# Patient Record
Sex: MALE | Race: WHITE | Employment: OTHER | URBAN - METROPOLITAN AREA
[De-identification: names, ages, dates, MRNs, and addresses within clinical notes are randomized per-mention and may not be internally consistent; named-entity substitution may affect disease eponyms.]

---

## 2017-01-09 NOTE — PROGRESS NOTES
Application for renewal of [address] financial assistance faxed to [email or web address] [facility], [phone] (Ph: [phone]) along with tax returns and insurance cards.

## 2017-01-24 NOTE — PROGRESS NOTES
Received notification from [employer] that patient was not approved for continuing assistance for Gleevec due to income limit and that he has drug coverage.   SW called [employer] and they advise that he may appeal with letter of explanation of need and let

## 2017-01-30 NOTE — TELEPHONE ENCOUNTER
Pt states he is having lower left abdomen pain. He is having problems going to the bathroom. Dr. Guille Jacobo informed. MRI ordered, premeds sent to pharmacy and patient should follow up a couple of days after MRI. Pt informed and verbalized understanding.

## 2017-02-03 PROBLEM — R19.5 DECREASED STOOL CALIBER: Status: ACTIVE | Noted: 2017-02-03

## 2017-02-03 NOTE — PROGRESS NOTES
[facility] Progress Note      Patient Name: Andre Torres   YOB: 1950  Medical Record Number: [id number]  Attending Physician: Faith Roy M.D.      Date of Visit: 2/3/2017      Chief Complaint  Gastrointestinal stromal tumor - alcohol use. Current Medications     furosemide 20 MG Oral Tab Take 1 tablet (20 mg total) by mouth 2 (two) times daily as needed (edema).  Disp: 180 tablet Rfl: 1   Levothyroxine Sodium (SYNTHROID) 125 MCG Oral Tab Take 1 tablet (125 mcg total) by nicolas our discussions today.     Laboratory     Recent Results (from the past 48 hour(s))  -COMP METABOLIC PANEL (14)   Collection Time: 02/03/17 11:49 AM   Result Value Ref Range   Glucose 135 (H) 70-99 mg/dL   BUN 17 8-20 mg/dL   Creatinine 1.17 0.70-1.30 mg/dL adenoma is again noted. AORTA/VASCULAR:  Normal.  No aneurysm or dissection.   RETROPERITONEUM:  Normal.  No mass or adenopathy.   BOWEL/MESENTERY:  Normal.  No visible mass, obstruction, or bowel wall thickening.   ABDOMINAL WALL:  Normal.  No mass or thuy symptoms persist or progress, recommend changing regimens to sunitinib. 2.   Bibiana-oribtal edema: Well controlled on furosemide.      Planned Follow Up   Patient will return for follow up in 2 months with imaging studies; he will call with an update in 1

## 2017-02-03 NOTE — PROGRESS NOTES
Pt was here at [facility] ([facility]) today for f/u OV with Dr. Sandi Astorga. Pt has been working with other Pricilla Martinez, to apply for continued financial assistance for [employer] through [employer].  Recent initial application was denied.   Pt & Nishi Arias are

## 2017-02-13 NOTE — PROGRESS NOTES
Patient called to advise that he was approved for assistance for Gleevec through the [employer] PA[employer], but they need a signed and dated RX. SW called and was advised that one date was missing from Enrollment application.   DELL added oral ALONZO signed the form and

## 2017-04-10 NOTE — PROGRESS NOTES
Patient is here today for follow up with Tj Alfaro for GIST. Patient denies pain. Patient is on Gleevec 800mg daily. Stated orbital edema and watery eyes. Medication list, medical history and toxicities were reviewed and updated.     Education Record    L

## 2017-04-23 PROBLEM — R35.0 URINARY FREQUENCY: Status: ACTIVE | Noted: 2017-04-23

## 2017-04-23 PROBLEM — N40.1 BPH (BENIGN PROSTATIC HYPERTROPHY) WITH URINARY RETENTION: Status: ACTIVE | Noted: 2017-04-23

## 2017-04-23 PROBLEM — R33.8 BPH (BENIGN PROSTATIC HYPERTROPHY) WITH URINARY RETENTION: Status: ACTIVE | Noted: 2017-04-23

## 2017-04-23 NOTE — PROGRESS NOTES
[facility] Progress Note      Patient Name: Darin Last   YOB: 1950  Medical Record Number: [id number]  Attending Physician: Eagle Ko M.D.      Date of Visit: 4/10/2017      Chief Complaint  Gastrointestinal stromal tumor - Rfl: 1   Imatinib Mesylate (GLEEVEC) 400 MG Oral Tab Take 2 tablets (800 mg total) by mouth daily. Disp: 60 tablet Rfl: 6   furosemide 20 MG Oral Tab Take 1 tablet (20 mg total) by mouth 2 (two) times daily as needed (edema).  Disp: 180 tablet Rfl: 1   Levo Collection Time: 04/10/17  1:01 PM   Result Value Ref Range   Glucose 100 (H) 70-99 mg/dL   BUN 14 8-20 mg/dL   Creatinine 1.17 0.70-1.30 mg/dL   GFR 65 >=60   Calcium, Total 9.2 8.3-10.3 mg/dL   Alkaline Phosphatase 65  U/L   AST 28 15-41 U/L   Al mass measured up to 9.8 x 9.1 cm on 3/15/16 and up  to 11 x 9.8 cm on 2/2/17. A component of these differences could relate to differences in technique. The extent of mass effect on the bladder does not appear significantly changed. CONCLUSION:   1. Persist

## 2017-04-28 NOTE — TELEPHONE ENCOUNTER
Per Dr. Maricarmen Medina. He should come back to see me with MRI in July. Orders are in Epic      Patient notified of follow up and MRI for July. When asked patient stated Tamsulosin has not improved his urination.  Instructed may stop taking Tamsulosin if it is no

## 2017-05-11 PROBLEM — R35.0 URINARY FREQUENCY: Status: RESOLVED | Noted: 2017-04-23 | Resolved: 2017-05-11

## 2017-05-11 PROBLEM — R19.5 DECREASED STOOL CALIBER: Status: RESOLVED | Noted: 2017-02-03 | Resolved: 2017-05-11

## 2017-05-11 NOTE — PROGRESS NOTES
HPI:    Patient ID: Kelsie Shaikh is a 77year old male. HPI  HPI:   Kelsie Shaikh is a 77year old male who presents for recheck of hyperlipidemia. Patient reports taking medications as instructed, no medication side effects noted.  Denies any generalized Past Medical History   Diagnosis Date   • Carcinoma of gastrointestinal tract [facility])    • Thyroid disease    • Other and unspecified hyperlipidemia    • Cancer ([facility] [address] 02-12     GIST   • Hypothyroidism           Past Surgical History    BACK SURGERY  19 plan.  The patient is asked to return in 10 m for physical. No further c-scope per pt due to 10.3 cm GIST tumor in rectum treated with gleevac.     Review of Systems         Current Outpatient Prescriptions:  tamsulosin HCl 0.4 MG Oral Cap Take 1 capsule (0.

## 2017-05-11 NOTE — PATIENT INSTRUCTIONS
High Cholesterol  High cholesterol is called hypercholesterolemia. Cholesterol and dietary fat are not the same thing. It’s important to understand how the fat in your diet affects your cholesterol levels.   Your body needs cholesterol to build new cells You can have high blood cholesterol if you eat a diet high in saturated fat and don’t get much exercise. In some cases, your family history plays a role. Your health care provider can diagnose high cholesterol with blood tests.  Treatment consists of a diet Follow up with your health care provider, or as advised. It takes at least 3 months for dietary changes to show a result in your blood cholesterol. Have repeat blood testing as advised by your provider.   If an X-ray or EKG (cardiogram) was done, a speciali

## 2017-06-19 NOTE — ED NOTES
MD at bedside to assess patient at this time. Patient's wife on her way to hospital to drive patient home following pain medication.

## 2017-06-19 NOTE — ED NOTES
Patient is resting comfortably. Patient provided with urinal to void at this time, continues to be able to pass urine. Wife remains at bedside. Patient still has all paperwork and prescriptions from yesterday with him. Wife will drive patient home.

## 2017-06-19 NOTE — ED NOTES
Patient able to void 100 ml following walking around in room, ready for discharge at this time. Wife to drive patient home.

## 2017-06-19 NOTE — ED PROVIDER NOTES
Patient Seen in: [facility] Emergency Department    History   Patient presents with:  Abdomen/Flank Pain (GI/)    Stated Complaint: abd pain    HPI    Patient's a 49-year-old with history of GI ST, with chronic pain, worse over the last 20 hours, levofloxacin 500 MG Oral Tab,  Take 1 tablet (500 mg total) by mouth daily. metRONIDAZOLE 500 MG Oral Tab,  Take 1 tablet (500 mg total) by mouth 3 (three) times daily. tamsulosin HCl 0.4 MG Oral Cap,  Take 1 capsule (0.4 mg total) by mouth daily.    FU anicteric, conjunctiva pink and moist.    Mucus membranes pink and moist,   Neck: Supple with normal range of motion. Chest: clear breath sounds     Heart: Regular rate and rhythm     Abdomen: Soft, nondistended,  No tenderness.   Rectal exam with no te patient had an IV established labs and urine, CT scan were performed. He was given IV fluids and Dilaudid for pain. On repeated exams he is much more comfortable. I spoke with him at length about the results.   I did call and speak with the surgeon on workup as needed    Elida Cockayne, MD  [address] [facility] [phone]    In 5 days  For office consultation      Medications Prescribed:  Discharge Medication List as of 6/19/2017  3:11 AM    START taking these medications

## 2017-06-19 NOTE — ED NOTES
Pt will go home via cab per ERMD. No ride home. 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

## 2017-06-19 NOTE — ED PROVIDER NOTES
Patient Seen in: [facility] Emergency Department    History   Patient presents with:  Anal Problem (GI)    Stated Complaint: rectal pain    HPI    51-year-old male with a known stromal tumor that abuts his bladder presents to the emerge department w (three) times daily. tamsulosin HCl 0.4 MG Oral Cap,  Take 1 capsule (0.4 mg total) by mouth daily.    FUROSEMIDE 20 MG Oral Tab,  TAKE 1 TABLET TWICE A DAY  AS NEEDED (EDEMA)   Imatinib Mesylate (GLEEVEC) 400 MG Oral Tab,  Take 2 tablets (800 mg total) b stridor. Abdominal: Soft. Bowel sounds are normal.   Musculoskeletal: Normal range of motion. Lymphadenopathy:     He has no cervical adenopathy. Neurological: He is alert and oriented to person, place, and time. Skin: Skin is warm and dry.    Psych

## 2017-06-19 NOTE — ED INITIAL ASSESSMENT (HPI)
Pt ambulatory to er with c.c. Abdominal pain with rectal pressure pt has hx of \"gist\"  Has had abd pain over past several weeks much  Worse today.

## 2017-06-19 NOTE — ED NOTES
Patient resting on cart with wife at bedside. Pain controlled at this time. Remains updated with plan of care. Waiting for MD to discuss plan with patient following phone consult with oncology.

## 2017-06-21 NOTE — TELEPHONE ENCOUNTER
Per Deb BOTELLO. Patient to drink one bottle of mag citrate. If no results within two hours patient to drink an additional bottle of mag citrate. Patient to call with results. Stated understanding.

## 2017-06-21 NOTE — TELEPHONE ENCOUNTER
Per Saira Phelps - patient to do an enema and call with results. Instructed pain can be from tumor pressure.

## 2017-06-22 NOTE — TELEPHONE ENCOUNTER
Telephone call to patient. Stated had bowel movement last night beginning at 7pm ... He is feeling better now. We will see patient this afternoon for appointment.

## 2017-06-22 NOTE — PROGRESS NOTES
Patient is here today for follow up with Mahesh Miner for GIST. Patient is on Gleevec 800mg daily. Patient stated pain rectal area is rated a 4 on a scale of 0-10. Increased pain with constipation and when having Bowel movement.  Denies side effects from [facility]

## 2017-06-26 NOTE — ED INITIAL ASSESSMENT (HPI)
Arrives with severe pain to the rectal area. States has a Gist tumor, for which being treated. Was here on Wednesday, given a few pills of pain meds but is now out. Had a bowel movement PTA which increased pain.

## 2017-06-26 NOTE — ED PROVIDER NOTES
Patient Seen in: [facility] Emergency Department    History   Patient presents with:  Abdomen/Flank Pain (GI/)    Stated Complaint: ABDOMINAL PAIN    HPI    14-year-old male presents with rectal pain.   Patient has a history of a large pelvic gist You cannot drive within 8 hours of taking this medicine because of the drowsiness. This medicine has tylenol in it, so you cannot take tylenol in addition to it.   levofloxacin 500 MG Oral Tab,  Take 1 tablet (500 mg total) by mouth daily.    metRONIDAZO Oropharynx clear, mucous membranes moist   Neck: supple, no rigidity   Lungs: good air exchange and clear   Heart: regular rate rhythm and no murmur   Abdomen: Soft and nontender. No abdominal masses.   No peritoneal signs   Extremities: no edema, normal p

## 2017-06-27 NOTE — TELEPHONE ENCOUNTER
Per Efrain Clark ok for refill Hydrocodone acetaminophen 5-325 #90. Patient notified - to  in [address] [place] [address].

## 2017-06-30 NOTE — PROGRESS NOTES
Patient is here today for follow up with Adrianna Daniels for GIST - Patient stated rectal pain improved - patient stated pain is a 2 on a scale of 0-10. Patient is on Sunitinib therapy. Patient denies nausea.  Medication list, medical history and toxicities wer

## 2017-06-30 NOTE — PROGRESS NOTES
DELL obtained patient's signatures on [employer] form for assistance with Sutent. MD completed Prescriber Section. Faxed to [employer], [phone], with patient's tax return and letter explaining his annual income.   Anette Jeronimo will advise Pam Tejeda that it has

## 2017-07-05 NOTE — PROGRESS NOTES
[facility] Progress Note      Patient Name: Siva Grey   YOB: 1950  Medical Record Number: [id number]  Attending Physician: Kali Delgado M.D.      Date of Visit: 6/22/2017      Chief Complaint  Gastrointestinal stromal tumor - hyperplasia; hypothyroidism; hypercholesterolemia. Past Surgical History (historical data, reviewed)  Inguinal hernia repair x 4; lumbar discectomy. Family History (historical data, reviewed)  Mother with breast cancer.      Social History (historic HPI.  Genitourinary As per HPI.     Vital Signs   /67 (BP Location: Left arm, Patient Position: Sitting)   Pulse 69   Temp 97.8 °F (36.6 °C) (Tympanic)   Resp 18   Ht 1.854 m (6' 0.99\")   Wt 107.8 kg (237 lb 9.6 oz)   SpO2 98%   BMI 31.35 kg/m²     P Collection Time: 06/18/17 11:51 PM   Result Value Ref Range   Hold Lt Green Auto Resulted    -COMP METABOLIC PANEL (14)   Collection Time: 06/18/17 11:51 PM   Result Value Ref Range   Glucose 139 (H) 70 - 99 mg/dL   BUN 14 8 - 20 mg/dL   Creatinine 1.13 Negative   pH Urine 7.0 4.5 - 8.0   Protein Urine Negative Negative mg/dl   Urobilinogen Urine <2.0 0.2 - 2.0 mg/dL   Nitrite Urine Negative Negative   Leukocyte Esterase Urine Negative Negative   WBC Urine 1-4 <5 /HPF   RBC URINE 0-2 0 - 2 /HPF   Bacteria

## 2017-07-05 NOTE — PROGRESS NOTES
[facility] Progress Note      Patient Name: Felix Brunner   YOB: 1950  Medical Record Number: [id number]  Attending Physician: Rj Bliss M.D.      Date of Visit: 6/30/2017      Chief Complaint  Gastrointestinal stromal tumor - signs/symptoms. Past Medical History (historical data, reviewed)  Benign prostatic hyperplasia; hypothyroidism; hypercholesterolemia. Past Surgical History (historical data, reviewed)  Inguinal hernia repair x 4; lumbar discectomy.      Family Histor Constitutional Well developed and well nourished; in no apparent distress; appears close to chronological age. Head  Normocephalic and atraumatic. Eyes  Conjunctiva clear; sclera anicteric; mild allyson-orbital edema.    ENMT  External nose normal; externa Monocyte Absolute 0.49 0.10 - 0.60 x10(3) uL   Eosinophil Absolute 0.18 0.00 - 0.30 x10(3) uL   Basophil Absolute 0.02 0.00 - 0.10 x10(3) uL   Immature Granulocyte Absolute 0.02 0.00 - 1.00 x10(3) uL   Neutrophil % 55.7 %   Lymphocyte % 30.2 %   Monocyte

## 2017-07-14 NOTE — PROGRESS NOTES
Patient advises that his application for financial assistance for Sutent was denied.   After speaking to the [employer] rep, the patient wrote a letter of appeal and created a spreadsheet of annual income and expenses which SW faxed to [employer] at [phone]

## 2017-07-14 NOTE — PROGRESS NOTES
Patient is here today for follow up with Dr. Alvaro Ang for GIST. Patient denies pain. Is on Sunitinib 50mg daily. Uses miralax daily for constipation. Orbital edema is resolved. Medication list, medical history and toxicities were reviewed and updated.

## 2017-07-16 NOTE — PROGRESS NOTES
[facility] Progress Note      Patient Name: Adela Lizarraga   YOB: 1950  Medical Record Number: [id number]  Attending Physician: Vasile Little M.D.      Date of Visit: 7/14/2017      Chief Complaint  Gastrointestinal stromal tumor - prostatic hyperplasia; hypothyroidism; hypercholesterolemia. Past Surgical History (historical data, reviewed)  Inguinal hernia repair x 4; lumbar discectomy. Family History (historical data, reviewed)  Mother with breast cancer.      Social History no respiratory distress; clear to auscultation bilaterally. Cardiovascular Regular rate and rhythm; normal S1S2  Abdomen Soft, nondistended; nontender. Extremities No lower extremity edema. Integumentary Skin is warm and dry.   Neurologic Alert and darshana % 4.7 %   Basophil % 0.3 %   Immature Granulocyte % 0.3 %   -FREE T4 (FREE THYROXINE)   Collection Time: 07/14/17  1:49 PM   Result Value Ref Range   Free T4 1.4 0.9 - 1.8 ng/dL       Impression and Plan   1.    Gastrointestinal stromal tumor: Patient is to

## 2017-07-17 NOTE — PROGRESS NOTES
DELL faxed another copy of a Medical Emergency Certificate to Genny Koroma, [phone], that was originally sent on 6/30, at patient's wife Judith's request.

## 2017-07-17 NOTE — PROGRESS NOTES
DELL called [address] who advises that patient is currently in the donut hole and his cost for Sutent is $3079. Once he hits the out-of-pocket maximum, his monthly cost will be in the area of $750/mo. MD advised.

## 2017-07-28 NOTE — TELEPHONE ENCOUNTER
Per  patient patient to restart Sutent at 37.5mg - follow up with Dr. Tian Billy and labs in 2 weeks. Patient transferred to [facility] to scheduled.

## 2017-08-06 NOTE — PROGRESS NOTES
[facility] Progress Note      Patient Name: Jason Olivo   YOB: 1950  Medical Record Number: [id number]  Attending Physician: Nel Medina M.D.      Date of Visit: 8/14/2017      Chief Complaint  Gastrointestinal stromal tumor - fatigue, mouth sores, diarrhea, abdominal cramps, melena, bright red blood per rectum, epistaxis. Performance Status   Karnofsky 100% - Normal, no complaints.     Past Medical History (historical data, reviewed)  Benign prostatic hyperplasia; hypothyroi Temp (!) 96.9 °F (36.1 °C) (Tympanic)   Resp 18   Ht 1.854 m (6' 0.99\")   Wt 103.2 kg (227 lb 8 oz)   SpO2 97%   BMI 30.02 kg/m²     Physical Examination   Constitutional  Well developed and well nourished; in no apparent distress; appears close to chrono - 33.2 pg   MCHC 34.4 31.0 - 37.0 g/dL   RDW 14.3 11.5 - 16.0 %   RDW-SD 50.3 (H) 35.1 - 46.3 fL   Neutrophil Absolute Prelim 1.74 1.30 - 6.70 x10 (3) uL   Neutrophil Absolute 1.74 1.30 - 6.70 x10(3) uL   Lymphocyte Absolute 1.55 0.90 - 4.00 x10(3) uL   Mo

## 2017-08-14 NOTE — PROGRESS NOTES
Patient is here today for follow up with Efrain Clark for GIST. Patient stated pain/aching in bilateral legs with walking or getting up from sitting position. On Sutent therapy 37.5mg daily. Denies nausea. Appetite is slightly decreased.  Medication list, med

## 2017-08-14 NOTE — TELEPHONE ENCOUNTER
----- Message from Poly Interiano MD sent at 8/14/2017  2:16 PM CDT -----  Decrease levothyroxine to 100 mcg daily.  Recheck tsh in 6 weeks    Santiago Micheline  ----- Message -----  From: Gus August MD  Sent: 8/14/2017   2:04 PM  To: Poly Interiano MD    [facility]

## 2017-08-14 NOTE — TELEPHONE ENCOUNTER
D/w pt results and recommendations. He expressed understanding. Rx sent ot retail and lab order placed.

## 2017-09-07 NOTE — PROGRESS NOTES
Patient is here today for follow up with Emily Mesa for GIST/carcinoma gastrointestinal treact. Patient denies pain. Is currently on Sutent 37.5mg daily. Denies adverse side effects.  Medication list, medical history and toxicities were reviewed and updated

## 2017-09-08 NOTE — PROGRESS NOTES
[facility] Progress Note      Patient Name: Janeth Hsu   YOB: 1950  Medical Record Number: [id number]  Attending Physician: Shyann Kemp M.D.      Date of Visit: 9/7/2017      Chief Complaint  Gastrointestinal stromal tumor - reviewed)  Benign prostatic hyperplasia; hypothyroidism; hypercholesterolemia. Past Surgical History (historical data, reviewed)  Inguinal hernia repair x 4; lumbar discectomy. Family History (historical data, reviewed)  Mother with breast cancer. effort; no respiratory distress; clear to auscultation bilaterally. Cardiovascular  Regular rate and rhythm; normal S1S2  Abdomen  Soft, nondistended; nontender. Extremities  No lower extremity edema. Integumentary  Skin is warm and dry.   Neurologic 51.2 %   Lymphocyte % 36.7 %   Monocyte % 7.5 %   Eosinophil % 4.0 %   Basophil % 0.3 %   Immature Granulocyte % 0.3 %     Radiology  08/31/2017:  CT abdomen/pelvis w contrast - LUNG BASE:  Slight subsegmental atelectasis. LIVER:  Homogeneous enhancement. BI discussed with Dr. Almas Peters. Impression and Plan   1. Gastrointestinal stromal tumor: Patient is tolerating sunitinib well. Patient's pain has resolved with therapy. He continues to complain of urinary frequency. Continue sunitinib.  I explained to alize

## 2017-09-08 NOTE — TELEPHONE ENCOUNTER
Received a message from Jasmina at Dr. Nasreen Cain office:  Zoie aBker number given to discuss colostomy care.  No answer at pt's number, left brief message for patient to call [facility] for pre op appointment for education prior to surgery if ne

## 2017-09-11 NOTE — TELEPHONE ENCOUNTER
Contacted patient per MD request to discuss ostomy care. Pt states he has decided to have ostomy surgery in the next month. I instructed pt on [employer] [place] web site for online education and also gave pt information about the ostomy support group [place]

## 2017-09-20 NOTE — PROGRESS NOTES
Pt given pre-op instrutions for surgery. Surgery to be coordinated with Dr. Dakotah Ramires and Dr. Yulia Stewart. PT to get medical clearance with PCP. Pt to see wound center for stoma markings and teachings. Pt agreed and understood.  I informed patient to call with a
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
## 2017-09-22 NOTE — PATIENT INSTRUCTIONS
Facts About Dietary Fat     Olive oil is a good source of unsaturated fat. Eating less saturated and trans fat is one of the best things you can do for your heart. Start by finding out which fats are better to use.  Then always try to use as little \" © 3241-5695 [address], [address] [place]. All rights reserved. This information is not intended as a substitute for professional medical care. Always follow your healthcare professional's instructions.

## 2017-09-25 NOTE — H&P
[address] [facility], [facility]    History and Physical    Hoang Moscow Patient Status:  No patient class for patient encounter    10/23/1950 MRN [id number]   Location [facility] Attending No att. provide Rfl: 0   Levothyroxine Sodium 100 MCG Oral Tab Take 1 tablet (100 mcg total) by mouth before breakfast. Disp: 30 tablet Rfl: 1   PEG 3350 Oral Powd Pack Take 17 g by mouth daily.  Disp:  Rfl:    tamsulosin HCl 0.4 MG Oral Cap Take 1 capsule (0.4 mg total) b any unusual skin lesions  EYES:denies blurred vision or double vision  HEENT: denies nasal congestion, sinus pain or ST  LUNGS: denies shortness of breath with exertion  CARDIOVASCULAR: denies chest pain on exertion  GI: denies abdominal pain,denies heartb showed normal sinus rhythm without acute ST changes or TWI. His EKG is unchanged from his EKG done on 10/30/2015.         History     Past Medical History:   Diagnosis Date   • Back problem     back surgery 20 years ago   • [facility] 02-12    GIST   • Ca 09/07/2017   BILT 0.4 09/07/2017   TP 6.5 09/07/2017   AST 18 09/07/2017   ALT 29 09/07/2017   T4F 1.4 08/14/2017   TSH 0.841 09/07/2017    06/18/2017   PSA 0.717 06/30/2017    08/14/2017               Assessment/Plan:     * No active hospital

## 2017-10-02 NOTE — PROGRESS NOTES
[facility]  Report of Pre-op Ostomy Nurse Consultation    Ashley Cordero Patient Status:  Wound Series    10/23/1950 MRN [id number]   Location [address] [facility] Attending Aliyah Mehta MD     History of Present Illness:  Ashley Cordero LUQ, LLQ, RUQ and RLQ    These sites were in 1. Rectus muscle, 2. In patients flattest pouching plane and in patients visual field. Surgical marker was used and covered with Transparent dressing. Questions answered. Plan: Will follow post-op.  Surg

## 2017-10-03 NOTE — INTERVAL H&P NOTE
There has been no significant change since I saw patient as documented in [facility]. Surgery revisted. To proceed as planned. Kush Sung I.  Yesica Cartwright MD FACS

## 2017-10-03 NOTE — BRIEF OP NOTE
Pre-Operative Diagnosis: GIST (gastrointestinal stroma tumor), malignant, colon ([address] [C49. A4]     Post-Operative Diagnosis: GIST (gastrointestinal stroma tumor), malignant, colon ([address] [C49. A4]     Procedure Performed:     PELVIC EXENTERATION  TO INCLU

## 2017-10-03 NOTE — ANESTHESIA PREPROCEDURE EVALUATION
PRE-OP EVALUATION    Patient Name: Baltazar Molina    Pre-op Diagnosis: GIST (gastrointestinal stroma tumor), malignant, colon ([facility] Fabiola Rodas. A4]    Procedure(s):  PELVIC EXENTERATION (DR. JORDAN)   OPEN ILEAL CONDUIT OR SIGMOID CONDUIT DIVERSION (NICOLE) mouth daily. Disp:  Rfl:        Allergies: Radiology Contrast Iodinated Dyes; Iodine (Topical)      Anesthesia Evaluation    Patient summary reviewed.     Anesthetic Complications  (-) history of anesthetic complications         GI/Hepatic/Renal  Comment: G

## 2017-10-03 NOTE — ANESTHESIA POSTPROCEDURE EVALUATION
[address] [place] [address]  Patient Status:  Surgery Admit   Age/Gender 77year old male MRN [id number]   [facility] SURGERY Attending Mingo Kohler MD   [facility] Day # 0 PCP Thomas Lake MD       Anesthesia Post-op Note    Procedure(s):  PE
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
## 2017-10-04 NOTE — PROGRESS NOTES
[facility] Urology   Progress Note  Landy Ledbetter Patient Status:  Inpatient    10/23/1950 MRN [id number]   [facility] 7NE-A Attending Deondre Rinaldi MD   [facility] Day # 1 PCP Molly Cheadle, MD     Subjective:  Landy Music is a(n) 77 y Urology  [phone]  4:24 PM

## 2017-10-04 NOTE — PHYSICAL THERAPY NOTE
PHYSICAL THERAPY EVALUATION - INPATIENT     Room Number: 8919/2807-S  Evaluation Date: 10/4/2017  Type of Evaluation: Initial  Physician Order: PT Eval and Treat    Presenting Problem: s/p pelvic exenteration 10/3  Reason for Therapy: Mobility Dysfunct incisional  Management Techniques: Activity promotion; Body mechanics;Breathing techniques;Repositioning    COGNITION  · Overall Cognitive Status:  WFL - within functional limits  · Arousal/Alertness:  appropriate responses to stimuli  · Attention Span:  ap Standardized Score (AM-PAC Scale): 38.1   CMS Modifier (G-Code): CL    FUNCTIONAL ABILITY STATUS  Gait Assessment   Gait Assistance: Dependent assistance  Distance (ft): 5  Assistive Device: Rolling walker  Pattern: Shuffle  Stoop/Curb Assistance: Not te transfers, and gait and stairs. The patient is below baseline and would benefit from skilled inpatient PT to address the above deficits to assist patient in returning to prior to level of function.   DISCHARGE RECOMMENDATIONS  PT Discharge Recommendations:

## 2017-10-04 NOTE — PROGRESS NOTES
[facility]  Progress Note     Maddy Rodriguez Patient Status:  Inpatient    10/23/1950 MRN [id number]   [facility] 7NE-A Attending Guanako Rodriguez MD   Hosp Day # 1 PCP Diane Eduardo MD     Chief Complaint: Medical Mgmt     S: Patient n 100 mcg Oral Before breakfast   • atorvastatin  10 mg Oral Nightly       ASSESSMENT / PLAN:     1. GIST tumor  1. S/p pelvic tumor resection/abdominoperineal resection, cystectomy,prostatectomy   2. XR abdomen reviewed   2. Hypothyroid   1.  Levothyroxine

## 2017-10-04 NOTE — PLAN OF CARE
Awake and alert   PCA dilaudid, effective for pain, required one additional dose of PRN dilaudid  Midline incision with small amt of drainage  Urostomy with dark red drainage  Pelvic drain with dark red drainage  Colostomy bag with no output  BS hypoactive

## 2017-10-04 NOTE — PLAN OF CARE
Received from PACU @ 2030, family at bedside. Pt is a/o x 4, drowsy. C/o abdominal pain and \"spasms\", reinforced use of PCA. Additional IV dilaudid given IVP. ML incision with scant amt of shadow drainage.    Pelvic drain to bulb suction, dark bloody

## 2017-10-04 NOTE — CM/SW NOTE
Pt seen for d/c planning and to set pt up with [facility]. Pt is a 78 yo male admitted with GIST. Pt had surgery with Dr Zackary Cooks who removed pt's bladder, prostate, and rectum. Pt is  and lives alone in a first floor apt with one step to get in.   Pt has th

## 2017-10-04 NOTE — H&P
[facility]  Annemarie Kim Patient Status:  Inpatient    10/23/1950 MRN [id number]   [facility] 7NE-A Attending Jim Winn MD   Hosp Day # 0 PCP Lauren Turner MD     Reason for consult: med mgt    Requested by:  and 10 pm day before surgery Disp: 3 tablet Rfl: 0   Neomycin Sulfate 500 MG Oral Tab Take 2 tablets by mouth at 2 pm, 3 pm, and 10 pm day before surgery Disp: 6 tablet Rfl: 0   Levothyroxine Sodium 100 MCG Oral Tab Take 1 tablet (100 mcg total) by mouth b Imaging data reviewed in Epic. ASSESSMENT / PLAN:     1. GIST tumor POD#0 s/p PELVIC EXENTERATION  TO INCLUDE RESECTION OF PELVIC TUMOR WITH EN BLOC, ABDOMINOPERINEAL RESECTION, TOTAL CYSTECTOMY, PROSTATECTOMY. END COLOSTOMY, UROSTOMY.    2. Hypothyroi

## 2017-10-04 NOTE — PROGRESS NOTES
POD#1  Reports pain  Has belching    Blood pressure 115/76, pulse 75, temperature 98.8 °F (37.1 °C), temperature source Oral, resp. rate 18, height 1.829 m (6'), weight 108 kg (238 lb), SpO2 95 %. I/O last 3 completed shifts:   In: 8176 [P.O.:180; I.V.:6

## 2017-10-05 NOTE — CONSULTS
[facility]  Inpatient Ostomy Progress Note    José Miguel Liz Patient Status:  Inpatient    10/23/1950 MRN [id number]   [facility] 7NE-A Attending Rosa Escobar MD   Days in hospital 2 Primary Rylee Cabezas MD     History of Present Illn program registration no    Purchasing supplies  yes        Teaching tools used    Video yes    Learning packet   yes    Demonstration  yes    Return Demonstration  no            Outcome of Education:  Needs reinforcement, Observed demonstration and Shows u

## 2017-10-05 NOTE — PROGRESS NOTES
[facility]  Progress Note     Tatyana Head Patient Status:  Inpatient    10/23/1950 MRN [id number]   [facility] 7NE-A Attending Ness Hammond MD   Hosp Day # 2 PCP Manju Salazar MD     Chief Complaint: Medical Mgmt     S: Patient f Subcutaneous 2 times per day   • famoTIDine  20 mg Oral BID    Or   • famoTIDine  20 mg Intravenous BID   • cefTRIAXone  2 g Intravenous Q24H   • Levothyroxine Sodium  100 mcg Oral Before breakfast   • atorvastatin  10 mg Oral Nightly       ASSESSMENT / PL

## 2017-10-05 NOTE — OPERATIVE REPORT
[facility] Urology        Operative Note     Perri Lopez Patient Status:  Inpatient    10/23/1950 MRN [id number]   [address] [facility] [address] Attending Ledy Rapp MD   Hosp Day # 2 PCP Feliberto Nayak MD       SURGEON: Rick Khanna M.D. appeared to be pushed up and compressed by the tumor against pubic arch bone.   Initially, an attempt was made to spare the bladder and a plane was created between the bladder and tumor mass, however, we realized that the tumor was invading bladder at the b The pelvis was then thoroughly irrigated with saline and hemostasis within the pelvis was confirmed. Dr Brent Potter then completed perineal resection of remaining rectum/anus and closed the vault of pelvic floor muscles.  The pelvis was then thoroughly irrigat drain, simultaneous drop in patient's BP and hypotention. We observed for a few minutes, bleeding did not seem to stop. At this point we decided to re-open the patient and explore the pelvic area.   We identified a large clot and some minor active bleedin

## 2017-10-05 NOTE — PLAN OF CARE
Anxious to work with PT  Nursing staff assisted pt to the chair, up to chair now, resting comfortably  Midline incision RED, staples well approximated, no s/s of infection  Pain well controlled with PCA and tramadol  hypoactive BS, abdomen soft, non disten

## 2017-10-05 NOTE — PLAN OF CARE
Assumed care at 1900. Pt a/ox4. Vss, nsr per tele. RA. Pain controlled w pca dilaudid and scheduled toradol. Mid abd incision w/ island dressing and tegaderm in place, small old drainage noted. DANETTE drain intact draining serosanguinous drain.   Colostomy

## 2017-10-05 NOTE — PROGRESS NOTES
POD#2  Pain controlled, toradol added yesterday    Blood pressure 121/73, pulse 74, temperature 98.1 °F (36.7 °C), temperature source Oral, resp. rate 18, height 1.829 m (6'), weight 106.6 kg (235 lb 0.2 oz), SpO2 98 %. I/O last 3 completed shifts:   In:

## 2017-10-05 NOTE — PROGRESS NOTES
[facility]  Urology Progress Note    Tatyana Head Patient Status:  Inpatient    10/23/1950 MRN [id number]   [facility] 7NE-A Attending Ness Hammond MD   [facility] Day # 2 PCP Manju Salazar MD     Subjective:  Tatyana Head is a(n) 77 year removed ureteral stents 7 days post-op.       Marianna King P.A.-C  [facility] Urology  10/5/2017  2:01 PM

## 2017-10-05 NOTE — DIETARY NOTE
[address] ASSESSMENT    Pt is at moderate nutrition risk. Pt does not meet malnutrition criteria.     NUTRITION DIAGNOSIS/PROBLEM:    Increased nutrient needs related to increased need for protein for healing as evidenced by pt s/ BID    FOOD/NUTRITION RELATED HISTORY:  Appetite: Good  Intake: >75%  Intake Meeting Needs: Yes  Food Allergies: No  Cultural/Ethnic/Anabaptist Preferences Addresses: Yes    NUTRITION RELATED PHYSICAL FINDINGS:     1. Body Fat/Muscle Mass: BMI: 31.87     2.

## 2017-10-06 NOTE — PROGRESS NOTES
[facility]  Urology Progress Note    Marla Mckinney Patient Status:  Inpatient    10/23/1950 MRN [id number]   [facility] 7NE-A Attending Marion Pandey MD   [address] Day # 3 PCP Tommy Medrano MD     Subjective:  Marla Mckinney is a(n) 77 year

## 2017-10-06 NOTE — CM/SW NOTE
SW received call from the wound care RN. Pt is s/p resection of pelvic gastrointestinal stromal tumor with en bloc abdominoperineal resection, total cystectomy, prostatectomy, end colostomy, and urostomy and will need extensive wound care at d/c.  Pt lives

## 2017-10-06 NOTE — PROGRESS NOTES
POD#3  Had vomited twice  Does not feel as well    Blood pressure 152/76, pulse 66, temperature 97.9 °F (36.6 °C), temperature source Oral, resp. rate 18, height 1.829 m (6'), weight 106.6 kg (235 lb 0.2 oz), SpO2 96 %. I/O last 3 completed shifts:   In:

## 2017-10-06 NOTE — PLAN OF CARE
Patient A/Ox4, discouraged about today  Room air, no s/s of respiratory distress  Encouraged to use the IS  NG inserted this morning by Dr. Rosana Selby, large green/brown output noted.  Minimal relief with nausea noted, Dr. Rosana Selby paged  No output noted in L) colo

## 2017-10-06 NOTE — PLAN OF CARE
Assumed care of pt 1900. POD #3 Abdomen with incision with staples RED. Abdomen soft, rounded. hypoactive BS. Pt denies flatus. No air noted in colostomy bag. Bibiana-anal area with dressing, C/D/I. Lt colostomy, no output noted. Stoma pink.  Rt urostomy with

## 2017-10-06 NOTE — PHYSICAL THERAPY NOTE
PHYSICAL THERAPY TREATMENT NOTE - INPATIENT    Room Number: 9176/7607-G     Session: 1   Number of Visits to Meet Established Goals: 5    Presenting Problem: s/p pelvic exenteration 10/3    Problem List  Active Problems:    GIST (gastrointestinal stroma t Little   -   Moving from lying on back to sitting on the side of the bed?: A Lot   How much help from another person does the patient currently need. ..   -   Moving to and from a bed to a chair (including a wheelchair)?: A Little   -   Need to walk in hosp with gait/transfers resulting in downgrade of overall functional mobility. Due to above deficits, Pt will benefit from continued IP PT, so that patient may achieve highest functional independence/return to baseline.  Recommend Home upon [facility] d/c

## 2017-10-06 NOTE — PHYSICAL THERAPY NOTE
Attempted to see Pt today x 2 attempts. Per RN Daryle Maid - Pt is currently with increased NG output and nausea. Pt not appropriate for PT session. Recommend nursing staff attempt to mobilize later this PM.  Will re-attempt as time permits.

## 2017-10-06 NOTE — PROGRESS NOTES
[facility]  Progress Note     Dennis Meza Patient Status:  Inpatient    10/23/1950 MRN [id number]   [facility] 7NE-A Attending Elvin Wang MD   Hosp Day # 3 PCP Marybelle Mortimer, MD     Chief Complaint: Medical Mgmt     S: Patient s Imaging data reviewed in Epic.     Medications:   • ketorolac (TORADOL) injection  15 mg Intravenous Q6H   • Heparin Sodium (Porcine)  5,000 Units Subcutaneous 2 times per day   • famoTIDine  20 mg Oral BID    Or   • famoTIDine  20 mg Intravenous BID   • Le

## 2017-10-07 NOTE — PLAN OF CARE
Assumed care at 1900. No acute issues overnight. C/o nausea x 2, zofran given with good results. NG to LIS, green output. Hypo bs x 4. No gas/stool in colostomy. Urostomy with pink tinged urine. ML incision w/ staples, franklyn.    Left abdominal DANETTE, s

## 2017-10-07 NOTE — PLAN OF CARE
RN paged Dr. Guera Lynch regarding fever and updates  Orders for Albumin x 1 bolus, orders carried out  Will continue to monitor

## 2017-10-07 NOTE — PROGRESS NOTES
[facility]: Antimicrobial Weight Dose Adjustment for: Zosyn (piperacillin/tazobactam)    Krysta Kahn is a 77year old male who has been prescribed Zosyn (piperacillin/tazobactam).   CrCl is estimated creatinine clearance is 107.8 mL/min (based on SC

## 2017-10-07 NOTE — PROGRESS NOTES
[facility]  Progress Note     Katlin Liao Patient Status:  Inpatient    10/23/1950 MRN [id number]   [facility] 7NE-A Attending Kali Shrestha MD   Hosp Day # 4 PCP Thomas Lake MD     Chief Complaint: Medical Mgmt     S: Patient Sodium  100 mcg Oral Before breakfast   • atorvastatin  10 mg Oral Nightly       ASSESSMENT / PLAN:     1. Fever overnight  1. Neutropenia on labs  2. Pan Cx, CXr  3. Pip tazo- d/c Abx in 48 hours if all cx negative/no PNA on CXR  2. GIST tumor  1.  S/p pel

## 2017-10-07 NOTE — PLAN OF CARE
Patient A/Ox4, more optimistic today  Room air, no s/s of respiratory distress  Encouraged to use the IS  NG noted with green output  No output noted in L) colostomy   R) urostomy noted with blood tinged urine  Bowel hypoactive, remains NPO, nausea control

## 2017-10-07 NOTE — PROGRESS NOTES
[facility]  Progress Note    Vinay Kim Patient Status:  Inpatient    10/23/1950 MRN [id number]   [facility] 7NE-A Attending Fide Burgess MD   [facility] Day [facility] 4 PCP Brian Mckeon MD     Subjective:  Vinay Kim is a(n) 77year old male

## 2017-10-07 NOTE — PROGRESS NOTES
[facility]  Progress Note    Maddy Rodriguez Patient Status:  Inpatient    10/23/1950 MRN [id number]   [facility] 7NE-A Attending Lisha Morales MD   Hosp Day # 4 PCP Diane Eduardo MD     Subjective:  Patient with large amount of an empha significant leukopenia-1.8 this morning  Monitor platelet [id number] this morning  Low-grade temp 100 overnight-afebrile this morning continue NG tube-encourage ambulation-    Plan:   Continue DANETTE drain to suction-monitor labs- continue NG tube-encourage amb

## 2017-10-08 NOTE — PROGRESS NOTES
[facility]  Progress Note     Osvaldo Eubanks Patient Status:  Inpatient    10/23/1950 MRN [id number]   [facility] 7NE-A Attending Judith Warren MD   Hosp Day # 5 PCP Sophie Fox MD     Chief Complaint: Medical Mgmt     S: Patient w Intravenous BID   • Levothyroxine Sodium  100 mcg Oral Before breakfast   • atorvastatin  10 mg Oral Nightly       ASSESSMENT / PLAN:     1. Fever overnight  1. Neutropenia on labs  2. Pan Cx, CXr  3.  Pip tazo- d/c Abx in 48 hours if all cx negative/no PNA

## 2017-10-08 NOTE — PLAN OF CARE
Assumed care at 0730. Midline insicion c/d/i. R DANETTE drain to bulb suction, see chart for output. PCA minimally utilized. Pain well controlled. NG tube to LIS. Pt ambulated the unit, up to chair for hours at a time. Needs attended to.     Angelo Daniels

## 2017-10-08 NOTE — PHYSICAL THERAPY NOTE
PHYSICAL THERAPY TREATMENT NOTE - INPATIENT    Room Number: 7662/5134-N     Session: 2  Number of Visits to Meet Established Goals: 5    Presenting Problem: s/p pelvic exenteration 10/3    Problem List  Active Problems:    GIST (gastrointestinal stroma tu standing up from a chair with arms (e.g., wheelchair, bedside commode, etc.): A Little   -   Moving from lying on back to sitting on the side of the bed?: A Lot   How much help from another person does the patient currently need. ..   -   Moving to and from for gait training, active flexibility and BLE strengthening, due to [facility] admission for s/p pelvic exenteration 10/3.     Results of the AM-PAC \"6 clicks\" Inpatient Daily Mobility Short Form for the patient is 54.16% degree of basic mobility imp

## 2017-10-08 NOTE — PLAN OF CARE
Assumed care at [address]. AOx4, VSS on RA  No signs of cardiac or respiratory distress noted. Denies nausea. Pain management with PCA Dilaudid and Toradol. Midline incision, RED. No redness, drainage or swelling noted.   DANETTE in place to bulb suction, serosa

## 2017-10-08 NOTE — PROGRESS NOTES
[facility]  Progress Note    Fina Aldrich Patient Status:  Inpatient    10/23/1950 MRN [id number]   [facility] 7NE-A Attending lSy Saab MD   [address] Day # 5 PCP Gabriella Feliciano MD     Subjective:  Fina Aldrich is a(n) 77year old male

## 2017-10-08 NOTE — CM/SW NOTE
sw notified by therapy they continue to recommend home. sw spoke to RN who states pts nursing and wound care needs for discharge are still unknown. Per RN pt/family have not yet picked a [facility] facility.  SW to follow up once nursing needs are known for discha

## 2017-10-08 NOTE — PROGRESS NOTES
[facility]  Progress Note    Erin Trejo Patient Status:  Inpatient    10/23/1950 MRN [id number]   [facility] 7NE-A Attending Josie Gordon MD   Hosp Day # 5 PCP Roscoe Kapoor MD     Subjective:  Patient states overall he is feeling for TPN parenteral nutritional support  Last albumin 2.5 from 4 days ago    Claudio Guidry MD  10/8/2017  12:20 PM

## 2017-10-09 NOTE — PROGRESS NOTES
[facility]  Progress Note    Jossy Jay Patient Status:  Inpatient    10/23/1950 MRN [id number]   [facility] 7NE-A Attending Burgess Giulia MD   [address] Day # 6 PCP Ean Pantoja MD     Subjective:  Patient sitting up in chair, NGT in p with dietary - unsure if pt to require TPN at DC  Pt with PCA in place, using minimally due to fear of side effects, discussed with pt   Ambulation encouraged  GI/DVT prophylaxis   Duane Salinas PA-C  10/9/2017  11:15AM

## 2017-10-09 NOTE — DIETARY NOTE
1230 [facility] ASSESSMENT    Pt is at moderate nutrition risk. Pt does not meet malnutrition criteria.     NUTRITION DIAGNOSIS/PROBLEM:    Inadequate oral intake related to inability to consume sufficient po as evidenced by pt s/p re placed today. 10/5- pt POD #2 s/p resection of pelvic gastrointestinal stromal tumor with en bloc abdominoperineal resection, total cystectomy, prostatectomy, end colostomy, and urostomy.  Pt reports he had some cream of wheat this morning, only ate a lit

## 2017-10-09 NOTE — OCCUPATIONAL THERAPY NOTE
OCCUPATIONAL THERAPY QUICK EVALUATION - INPATIENT    Room Number: 6203/0901-S  Evaluation Date: 10/9/2017     Type of Evaluation: Quick Eval  Presenting Problem: GI stroma tumor, 10/3 surgery, see surgery report    Physician Order: IP Consult to Occupation Hand Dominance: Right  Drives: Yes  Patient Regularly Uses: Glasses    Prior Level of Function: independent with ADL, IADL. Daughter lives in [facility]. SUBJECTIVE   \"I can do things on my own just fine. \"        OBJECTIVE  Precautions: Drain(s); Co Independent with UE dressing. Patient End of Session: Up in chair;Needs met;Call light within reach;RN aware of session/findings; All patient questions and concerns addressed    ASSESSMENT     Patient is a 77year old male admitted on 10/3/2017 for michele to demonstrate safety with ADLS: safely and independently

## 2017-10-09 NOTE — CM/SW NOTE
PT/OT not recommending NEERAJ; however, pt does not feel comfortable going home alone and has limited support. He would like to go to [facility] or [facility]. Referrals made to both of these facilities via ecin. DON screen requested.   Waiting for

## 2017-10-09 NOTE — PLAN OF CARE
Assumed care at 0700  PICC placed  Bowel sounds present. No output from colostomy.  NPO with ice chips  Urine cultures positive for yeast, Zosyn dc'd, Diflucan started  DANETTE creatinine pending  Midline incision dressing c/d/i  TPN to be administered tonight

## 2017-10-09 NOTE — PHYSICAL THERAPY NOTE
PHYSICAL THERAPY TREATMENT NOTE - INPATIENT    Room Number: 1868/3906-P     Session: 3  Number of Visits to Meet Established Goals: 5    Presenting Problem: s/p pelvic exenteration 10/3    Problem List  Active Problems:    GIST (gastrointestinal stroma tu over in bed (including adjusting bedclothes, sheets and blankets)?: A Little   -   Sitting down on and standing up from a chair with arms (e.g., wheelchair, bedside commode, etc.): A Little   -   Moving from lying on back to sitting on the side of the bed? ambulation to maintain current level of mobility. The rehab aide will perform treatment activities prescribed by this physical therapist. The rehab aide will communicate with overseeing PT regarding any change in functional mobility. RN aware.      Via Fidelia Mcdowell

## 2017-10-09 NOTE — PLAN OF CARE
Assumed patient care at [address] [facility], Patient is alert & oriented X 4  patient c/o abdominal pain 5/10 with relief of dilaudid PCA, patient states he is hesitant to use pain medication because he does not want to slow down his bowels, discussed pain control and am

## 2017-10-09 NOTE — PROGRESS NOTES
[facility]  Progress Note     Ashley Cordero Patient Status:  Inpatient    10/23/1950 MRN [id number]   [facility] 7NE-A Attending Keshia Holloway MD   Hosp Day # 6 PCP Mila Bee MD     Chief Complaint: Medical Mgmt     S: Patient u • Levothyroxine Sodium  100 mcg Oral Before breakfast   • atorvastatin  10 mg Oral Nightly       ASSESSMENT / PLAN:     1. Fever - resolved  1. Urine Cx with candida albicans- given neutropenia/urological intervention  will treat with fluconazole  2.  Pip

## 2017-10-10 NOTE — WOUND PROGRESS NOTE
[facility]  Report of Inpatient Ostomy Consultation    Fina Aldrich Patient Status:  Inpatient    10/23/1950 MRN [id number]   [facility] 7NE-A Attending Sly Saab MD     History of Present Illness:  Fina Aldrich is a a(n) 77 year sigmoid conduit (Urostomy)  Stoma color: red  Stoma condition: normal  Stoma diameter:   Ostomy output: urine  Stoma height: adequate  Peristomal skin: intact  Pouching system:two piece Wafer 07806 and pouch 23463 and adapter 2625  Able to care for stoma:

## 2017-10-10 NOTE — PROGRESS NOTES
POD#7  Feels well  + flatus    Blood pressure 133/77, pulse 64, temperature 98.4 °F (36.9 °C), temperature source Oral, resp. rate 18, height 1.829 m (6'), weight 97.5 kg (215 lb), SpO2 98 %. I/O last 3 completed shifts: In: 3408 [P.O.:400;  I.V.:2648;

## 2017-10-10 NOTE — PLAN OF CARE
Patient alert and oriented times four. Meds given per MAR. Patient has NG to LIS. Walked laps before bed. Starting to pass gas into colostomy bag. Vital signs stable. Denies any pain or discomfort. Resting comfortably in bed. Call light in reach.

## 2017-10-10 NOTE — PROGRESS NOTES
10/10/17 0044   Clinical Encounter Type   Visited With Patient and family together  (daughter/friend were present by the bedside)   Continue Visiting (Encouraged to call  anytime for continuity of care/support.)   Patient's Supportive Strategies

## 2017-10-10 NOTE — PROGRESS NOTES
Patient presented semi-supine in bed; VSS and agreeable to participate. Transferred supine>sit and sit>stand w/supervision assist.  Ambulated 300' w/SBA sans AD.   Upon completion, patient left in bathroom under PCT care to assist w/ADL's.  RN [facility] aware

## 2017-10-10 NOTE — PROGRESS NOTES
[facility]  Progress Note     Priti To Patient Status:  Inpatient    10/23/1950 MRN [id number]   [facility] 7NE-A Attending Dontrell Mena MD   Hosp Day # 7 PCP Shelby Hernández MD     Chief Complaint: Medical Mgmt     S: Patient f Sodium (Porcine)  5,000 Units Subcutaneous [facility]   • famoTIDine  20 mg Oral BID    Or   • famoTIDine  20 mg Intravenous BID   • Levothyroxine Sodium  100 mcg Oral Before breakfast       ASSESSMENT / PLAN:     1. Fever - resolved  1.  Urine Cx with candida

## 2017-10-10 NOTE — PROGRESS NOTES
[facility]    Progress Note    Loly Jiang Patient Status:  Inpatient    10/23/1950 MRN [id number]   [facility] 7NE-A Attending Vianey Oreilly MD   [facility] Day # 7 PCP Bernice Hernandez MD       Subjective:   Loly Jiang is a(n) 77year old

## 2017-10-10 NOTE — PLAN OF CARE
Patient A/Ox4, cooperative with care and staff  Room air, no s/s of respiratory distress  Encouraged to use the IS  NG clamped at 08:00, no residual noted when hooked back to LIS.   RN to remove NG  Gas and stool noted in L) colostomy   R) urostomy noted wi

## 2017-10-10 NOTE — DIETARY NOTE
Nutrition Short Note-TPN    TPN infusing without difficulty. TPN tonight to provide 825 dextrose calories, 500 lipid calories, 90 grams protein in 2400 ml fluid to meet ~75% pt needs. Electrolytes adjusted based on am labs. All discussed with CLINTON PAREKH to con

## 2017-10-10 NOTE — PROGRESS NOTES
10/10/17 9691   Clinical Encounter Type   Visited With Patient and family together  (daughter/friend were present by the bedside)   Continue Visiting (Encouraged to call  anytime for continuity of care/support.)   Patient's Supportive Strategies

## 2017-10-11 NOTE — WOUND PROGRESS NOTE
[facility]  Inpatient Ostomy Progress Note    Domingo Zuniga Patient Status:  Inpatient    10/23/1950 MRN [id number]   [facility] 7NE-A Attending Mercedes Sicard, MD   Days in hospital 8 Primary Arya Dodson MD     History of Present Illn Purchasing supplies  yes        Teaching tools used    Video yes    Learning packet   yes    Demonstration  yes    Return Demonstration  yes            Outcome of Education:  Needs reinforcement, Observed demonstration and Shows understanding        Tota

## 2017-10-11 NOTE — PROGRESS NOTES
[facility]  Progress Note     Loly Dates Patient Status:  Inpatient    10/23/1950 MRN [id number]   [facility] 7NE-A Attending Keyona Noland MD   Hosp Day # 8 PCP Bernice Hernandez MD     Chief Complaint: GIST    S: Patient doing well Heparin Sodium (Porcine)  5,000 Units Subcutaneous [facility]   • famoTIDine  20 mg Oral BID    Or   • famoTIDine  20 mg Intravenous BID   • Levothyroxine Sodium  100 mcg Oral Before breakfast       ASSESSMENT / PLAN:     1. Neutropenia fever, resolved  2.  Ca

## 2017-10-11 NOTE — PROGRESS NOTES
10/11/17 1825   Clinical Encounter Type   Visited With Patient   Continue Visiting No   Patient Spiritual Encounters   Spiritual Interventions  visited pt. of Dr. Shirley Reis, providing active listening as pt. shared of surgery one week ago, and exp

## 2017-10-11 NOTE — PROGRESS NOTES
POD#8  Feels well  + BM    Blood pressure 128/78, pulse 52, temperature 98.9 °F (37.2 °C), temperature source Oral, resp. rate 18, height 1.829 m (6'), weight 97.5 kg (215 lb), SpO2 100 %. I/O last 3 completed shifts:   In: 2755 [P.O.:880; I.V.:165; IV P

## 2017-10-11 NOTE — CONSULTS
INFECTIOUS DISEASE CONSULT NOTE    Delilah Ryan Patient Status:  Inpatient    10/23/1950 MRN [id number]   [facility] 7NE-A Attending Rio Recinos MD   [facility] Day # 8 PCP India Gallagher, Contrast *    Hives, Respiratory failure, Other                            (See Comments)    Comment:Originally with \"lower GI enema\"  Iodine (Topical)            Medications:    Current Facility-Administered Medications:   •  HYDROcodone-acetaminophen ( auscultation bilaterally. No wheezes. Decreased at bases  Cardiovascular: S1, S2.  Regular rate and rhythm. Abdomen: Soft, mild tenderness with deep palpation, nondistended. Incision well approx, no signs of infection. Colostomy and urostomy in place. <5 /HPF 5-10 (A)   RBC Latest Ref Range: 0 - 2 /HPF >10 (A)   BACTERIA Latest Ref Range: None Seen  None Seen   SQUAM EPI CELLS UR Latest Ref Range: Small /LPF None Seen   RENAL TUBULAR EPITHELIAL CELLS Latest Ref Range: Small /LPF None Seen   TRANSITIONAL

## 2017-10-11 NOTE — PROGRESS NOTES
[facility]    Progress Note    Domingo Zuniga Patient Status:  Inpatient    10/23/1950 MRN [id number]   [facility] 7NE-A Attending Deb Amador MD   [facility] Day # 8 PCP Arya Dodson MD       Subjective:   Domingo Zuniga is a(n) 77year old Group  10/11/2017

## 2017-10-11 NOTE — PLAN OF CARE
Assumed care @0700. Pt AOx4. VSS on RA. Pain treated with Norco.   PCA pump d/c. TPN d/c. Diet advanced to soft/low fiber. Tolerating well. Stents pulled by PA from urostomy. Changed by wound care. Colostomy with small OP. Changed by wound care.

## 2017-10-11 NOTE — PLAN OF CARE
Assumed care at [address]. Alert and oriented x4. Telemetry monitor reading SR. Rt. PICC with TPN infusing assessed and maintained. Lt. DANETTE drain with serosanguineous output. Urostomy draining clear, yellow urine.  Colostomy drain very small amount of greenish/br

## 2017-10-12 NOTE — CM/SW NOTE
Pt is ready for d/c today. Pt will go to [facility]. Called Ely at [facility] to set up d/c time. Gave report number to RN.   Pt dtr will drive pt to [facility] today at 3:00pm.

## 2017-10-12 NOTE — PROGRESS NOTES
[facility]    Progress Note    Landy Ledbetter Patient Status:  Inpatient    10/23/1950 MRN [id number]   [facility] 7NE-A Attending Mik Neil MD   [facility] Day # 9 PCP Molly Cheadle, MD       Subjective:   Landy Ledbetter is a(n) 77year old

## 2017-10-12 NOTE — PROGRESS NOTES
[facility]  Progress Note     Sharlette Music Patient Status:  Inpatient    10/23/1950 MRN [id number]   [facility] 7NE-A Attending Deondre Rinaldi MD   [address] Day # 5 PCP Molly Cheadle, MD     Chief Complaint: GIST    S: Patient doing well Intravenous BID   • Levothyroxine Sodium  100 mcg Oral Before breakfast       ASSESSMENT / PLAN:     1. Neutropenia fever, resolved  2. Candida UTI in setting of recent surgery, creation of urostomy  1. Diflucan  2. ID following  3.  GIST tumor sp pelvic tu

## 2017-10-12 NOTE — PLAN OF CARE
NURSING DISCHARGE NOTE    Discharged Nursing home via Wheelchair. Accompanied by Support staff  Belongings Taken by patient/family. VSS. Report given to Maria Luisa at [address] [place] [address]. Discharge instructions given to patient.  Scripts and discharge paperwork for S

## 2017-10-12 NOTE — PROGRESS NOTES
POD#9  Feels well  + BM    Blood pressure 114/71, pulse 58, temperature 98.1 °F (36.7 °C), temperature source Oral, resp. rate 18, height 1.829 m (6'), weight 97.4 kg (214 lb 11.7 oz), SpO2 98 %. I/O last 3 completed shifts:   In: 3083 [P.O.:640; I.V.:12

## 2017-10-12 NOTE — PLAN OF CARE
Assumed care at [address]. Alert and oriented x4. Telemetry monitor reading SR. Lt DANETTE drain with serosanguineous output. Urostomy with clear yellow drainage. Colostomy with green soft drainage. Pain managed with Norco. Ambulated once in halls from 7p-7a.  Assess

## 2017-10-12 NOTE — PROGRESS NOTES
[address] [place] [address]  TEL: [phone]  FAX: [phone]    José Miguel Liz Patient Status:  Inpatient    10/23/1950 MRN [id number]   [facility] 7NE-A Attending Edson Adams MD   [facility] Day # 9 PCP Rylee Cabezas MD View) (cpt=74000)    Result Date: 10/4/2017  PROCEDURE:  XR ABDOMEN (1 VIEW) (CPT=74000)  INDICATIONS:  s/p pelvix exenteration. COMPARISON:  [facility] , CT ABDOMEN+PELVIS(CONTRAST ONLY)(CPT=74177), 8/31/2017, 9:57. TECHNIQUE:  Supine AP view was obtained. increased distention of small bowel loops with relatively decompressed large bowel suggesting either ileus or small bowel obstruction. Findings are slightly worse in the interval. Follow up recommended. Details above.     Dictated by: Joseph Julio MD

## 2017-10-13 NOTE — PROGRESS NOTES
Jairo Peña  : 10/23/1950  Age 77year old  male patient is admitted to Facility: [address] [place] [address] [address] Residence for [address] [place] [address] date:  10/3/17  Discharge date to [facility]:  10/12/17  ELOS:  10-12 days  Anticipated discharge date: 10/23/17     P Packs/day: 1.00      Years: 45.00        Types: Cigars     Quit date: 9/22/2016  Smokeless tobacco: Never Used                      Alcohol use:  Yes              Comment: 1 a day      ALLERGIES:    Radiology Contrast *    Hives, Respiratory failure, O bleeding  ENDOCRINE: denies excessive thirst or urination; denies unexpected wt gain or wt loss  ALLERGY/IMM.: denies food or seasonal allergies      PHYSICAL EXAM:  GENERAL HEALTH: well developed, well nourished, in no apparent distress  LINES, TUBES, LUCIO HCT 30.4 (L) 10/11/2017   .0 10/11/2017   MCV 96.5 10/11/2017   MCH 32.4 10/11/2017   MCHC 33.6 10/11/2017   RDW 14.6 10/11/2017   NEPRELIM 2.49 10/11/2017   NEPERCENT 57.6 10/11/2017   LYPERCENT 28.9 10/11/2017   MOPERCENT 8.1 10/11/2017   EOPERC

## 2017-10-13 NOTE — CM/SW NOTE
10/13/17 0900   Discharge disposition   Discharged to: Skilled Nurs   Name of Facillity/Home Care/Hospice [facility]   Patient is Discharged to a [address] [place], [place] [address]st [place] Yes   Discharge transportation Private car

## 2017-10-14 NOTE — DISCHARGE SUMMARY
[facility]  Discharge Summary    Maddy Rodriguez Patient Status:  Inpatient    10/23/1950 MRN [id number]   [facility] 7NE-A Attending No att. providers found   Hosp Day # 9 PCP Diane Eduardo MD     Date of Admission: 10/3/2017    Date of mouth daily. , Normal, Disp-90 capsule, R-1    simvastatin 20 MG Oral Tab  Take 1 tablet (20 mg total) by mouth daily. , Normal, Disp-90 tablet, R-3    aspirin 81 MG Oral Tab  Take 81 mg by mouth daily. , Historical    Multiple Vitamin (MULTI-VITAMIN) Oral Ta

## 2017-10-17 NOTE — PROGRESS NOTES
Kelsie Shaikh  : 10/23/1950  Age 77year old  male patient is admitted to Facility: [address] [place] [address] for [facility] Admit date:  10/3/17  Discharge date to [facility]:  10/12/17  ELOS:  10-12 days  Anticipated discharge date: 10/23/17         Breast      Smoking status: Former Smoker                                                              Packs/day: 1.00      Years: 45.00        Types: Cigars     Quit date: 9/22/2016  Smokeless tobacco: Never Used                      Alcohol use: Michael Saucedo sensory or motor complaint  PSYCHE: no symptoms of depression or anxiety  HEMATOLOGY:denies excessive bleeding  ENDOCRINE: denies excessive thirst or urination; denies unexpected wt gain or wt loss  ALLERGY/IMM.: denies food or seasonal allergies        PH 03/05/2015    10/12/2017   K 4.3 10/12/2017    10/12/2017   CO2 23.0 10/12/2017            Lab Results  Component Value Date   WBC 4.3 10/11/2017   RBC 3.15 (L) 10/11/2017   HGB 10.2 (L) 10/11/2017   HCT 30.4 (L) 10/11/2017   .0 10/11/20

## 2017-10-17 NOTE — PROGRESS NOTES
Karla Morris, 10/23/1950, 77year old, male    Chief Complaint:  Patient presents with:  [address] date:  10/3/17  Discharge date to [facility]:  10/12/17  ELOS:  10-12 days  Anticipated discharge date: 10/23/17     HPI  Pt hx JVD, no TMG, no carotid bruits  BREAST: deferred exam  RESPIRATORY:clear to percussion and auscultation  CARDIOVASCULAR: S1, S2 normal, RRR; no S3, no S4;   ABDOMEN:  normal active BS+, soft, nontender, no guarding  LYMPHATIC:no lymphedema  MUSCULOSKELETAL

## 2017-10-18 NOTE — PATIENT INSTRUCTIONS
Rectal incision care: apply neosporin and cover with a dry gauze. Change once per day or more if soiled. Abdominal incision care:wet to dry dressing once per day.

## 2017-10-18 NOTE — PROGRESS NOTES
Post-op: S/p Pelvic exenteration to include en-bloc resection of pelvic gastrointestinal stromal tumor with abdominoperineal resection, total cystectomy, prostatectomy, end colostomy, and urostomy on 10/3/17.  Sutures and staples removed from rectum and abd
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
## 2017-10-18 NOTE — PROGRESS NOTES
Kelsie Shaikh, 10/23/1950, 77year old, male    Chief Complaint:  Patient presents with:  Wound  Pt [address] [facility] Admit date:  10/3/17  Discharge date to [facility]:  10/12/17  ELOS:  10-12 days  Anticipated discharge date: 10/23/17     HPI  Pt hx of Norma Burrows intact; no drainage/cellulitis or s/s infection  EYES: conjunctiva normal; no drainage from eyes, glasses+  HENT: normocephalic; normal nose, no nasal drainage  NECK: supple; FROM; no JVD, no TMG, no carotid bruits  BREAST: deferred exam  RESPIRATORY:clear

## 2017-10-19 NOTE — PROGRESS NOTES
Pt here for MD f/u for GIST. S/p Pelvic exenteration to include en-bloc resection of pelvic gastrointestinal stromal tumor with abdominoperineal resection, total cystectomy, prostatectomy, end colostomy, and urostomy on 10/3/17 with Dr. Guera Lynch.  Here to disc

## 2017-10-20 NOTE — PROGRESS NOTES
[facility] Progress Note      Patient Name: Ruchi Torres   YOB: 1950  Medical Record Number: [id number]  Attending Physician: Bibi Jade M.D.      Date of Visit: 10/19/2017      Chief Complaint  Gastrointestinal stromal tumor colostomy, and urostomy.  Pathology showed a 10.2 cm GIST with 80% necrosis; necrotic tumor was present in prostatic tissue and wall of rectum; 10/10 lymph nodes were negative for metastasis; tumor showed 16 mitoses per 50 hpf; surgical margins were negativ m (6' 0.99\")   Wt 96.2 kg (212 lb)   SpO2 99%   BMI 27.98 kg/m²     Physical Examination   Constitutional Well developed and well nourished; in no apparent distress; appears close to chronological age. Head Normocephalic and atraumatic.   Eyes Conjunctiva 2.   Depression: Recommend pharmacologic intervention. Patient and daughter are in agreement. In consultation with Dr. Alessandra Ashby, the patient's primary care physician, he will start Lexapro 10 mg daily and follow up with Dr. Alessandra Ashby in 2 weeks for titration.

## 2017-10-20 NOTE — TELEPHONE ENCOUNTER
Claudia Hawley MD  [facility]             Please call patient. Tell him that I spoke with Dr. Marie Busby who wants him to start lexapro 10 mg daily. Need it called into his subacute rehab [facility].  Dr. Marie Busby would like him to make an appoint

## 2017-10-23 NOTE — PROGRESS NOTES
Patient called DELL this morning advising that someone at [facility]. Pat[facility] said that he may be discharged this Friday. He is concerned that they are trying to teach him to care for his wound, and he doesn't want to do that because it's a big wound.   [facility] advised mandi

## 2017-10-23 NOTE — PROGRESS NOTES
Marla Mckinney, 10/23/1950, 77year old, male    Chief Complaint:  Patient presents with:  [address] Admit date:  10/3/17  Discharge date to [facility]:  10/12/17  ELOS:  10-12 days  Anticipated discharge date: 10/23/17, extended to 10/27 du Original plan had been to have [place] RN, close follow-up with wound clinic and surgeon however will see what patient/family decided is best for them going forward. Patient seen by heme onc, Dr. Stefano Salinas who prescribed Lexapro 10mg po daily.       Afebrile Deconditioning/Weakness  1. PT/OT/ST to evaluate and treat  2. Anticipated/goal discharge date: 10/27/17  3. ASA 81mg po daily  4.  PCP Follow-up with Dr. Genia Burnette 11/13    Wound; surgical incision to abdomen with staples; dehiscence at either end with packing

## 2017-10-24 NOTE — PROGRESS NOTES
Karla Morris, 10/23/1950, 79year old, male    Chief Complaint:  Patient presents with:  [address] date:  10/3/17  Discharge date to [facility]:  10/12/17  ELOS:  10-12 days  Anticipated discharge date: 10/23/17, extended to 10/ largely WNL/unremarkable, stable  No SOB/KIM/cough  No CP/dizziness/palpitations  No nausea/vomiting  Urostomy urine+, Colostomy stool+; both ostomy bags adherent/ no leak at this time  Wound: no s/s infection    Objective:  VITALS:  /65   Pulse 68 52.0 L Final  MCV 98.7 um3 80.0 - 94.0 H Final  MCH 32.9 uug 27.0 - 33.0 Final  MCHC 33.3 gm/dl 32.0 - 36.0 Final  RDW-CV 14.8 % 11.5 - 15.2 Final  PLATELET COUNT 586 THO/mm3 150 - 400 H Final  MPV 9.4 um3 9.5 - 13.1 L Final  NEUTROPHILS, ABS 1.76 THO/mm3 I.D. X 7 days total; END DATE: 10/16/17      Follow-ups after NEERAJ discharge:  11/13/17, PCP, AYAN Blancas  10/23/2017  2:56 PM

## 2017-10-24 NOTE — PROGRESS NOTES
[facility]  Report of Outpatient Ostomy Consultation    Baltazar Molina Patient Status:  Wound Series    10/23/1950 MRN [id number]   Location [address] [facility] Attending Jaky Gudino MD     History of Present Illness:  Baltazar Molina is color: red  Stoma condition: edematous  Stoma diameter: 1 1/4 inch  Ostomy output: serosanguinous drainage  Stoma height: adequate  Peristomal skin: intact  Pouching system:one piece Engelhard 7609 with Adapt barrier ring 0912       Able to care for stoma:

## 2017-10-25 PROBLEM — Z71.89 CARE PLAN DISCUSSED WITH PATIENT: Status: ACTIVE | Noted: 2017-10-25

## 2017-10-25 NOTE — PATIENT INSTRUCTIONS
Wet to dry dressing to abdominal wounds everyday. Return to clinic on Monday for wound dressing change.
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
## 2017-10-26 NOTE — PROGRESS NOTES
Karla Morris, 10/23/1950, 79year old, male is being discharged from Facility: [place] [address]    Date of Admission: 10/12/17    Date of Discharge: 10/27/17                                 Admitting Diagnoses: Pt hx of Domitila Cantrell bruits  RESPIRATORY:clear to percussion and auscultation  CARDIOVASCULAR: S1, S2 normal, RRR; no S3, no S4;   ABDOMEN:  BS+, soft, nontender, no guarding/ Urine+, Stool+  MUSCULOSKELETAL: no acute synovitis upper or lower extremity  EXTREMITIES/VASCULAR:no MIL/mm3 4.70 - 6.10 L Final  HEMOGLOBIN 10.2 gm/dl 14.0 - 18.0 L Final  HEMATOCRIT 30.6 % 42.0 - 52.0 L Final  MCV 98.7 um3 80.0 - 94.0 H Final  MCH 32.9 uug 27.0 - 33.0 Final  MCHC 33.3 gm/dl 32.0 - 36.0 Final  RDW-CV 14.8 % 11.5 - 15.2 Final  PLATELET CO 100mcg po daily before breakfast     Dyslipidemia  Simvastatin 20mg po daily     Vitamins/supplements as r/t deficiencies   1. MVI; 1 po daily     Candida UTI---RESOLVED  Diflucan 100mg po daily per I.D.  X 7 days total; END DATE: 10/16/17      Medication R

## 2017-10-30 NOTE — PROGRESS NOTES
Patient here for abdominal wound packing. Old dressing removed wounds cleaned with saline. Dr. Humaira Medina inspected wound- anticipating wound vac provided by wound clinic tomorrow. New wet to dry dressing placed.  Patient will need follow up imaging in January p

## 2017-10-30 NOTE — PROGRESS NOTES
Initial Post Discharge Follow Up   Discharge Date: 10/27/17 from [address] [place] [address]  Contact Date: 10/30/2017    Consent Verification:  Assessment Completed With: Patient  HIPAA Verified?   Yes    Discharge Dx:  Patient was admitted to [address] [place] [address] 1 needed for Pain. Disp:  Rfl:    Levothyroxine Sodium 100 MCG Oral Tab Take 1 tablet (100 mcg total) by mouth before breakfast. Disp: 30 tablet Rfl: 1   PEG 3350 Oral Powd Pack Take 17 g by mouth daily.  Disp:  Rfl:    simvastatin 20 MG Oral Tab Take 1 table [facility]    Nov 13, 2017 11:00 AM CST Medicare Annual Well Visit with MD Isaac Brooks 26, Jay Vital ([address] [facility])        [facility] Wound Ca

## 2017-10-30 NOTE — TELEPHONE ENCOUNTER
PCP request for TCM HFU after stay at [address] [place] [address]. Patient has an appointment on 11/13/17 for an annual wellness visit.  NCM attempted to schedule a TCM HFU for 11/3/17 and then had to cancel because patient remembered he has an appointment for a dres

## 2017-10-31 NOTE — PROGRESS NOTES
Chief Complaint  This information was obtained from the patient  Patient is here for an initial consult. He presents with an abdominal wound from surgery on 10/3/2017.      Allergies  Iodine    HPI  This information was obtained from the patient    10-31- developed severe pain. He states that he had noticed that for the one month prior, he was having increasing issues with constipation. 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   On 06/20/2017 patient began therapy with suniti colon ([facility] [id number]. A4]     Procedure Performed:      PELVIC EXENTERATION  TO INCLUDE RESECTION OF PELVIC TUMOR WITH EN BLOC, ABDOMINOPERINEAL RESECTION, TOTAL CYSTECTOMY, PROSTATECTOMY. END COLOSTOMY, UROSTOMY.          Surgeon(s) and Role:  Panel 1:    * Sal (GI)  Neurological  Allergic/Immunologic    General Notes:  negative except HPI    Additional Information  Medication reconciliation completed at today's visit. : Yes  History of chemotherapy?: No  History of radiation?: No    Medications  simvastatin 20 m Surgical Wound and has received a status of Not Healed. Initial wound encounter measurements are 4cm length x 2cm width x 1.4cm depth, with an area of 8 sq cm and a volume of 11.2 cubic cm. No tunneling has been noted. No sinus tract has been noted.  No und slot.    Misc/Additional Orders  Home health care nurse for wound care. Supplement with a daily multivitamin. Increase dietary protein  Abdominal binder  S/S of Infection    Care summary  Reviewed and evaluated labs.   Discussed the Plan of Care at [facility]

## 2017-11-07 PROBLEM — Z71.89 CARE PLAN DISCUSSED WITH PATIENT: Status: RESOLVED | Noted: 2017-10-25 | Resolved: 2017-11-07

## 2017-11-07 NOTE — PATIENT INSTRUCTIONS
Facts About Dietary Fat     Olive oil is a good source of unsaturated fat. Eating less saturated and trans fat is one of the best things you can do for your heart. Start by finding out which fats are better to use.  Then always try to use as little \" Date Last Reviewed: 6/1/2017  © 7624-3368 [employer] [address]. [address] [facility] [place], [address] [place] [employer]. All rights reserved. This information is not intended as a substitute for professional medical care.  Always follow your healthcare professional'

## 2017-11-07 NOTE — PROGRESS NOTES
HPI:    Patient ID: Linda Johnson is a 79year old male.     HPI   Here for follow up from discharge from SNF  Patient was admitted to [address] [place] [address] 10/12/17 for deconditioning/weakness following hospital stay  [facility] admit: 10/3/17, [facility] discharge 10/ reviewed and are negative. Current Outpatient Prescriptions:  escitalopram 10 MG Oral Tab Take 1 tablet (10 mg total) by mouth daily.  Disp: 90 tablet Rfl: 0   ALPRAZolam (XANAX) 0.5 MG Oral Tab Take 1 tablet (0.5 mg total) by mouth 2 (two) times xanax prn for anxiety  - cont statin and levothyroxine.  - labs placed for up coming physical    Orders Placed This Encounter      COMP METABOLIC PANEL      HGB R9S      CBC W/DIFF      TSH W REFLEX TO FREE T4      LIPID PANEL      URINALYSIS, ROUTINE    M

## 2017-11-08 NOTE — PROGRESS NOTES
Chief Complaint  This information was obtained from the patient  Patient is here for a f/u to wound on abdomen x 2. Pt denies any problems with wound vac.     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 [facility] - reviewed initial pathology slides and confirmed GIST tumor, spindle cell type. On 06/19/2017 he presented to the emergency room with rectal pain.  He reports that he was cons • Alcohol and Other Disorders Associated Father    • Cancer Mother        Breast       Date of Service: 10/3/2017 6:39 PM  Yecenia Mcbride MD   Surgical Oncology     Pre-Operative Diagnosis: GIST (gastrointestinal stroma tumor), malignant, colon ([facility] [C49. Wound #1 Proximal Abdomen is a chronic Full Thickness Surgical Wound and has received a status of Not Healed. Subsequent wound encounter measurements are 3.2cm length x 2cm width x 1.2cm depth, with an area of 6.4 sq cm and a volume of 7.68 cubic cm.  No tu (Encounter Diagnosis) S31.105A - Unspecified open wound of abdominal wall, periumbilic region without penetration into peritoneal cavity, initial encounter  (Encounter Diagnosis) T81.32XA - Disruption of internal operation (surgical) wound, not elsewhere c Wound #2 (Surgical Wound) is located on the distal abdomen.  A skin/subcutaneous tissue level excisional/surgical debridement with a total area debrided of 3.78 sq cm was performed by Kwesi Burgos MD. Subcutaneous was removed along with devitalized Initial Anesthetic Order: Apply lidocaine to wound bed on all future wound center visits during preparation for physician exam if wound bed contains fibrin or eschar.   4% Topical Lidocaine    Wound Cleansing & Dressings  May shower and/or cleanse wound wit

## 2017-11-13 PROBLEM — R33.8 BENIGN PROSTATIC HYPERPLASIA WITH URINARY RETENTION: Status: ACTIVE | Noted: 2017-04-23

## 2017-11-13 PROBLEM — N40.1 BENIGN PROSTATIC HYPERPLASIA WITH URINARY RETENTION: Status: ACTIVE | Noted: 2017-04-23

## 2017-11-13 NOTE — PATIENT INSTRUCTIONS
Facts About Dietary Fat     Olive oil is a good source of unsaturated fat. Eating less saturated and trans fat is one of the best things you can do for your heart. Start by finding out which fats are better to use.  Then always try to use as little \" Date Last Reviewed: 6/1/2017  © 3927-9446 [employer] [address]. [address] [facility] [place], [address] [place]. All rights reserved. This information is not intended as a substitute for professional medical care.  Always follow your healthcare professional' E[facility] - [facility], and non-screening if indicated for medical reasons    Electrocardiogram date09/22/2017 Routine EKG is not a screening covered service except at the Welcome to Medicare Visit    Abdominal aortic aneurysm scre Pneumococcal 23 (Pneumovax)  Covered Once after 65   Orders placed or performed in visit on 10/27/15  -PNEUMOCOCCAL IMMUNIZATION    Please get once after your 65th birthday    Hepatitis B for Moderate/High Risk No orders found for this or any previous vis

## 2017-11-13 NOTE — PROGRESS NOTES
HPI:   Hoang Yip is a 79year old male who presents for a Medicare Subsequent Annual Wellness visit (Pt already had Initial Annual Wellness). HPI:  Feels better. Less anxiety  Wound vac upper has been removed.  Denies fever    PAST MEDICAL, SOCIAL, FA omeprazole 20 MG Oral Capsule Delayed Release Take 20 mg by mouth every morning before breakfast.   aspirin 81 MG Oral Tab Take 81 mg by mouth daily. Multiple Vitamin (MULTI-VITAMIN) Oral Tab Take 1 tablet by mouth daily.       MEDICAL INFORMATION:   He 72\".    Weight as of this encounter: 210 lb.     Medicare Hearing Assessment  (Required for AWV/SWV)    Hearing Screening    Screening Method:  Whisper Test  Whisper Test Result:  Pass           Visual Acuity  Right Eye Visual Acuity: Corrected Right Eye C Pneumococcal (Prevnar 13) 03/02/2015   • Pneumovax 23 10/27/2015   • TDAP 03/02/2012   • Zoster (Shingles) 03/02/2013       ASSESSMENT AND OTHER RELEVANT CHRONIC CONDITIONS:   Delilah Ryan is a 79year old male who presents for a Medicare Assessment.      P activity?: Light    How would you describe your current health state?: Good    How do you maintain positive mental well-being?: Social Interaction; Visiting Friends; Visiting Family    If you are a male age 38-65 or a female age 47-67, do you take aspirin?: patient  PREVENTATIVE SERVICES  INDICATIONS AND SCHEDULE Internal Lab or Procedure External Lab or Procedure   Diabetes Screening      HbgA1C   Annually HgbA1C (%)   Date Value   11/08/2017 5.6       No flowsheet data found.     Fasting Blood Sugar (FSB)Tawanna (ACE/ARB, digoxin diuretics, anticonvulsants.)    Potassium  Annually Potassium (mmol/L)   Date Value   11/08/2017 4.1     POTASSIUM (mmol/L)   Date Value   03/05/2015 4.1    No flowsheet data found.     Creatinine  Annually CREATININE (mg/dL)   Date Value

## 2017-11-15 NOTE — PROGRESS NOTES
Chief Complaint  This information was obtained from the patient  Patient is here for a f/u to wound on abdomen x 2. Pt denies any problems with wound vac. Pt stated the distal wound he has been having a hard time keeping the dressing on.     Allergies  Io He has transitioned his care to [facility]. 11/21/2014 - MRI - Pelvic GIST tumor. No metastatic disease. [facility] - reviewed initial pathology slides and confirmed GIST tumor, spindle cell type.   On 06/19/2017 he presented to the [address] Reviewed:    Family History  Problem Relation Age of Onset  • Alcohol and Other Disorders Associated Father    • Cancer Mother        Breast       Date of Service: 10/3/2017 6:39 PM  Catherine Poe MD   Surgical Oncology     Pre-Operative Diagnosis: GIST Thickness Surgical Wound and has received a status of Not Healed. Subsequent wound encounter measurements are 2.5cm length x 1.5cm width x 3cm depth, with an area of 3.75 sq cm and a volume of 11.25 cubic cm. No tunneling has been noted.  No sinus tract has internal operation (surgical) wound, not elsewhere classified, subsequent encounter  (Encounter Diagnosis) Z92.21 - Personal history of antineoplastic chemotherapy  (Encounter Diagnosis) C26.9 - Malignant neoplasm of ill-defined sites within the digestive or eschar. 4% Topical Lidocaine    Wound Cleansing & Dressings  May shower and/or cleanse wound with mild soap and water. - before dressing changes  Collagen  Change dressing 3x/week    NPWT Orders  NPWT, black foam to at 125 mmHG continuous.  RN to change

## 2017-11-22 NOTE — PROGRESS NOTES
Chief Complaint  This information was obtained from the patient  Patient is here for a f/u to wound on abdomen x 2. Pt stated he is having more pain and has been shivering since Monday. He also states he has been SOB.      Allergies  Iodine    HPI  This i mass in abdomen. He also developed bladder dysfunction requiring a chronic vo catheter at that time. He was referred to [facility] [place].   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 : Yes        Objective    Constitutional  Height/Length: 73 in (185.42 cm), Weight: 206 lbs (93.64 kgs), BMI: 27.2, Temperature: 99.4 °F (37.44 °C), Pulse: 72 bpm, Respiratory Rate: 16 breaths/min, Blood Pressure: 127/80 mmHg. periumbilic region without penetration into peritoneal cavity, initial encounter  (Encounter Diagnosis) T81.32XD - Disruption of internal operation (surgical) wound, not elsewhere classified, subsequent encounter  (Encounter Diagnosis) Z92.21 - Personal hi Orders  Home health care nurse for wound care. Increase dietary protein  Abdominal binder  S/S of Infection    Care summary  Discussed the Plan of Care at bedside with patient.  The patient verbally acknowledges understanding of all instructions and all q

## 2017-11-29 NOTE — PROGRESS NOTES
Progress Note Details  Patient Name: Aristeo Lebron  Date: 11/29/2017   Patient Number: [id number] Physician / Extender: Yessica Freeman   Patient YOB: 1950 [facility]: [facility]    Chief Complaint  This information was obtained PAST MEDICAL HISTORY RELATED TO GIST DIAGNOSIS AND RECENT SURGERY FOR THE SAME -   2008 -developed LLQ pain. CT scan of abd/pelis \"negative\". Follow up colonoscopy reveals \"indentation\" in colon and pt was referred to Urology.  He was diagnosed with BPH • Hernia surgery Left 2006  • Hernia surgery Left 1975  • Other surgical history 12/3/2015  fracture radiius and ulna Right arm -   • Other surgical history 10/03/2017  Pelvic exenteration to include en-bloc resection of pelvic gastrointestinal stromal thomas Eyes: Vision Changes  Ear/Nose/Mouth/Throat: Dental Problems  Respiratory  Cardiovascular (Central/Peripheral): Dyspnea on Exertion  Gastrointestinal (GI): Nausea / Vomiting / Diarrhea (N/V/D), Difficulty Swallowing, Stomach/abdominal pain  Musculoskeletal Assessment    Active Problems    ICD-10  (Encounter Diagnosis) S31.105D - Unspecified open wound of abdominal wall, periumbilic region without penetration into peritoneal cavity, subsequent encounter  (Encounter Diagnosis) T81.32XD - Disruption of internal Discussed the Plan of Care at bedside with patient. The patient verbally acknowledges understanding of all instructions and all questions were answered. Reviewed hospital records to include: - MD NOTES / LABS  Wound no change. No s/s of infection.

## 2017-12-01 NOTE — PROGRESS NOTES
Contacted pt to intro CCM. Pt stated would like to think about it. Advised pt I would send info in the mail and f/up in a few weeks. Verbalized understanding.

## 2017-12-06 NOTE — PROGRESS NOTES
Chief Complaint  This information was obtained from the patient  Patient is here for a f/u to wound on abdomen .      Allergies  Iodine    HPI  This information was obtained from the patient    12-6-17: Completed Bactrim - V small opening persists with a SURGERY FOR THE SAME -   2008 -developed LLQ pain. CT scan of abd/pelis \"negative\". Follow up colonoscopy reveals \"indentation\" in colon and pt was referred to Urology. He was diagnosed with BPH and was treated for approx. 3 years.   LLQ pain continu Hernia surgery Left 1975  • Other surgical history   12/3/2015    fracture radiius and ulna  Right arm -   • Other surgical history   10/03/2017    Pelvic exenteration to include en-bloc resection of pelvic gastrointestinal stromal tumor with abdominoperin (Right urostomy)  Integumentary (Hair/Skin/Nails): Other (Abdominal wound)    Patient denies complaints or symptoms related to:   Constitutional Symptoms (General Health):  Fatigue, Fever, Loss of Appetite, Night Sweats, Chills, Other  Eyes: Vision Changes Friable, Rash, Dry/Scaly, Moist, Maceration, Ecchymosis, Erythema. The temperature of the periwound skin is Warm. Periwound skin does not exhibit signs or symptoms of infection.         Assessment    Active Problems    ICD-10  (Encounter Diagnosis) S31.105D of all instructions and all questions were answered.    Reviewed hospital records to include: - MD NOTES / John Haley

## 2017-12-11 PROBLEM — R50.9 FEVER: Status: ACTIVE | Noted: 2017-12-11

## 2017-12-11 PROBLEM — R50.9 FEVER, UNSPECIFIED FEVER CAUSE: Status: ACTIVE | Noted: 2017-12-11

## 2017-12-11 PROBLEM — N39.0 URINARY TRACT INFECTION WITHOUT HEMATURIA, SITE UNSPECIFIED: Status: ACTIVE | Noted: 2017-12-11

## 2017-12-11 NOTE — ED PROVIDER NOTES
Patient Seen in: [facility] Emergency Department    History   Patient presents with:  Fever (infectious)    Stated Complaint: fever, pt sts wound from abd sx maybe infected.  \"it is getting worse\"     HPI    This is a 70-year-old male who has histo Systems    Positive for stated complaint: fever, pt sts wound from abd sx maybe infected. \"it is getting worse\"   Other systems are as noted in HPI. Constitutional and vital signs reviewed.       All other systems reviewed and negative except as noted ab for the following:     Clarity Urine Cloudy (*)     Ketones Urine Trace (*)     pH Urine 9.0 (*)     Protein Urine 30  (*)     Nitrite Urine Positive (*)     Leukocyte Esterase Urine Moderate (*)     WBC Urine 21-50 (*)     Bacteria Urine 1+ (*)     Amorph 8/31/2017, 9:57. INDICATIONS:  fever, pt sts wound from abd sx maybe infected. it is getting worse  TECHNIQUE:  Unenhanced multislice CT scanning was performed from the dome of the diaphragm to the pubic symphysis. Dose reduction techniques were used.  Do changes cystectomy, partial colon resection with right lower quadrant ileal loop diversion and left lower quadrant colostomy.     Trace soft tissue stranding and free fluid adjacent to surgical clips in the posterior pelvis and presacral space likely postop 11/7/2017          ICD-10-CM Noted POA    Fever R50.9 12/11/2017 Unknown

## 2017-12-12 NOTE — PLAN OF CARE
Assumed care @ 0700. A&Ox4, VSS on RA  NSR/SB on tele.  HR as low at 43 bpm. Pt asymptomatic  Wound RN saw pt. abd dressing and urostomy changed  Colostomy with brown stool output    GASTROINTESTINAL - ADULT    • Minimal or absence of nausea and vomiting Pr

## 2017-12-12 NOTE — PLAN OF CARE
Received patient via cart from the ED with c/o fever. Alert and oriented x4. Telemetry monitor reading SB. Colostomy and urostomy present. Started IVF. SCD's patient refusing. Oriented to floor and hospital policies.  See admission assessment for complete c

## 2017-12-12 NOTE — CONSULTS
[facility]  Report of Inpatient Wound Care Consultation    Jossydebbie Jay Patient Status:  Observation    10/23/1950 MRN [id number]   [facility] 7NE-A Attending Velia Thompson MD   Hosp Day # 0 PCP Ean Pantoja MD     Reason for Consultat HGB  11.7*  10.6*   HCT  34.3*  32.0*   PLT  223.0  169.0   CREATSERUM  1.00  0.85   BUN  12  15   GLU  105*  123*   CA  8.9  8.2*   ALB  3.2*   --    TP  7.2   --        Microbiology:   Wound culture pending   Urologist obtained urine sample, catheteriz

## 2017-12-12 NOTE — CONSULTS
Please refer to my clinic progress note from today.   Will also consult urology (Dr Adrianna Chanel was involved in pelvic exenteration) and wound care

## 2017-12-12 NOTE — PROGRESS NOTES
[facility]  Progress Note     Sylvia Loera Patient Status:  Observation    10/23/1950 MRN [id number]   [facility] 7NE-A Attending Marty Higginbotham MD   Hosp Day # 0 PCP Sarah Martinez MD     Chief Complaint: abd pain fever    S: Patient Pantoprazole Sodium  20 mg Oral QAM AC   • atorvastatin  10 mg Oral Nightly       ASSESSMENT / PLAN:     1. Fever  due to pyelonephritis -on Zosyn  2.  Recent GIST surgery -abdominal CT without any acute issues, appreciate Dr. Almas Peters, and Urology recs, await

## 2017-12-12 NOTE — HOME CARE LIAISON
MET WITH PTNT TO DISCUSS HOME HEALTH SERVICES AND COVERAGE CRITERIA. PTNT AGREEABLE TO Quinton Hernandez. PTNT GIVEN RESIDENTIAL BROCHURE. RESIDENTIAL WITH PROVIDE SN/PT ON DISCHARGE.         Thank you for this referral,   Rachel Micheline

## 2017-12-12 NOTE — ED NOTES
Report given to [facility], Talisha 55 [phone] RN x [phone] at [address] [place] [address]. Transport paged. Pt updated on plan of care.
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
## 2017-12-12 NOTE — CM/SW NOTE
Pt seen for d/c planning. Pt is a 79 y o male admitted for unspecified cause of fever. Pt is known from his last admission in October when he had surgery with Dr. Evie Cole. Pt lives alone in an apartment. He has three step sons and a dtr.   Pt went to [employer]

## 2017-12-12 NOTE — CONSULTS
[facility]  Report of Consultation    José Miguel Agusto Patient Status:  Observation    10/23/1950 MRN [id number]   [facility] 7NE-A Attending Sarah Bae MD   Hosp Day # 0 PCP Rylee Cabezas MD     Impression and Plan:  Left pyelonephrit aspirin 81 MG Oral Tab Take 81 mg by mouth daily. Disp:  Rfl:    Multiple Vitamin (MULTI-VITAMIN) Oral Tab Take 1 tablet by mouth daily.  Disp:  Rfl:          Current Facility-Administered Medications:  ondansetron HCl (ZOFRAN) injection 4 mg 4 mg [address] Surgical History:  1998: BACK SURGERY  2006: COLONOSCOPY  2006: HERNIA SURGERY Left  1975:  HERNIA SURGERY Left  12/3/2015: OTHER SURGICAL HISTORY      Comment: fracture radiius and ulna  Right arm -   10/03/2017: OTHER SURGICAL HISTORY      Comment: Pelvic with a little bit of purulent material coming from it. It is not malodorous and there is no surrounding erythema. It is not tender. Genitourinary: Penis; no lesions. Testicles;no masses. No flank tenderness. Prostate has been removed SHANNAN not performed.

## 2017-12-13 NOTE — PLAN OF CARE
Assumed care at 1900. No issues overnight. Denies pain. RA, no sob. NSR/SB on tele. ML packing/dressing intact. Colostomy and urostomy intact. IVF and IV abx as ordered. Vitals stable, afebrile.      GASTROINTESTINAL - ADULT    • Maintains or

## 2017-12-13 NOTE — PROGRESS NOTES
[facility]  Progress Note     Leone Bence Patient Status:  Inpatient    10/23/1950 MRN [id number]   [facility] 7NE-A Attending Priyanka Monreal MD   Hosp Day # 1 PCP Danii Resenedz MD     Chief Complaint: abd pain    S: Patient feels breakfast   • Pantoprazole Sodium  20 mg Oral QAM AC   • atorvastatin  10 mg Oral Nightly       ASSESSMENT / PLAN:     1. Proteus/ GNR Pyelonephritis  1. Cont. IV ABX  2. Await BCX  3.  following  2. Wound Dehiscence  1. Cont. Wound care  2.  No infection

## 2017-12-13 NOTE — PROGRESS NOTES
[facility]  Urology Progress Note    Linda Johnson Patient Status:  Inpatient    10/23/1950 MRN [id number]   [facility] 7NE-A Attending Roswell Sicard, MD   [facility] Day # 1 PCP Nereida Castro MD     Subjective:  Linda Johnson is a(n) 79 year

## 2017-12-13 NOTE — PLAN OF CARE
Assumed care at 0730. A&OX4. Denies pain or discomfort. Pt feels well and requesting discharge today. Awaiting urine culture sensitivities for home antibiotics. Up ambulating in room and roberts. Pt is self care with urostomy and  Colostomy.   Continuing

## 2017-12-14 PROBLEM — N39.0 URINARY TRACT INFECTION WITHOUT HEMATURIA: Status: ACTIVE | Noted: 2017-12-11

## 2017-12-14 NOTE — PROGRESS NOTES
[facility]  Urology Progress Note    Leone Bence Patient Status:  Inpatient    10/23/1950 MRN [id number]   [facility] 7NE-A Attending Priyanka Monreal MD   [address] Day # 2 PCP Danii Resendez MD     Subjective:  Leone Bence is a(n) 79 year

## 2017-12-14 NOTE — CM/SW NOTE
Pt is ready for d/c today. He will resume HH with [facility]. Laith Rahman from [facility] aware.

## 2017-12-14 NOTE — PLAN OF CARE
AOx4, RA, SR/SB as low as 36  Reports no pain, nausea, or SOB  Dressing changed according to order - old dressing with scant SS drainage  Patient empties and cares for colostomy and urostomy per request  Up ad cecy - ambulates frequently  VSS, patient resti

## 2017-12-14 NOTE — PROGRESS NOTES
[facility]  Progress Note     Leone Bence Patient Status:  Inpatient    10/23/1950 MRN [id number]   [facility] 7NE-A Attending Priyanka Monreal MD   Hosp Day # 2 PCP Danii Resendez MD     Chief Complaint: abd pain    S: Patient is do Levothyroxine Sodium  100 mcg Oral Before breakfast   • Pantoprazole Sodium  20 mg Oral QAM AC   • atorvastatin  10 mg Oral Nightly       ASSESSMENT / PLAN:     1. Proteus/ GNR Pyelonephritis  1. Cont. IV ABX  2.  following  3. [address] NTD  2.  Wound Dehiscen

## 2017-12-14 NOTE — PLAN OF CARE
Discharge instructions, prescriptions, medications & follow-up appointments reviewed with pt. Verbalizes understanding. LAURA BRITTON DC'gurvinder. Pt refused to get Holter Monitor prior to discharge for Asymptomatic Bradycardia.   Agrees to see Cardiologist as an outpt

## 2017-12-14 NOTE — PLAN OF CARE
Holter Monitor for Home ordered. Pt refused. Agreeable to make outpatient appointment with Cardiologist for Asymptomatic Bradycardia.

## 2017-12-15 NOTE — DISCHARGE SUMMARY
[facility]  DISCHARGE SUMMARY     Dipti Simms Patient Status:  Inpatient    10/23/1950 MRN [id number]   [facility] 7NE-A Attending No att. providers found   Hosp Day # 2 PCP Tito Haywood MD     Date of Admission: 2017  Date activity. The patient was eventually stable for discharge on oral antibiotics. He will follow-up with his primary doctor. I discussed in detail with the patient in regards to his bradycardia.   He did not wish to see cardiology or have any further cardia [id number] [facility] [address]  [phone]    In 1 week  low heart rate    Kee Cespedes MD  [address] [facility] [address] [phone]    Schedule an appointment as soon as possible for a visit      Gold August

## 2017-12-15 NOTE — CM/SW NOTE
12/15/17 1000   Discharge disposition   Discharged to: Home-Health   Name of Facillity/Home Care[facility]Hospice [facility]   Home services after discharge Skilled home care   Discharge transportation Private car

## 2017-12-18 NOTE — PROGRESS NOTES
Initial Post Discharge Follow Up   Discharge Date: 12/14/17  Contact Date: 12/18/2017    Consent Verification:  Assessment Completed With: Patient  HIPAA Verified? Yes    Discharge Dx:     Fever, unspecified, pyelonephritis, wound dehiscence    General: you? yes  • If you were prescribed a new medication:   o Was the new medication’s purpose explained? yes  o Was the new medication’s side effects discussed? yes  o Do you have any questions about your new medication?  No  • Did you  your discharge me Reviewed/scheduled/rescheduled PCP TCM/HFU appointment with pt:  NCM confirmed TCM HFU appointment on 12/20/17      Have you made all of your follow up appointments? no, Patient reports he still has to schedule an appointment with urology and cardiology.

## 2017-12-20 PROBLEM — R50.9 FEVER: Status: RESOLVED | Noted: 2017-12-11 | Resolved: 2017-12-20

## 2017-12-20 PROBLEM — R00.1 BRADYCARDIA: Status: ACTIVE | Noted: 2017-12-20

## 2017-12-20 PROBLEM — R50.9 FEVER, UNSPECIFIED FEVER CAUSE: Status: RESOLVED | Noted: 2017-12-11 | Resolved: 2017-12-20

## 2017-12-20 PROBLEM — N39.0 URINARY TRACT INFECTION WITHOUT HEMATURIA: Status: RESOLVED | Noted: 2017-12-11 | Resolved: 2017-12-20

## 2017-12-20 NOTE — PROGRESS NOTES
HPI:    Kelsie Shaikh is a 79year old male here today for hospital follow up.    He was discharged from Inpatient hospital, [facility] to Home   Admission Date: 12/11/17   Discharge Date: 12/14/17  Hospital Discharge Diagnosis: No Value exists for th bradycardia. He did not wish to see cardiology or have any further cardiac workup while inpatient. He was given a outpatient follow-up with cardiology.      -patient followed up with wound care today, has wound vac in place.   -denies fever, chills, urina father; Cancer in his mother. He  reports that he quit smoking about 14 months ago. His smoking use included Cigars. He has a 45.00 pack-year smoking history. He has never used smokeless tobacco. He reports that he drinks alcohol.  He reports that he does has a normal mood and affect. ASSESSMENT/ PLAN:   Diagnoses and all orders for this visit:    Pyelonephritis    Hypothyroidism sequence  -     Levothyroxine Sodium 100 MCG Oral Tab;  Take 1 tablet (100 mcg total) by mouth before breakfast.    Bradycardi

## 2017-12-20 NOTE — PROGRESS NOTES
Chief Complaint  This information was obtained from the patient  Patient is here for a f/u to wound on abdomen    Allergies  Iodine    HPI  This information was obtained from the patient    12-20-17:   Patient was recently hospitalized  for pyelonephriti Accompanied by family. Wound vac equested by Dr. Evie Cole.     Patient was admitted to [address] [place] [address] 10/12/17 for deconditioning/weakness following hospital stay  THE [facility] admit: 10/3/17, THE [facility] discharge 10/12/17 DX: GIST (gastrointestinal stroma tumor), • Cancer [facility]) 02-12    GIST  • Carcinoma of gastrointestinal tract ([facility] [address]    • Hypothyroidism    • Other and unspecified hyperlipidemia    • Personal history of antineoplastic chemotherapy      currently on chemo  • Thyroid disease    • Visual impairment Estimated Blood Loss: Blood Output: 2L (10/3/2017  6:26 PM)       Giselle Oliver MD  10/3/2017  6:39 PM    General Notes:  1st application of KASHMIR dressing this visit.  Trung    Complaints and Symptoms  This information was obtained from the patient  Pa Wound #1 Proximal Abdomen is a chronic Full Thickness Surgical Wound and has received a status of Not Healed. Subsequent wound encounter measurements are 0.4cm length x 0.1cm width x 2cm depth, with an area of 0.04 sq cm and a volume of 0.08 cubic cm.  Hype Wound #1 (Surgical Wound) is located on the proximal abdomen.  A skin/subcutaneous tissue level excisional/surgical debridement with a total area debrided of 0.04 sq cm was performed by Mireya Emmanuel MD. Subcutaneous was removed along with devitaliz

## 2017-12-20 NOTE — PATIENT INSTRUCTIONS
Finish antibiotics as prescribed  Schedule with Dr. Riley Logan, electrophysiologist, for bradycardia  Continue with wound care

## 2017-12-27 NOTE — PROGRESS NOTES
Chief Complaint  This information was obtained from the patient  Patient is here for a f/u to wound on abdomen. Holger Covarrubias reports improvement.      Allergies  Iodine    HPI  This information was obtained from the patient    12-27-17:  WOUND CLOSED    12-20-17: pain / redness periwound / discharge / fever. Accompanied by family. Wound vac equested by Dr. Evie Cole.     Patient was admitted to [address] [place] [address] 10/12/17 for deconditioning/weakness following hospital stay  THE [facility] admit: 10/3/17, THE [facility] discharge 10/ problem      back surgery 20 years ago  • Cancer [facility]) 02-12    GIST  • Carcinoma of gastrointestinal tract ([facility] [address]    • Hypothyroidism    • Other and unspecified hyperlipidemia    • Personal history of antineoplastic chemotherapy      currently on chemo  • least 10 cm tumor     Specimen: Yes     Estimated Blood Loss: Blood Output: 2L (10/3/2017  6:26 PM)       Angus Chandra MD  10/3/2017  6:39 PM    Complaints and Symptoms  This information was obtained from the patient  Patient complains of:   [facility] The periwound skin did not exhibit: Excoriation, Crepitus, Fluctuance, Friable, Rash, Dry/Scaly, Maceration, Ecchymosis, Erythema. The temperature of the periwound skin is Warm. Periwound skin does not exhibit signs or symptoms of infection.    General Note

## 2018-02-06 NOTE — PROGRESS NOTES
Called pt to f/up with CCM info that was sent back in Dec. Pt stated he's pretty sure he received the info but at this time is going to hold off on enrolling in CCM program. Pt did state it is something he would possibly interested in for the future.  Bassam Leavitt

## 2018-03-29 NOTE — TELEPHONE ENCOUNTER
Patient called and stated he has had a colon ostomy and urostomy, and has great difficulty in the bathroom.  Currently, patient lives in a complex where his lease doesn't  until September, and can only leave early if MD writes a letter he needs to mov

## 2018-03-29 NOTE — TELEPHONE ENCOUNTER
The pt states has been struggling after procedure in October that has given the pt a colostomy and urostomy. The pt has had difficulty in the bathroom due to this. The pt has found an apartment that has a handicap apartment that would be available in June.

## 2018-05-15 PROBLEM — E03.4 HYPOTHYROIDISM DUE TO ACQUIRED ATROPHY OF THYROID: Status: ACTIVE | Noted: 2018-05-15

## 2018-05-15 NOTE — PATIENT INSTRUCTIONS
Facts About Dietary Fat     Olive oil is a good source of unsaturated fat. Eating less saturated and trans fat is one of the best things you can do for your heart. Start by finding out which fats are better to use.  Then always try to use as little \" Date Last Reviewed: 6/1/2017  © 7037-4239 [employer] [address]. [address] [facility] [address]. All rights reserved. This information is not intended as a substitute for professional medical care.  Always follow your healthcare professional'

## 2018-05-15 NOTE — PROGRESS NOTES
HPI:    Patient ID: Ashley Cordero is a 79year old male. HPI  HPI:   Ashley Cordero is a 79year old male who presents for recheck of hyperlipidemia and hypothyroidism. Patient reports taking medications as instructed, no medication side effects noted.  Farzana Dumont Past Medical History:   Diagnosis Date   • Back problem     back surgery 20 years ago   • Cancer ([facility] [address] 02-12    GIST   • [facility]    • Hypothyroidism    • Other and unspecified hyperlipidemia    • Personal history of BS's,no masses, HSM or tenderness  EXTREMITIES: no cyanosis, clubbing or edema    ASSESSMENT AND PLAN:   Vinay Kim is a 79year old male who presents for recheck of hyperlipidemia and hyperlipidemia.  Degree of control is well controlled, no significant simvastatin 20 MG Oral Tab 90 tablet 3      Sig: Take 1 tablet (20 mg total) by mouth daily.            Imaging & Referrals:  None       BI#[id number]

## 2018-05-22 NOTE — TELEPHONE ENCOUNTER
Rx was already sent to the mail order pharmacy on 5/15/2018. Rx resent for the pt. Did confirm Rx for the [facility] from 5/15/2018 was cancelled.

## 2018-05-22 NOTE — TELEPHONE ENCOUNTER
Refill sent to wrong pharmacy:  Levothyroxine Sodium 100 MCG Oral Tab 90 tablet 3     Please resend to:  [facility], [address] [facility], [address] [phone], [phone]

## 2018-07-20 NOTE — TELEPHONE ENCOUNTER
Pt called stating he was having some increased stool output over the past few days then decrease in colostomy function for several days. Discussed with  Pt ostomy  Blockage s/s and then more about hydration and f/u with MD if occurs again.  Today pt states

## 2018-07-22 NOTE — ED PROVIDER NOTES
Patient Seen in: [facility] Emergency Department    History   Patient presents with:  Cath Tube Problem (gastrointestinal, urinary, integumentary)    Stated Complaint: blocked colostomy bag    HPI    Patient states that he has a colostomy as result HPI.  Constitutional and vital signs reviewed. All other systems reviewed and negative except as noted above.     Physical Exam   ED Triage Vitals [07/22/18 5697]  BP: (!) 135/94  Pulse: 58  Resp: 16  Temp: 97.9 °F (36.6 °C)  Temp src: Temporal  SpO2: who recommended oral MiraLAX and tap water enema  ------------------------------------------------------------  Time: 07/22 2213  Comment: Not much yield from soap suds enema.       MDM     Patient presents with no symptoms other than noting diminished stoo

## 2018-07-23 NOTE — TELEPHONE ENCOUNTER
Patient calling to report a drastic change in his ostomy output. Describes some general abdominal pain and bloating.  States he was in ED yesterday and they recommend patient begin Miralax and also attempted to relieve symptoms with enema but reports that d

## 2018-07-24 NOTE — TELEPHONE ENCOUNTER
Patient's CT without any evidence for obstruction. Patient states he had two large BMs today. Denies fever, chills, bloating, abdominal pain, or N/V. Good appetite. Patient will follow-up with our office next Wed.  Aug 1st at 3:15 PM.
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
## 2018-09-21 NOTE — TELEPHONE ENCOUNTER
Patient calling with c/o area around colostomy protruding and feels like it's a hernia. Denies fever, nausea or vomiting. C/o a \"chronic\" abdominal pain that gets better at night while sleeping but returns in the am when he wakes and moves around.  Per Jenny Armendariz

## 2018-09-21 NOTE — PROGRESS NOTES
[facility]    Patient Name:  Marla Mckinney   YOB: 1950   Gender:  Male   Appt Date:  9/21/2018   Provider:  Mae Nguyen MD   Insurance:  MEDICARE PART A&B     PATIENT PROVIDERS    Primary Care Zay Cano MD   Add 2012 - Repeat CT obtained.  Per patient he was found to have \"large\" mass in abdomen.  He also developed bladder dysfunction requiring a chronic vo catheter at that time.  He was referred to [facility].  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 , Disp: 1 tablet, Rfl: 0  •  Levothyroxine Sodium 100 MCG Oral Tab, Take 1 tablet (100 mcg total) by mouth before breakfast., Disp: 90 tablet, Rfl: Social History    Tobacco Use      Smoking status: Former Smoker        Packs/day: 1.00        Years: 45.00        Pack years: 39        Types: Cigars        Quit date: 2016        Years since quittin.9      Smokeless tobacco: Never Used    [employer] There is concern for parastomal hernia based on physical exam and history. There is no evidence of strangulation or incarceration. Patient has history of incarcerated hernia. He is due for routine imaging for follow-up GIST in a few weeks.  Patient will charissa
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
## 2018-11-15 PROBLEM — R00.1 BRADYCARDIA: Status: RESOLVED | Noted: 2017-12-20 | Resolved: 2018-11-15

## 2018-11-15 PROBLEM — K46.9 PERISTOMAL HERNIA: Status: ACTIVE | Noted: 2018-11-15

## 2018-11-15 NOTE — PROGRESS NOTES
HPI:   Kelsie Shaikh is a 76year old male who presents for a Medicare Subsequent Annual Wellness visit (Pt already had Initial Annual Wellness). HPI:  Here for AWV  Reports increasing anxiety related to peristomal hernia.  No plans to correct at this kaity (Internal Medicine)  Kirsten Newman MD (Shad Rucker)  Unique Oreilly MD (Claudene Cherry)  Colton Nelson MD (Surgical Oncology)    Patient Active Problem List:     GIST (gastrointestinal stroma tumor), malignant, colon ([facility] [address]     Mixed hyperlipid chemotherapy, Thyroid disease, and Visual impairment.     He  has a past surgical history that includes back surgery (1998); hernia surgery (Left, 2006); hernia surgery (Left, 1975); colonoscopy (2006); other surgical history (12/3/2015); other surgical his Both Eyes Chart Acuity: 20/20   Able To Tolerate Visual Acuity: Yes      General Appearance:  Alert, cooperative, no distress, appears stated age   Head:  Normocephalic, without obvious abnormality, atraumatic   Eyes:  PERRL, conjunctiva/corneas clear, EOM visit:    Annual physical exam    Anemia of chronic disease    Benign prostatic hyperplasia with urinary retention    GIST (gastrointestinal stroma tumor), malignant, colon ([facility] [address]  -     CBC WITH DIFFERENTIAL WITH PLATELET;  Future    Hypothyroidism due to a Procedure External Lab or Procedure   Diabetes Screening      HbgA1C   Annually HgbA1C (%)   Date Value   11/08/2017 5.6       No flowsheet data found.     Fasting Blood Sugar (FSB)Annually Glucose (mg/dL)   Date Value   12/12/2017 123 (H)     GLUCOSE (mg/d covered by Medicare Part B) No vaccine history found This may be covered with your prescription benefits, but Medicare does not cover unless Medically needed    Zoster   Not covered by Medicare Part B No vaccine history found This may be covered with your 100 MCG Oral Tab 90 tablet 3     Sig: TAKE 1 TABLET BEFORE       BREAKFAST   • simvastatin 20 MG Oral Tab 90 tablet 3     Sig: Take 1 tablet (20 mg total) by mouth daily.        Imaging & Referrals:  ENT - INTERNAL       TP#[id number]

## 2018-11-15 NOTE — PATIENT INSTRUCTIONS
Keeley Hernández's SCREENING SCHEDULE   Tests on this list are recommended by your physician but may not be covered, or covered at this frequency, by your insurer. Please check with your insurance carrier before scheduling to verify coverage.     PREVENTATIVE following criteria:   • Men who are 73-68 years old and have smoked more than 100 cigarettes in their lifetime   • Anyone with a family history    Colorectal Cancer Screening Covered up to Age 76     Colonoscopy Screen   Covered every 10 years- more often institutions for the mentally retarded   Persons who live in the same house as a HepB virus carrier   Homosexual men   Illicit injectable drug abusers     Tetanus Toxoid- Only covered with a cut with metal- TD and TDaP Not covered by Medicare Part B) No or

## 2018-11-19 NOTE — TELEPHONE ENCOUNTER
----- Message from Diane Eduardo MD sent at 11/19/2018 11:52 AM CST -----  Increase levothyroxine to 125 mcg daily and recheck tsh in 6 weeks  Cbc is normal  Renal/lft are normal  fbs in dm2 range, please recheck glucose, fasting.   flp is at goal

## 2018-12-19 NOTE — PROGRESS NOTES
HPI:    Patient ID: Jenifer Rodriguez is a 76year old male. Patient presents with:  Fatigue: fatigue, chills     HPI  Patient has been feeling ill since Monday. He has had full body chills and extreme fatigue. He has a urostomy bag and colostomy bag.  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 No respiratory distress. Abdominal: Soft. Bowel sounds are normal. He exhibits no distension. There is no tenderness. There is no CVA tenderness.    Urostomy and colostomy site WNL   Neurological: He is alert and oriented to person,

## 2019-01-02 NOTE — TELEPHONE ENCOUNTER
Relayed results and recommendations to patient. He verbalized understanding and agreed with plan. DM education referral placed and information given to patient. He also requested refill on Levothyroxine. Ok per Dr. Howard Cunha to give refills.    Orders

## 2019-01-02 NOTE — TELEPHONE ENCOUNTER
----- Message from Wojciech Cunningham MD sent at 1/2/2019  9:22 AM CST -----  New onset dm2. At this time can cont diet control. Recheck a1c in 3 m. Check microalbuminuria in 3 m.  Refer to dm2 educator for diet education

## 2019-02-18 NOTE — LETTER
[facility] [address], [address] [place] [address]  Consent for Procedure/Sedation    Date: 10/03/2022    Time: 1145      1. I hereby authorize  DR Abigail Restrepo , my physician and his/her assistants (if applicable), which may include medical students, residents, and/or fellows, to perform the following surgical operation/ procedure and administer such anesthesia as may be determined necessary by my physician:  Operation/Procedure name (s)  Cardioversion/Automated Internal Cardiac Defibrillator check   on Graciela Gladys  2. I recognize that during the surgical operation/procedure, unforeseen conditions may necessitate additional or different procedures than those listed above. I, therefore, further authorize and request that the above-named surgeon, assistants, or designees perform such procedures as are, in their judgment, necessary and desirable. 3.   My surgeon/physician has discussed prior to my surgery the potential benefits, risks and side effects of this procedure; the likelihood of achieving goals; and potential problems that might occur during recuperation. They also discussed reasonable alternatives to the procedure, including risks, benefits, and side effects related to the alternatives and risks related to not receiving this procedure. I have had all my questions answered and I acknowledge that no guarantee has been made as to the result that may be obtained. 4.   Should the need arise during my operation or immediate post-operative period, I also consent to the administration of blood and/or blood products. Further, I understand that despite careful testing and screening of blood or blood products by collecting agencies, I may still be subject to ill effects as a result of receiving a blood transfusion and/or blood products.   The following are some, but not all, of the potential risks that can occur: fever and allergic reactions, hemolytic reactions, transmission of diseases such as Dr Wing Teran pt  Pt stable on current dose  Has neuro appt 4/2/19  Hepatitis, AIDS and Cytomegalovirus (CMV) and fluid overload. In the event that I wish to have an autologous transfusion of my own blood, or a directed donor transfusion. I will discuss this with my physician. 5.   I authorize the use of any specimen, organs, tissues, body parts or foreign objects that may be removed from my body during the operation/procedure for diagnosis, research or teaching purposes and their subsequent disposal by hospital authorities. I also authorize the release of specimen test results and/or written reports to my treating physician on the hospital medical staff or other referring or consulting physicians involved in my care, at the discretion of the Pathologist or my treating physician. 6.   I consent to the photographing or videotaping of the operations or procedures to be performed, including appropriate portions of my body for medical, scientific, or educational purposes, provided my identity is not revealed by the pictures or by descriptive texts accompanying them. If the procedure has been photographed/videotaped, the surgeon will obtain the original picture, image, videotape or CD. The hospital will not be responsible for storage, release or maintenance of the picture, image, tape or CD.    7.   I consent to the presence of a  or observers in the operating room as deemed necessary by my physician or their designees. 8.   I recognize that in the event my procedure results in extended X-Ray/fluoroscopy time, I may develop a skin reaction. 9. If I have a Do Not Attempt Resuscitation (DNAR) order in place, that status will be suspended while in the operating room, procedural suite, and during the recovery period unless otherwise explicitly stated by me (or a person authorized to consent on my behalf). The surgeon or my attending physician will determine when the applicable recovery period ends for purposes of reinstating the DNAR order.   10. Patients having a sterilization procedure: I understand that if the procedure is successful the results will be permanent and it will therefore be impossible for me to inseminate, conceive, or bear children. I also understand that the procedure is intended to result in sterility, although the result has not been guaranteed. 11. I acknowledge that my physician has explained sedation/analgesia administration to me including the risk and benefits I consent to the administration of sedation/analgesia as may be necessary or desirable in the judgment of my physician.     I CERTIFY THAT I HAVE READ AND FULLY UNDERSTAND THE ABOVE CONSENT TO OPERATION and/or OTHER PROCEDURE.      _________________________________________  __________________________________  Signature of Patient     Signature of Responsible Person         ___________________________________         Printed Name of Responsible Person           _________________________________                 Relationship to Patient  _________________________________________  ______________________________  Signature of Witness          Date  Time      Patient Name: Dorie Burger     : 10/23/1950                 Printed: October 3, 2022     Medical Record #: [id number]                     Page 1 of 1

## 2019-03-31 PROBLEM — Z43.6 ATTENTION TO UROSTOMY (HCC): Status: ACTIVE | Noted: 2019-03-31

## 2019-03-31 PROBLEM — Z93.3 COLOSTOMY STATUS (HCC): Status: ACTIVE | Noted: 2019-03-31

## 2019-03-31 NOTE — H&P
New Patient Visit Note       Active Problems      1. Peristomal hernia    2. Colostomy status ([facility] [address]    3. Attention to urostomy [facility])    4. Pelvic pain    5. Anemia of chronic disease    6. Hypothyroidism due to acquired atrophy of thyroid    7.  GIST (te (topical) and radiology contrast iodinated dyes. Past Medical / Surgical / Social / Family History    The past medical history, past surgical history, family history, social history, and medications have been reviewed by me today.     Past Medical Histor total) by mouth daily. aspirin 81 MG Oral Tab Take 81 mg by mouth daily. Multiple Vitamin (MULTI-VITAMIN) Oral Tab Take 1 tablet by mouth daily. No current facility-administered medications on file prior to visit.        Review of Systems  Review of urine in the left neck. On the left lower abdomen the colostomy is in place. The mucosa is healthy. There is a small bulge of the skin around the colostomy. This is soft and nontender. There is soft stool in the bag.    Musculoskeletal: Normal range of BASE:  Minimal atelectasis/scarring  LIVER:  Unremarkable. BILIARY:  Unremarkable. SPLEEN:  Unremarkable. PANCREAS:  Unremarkable.   ADRENALS:  Incompletely characterized right adrenal mass measuring 1.6 x 1.5 cm is unchanged in the previous exam.  This Linn Nowak MD on 10/26/2018 at 9:25       Approved by: Linn Nowak MD              Assessment   Peristomal hernia  (primary encounter diagnosis)  Colostomy status [facility])  Attention to urostomy [facility])  Pelvic pain  Anemia of chronic disease  Hypothyr the symptoms. Patient should notify the office if he does begin having increasing symptoms at the colostomy, we may revisit surgical intervention at that point. No orders of the defined types were placed in this encounter.         Viviana Gaviria

## 2019-03-31 NOTE — PATIENT INSTRUCTIONS
Assessment   Peristomal hernia  (primary encounter diagnosis)  Colostomy status ([facility] [address]  Attention to urostomy [facility])  Pelvic pain  Anemia of chronic disease  Hypothyroidism due to acquired atrophy of thyroid  GIST (gastrointestinal stroma tumor), malignant, symptoms at the colostomy, we may revisit surgical intervention at that point.

## 2019-04-09 NOTE — PROGRESS NOTES
A letter was mailed to the pt's home reminding the office will need a copy of a yearly eye exam. There are no eye exams on record.

## 2019-04-17 NOTE — TELEPHONE ENCOUNTER
Returned patient's call stating he has been experienced rectal pain. Denies fever or chills. Informed patient he is due for his CT scan and confirmed central scheduling number. Patient will schedule scan and then f/u appointment with Dr. Fermin Gonsalez.

## 2019-05-01 PROBLEM — R10.2 PERINEAL PAIN IN MALE: Status: ACTIVE | Noted: 2019-05-01
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
## 2019-05-16 PROBLEM — E11.9 DIET-CONTROLLED DIABETES MELLITUS (HCC): Status: ACTIVE | Noted: 2019-05-16

## 2019-05-16 NOTE — PROGRESS NOTES
HPI:    Patient ID: Hoang Yip is a 76year old male. HPI  Here for follow up  C/p severe perineum pan x 10 months  Constant. Burning. Severe. No alleviating factors. Tried lidoderm patch with oncology surgery. No relief. Hx of GIST tumor.    No fever sounds normal. No respiratory distress. He has no wheezes. He has no rales. Abdominal: Soft. Bowel sounds are normal. He exhibits no distension. There is no tenderness. There is no rebound. Musculoskeletal: Normal range of motion. He exhibits no edema.

## 2019-05-29 NOTE — TELEPHONE ENCOUNTER
Patient called and requested a call back because he was prescribed Gabapentin, and he thinks his dose should be doubled. Please advise.

## 2019-05-29 NOTE — TELEPHONE ENCOUNTER
Currently on Gabapentin 100mg bid    Patient states the nerve pain is getting better, urge to urinate is gone but buttock nerve pain is still not tolerable. States Dr. Miriam Alexander advised he would increase Gabapentin dose if symptoms persisted.      Patient aware

## 2019-05-30 NOTE — TELEPHONE ENCOUNTER
Dr. Howard Cunha recommends increasing Gabapentin 300 mg BID. Follow up in a couple of weeks with condition update. Patient informed of recommendations and agreed with plan.

## 2019-06-13 NOTE — TELEPHONE ENCOUNTER
The pt was given an Rx for gabapentin 300mg bid on 5/30/2019 for perineal pain and BPH. Last appointment on 5/16/2019:   C/p severe perineum pan x 10 months  Constant. Burning. Severe. No alleviating factors.  Tried lidoderm patch with oncology surgery

## 2019-06-13 NOTE — TELEPHONE ENCOUNTER
Pt reports gabapentin incresase is helping but he still experiences pain. Pain rating is only a 2/3 during the early day but after six pm builds up to 9/10 pain rating (1 to 10 scale).  meds will run out in approximately 8 days but the patient doesn't want

## 2019-06-28 NOTE — PROGRESS NOTES
HPI:    Patient ID: Priti To is a 76year old male. HPI  HPI:   Priti To is a 76year old male who presents for recheck of his diabetes, HYPERLIPIDEMIA AND HYPOTHYROIDISM. States last visit with opthalmology was 2 years ago.   Pt has been checkin Calc   Date Value Ref Range Status   05/08/2019 75.6 (H) <=30.0 ug/mg Final     Comment:       <30 ug/mg creatinine       Normal     ug/mg creatinine   Microalbuminuria   >300 ug/mg creatinine      Albuminuria             Current Outpatient Medicatio OR      Social History: Social History    Tobacco Use      Smoking status: Former Smoker        Packs/day: 1.00        Years: 45.00        Pack years: 39        Types: Cigars        Quit date: 2016        Years since quittin.7      Smokeless tobac been helping the pain). Hypothyroidism- stable, continue medication. Hyperlipidemia- stable, continue statin. Diet controlled diabetes- continue low carb, low sugar diet. The patient indicates understanding of these issues and agrees to the plan.

## 2019-07-07 PROBLEM — R10.2 PERINEAL PAIN: Status: ACTIVE | Noted: 2019-07-07

## 2019-07-07 PROBLEM — R73.9 HYPERGLYCEMIA: Status: ACTIVE | Noted: 2019-07-07

## 2019-07-08 NOTE — ED INITIAL ASSESSMENT (HPI)
Pt to ED from home with c/o rectal pain. Pt states this began 6-7 months ago when noted hernia around colostomy site. Pt has been seeing his surgeon, put on gabapentin with mild relief. He reports that yesterday pain became significantly worse.

## 2019-07-08 NOTE — PLAN OF CARE
A&Ox4. VSS. Afebrile. Tolerating regular diet. Denies any nausea. Colostomy noted with gas and liquid stool. Urostomy draining clear yellow urine, night time bag at bedside. Patient does self care to both appliances. Pain service consulted.  Norco scheduled call for assistance with activity based on assessment  - Modify environment to reduce risk of injury  - Provide assistive devices as appropriate  - Consider OT/PT consult to assist with strengthening/mobility  - Encourage toileting schedule  Outcome: Progr
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
## 2019-07-08 NOTE — PLAN OF CARE
Assumed care at 1500  EKG completed, showing SB and left anterior fascicular block. Clarified with pain service prior to starting lidocaine infusion; no contraindication  Pt denies any side effects.  VSS  Reports no improvement of pain yet, but burning sens

## 2019-07-08 NOTE — H&P
[facility]  History and Physical     Tatyana Head Patient Status:  Observation    10/23/1950 MRN [id number]   [facility] 3NW-A Attending Jimmy Ceballos MD   Hosp Day # 0 PCP Manju Salazar MD     Chief Complaint: Pain     History alcohol. He reports that he does not use drugs.     Family History:   Family History   Problem Relation Age of Onset   • Alcohol and Other Disorders Associated Father    • Cancer Mother         Breast        Allergies:   Iodine (Topical)        HIVES  Radio No edema or cyanosis. Integument: No rashes or lesions.    Psychiatric: Almost tearful, upset, talkative       Diagnostic Data:      Labs:  Recent Labs   Lab 07/07/19 2050   WBC 8.6   HGB 14.4   MCV 90.3   .0       Recent Labs   Lab 07/07/19 2050

## 2019-07-08 NOTE — CONSULTS
formerly [facility]  Report of Consultation    Fina Aldrich Patient Status:  Observation    10/23/1950 MRN [id number]   [facility] 3NW-A Attending Dariel Ford MD   Hosp Day # 0 PCP Gabriella Feliciano MD     Date of Admission:  2019 alcohol. He reports that he does not use drugs.     Allergies:    Iodine (Topical)        HIVES  Radiology Contrast *    HIVES, RESPIRATORY FAILURE, OTHER                            (SEE COMMENTS)    Comment:Originally with \"lower GI enema\"    Medications 07/07/2019    CO2 22.0 07/07/2019     07/07/2019    CA 9.4 07/07/2019    ALB 3.9 07/07/2019    ALKPHO 71 07/07/2019    BILT 0.4 07/07/2019    TP 7.3 07/07/2019    AST 16 07/07/2019    ALT 25 07/07/2019       Imaging:  X-Ray    Impression:  Patient A

## 2019-07-08 NOTE — BH PROGRESS NOTE
Went to see the pt for his gwen level of care assessment. Pt is tearful and admits to being in pain. He doesn't want the assessment done today but agrees for this liaison to come back tomorrow.

## 2019-07-08 NOTE — PLAN OF CARE
Ax4.  is being maintained, HR is in the 50s. Pt put on 2L of O2 NC d/t desating (86%) overnight. Pt is up with asst, reg diet, electrolyte protocol. PIV is saline locked. Pt has urostomy on the right side and colostomy is on the left side.  Pt is having

## 2019-07-08 NOTE — ED PROVIDER NOTES
Patient Seen in: [facility] Emergency Department    History   Patient presents with:  Anal Problem (GI)    Stated Complaint: ambulatory to er cc rectal pain/hx colostomy incr pain today    HPI    Patient presents with perineal pain.   The patient sta and urostomy   • PELVIC EXENTERATION N/A 10/3/2017    Performed by Yecenia Mcbride MD at [facility] MAIN OR           Social History    Tobacco Use      Smoking status: Former Smoker        Packs/day: 1.00        Years: 45.00        Pack years: 45        Types: Cig CULTURE REFLEX - Abnormal; Notable for the following components:    Blood Urine Small (*)     pH Urine 9.0 (*)     Leukocyte Esterase Urine Moderate (*)     Bacteria Urine Rare (*)     All other components within normal limits   CBC WITH DIFFERENTIAL WITH Noted POA    Hyperglycemia R73.9 7/7/2019 Yes    Perineal pain R10.2 7/7/2019 Unknown

## 2019-07-09 NOTE — PROGRESS NOTES
[facility]  Progress Note     José Miguel Liz Patient Status:  Observation    10/23/1950 MRN [id number]   [facility] 7NE-A Attending Sarah Bae MD   Hosp Day # 0 PCP Rylee Cabezas MD     Chief Complaint: pelvic pain    S: Patient den 6 times per day   • gabapentin  400 mg Oral TID   • Amitriptyline HCl  10 mg Oral Nightly   • ketorolac (TORADOL) injection  30 mg Intravenous Q6H       ASSESSMENT / PLAN:     1.  Perineal pain s/p abdominoperineal resection, pelvic exenteration for GIST wi

## 2019-07-09 NOTE — PLAN OF CARE
Assumed care @ 0700. A&Ox3, VSS   NSR on tele. 12.5 sec run of SVT this am while pt sleeping. Lawerance Kras APN updated. Cardizem started   Received pt on 8L Hi-flow NC. Weaned to 5-6L at rest. Requires 10L with minimal activity as pt desats to low 80s.  Lung sound

## 2019-07-09 NOTE — PLAN OF CARE
Assumed care @ 0700. A&Ox4, VSS on RA, NSR on tele. Pt denies pain. Denies need for medication. Plan to adjust medications outpt        NURSING DISCHARGE NOTE    Discharged Home via Wheelchair.   Accompanied by Support staff  Belongings Taken by patient/fam

## 2019-07-09 NOTE — BH LEVEL OF CARE ASSESSMENT
Level of Care Assessment Note    General Questions  Why are you here?: Patient states he came into the hospital due to rectal pain. Precipitating Events: Pt was in severe pain when he came into the hospital.  Order placed for possible depression.   The pt Firearm/Weapon: Yes  Current Location of Firearm/Weapon: pt states he has 3 guns  Firearm/Weapon Secured: states not locked up because he lives by himself.   Do you have a firearm owner ID card?: No    Self Injury  History of Self Injurious Behaviors: No  P No  Describe: The pt states he loves everything about his home.      Withdrawal Symptoms  Current Withdrawal Symptoms: No    Compulsive Behaviors  Are you/others concerned about any of the following behaviors over the past 30 days?: Denies time thought he was depresed. Pt also denies any anxiety or depression. Risk/Protective Factors  Risk Factors: No current treatment  Protective Factors:  The pt states for his grandkids    Motivational Stage of Change  Motivational Stage of Change: Pr

## 2019-07-09 NOTE — PLAN OF CARE
Assumed pt care @2071. Lidocaine infusion stopped @ 2030. Pt c/o continued rectal pain, states his pain type changed from burning to aching now. Refused Norco and Gabapentin, stating they don't help control pain at all. IV toradol given.  Pt ambul as appropriate  - Consider OT/PT consult to assist with strengthening/mobility  - Encourage toileting schedule  Outcome: Progressing     Problem: DISCHARGE PLANNING  Goal: Discharge to home or other facility with appropriate resources  Description  Ced Grace

## 2019-07-10 NOTE — PROGRESS NOTES
Initial Post Discharge Follow Up   Discharge Date: 7/9/19  Contact Date: 7/10/2019    Consent Verification:  Assessment Completed With: Patient  HIPAA Verified?   Yes    Discharge Dx:    Perineal pain    Was TCC ordered: no    General:   • How have you b Patient states when he gets up and moves around the pain worsens  o Is the pain manageable at home?  yes  • How well was your pain managed while in the hospital?   o On a scale of 1-5   1- Very Poor and 5- Very well   o Very well Patient reports that he had in the AM and 1200 mg in the evening. Referrals/orders at D/C:  Home Health ordered at D/C? No     DME ordered at D/C? No    Services ordered at D/C? Yes   What services:   Pain clinic   Have you scheduled these services?     yes, 7/17/19      Needs post symptoms, when to call the doctor and when to call 911. Patient verbalized understanding of these. NCM instructed patient to call PCP with any questions or needs, he states he will.     [x]  Discharge Summary, Discharge medications reviewed/discussed/and re

## 2019-07-11 NOTE — TELEPHONE ENCOUNTER
Pt was discharged but rx for amitriptyline was not given. Per Dr. Tahmina Moreno Amitriptyline 10mg at bedtime. Spoke with pt Dr. Marleen Soni office just called in a short term supply of amitriptyline but pt would like our office to manage and prescribe this.

## 2019-07-11 NOTE — TELEPHONE ENCOUNTER
From: Micheline Starks RN   Sent: 7/9/2019   1:23 PM   To: Radha Duffy MD, Lamon Babinski, MD, *   Subject: follow up for neuropathetic pain                 Hi Dr. Stephanie Chance,   Just wanted to follow up with you on Carmen Nguyen.  He was admitted with intractable ne

## 2019-07-11 NOTE — DISCHARGE SUMMARY
[facility]  DISCHARGE SUMMARY     Sylvia Loera Patient Status:  Observation    10/23/1950 MRN [id number]   [facility] 7NE-A Attending No att. providers found   Hosp Day # 0 PCP Sarah Martinez MD     Date of Admission: 2019  Date worsen    Laurie Fountain MD  [address] [facility] [address]  [phone]    Schedule an appointment as soon as possible for a visit in 1 week      Appointments for Next 30 Days 7/11/2019 - 8/10/2019      Date Arrival Time Visit Type Good

## 2019-07-11 NOTE — TELEPHONE ENCOUNTER
Called to check on patient s/p hospital discharge. He has overall been doing well. The pain only occurs at night as of right now. He has follow-up with Dr. Humble George and his PCP next week.  He reports he was only discharged home on Gabapentin and not given the E

## 2019-07-16 NOTE — PROGRESS NOTES
HPI:    Linda Johnson is a 76year old male here today for hospital follow up.    He was discharged from Inpatient hospital, [facility] to Home   Admission Date: 7/7/19   Discharge Date: 7/9/19  Hospital Discharge Diagnoses (since 6/16/2019) perineal in the afternoon, 2 capsules at bedtime. CRANBERRY OR Take by mouth. LEVOTHYROXINE SODIUM 125 MCG Oral Tab TAKE 1 TABLET (125 MCG TOTAL) BY MOUTH BEFORE BREAKFAST.   simvastatin 20 MG Oral Tab Take 1 tablet (20 mg total) by mouth daily.    aspirin 81 MG bleeding. Colostomy WNL     Genitourinary: Negative. Ileal conduit with urostomy WNL   Musculoskeletal: Negative. Negative for back pain. Neurological: Negative. Negative for dizziness, weakness, light-headedness, numbness and headaches. business days of discharge on date above   Medical Decision Making- Based on service period of discharge to 30 days:   · Number of Possible Diagnoses and/or Management Options: moderate  · Amount and/or Complexity of Data to Be Reviewed: moderate  · Risk o

## 2019-07-17 NOTE — PROGRESS NOTES
PHYSICAL EXAM:   Physical Exam   Constitutional:           Patient presents in office today with reported pain in middle of buttocks     Current pain level reported = 5/10      Location of Pain: middle of buttocks    Date Pain Began: 8/18          Work R

## 2019-07-18 NOTE — TELEPHONE ENCOUNTER
Spoke with patient and scheduled injection(s). Reviewed pre-op instructions. Patient verbalized understanding, and agreeable to holding ASA 81 and NSAIDs medications as indicated in instructions listed in below.   Encouraged pt to contact office if changes **To reduce the risk of infection, we ask that you bathe within 24 hours prior to your procedure. **      Day of Procedure:   Do not eat or drink anything (including water) 8 hours prior to your procedure.   ? If you take morning blood pressure medicat ? Plavix (Clopidogrel)  ? Epidural ____ - 10 days  ? Others 7 days   NSAIDs: 24 hours  Meloxicam -- Cervical procedures require 3 days  ?  Ibuprofen (Motrin, Advil, Vicoprofen), Naproxen (Naprosyn, Aleve), Piroxcam (Feldene), Meloxicam (Mobic)--(Cervical pr Spinal Cord Stimulation  What is a spinal cord stimulator?   A spinal cord stimulator is a specialized device that stimulates the spinal cord and spinal nerves by emitting tiny electrical impulses via a small electrical wire placed behind and just outside t You will be scheduled for the trial once prior authorization had been obtained.  The final step is the follow up appointment after the trial. At this appointment it will be determined how well your pain was relieved and if you are a candidate for permanent You will be monitored with an EKG, blood pressure cuff and an oxygen-monitoring device. The procedure is performed under sterile conditions.  In a spinal cord stimulator trial, temporary electrodes are placed and the patient uses an external device to gener If the procedure is successful, your pain may be gone or diminished. You may feel a constant sensation of stimulation, often described as warm or tingly. You may have soreness due to the needle placement for a day or two.  Most patients with successful stim Videos about spinal cord stimulation are available in the office for you to take home. More detailed information is available from the manufacturers of these devices. Such information is available at [employer] website at [email or web address].        February 2

## 2019-07-18 NOTE — TELEPHONE ENCOUNTER
Received psychological evaluation from Dr. Thierry Ugarte and abraham to proceed with SCS trial. Please call patient to schedule.

## 2019-07-19 NOTE — TELEPHONE ENCOUNTER
Per Dr. Faustino Fleming patient can have percocet 5/325mg q8h PRN #90 one time script for now until the SCS trial. Per Lisa Hanks this was offered multiple times during the office visit and patient refused.  If he would like to try that now he can come  the pr

## 2019-07-19 NOTE — TELEPHONE ENCOUNTER
The pt has also contacted Dr. Geroge Kehr office post ED visit due to uncontrolled pain and was given a lidocaine infusion that helped the pt for only a few days. The pain is located in the buttocks and described as intense.  The pt is taking advil, gabapentin 3

## 2019-07-19 NOTE — TELEPHONE ENCOUNTER
Pt just got home from ED, states they were unable to control his pain, pt asking for meds or advice from RN

## 2019-07-19 NOTE — TELEPHONE ENCOUNTER
Patient here to p/up RX, RN working on it.  Provided [facility] [phone]

## 2019-07-19 NOTE — TELEPHONE ENCOUNTER
Patient wants to know if he can increase the dosage of his amitriptyline 25mg and gabapentin 300mg because he says he is in a lot of pain. Please c/b to discuss.

## 2019-07-23 NOTE — TELEPHONE ENCOUNTER
We can add some other medications to help until his SCS:   Meds sent to pharmacy:  Medrol dose pack  Cyclobenzaprine 10mg every 8 hours    If the pain is so uncontrolled he can be admitted and receive IV lidocaine with Dr. Trae Chan.

## 2019-07-25 NOTE — PROGRESS NOTES
Name: Linda Johnson   : 10/23/1950   DOS: 2019     Chief complaint: Rectal pain    History of present illness:  Linda Johnson is a 76year old male complaining of  pain in the rectal area for 1 year.  Pain started after he had extensive intra-abdomina Disp: 90 tablet Rfl: 0   Amitriptyline HCl 25 MG Oral Tab Take 2 tablets (50 mg total) by mouth nightly. Disp: 90 tablet Rfl: 1   gabapentin 300 MG Oral Cap 2 capsules in the morning, 3 capsules in the afternoon, 3 capsules at bedtime as needed.  Disp: 30 c ischemia in extremities. Endocrine: Negative. Specifically denies thyroid disorder, diabetes mellitus, osteoporosis or any other endocrine problems. Respiratory:  Denies shortness of breath, wheezing, coughing.   Gastrointestinal:  Denies abdominal pain, 5    Deep Tendon Reflexes:            Biceps:     5   5   Triceps:    5   5   Brachioradialis:               5   5  Sensory Examination:    R   L   C5:     N   N   C6:     N   N   C7:     N   N   C8:     N   N   T1:     N   N    Cardiovascular system: Regu discussion with the patient about the management. The patient does not do well with the narcotic and narcotic also does not help him much. I recommended a spinal cord stimulator trial and implantation. I discussed the risk and benefits of the procedure.

## 2019-07-29 NOTE — TELEPHONE ENCOUNTER
Returned patient's call regarding re order of his ostomy supplies. I contacted [address] ph. [phone] to have them fax re order forms for all of his ostomy supplies.      Leonel Dawkins know we would re order his supplies as soon as the refill was received

## 2019-08-08 NOTE — TELEPHONE ENCOUNTER
Spoke to patient, confirmed procedure date of 8/13/19 with Dr Jory Smart and to be checked in at outpatient registration at 12:00 pm. Patient instructed to call pre-procedure line before procedure at [phone].  Patient instructed to call office if there are a

## 2019-08-13 NOTE — H&P
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 [address]    • Hypothyroidism    • O Physical done within the last 30 days. Any changes noted above.     Chema Kim, APRN

## 2019-08-15 NOTE — OPERATIVE REPORT
[facility]  Operative Report  2019     Karla Morris Patient Status:  Hospital Outpatient Surgery    10/23/1950 MRN [id number]   [facility] ENDOSCOPY Attending No att. providers found   Hosp Day # 0 PCP Yohana Galeano MD     Indicat and subcutaneous tissue was anesthetized with 1% lidocaine at the L3 level around the midline. A #11 blade was used to make a small skin incision. A 14-gauge Tuohy needle was inserted and directed to L3 intervertebral interlaminar space in the midline. be determined according to the patient's response during this week of trial.  The patient understood fully and was discharged in good condition. Complications: None. Follow up:  The patient will be followed in the pain clinic in 5 to 7 days for posto

## 2019-08-19 NOTE — PROGRESS NOTES
Patient presents in office today with reported pain in rectum.      Current pain level reported = 4/10    Last reported dose of Oxycodone 8 days ago

## 2019-08-20 NOTE — PROGRESS NOTES
Location of Pain: Buttocks, anal region.   Has colostomy / urostomy    Date Pain Began: Yesterday       Work Related:   No        Receiving Work Comp/Disability:   No    Numeric Rating Scale:  Pain at Present:  10

## 2019-08-20 NOTE — H&P
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 Abdomen:  Soft, non-tender to palpation. Non-distended, with positive bowel sounds. Rectal and genital:  Exams are deferred. Muscular:  Exam reveals normal bulk and tone.   Neurologic Ex Lyrica for nerve pain, he is took gabapentin the past without much relief        Time spent on counseling/coordination of care:  15 Minutes    Total time spent with patient:  130 Diana Mosher MD   [address]

## 2019-08-24 NOTE — PROGRESS NOTES
Name: Tatyana Head   : 10/23/1950   DOS: 2019     Pain Clinic Follow Up Visit:   Tatyana Head is a 76year old male who presents for recheck of his chronic rectal pain.   She is status post spinal cord stimulator trial.  The patient had 100% pain re in this encounter.       Medications filled today:  Requested Prescriptions      No prescriptions requested or ordered in this encounter       Radiology orders and consultations:None    The patient indicates understanding of these issues and agrees to the p

## 2019-09-09 NOTE — TELEPHONE ENCOUNTER
Last time medication was refilled ?  D/C'd 7/30  Quantity and # of refills   Last OV 7/16/19 here, Jose Mcfarland 8/19/19, Yuriy 8/20/19  Next OV 11/18/19 here

## 2019-09-26 NOTE — TELEPHONE ENCOUNTER
Call patient to inform we will contact him once we get approval to start doing these at the surgery center.   Dr. Michelle Wilson is still awaiting approval from [facility]

## 2019-10-21 NOTE — TELEPHONE ENCOUNTER
Spoke with pt informing him Dr. Rob Ball office will need to refill percocet. He will call that office for refill. Pt also states he is taking gabapentin 300mg 3 caps in the morning and afternoon and then 2 at bedtime and needs a refill.   Pt aware this will

## 2019-10-22 NOTE — TELEPHONE ENCOUNTER
Medication: oxyCODONE-acetaminophen 5-325 MG Oral Tab    Date of last refill: 7/19/19  [place] 0[place]/01    Last office visit: 8/19/19  Due back to clinic per last office note:  Not indicated  Date next office visit schedu

## 2019-10-22 NOTE — TELEPHONE ENCOUNTER
Percocet refill  Patient informed of 48 hour refill policy excluding weekends and holidays. Informed patient only patient can  the prescription effective April 1, 2017.   Further explained patient will not receive a call back once prescription is marcy

## 2019-10-22 NOTE — TELEPHONE ENCOUNTER
Percocet was given as a one time prescription, but since he has not gotten his SCS permanent placement due to Dr. Herbert Oconnor waiting on approval for surgical center [facility] we will give one last prescription which should hopefully last him until he can ge

## 2019-10-23 NOTE — TELEPHONE ENCOUNTER
Prescription ready for , [place] [address]    The following documentation was provided to patients at time of Rx pickup:    From the Office of Bryan Zuniga and Marylu Torres at [facility]:  [address] [facility] office is committed to providing the best care to o

## 2019-11-18 PROBLEM — R73.9 HYPERGLYCEMIA: Status: RESOLVED | Noted: 2019-07-07 | Resolved: 2019-11-18

## 2019-11-18 PROBLEM — R10.2 PERINEAL PAIN: Status: RESOLVED | Noted: 2019-07-07 | Resolved: 2019-11-18

## 2019-11-18 NOTE — PATIENT INSTRUCTIONS
Ayana Hernández's SCREENING SCHEDULE   Tests on this list are recommended by your physician but may not be covered, or covered at this frequency, by your insurer. Please check with your insurance carrier before scheduling to verify coverage.     PREVENTATIVE the following criteria:   • Men who are 73-68 years old and have smoked more than 100 cigarettes in their lifetime   • Anyone with a family history    Colorectal Cancer Screening Covered up to Age 76     Colonoscopy Screen   Covered every 10 years- more of renal disease   Hemophiliacs who received Factor VIII or IX concentrates   Clients of institutions for the mentally retarded   Persons who live in the same house as a HepB virus carrier   Homosexual men   Illicit injectable drug abusers     Tetanus Toxoid-

## 2019-11-18 NOTE — PROGRESS NOTES
HPI:   Jerzy Powell is a 71year old male who presents for a Medicare Subsequent Annual Wellness visit (Pt already had Initial Annual Wellness). HPI:  Here for AWV  Still with perineal pain. Controlled with percocet. Take 2 tabs a day.  Awaiting pain sti his medication list.   CAGE Alcohol screening   Loly Jiang was screened for Alcohol abuse and had a score of 0 so is at low risk.      Patient Care Team: Patient Care Team:  Rober Wahl MD as PCP - General (Internal Medicine)  Rober Wahl MD as PCP afternoon and 2 capsules at bedtime. CRANBERRY OR, Take by mouth. LEVOTHYROXINE SODIUM 125 MCG Oral Tab, TAKE 1 TABLET (125 MCG TOTAL) BY MOUTH BEFORE BREAKFAST.  simvastatin 20 MG Oral Tab, Take 1 tablet (20 mg total) by mouth daily.   aspirin 81 MG Oral kg)   SpO2 97%   BMI 30.87 kg/m²   Estimated body mass index is 30.87 kg/m² as calculated from the following:    Height as of this encounter: 73\". Weight as of this encounter: 234 lb (106.1 kg).     Medicare Hearing Assessment  (Required for AWV/SWV) normal.  Pulsation pedal pulse exam of both lower legs/feet is normal as well.               Vaccination History     Immunization History   Administered Date(s) Administered   • FLU VACC High Dose 72 YRS & Older PRSV Free (04893) 10/11/2016, 09/13/2017, 09/ ostomy clinic     Attention to urostomy [facility])- refer to ostomy clinic     Perineal pain in male- cont percocet per pain service. Await placement     Diet-controlled diabetes mellitus ([facility] [address]- controlled.  Cont current med therapy    The patient indicates unde found.    Glaucoma Screening      Ophthalmology Visit Annually: Diabetics, FHx Glaucoma, AA>50, > 65 Data entered on: 11/6/2018   Last Dilated Eye Exam 11/6/2018       Prostate Cancer Screening      PSA  Annually There are no preventive care remind found.       Diabetes      HgbA1C  Annually HgbA1C (%)   Date Value   05/08/2019 6.6 (H)       No flowsheet data found.     Creat/alb ratio  Annually Malb/Cre Calc (ug/mg)   Date Value   05/08/2019 75.6 (H)        LDL  Annually LDL Cholesterol (mg/dL)   Bennie PANEL      HGB A1C      Microalb/Creat Ratio, Random Urine      LIPID PANEL      Meds This Visit:  Requested Prescriptions      No prescriptions requested or ordered in this encounter       Imaging & Referrals:  Cheli Riggs [phone]       #[phone]

## 2019-11-26 NOTE — TELEPHONE ENCOUNTER
Discussed with Dr. Jaron Ramirez, who feels there is nothing we can do for him today in the office.  I can increase his percocet 5/325mg to 1 tab q6h PRN and give him a prescription #28 to get him through the weekend and have him make an office visit on Monday 12/2/

## 2019-11-26 NOTE — TELEPHONE ENCOUNTER
Patient calling with pain to: In severe pain today, states pain is in the rectal area. Completed SCS  trial 8/13/19. On wait list for SCS placement.      Patient states he was on Percocet for 2 months and it has stopped working since past 2 weeks     Nadege

## 2019-11-26 NOTE — TELEPHONE ENCOUNTER
Discussed with Gely BOTELLO and informed patient we can give him a temporary Rx for Percocet QID and he will need to be seen by Dr Rosana Carroll in office on Monday for further plan of care and pain management.      Patient stated he has enough Percocet to take QID and

## 2019-12-02 NOTE — PROGRESS NOTES
Name: Vinay Kim   : 10/23/1950   DOS: 2019     Pain Clinic Follow Up Visit:   Vinay Kim is a 71year old male who presents for recheck of his chronic rectal pain.  Patient is s/p SCS trial with good results and is currently awaiting date for S Size: adult)   Pulse 78   Ht 72\"   Wt 225 lb (102.1 kg)   SpO2 96%   BMI 30.52 kg/m²   General: Jairo Peña IS A 71year old male in no acute distress. Neurologic: Alert, oriented, articulate. Normal gait. Psychiatric: Normal mood.   Inspection: Ambul Pain.       Radiology orders and consultations:None    The patient indicates understanding of these issues and agrees to the plan. Return if symptoms worsen or fail to improve.     HUMPHREY Abrams, 12/2/2019, 11:02 AM

## 2019-12-02 NOTE — ED PROVIDER NOTES
Patient Seen in: [facility] Emergency Department      History   Patient presents with:  Pain (neurologic)    Stated Complaint: rectal pain    HPI    Patient is a 70-year-old male comes to ED for evaluation of rectal pain.   Patient has history of gas Past Surgical History:   Procedure Laterality Date   • BACK SURGERY  1998   • BLADDER CYSTECTOMY WITH ILEAL CONDUIT N/A 10/3/2017    Performed by Karina Gerardo MD at [facility] MAIN OR   • COLONOSCOPY  2006   • HERNIA SURGERY Left 2006   • HERNIA SURGER exudate. NECK: Supple, trachea midline, no lymphadenopathy. LUNG: Lungs clear to auscultation bilaterally, no wheezing, no rales, no rhonchi. CARDIOVASCULAR: Regular rate and rhythm. Normal S1S2. No S3S4 or murmur. ABDOMEN: Bowel sounds are present. the pelvis right greater than left. Also bilateral pelvic adenopathy right greater than left. 4.4 cm soft tissue mass with the left lower quadrant.   Finding suggests recurrent malignancy metastatic disease    Medications   HYDROmorphone HCl (DILAUDID) 1

## 2019-12-02 NOTE — PROGRESS NOTES
Patient presents in office today with reported pain in rectal area    Current pain level reported = 6/10    Received morphINE Sulfate IR 15 MG Oral Tab this morning in the ER however states it does not appear to be helping at this time.  Patient states \"I

## 2019-12-02 NOTE — ED INITIAL ASSESSMENT (HPI)
Phantom rectal pain. Pt was recently seen by PCP who increased percocet dosage pt states he has had no relief. Pt appears to be in mild distress with frequent groaning and deep breathing.

## 2019-12-04 NOTE — TELEPHONE ENCOUNTER
Spoke to pt who states the medication he was prescribed on 12/2/19 is not managing pain very well. Confirmed that pt was referring to Dilaudid. Pt asking if he can double the dosage, stating \"they have me on a very little dose, just 4 mg. \" Advised pt not

## 2019-12-04 NOTE — TELEPHONE ENCOUNTER
Pt wants to increase dose of hydromorphone after being on it for 3 days. It is not working that well, in a lot of pain. Can he double or take one and a half? Please call.

## 2019-12-05 PROBLEM — K62.89 RECTAL PAIN: Status: ACTIVE | Noted: 2019-12-05

## 2019-12-05 NOTE — TELEPHONE ENCOUNTER
Patient seen by Dr. Kaur Favorite today and is admitted inpatient, will wait and see how patient is doing when discharged.

## 2019-12-05 NOTE — PLAN OF CARE
NURSING ADMISSION NOTE      Patient admitted via Wheelchair  Oriented to room. Safety precautions initiated. Bed in low position. Call light in reach. Admission navigator completed. Page out to oncology service for admission orders.     Patient

## 2019-12-05 NOTE — PROGRESS NOTES
Patient is here today for Consult with Zander Saucedo for GIST. Patient stated pain in the Buttock is rated a 8 on a scale of 0-10. Medication list and medical history were reviewed and updated.      Education Record    Learner:  Patient and family members    D

## 2019-12-05 NOTE — PROGRESS NOTES
At 1110, pt up to restroom and reports pain back up to 8/10, mainly when standing or walking. Dilaudid 2 mg IVP given. Pt now reports pain 0/10 and is resting comfortably in reclined chair.

## 2019-12-05 NOTE — PROGRESS NOTES
At 1020, pt received additional dilaudid 1 mg IVP for unresolved pain. At approx 1040, pt states pain is gone while he is reclined in the chair; pt now resting comfortably. Will reassess pain level at 1100.

## 2019-12-05 NOTE — PROGRESS NOTES
Pt was seen by Dr. Francoise Martínez today for recurrent GIST. Plan is for pt to be admitted for pain management. While in clinic, orders were received for pt to receive IVP dilaudid PRN while waiting for IP bed. Pt agreeable with plan.  Will continue to monitor pain

## 2019-12-05 NOTE — H&P
[facility] History and Physical      Patient Name: Thelma Mcnamara   YOB: 1950  Medical Record Number: [id number]  Consulting Physician: Branda Kanner. Koko Sullivan M.D.    Referring Physician: Rose May MD    Date of Con abdominoperineal resection, total cystectomy, prostatectomy, end colostomy, and urostomy.  Pathology showed a 10.2 cm GIST with 80% necrosis; necrotic tumor was present in prostatic tissue and wall of rectum; 10/10 lymph nodes were negative for metastasis; Transdermal, Q72H        Allergies   Mr. Mitzy Patel is allergic to radiology contrast iodinated dyes. Review of Systems   Constitutional No fevers, chills, night sweats, excessive fatigue or weight loss. Allergic/Immunologic No reactions.   Eyes No signi rashes. Neurologic Alert and oriented x 3; motor and sensory grossly intact. Psychiatric Mood and affect appropriate; coherent speech; verbalizes understanding of our discussions today.     Laboratory   No results found for this or any previous visit (fro Cystectomy. PELVIC NODES:  Bilateral pelvic adenopathy, right greater than left. Several heterogeneous soft tissue nodules within the pelvis, right greater than left which measure up to 3 cm. PELVIC ORGANS:  Prostatectomy. No free fluid.   BONES:  N

## 2019-12-05 NOTE — IMAGING NOTE
Spoke w RN Lucio Guillaume re CT bx. To be done tomorrow. Pt is able to sign consent. Last dose of ASA on 12/4. Asked to hold. Instructed NPO after midnight and draw stat PT/INR in morning.  Requested accucheck prior to going to bx and also asked for PCA to be dc'd

## 2019-12-05 NOTE — PROGRESS NOTES
Pt will be admitted to  403. Transport requested and report called to CHOLO Fuentes at 1230. Dilaudid 2 mg IVP last given at 1120.  Offered additional dose prior to discharge from clinic, but pt refused at this time stating that while he is sitting he ha

## 2019-12-06 NOTE — PROGRESS NOTES
Saw patient this afternoon to evaluation pain control. S/p biopsy this AM, some tenderness at site. Has ambulated in hallway-states pain was controlled and improved compared to past several days. Discussed discharge, patient agreeable.  Daughter to drive pa

## 2019-12-06 NOTE — PLAN OF CARE
NURSING DISCHARGE NOTE    Discharged Home via Wheelchair. Accompanied by Family member  Belongings Taken by patient/family. AVS and education provided to patient and family.    Discharge plan of care including follow up needs and new medication info

## 2019-12-06 NOTE — PLAN OF CARE
PCA turned off 1550;   7.5mL given since last shift handoff. 5 doses attempted, 5 doses delivered. Plan to DC home with Fentanyl patch & PO Dilaudid.

## 2019-12-06 NOTE — PROCEDURES
[facility]  Procedure Note    Hoang Kailua Patient Status:  Inpatient    10/23/1950 MRN [id number]   [facility] 4NW-A Attending Stuart Carlos MD   [facility] Day # 1 PCP Boris Ribeiro MD     Procedure: CT guided abdominal biopsy

## 2019-12-06 NOTE — PLAN OF CARE
A&Ox4. VSS, afebrile. PCA Dilaudid 0/0.3/10/1.8- patient tolerating pain medication well. Fentanyl patch to left upper arm- new this admission. Patient has barely any pain when at rest. Patient able to sit up without any increase in pain.  Patient's p assistance with activity based on assessment  - Modify environment to reduce risk of injury  - Provide assistive devices as appropriate  - Consider OT/PT consult to assist with strengthening/mobility  - Encourage toileting schedule  Outcome: Progressing

## 2019-12-06 NOTE — IMAGING NOTE
Patient arrived from floor to CT for Bx of a left sided pelvic mass. The patient has a history of cancer. The Patient's affect is good and conversational.  He has pain in his abdominal region.  Doctor Soto consented patient for the procedure, and all q

## 2019-12-12 PROBLEM — Z51.5 PALLIATIVE CARE BY SPECIALIST: Status: ACTIVE | Noted: 2019-12-12

## 2019-12-12 PROBLEM — G89.3 NEOPLASM RELATED PAIN: Status: ACTIVE | Noted: 2019-12-12

## 2019-12-12 NOTE — PROGRESS NOTES
Palliative Care Consult Note     Patient Name: Karla Morris   YOB: 1950   Medical Record Number: [id number]   [facility]: [id number]   Date of visit: 12/12/2019     Reason for Consult:  Symptom management and goals of care    Chief Complaint/Reason Laterality Date   • BACK SURGERY  1998   • BLADDER CYSTECTOMY WITH ILEAL CONDUIT N/A 10/3/2017    Performed by Stefany Davalos MD at [facility] MAIN OR   • COLONOSCOPY  2006   • HERNIA SURGERY Left 2006   • HERNIA SURGERY Left 1975   • OTHER SURGICAL HISTORY  1 Patch 72 Hr Place 1 patch onto the skin every third day. 10 patch 0   • HYDROmorphone HCl (DILAUDID) 4 MG Oral Tab Take 1 tablet (4 mg total) by mouth every 4 (four) hours as needed for Pain.  120 tablet 0   • SIMVASTATIN 20 MG Oral Tab TAKE 1 TABLET DAILY

## 2019-12-13 NOTE — TELEPHONE ENCOUNTER
MD Bobbi Knight RN             Please call patient. Let him know that his biopsy, as expected, confirms GIST. Mutational testing on the tumor is still in process but I expect to have results next week.  So I would like him to be sc

## 2019-12-19 NOTE — PROGRESS NOTES
ORAL CHEMOTHERAPY EDUCATION RECORD  Learner:  Patient and Family Member  Barriers / Limitations:  None    Diagnosis:   Malignant GI Stromal Tumor    Medication Name:   Regorafenib  Dose:  Start with 2 tablets daily x4 days, then 3 tablets daily x4 days, the Treatment Team Information Sheet  Side Effect Management Information Sheet  Maria [facility] Hayde Support Services Sheet  [employer] Information sheet    Patient and caregiver were given ample opportunity to ask questions.   All questions and concerns Deja Kothari

## 2019-12-23 NOTE — TELEPHONE ENCOUNTER
Patient called stating his pain has much improved with starting stivarga. He is still using dilaudid in the morning as a just in case dose and lyrica BID. He would like to wean if possible.     Will have him stop dilaudid and only use if pain starts escal

## 2019-12-26 PROBLEM — R10.9 ABDOMINAL PAIN OF UNKNOWN ETIOLOGY: Status: ACTIVE | Noted: 2019-12-26

## 2019-12-26 NOTE — TELEPHONE ENCOUNTER
Ilan Allen called to say that he was on a new chemo drug as of last week and it has led to him being in immense pain. He asked that he please get a call back to go over this.

## 2019-12-26 NOTE — ANESTHESIA PREPROCEDURE EVALUATION
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   Te Cardiovascular  Comment: 1. Left ventricle: The cavity size was mildly increased. Wall thickness was     normal. Systolic function was normal. The estimated ejection fraction was     60%.  There was no diagnostic evidence for regio Smokeless tobacco: Never Used      Tobacco comment: \"occasional social cigar\"     Alcohol use: Yes      Comment: occasionally- 1 or 2 per week       Drug use: No     Available pre-op labs reviewed.   Lab Results   Component Value Date    WBC 7.7 12/26/201

## 2019-12-26 NOTE — PLAN OF CARE
NURSING ADMISSION NOTE      Patient admitted via Cart  Oriented to room. Safety precautions initiated. Bed in low position. Call light in reach.   ADMISSION COMPLETED IN ER

## 2019-12-26 NOTE — ANESTHESIA PROCEDURE NOTES
Airway  Date/Time: 12/26/2019 4:45 PM  Urgency: elective      General Information and Staff    Patient location during procedure: OR  Anesthesiologist: Joanna Licona MD  Performed: anesthesiologist     Indications and Patient Condition  Indications for a

## 2019-12-26 NOTE — ED PROVIDER NOTES
Patient Seen in: [facility] Emergency Department      History   Patient presents with:  Abdomen/Flank Pain    Stated Complaint: abdominal discomfort, hx of colostomy/urostomy, started new oral chemo    HPI    Adisdebbie Edwards is a pleasant 61-year-old gentleman Years: 45.00        Pack years: 39        Types: Cigars        Quit date: 9/22/2016        Years since quitting: 3.2      Smokeless tobacco: Never Used      Tobacco comment: \"occasional social cigar\"     Alcohol use: Yes      Comment: occasionally- 1 or other components within normal limits   LIPASE - Normal   PTT, ACTIVATED - Normal   PROTHROMBIN TIME (PT) - Normal   CBC WITH DIFFERENTIAL WITH PLATELET    Narrative:      The following orders were created for panel order CBC WITH DIFFERENTIAL WITH PLATELET

## 2019-12-26 NOTE — ED NOTES
OR called and they are sending transport to get the patient for OR. Pt instructed to remove all clothing and jewelry. Pt last ate at 0600.

## 2019-12-26 NOTE — CONSULTS
[facility]  Report of Consultation    Vinay Judy Patient Status:  Emergency    10/23/1950 MRN [id number]   Location [address] [facility] Attending Julita Wen MD   Hosp Day # 0 PCP Brian Mckeon MD     Reason for Consultation History:   Diagnosis Date   • Back problem     back surgery 20 years ago   • Cancer ([facility] 02-12    GIST   • Carcinoma of gastrointestinal tract ([facility]    • Hypothyroidism    • Other and unspecified hyperlipidemia    • Personal history of antineoplastic chem injection 4 mg, 4 mg, Intravenous, Once    Review of Systems:    Allergic/Immuno:  Review of patient's allergies complete, up to date. Cardiovascular:  Negative for cool extremity and irregular heartbeat/palpitations  Constitutional:  Negative for fever. quadrant. Well-healed midline incision. There is no evidence of any ascites or peritonitis. No other palpable hernias. Extremities:  No lower extremity edema noted. Without clubbing or cyanosis. Skin: Normal texture and turgor.       Laboratory the appearance of the left-sided ostomy, with redemonstration of parastomal hernia containing numerous loops of small bowel, but now containing stranding of the fat and small fluid and a dilated loop of small bowel 4.7   x 3.9 cm.      URINARY BLADDER:  Unr retention     Anemia of chronic disease     Hypothyroidism due to acquired atrophy of thyroid     Peristomal hernia     Colostomy status ([facility]     Attention to urostomy [facility])     Perineal pain in male     Diet-controlled diabetes mellitus ([facility]     Rectal I have edited the above to reflect my evaluation, opinion, and physical exam findings. Patient is seen at the request of the emergency room physician for incarcerated and possibly strangulated parastomal hernia.   The patient has history of a gist tumor explained include, but are not limited to, bleeding, infection, pain wound complications, recurrence, incorrect diagnosis, injury to adjacent organs and structures.  We also discussed the possibile need for further therapeutic, diagnostic, or surgical inter

## 2019-12-26 NOTE — TELEPHONE ENCOUNTER
Spoke with Norma BOTELLO. She will see patient this afternoon. In [place] office. Spoke with patient - Offered appointment this afternoon. Patient stated he doesn't think he can wait. Instructed patient to go to the ER.   Stated he really didn't wan

## 2019-12-26 NOTE — ED INITIAL ASSESSMENT (HPI)
Pt here stating severe pain around colostomy bag onset 4d ago. Pt stating he being treated with colon CA x 6y. Last Friday started a new chemo drug to shrink 5 tumors in his abdomen.  Pt stating initially the new chemo drug took away the rectal pain he had

## 2019-12-27 NOTE — PLAN OF CARE
Received patient from PACU at 2145. Patient alert and oriented x4 with daughter at bedside. Telemetry monitor applied reading SR. O2 3L NC for comfort. IVF infusing and assessed and changed. Pain patch applied to Rt UA. 3 lap sites closed with glue.  Jses Mejia Progressing  Goal: Achieves appropriate nutritional intake (bariatric)  Description  INTERVENTIONS:  - Monitor for over-consumption  - Identify factors contributing to increased intake, treat as appropriate  - Monitor I&O, WT and lab values  - Obtain nutri

## 2019-12-27 NOTE — PLAN OF CARE
Received patient A/Ox4, RA, NSR on tele at 0700  Patient advanced to soft diet, tolerating well  Patient ambulated in roberts x3, up to chair for meals  Patient complained of pain, reported relief with 2 tabs Norco  Patient has right urostomy draining straw y over-consumption  - Identify factors contributing to increased intake, treat as appropriate  - Monitor I&O, WT and lab values  - Obtain nutritional consult as needed  - Evaluate psychosocial factors contributing to over-consumption  Outcome: Progressing interventions for patient's volume status, including labs, urine output, blood pressure (other measures as available)  - Encourage oral intake as appropriate  - Instruct patient on fluid and nutrition restrictions as appropriate  Outcome: Progressing     P ordered  - Assess for signs and symptoms of hyperglycemia and hypoglycemia  - Administer ordered medications to maintain glucose within target range  - Assess barriers to adequate nutritional intake and initiate nutrition consult as needed  - Instruct angie administration  - Ensure safe mobilization of patient  - Hold pressure on venipuncture sites to achieve adequate hemostasis  - Assess for signs and symptoms of internal bleeding  - Monitor lab trends  - Patient is to report abnormal signs of bleeding to st

## 2019-12-27 NOTE — PROGRESS NOTES
[facility]  Progress Note     Tatyana Head Patient Status:  Inpatient    10/23/1950 MRN [id number]   [facility] 7NE-A Attending Erica [place] MD   Hosp Day # 1 PCP Manju Salazar MD     Chief Complaint: abdominal pain    S: Patient BID   • Heparin Sodium (Porcine)  5,000 Units Subcutaneous Q8H [address]   • Insulin Aspart Pen  1-10 Units Subcutaneous TID AC and HS       ASSESSMENT / PLAN:     1. Incarcerated parastomal hernia s/p repair 12/26  1. Pain control  2. Adv diet  2.  GIST s/p rese

## 2019-12-27 NOTE — PROGRESS NOTES
[facility]  Progress Note    Laith Glez Patient Status:  Inpatient    10/23/1950 MRN [id number]   [facility] 7NE-A Attending Dario Urbano MD   Hosp Day # 1 PCP Cori Galeazzi, MD     Subjective:  Doing well this morning.  Has incisio Assessment/Plan:  Patient Active Problem List:     GIST (gastrointestinal stroma tumor), malignant, colon ([facility]     Mixed hyperlipidemia     Benign prostatic hyperplasia with urinary retention     Anemia of chronic disease     Hypothyroidism due to acq

## 2019-12-27 NOTE — ANESTHESIA POSTPROCEDURE EVALUATION
[address] Patient Status:  Observation   Age/Gender 71year old male MRN [id number]   [facility] SURGERY Attending Ginna Martínez MD   [facility] Day # 0 PCP Lauren Turner MD       Anesthesia Post-op Note    Procedure(s):   MARIS

## 2019-12-27 NOTE — BRIEF OP NOTE
Pre-Operative Diagnosis: Parastomal hernia [K43.5]     Post-Operative Diagnosis: Parastomal hernia [K43.5] ; adhesions; pelvic/ LLQ mass     Procedure Performed:   Procedure(s):    1.  DIAGNOSTIC LAPAROSCOPY,  2.  ROBOTIC LYSIS OF ADHESIONS (35 minutes),

## 2019-12-27 NOTE — CONSULTS
Hem/Onc Report of Consultation    Patient Name: Marla Mckinney   YOB: 1950   Medical Record Number: [id number]   CSN: [id number]   Referring Provider(s): Coral Romberg, PA  Date of Consultation: 12/27/2019     Reason for Consultation: \"Pt qu pelvic gastrointestinal stromal tumor with abdominoperineal resection, total cystectomy, prostatectomy, end colostomy, and urostomy   • PELVIC EXENTERATION N/A 10/3/2017    Performed by Aliyah Mehta MD at [address] [place] [address] Not on file        Relationship status: Not on file      Intimate partner violence:        Fear of current or ex partner: Not on file        Emotionally abused: Not on file        Physically abused: Not on file        Forced sexual activity: Not on file 5,000 Units, Subcutaneous, Q8H KARRIE  ketorolac tromethamine (TORADOL) 30 MG/ML injection 15 mg, 15 mg, Intravenous, Q6H PRN    Or  ketorolac tromethamine (TORADOL) 30 MG/ML injection 30 mg, 30 mg, Intravenous, Q6H PRN  HYDROcodone-acetaminophen (NORCO) 5-32 Pain.  SIMVASTATIN 20 MG Oral Tab, TAKE 1 TABLET DAILY  LEVOTHYROXINE SODIUM 125 MCG Oral Tab, TAKE 1 TABLET BEFORE       BREAKFAST  CRANBERRY OR, Take by mouth. aspirin 81 MG Oral Tab, Take 81 mg by mouth daily.   Multiple Vitamin (MULTI-VITAMIN) Oral Tab 12/02/2019      Lab Results   Component Value Date    .0 12/26/2019    .0 12/06/2019    .0 12/02/2019       Radiology:  PROCEDURE:  CT ABDOMEN+PELVIS (CPT=74176)     COMPARISON:  [facility] , CT, CT ABDOMEN PELVIS IV CONTRAST, NO ORAL (ER) mass   reflecting change in axial measurements. It probably is not grossly enlarged or decreased in size since previous.   Additional right-sided pelvic masses are not as well assessed on this noncontrast scan, as demonstrated previously, and are probably

## 2019-12-27 NOTE — PROGRESS NOTES
12/27/19 0926   Clinical Encounter Type   Referral To Nurse  (Referral made to the [employer] for [employer], as requested. )

## 2019-12-27 NOTE — H&P
[facility]  History and Physical     Sofia Manual Patient Status:  Observation    10/23/1950 MRN [id number]   [facility] 7NE-A Attending Julia Chun MD   Hosp Day # 0 PCP Wojciech Cunningham MD     Chief Complaint: abd pain    [facility] at [facility] ENDOSCOPY       Social History:  reports that he quit smoking about 3 years ago. His smoking use included cigars. He has a 45.00 pack-year smoking history. He has never used smokeless tobacco. He reports current alcohol use.  He reports that he does n Moist mucous membranes. EOM-I. PERRLA. Anicteric. Neck: No lymphadenopathy. No JVD. No carotid bruits. Respiratory: Clear to auscultation bilaterally. No wheezes. No rhonchi. Cardiovascular: S1, S2. Regular rate and rhythm.  No murmurs, no rubs or gallop illness, I anticipate that, after discharge, patient will require TBD.

## 2019-12-27 NOTE — OPERATIVE REPORT
[facility]    PATIENT'S NAME: Navdeep Ruiz   ATTENDING PHYSICIAN: Jeanette Berger M.D. OPERATING PHYSICIAN: Parveen Case M.D.    PATIENT ACCOUNT#:   [de-identified]    LOCATION:  [address] [place] [address]  MEDICAL RECORD #:   [id number]       DATE OF BIRTH:  [birth year] to cause external obstruction of the colostomy site. The mesh is well approximated against the abdominal wall. DISPOSITION:  The patient is awakened from anesthesia. He was brought to the recovery room in stable condition.   He tolerated the procedure margin 2 cm inferior to the subcostal margin in the midclavicular line, through which a Veress needle is placed and pneumoperitoneum is established.   A 5 mm scope is then used with an Optiview trocar to gain entry in the right upper quadrant under direct v wall.  At this point, there was no evidence of encumbrance on the large bowel at the colostomy site. Attention is now turned to the large friable mass in the left lower quadrant; it is held to the abdominal wall by a very small stalk.   The stalk was caute

## 2019-12-28 NOTE — PLAN OF CARE
HR NS to SB on tele  Crackles in bases of lungs with deep breath. IS teaching done  Bowels active. Lose stool in colostomy bag  Urostomy 1K out today  Sb on tele  Discharge paperwork discussed with pt and family.    Tolerating soft diet

## 2019-12-28 NOTE — PROGRESS NOTES
[facility]  Progress Note    Areta Faye Patient Status:  Inpatient    10/23/1950 MRN [id number]   [facility] 7NE-A Attending Brice Aguilar MD   Hosp Day # 2 PCP Tommy Medrano MD     Subjective:  Pt ambulated in halls.   He is tolera

## 2019-12-28 NOTE — PHYSICAL THERAPY NOTE
PHYSICAL THERAPY QUICK EVALUATION - INPATIENT    Room Number: 3553/4577-I  Evaluation Date: 12/28/2019  Presenting Problem: s/p lysis of adhesions, repair of parastomal hernia and excision of left lower quadrant tumor on 12/26/19  Physician Order: PT Alyssa Patches cystectomy, prostatectomy, end colostomy, and urostomy   • PELVIC EXENTERATION N/A 10/3/2017    Performed by Yecenia Mcbride MD at [address] [place] [address] N/A 12/26/2019    Performed by Miguel Mary MD at [facility] chair (including a wheelchair)?: None   -   Need to walk in hospital room?: None   -   Climbing 3-5 steps with a railing?: A Little       AM-PAC Score:  Raw Score: 22   Approx Degree of Impairment: 20.91%   Standardized Score (AM-PAC Scale): 53.28   CMS Mo deviations, or changes speed but has significant gait deviations, or changes speed but loses balance but is able to recover and continue walking.  (0)  Severe Impairment: Cannot change speeds, or loses balance and has to reach for wall or be caught.     3. Despite pain pt was able to complete sit<>stand transfers and ambulation with independence. Thus, anticipate that as pain is controlled the pt will be able to complete bed mobility with modified independence and has no further therapy needs.    Based on th

## 2019-12-28 NOTE — PLAN OF CARE
Assumed care at 1900. Alert and oriented x4. Telemetry monitor reading SR/SB. IVF infusing assessed and maintained. Refusing Accucheck. 3 abd lap sites closed with glue, no infection noted. Sm. BM in colostomy, + gas, +BS.  Urostomy with clear yellow urine (bariatric)  Description  INTERVENTIONS:  - Monitor for over-consumption  - Identify factors contributing to increased intake, treat as appropriate  - Monitor I&O, WT and lab values  - Obtain nutritional consult as needed  - Evaluate psychosocial factors c

## 2019-12-29 NOTE — DISCHARGE SUMMARY
[facility]  DISCHARGE SUMMARY     Fina Aldrich Patient Status:  Inpatient    10/23/1950 MRN [id number]   [facility] 7NE-A Attending No att. providers found   Hosp Day # 2 PCP Gabriella Feliciano MD     Date of Admission: 2019  Date taking these medications      Instructions Prescription details   aspirin 81 MG Tabs      Take 81 mg by mouth daily. Refills:  0     CRANBERRY OR      Take by mouth.    Refills:  0     DILAUDID 4 MG Tabs  Generic drug:  HYDROmorphone HCl      Take 1 table 30 min. [facility] [place], [place], APRN    Patient Instructions:          Location Instructions: Your appointment is on the [address] [place] [address] in&nbsp; the [facility].  The address is [address] [place] [address], [address]

## 2019-12-30 NOTE — TELEPHONE ENCOUNTER
Called to Verify dosing instruction for [address] [place] [address]. Regorafenib 40 MG Oral Tab  84 tablet 11      Sig:   Take 160 mg by mouth daily. Take for 21 days followed by 7 day rest period.      Route:   Oral           Order #:   [id number]         Dosing instruct

## 2019-12-30 NOTE — TELEPHONE ENCOUNTER
Patient called with condition update, patient states he had surgery on Thursday 12/26/19 and has not had a bowel movement yet. Patient states he is passing gas and taking Miralax.   Patient states he has no output from colostomy but has released gas from ba

## 2019-12-30 NOTE — TELEPHONE ENCOUNTER
[facility] at [phone] is calling for an order clarification on Stivarga, please call

## 2020-01-03 NOTE — PROGRESS NOTES
Made appt with PBP .      Future Appointments  1/6/2020   9:00 AM    HUMPHREY Jansen           [address] [facility]  1/9/2020   9:30 AM    Karolina Sánchez MD      [facility]  1/13/2020  1:45 PM    Tony Rios MD  [facility] HEM

## 2020-01-06 NOTE — PROGRESS NOTES
Palliative Care Follow Up Note     Patient Name: Niko Hodge   YOB: 1950   Medical Record Number: [id number]   CSN: [id number]   Date of visit: 1/6/2020     Chief Complaint/Reason for Visit:  Palliative follow up     History of Present Illne unspecified hyperlipidemia    • Personal history of antineoplastic chemotherapy     currently on chemo   • Thyroid disease    • Visual impairment     glasses     Surgical History:  Past Surgical History:   Procedure Laterality Date   • Roderick White 2 Comment: occasionally- 1 or 2 per week       Drug use: No    Protestant/Cultural Information:   Current living situation: Patient lives with spouse.     Medications:  Current Outpatient Medications   Medication Sig Dispense Refill   • HYDROmorphone HCl (DI pain  Fentanyl 50mcg Q 72  Dilaudid 4mg Q 4 prn  lyrica 75mg BID       Planned Follow up: Return in about 2 weeks (around 1/20/2020).       30 total minutes spent with patient >50% of visit was spent in counseling and coordination of care for symptom manage

## 2020-01-09 NOTE — PROGRESS NOTES
Post Operative Visit Note       Active Problems  1. Parastomal hernia with obstruction and without gangrene    2.  GIST (gastrointestinal stroma tumor), malignant, colon [facility])         Chief Complaint   Patient presents with:  Post-Op: PO 12/26 PBP - DIAGNOS Procedure Laterality Date   • BACK SURGERY  1998   • BLADDER CYSTECTOMY WITH ILEAL CONDUIT N/A 10/3/2017    Performed by Bianka Martinez MD at [address] [facility] OR   • COLONOSCOPY  2006   • HERNIA SURGERY Left 2006   • HERNIA SURGERY Left 1975   • OTHER SURGICA Concern: No        Weight Concern: Yes       Current Outpatient Medications:   •  HYDROmorphone HCl (DILAUDID) 4 MG Oral Tab, Take 1 tablet (4 mg total) by mouth every 4 (four) hours as needed for Pain., Disp: 120 tablet, Rfl: 0  •  fentaNYL 50 MCG/HR Josh Townsend BMI 27.44 kg/m²   Physical Exam   Constitutional: He is oriented to person, place, and time. He appears well-developed and well-nourished. Cardiovascular: Normal rate, regular rhythm, normal heart sounds and intact distal pulses.    Pulmonary/Chest: Effo improve.     Tianna Zafar MD

## 2020-01-13 NOTE — PROGRESS NOTES
Patient is here today for follow up with Sruthi Shields for GIST. Patient Marco Nina is currently on hold post Hernia Surgery on 12/26/2019. Patient stated pain controlled with Dilaudid QID. Medication list and medical history were reviewed and updated.      Edu

## 2020-01-17 NOTE — TELEPHONE ENCOUNTER
Patient called stating his pain has improved with resuming stivarga, so he has tapered off his dilaudid. He has used no dilaudid today and pain remains controlled. The goal is to continue to wean down opioids as tolerated.   He will continue fentanyl 50mc

## 2020-01-20 PROBLEM — E86.0 DEHYDRATION: Status: ACTIVE | Noted: 2020-01-20

## 2020-01-20 NOTE — PROGRESS NOTES
[facility] Hematology Oncology Group Progress Note      Patient Name: Griselda Gduino   YOB: 1950  Medical Record Number: [id number]    Date of Visit: 1/20/2020       Chief Complaint  Metastatic gastrointestinal stromal tumor - follow up.     Oncolog urostomy. Pathology showed a 10.2 cm GIST with 80% necrosis; necrotic tumor was present in prostatic tissue and wall of rectum; 10/10 lymph nodes were negative for metastasis; tumor showed 16 mitoses per 50 hpf; surgical margins were negative.      Patient above).    Family History (historical data, reviewed)  Mother with breast cancer.      Social History (historical data, reviewed)  Current cigar smoker; social alcohol use.       Current Medications  HYDROmorphone HCl (DILAUDID) 4 MG Oral Tab, Take 1 table bilaterally. Cardiovascular Regular rate and rhythm; normal S1S2. Abdomen Soft; ostomies in place; tenderness near left abdominal ostomy; bowel sounds are present. Extremities No lower extremity edema. Integumentary Skin is warm and dry.   Neurologic Mo Neutrophil % 78.9 %    Lymphocyte % 15.9 %    Monocyte % 4.2 %    Eosinophil % 0.5 %    Basophil % 0.4 %    Immature Granulocyte % 0.1 %     Impression and Plan   1. Metastatic GIST: Hold regorafenib until acute issues are resolved.      2.   Alina Staff

## 2020-01-20 NOTE — TELEPHONE ENCOUNTER
Catie Al called to say that he has been on chemo for the last 12 days. He stated that he does not feel well and feels like he is losing it mentally.  He stated that he is in a very depressed state states that he is not suicidal but just doesn't feel like himself

## 2020-01-20 NOTE — PROGRESS NOTES
THE [facility] [place] [place] Hematology Oncology Group Progress Note      Patient Name: Ramirez Christensen   YOB: 1950  Medical Record Number: [id number]    Date of Visit: 1/13/2020       Chief Complaint  Metastatic gastrointestinal stromal tumor - follow up.     Oncolog urostomy. Pathology showed a 10.2 cm GIST with 80% necrosis; necrotic tumor was present in prostatic tissue and wall of rectum; 10/10 lymph nodes were negative for metastasis; tumor showed 16 mitoses per 50 hpf; surgical margins were negative.      Patient use.      Current Medications  HYDROmorphone HCl (DILAUDID) 4 MG Oral Tab, Take 1 tablet (4 mg total) by mouth every 4 (four) hours as needed for Pain., Disp: 120 tablet, Rfl: 0  fentaNYL 50 MCG/HR Transdermal Patch 72 Hr, Place 1 patch onto the skin every S1S2.  Abdomen Soft; ostomies in place; no tenderness. Extremities No lower extremity edema. Integumentary Skin is warm and dry; no rashes. Neurologic Motor and sensory grossly intact. Psychiatric Mood and affect appropriate.     Laboratory   Recent Res Immature Granulocyte % 0.3 %     Radiology   12/26/2019:  CT abdomen/pelvis wo contrast - This scan performed without IV or oral contrast, with limitations thereof. BONES:  No acute abnormality. LUNG BASES:  No acute process. LIVER:  Unremarkable.  BILIARY: are probably unchanged since the recent prior CT, consistent with adenopathy and/or other malignant masses. Other details as above on this limited noncontrast CT.     Pathology  12/26/2019:  Left lower abdominal quadrant mass: Gastrointestinal stromal tumo

## 2020-01-20 NOTE — PROGRESS NOTES
LAURA met with patient to discuss at [facility] needs. Patient reports that he lives alone; he states his daughter lives in the same town, but is also unwell. Patient had some questions about meal preparation.  Laura discussed Meals and Wheels, ordering from [employer] or [address] [place] [address] Dr RIDLEY

## 2020-01-20 NOTE — PROGRESS NOTES
Palliative Care Follow Up Note     Patient Name: Lisa Bellamy   YOB: 1950   Medical Record Number: [id number]   CSN: [id number]   Date of visit: 1/20/2020     Chief Complaint/Reason for Visit:  Patient presents with:  Weakness: APN assessment Hypothyroidism    • Other and unspecified hyperlipidemia    • Personal history of antineoplastic chemotherapy     currently on chemo   • Thyroid disease    • Visual impairment     glasses     Surgical History:  Past Surgical History:   Procedure Laterality quitting: 3.3      Smokeless tobacco: Never Used      Tobacco comment: \"occasional social cigar\"     Substance and Sexual Activity      Alcohol use: Yes        Comment: occasionally- 1 or 2 per week       Drug use: No    Lutheran/Cultural Information: anxious    Labs:   CBC, CMP reviewed    Advanced Directives Discussed and Completed:   HCPOA:   POLST Discussed and Completed:     Palliative Care:  Labs are within normal limits but he has had 10Lb weight loss and isn't drinking much over weekend.   Will

## 2020-01-20 NOTE — PROGRESS NOTES
NURSING ADMISSION NOTE      Patient admitted via Wheelchair  Oriented to room. Safety precautions initiated. Bed in low position. Call light in reach. The patient is a 64y Female complaining of back pain general.

## 2020-01-20 NOTE — TELEPHONE ENCOUNTER
Toxicities: [place] pt     Anxiety/Fatigue/Anorexia/Dry Skin/Numbness Bottom of Feet     Anxiety: Grade 2 (Pt is anxious. He has never had a panic attack before, but thinks he might have one. He is very upset about how he feels and his cancer.

## 2020-01-20 NOTE — PROGRESS NOTES
Report given to CHOLO Marlow for patient admission to [facility]. Transport requested. Patient remains in stable condition and verbalizes understanding of plan.

## 2020-01-20 NOTE — PLAN OF CARE
NURSING ADMISSION NOTE      Patient admitted via direct admit from the cancer center. Oriented to room. Safety precautions initiated. Bed in low position. Call light in reach. Updated history and pta meds.  Gave report to Rashawn Lobato

## 2020-01-20 NOTE — TELEPHONE ENCOUNTER
MD Sofiya Hargrove RN   Caller: Unspecified (Today,  9:22 AM)             Tell him to hold drug today and come in and see me tomorrow at 8:45 am     Patient notified. -- patient is seeing Cristine Rodríguez now - palliative care. Notified.

## 2020-01-21 NOTE — H&P
[facility]  History and Physical     Pepper Shelby Patient Status:  Inpatient    10/23/1950 MRN [id number]   [facility] 4NW-A Attending Carlos Mendoza MD   Hosp Day # 0 PCP India Gallagher MD     Chief Complaint: \"Dehydration\"    His and urostomy   • OTHER SURGICAL HISTORY  12/26/2019    DIAGNOSTIC LAPAROSCOPY, ROBOTIC LYSIS OF ADHESION   • PART REMOVAL COLON W END COLOSTOMY     • PELVIC EXENTERATION N/A 10/3/2017    Performed by Ld Burrows MD at [address] [place] [address] daily as needed for Anxiety. , Disp: 15 tablet, Rfl: 0  pregabalin 75 MG Oral Cap, Take 1 capsule (75 mg total) by mouth 2 (two) times daily. , Disp: 60 capsule, Rfl: 0        Review of Systems:   A comprehensive 14 point review of systems was completed. SCD's  · CODE status: full  · Quigley: None    Plan of care discussed with Patient.     Maryse Johnson MD  1/20/2020

## 2020-01-21 NOTE — PROGRESS NOTES
NURSING DISCHARGE NOTE    Discharged Home via Wheelchair. Accompanied by Friend     Belongings Taken by patient/family.

## 2020-01-21 NOTE — DISCHARGE SUMMARY
[facility]ITAL[facility]  DISCHARGE SUMMARY     Krysta Kahn Patient Status:  Observation    10/23/1950 MRN [id number]   [facility] 4NW-A Attending Se De Luna MD   Hosp Day # 0 PCP Tariq Shukla MD     Date of Admission: 2020  Date of D Pt72  Commonly known as:  [address] [facility] 1 patch onto the skin every third day.    Quantity:  5 patch  Refills:  0     HYDROmorphone HCl 4 MG Tabs  Commonly known as:  DILAUDID      Take 1 tablet (4 mg total) by mouth every 4 (four) hours as needed fo (138)/(85-86) 138/86    Physical Exam:    General: No acute distress. Respiratory: Clear to auscultation bilaterally. No wheezes. No rhonchi. Cardiovascular: S1, S2. Regular rate and rhythm. No murmurs, rubs or gallops.    Abdomen: Soft, nontender, nondi

## 2020-01-21 NOTE — DIETARY NOTE
[address] ASSESS[facility]    Pt does not meet malnutrition criteria at this time.      NUTRITION DIAGNOSIS/PROBLEM:    Unintentional weight loss related to physiologic causes increasing nutrient needs as evidenced by severe wt loss (20 Meeting Needs: Marginal, added supplements  Food Allergies: No  Cultural/Ethnic/Cheondoism Preferences Addresses: Yes    NUTRITION RELATED PHYSICAL FINDINGS:     1. Body Fat/Muscle Mass: noted no evident signs of fat/muscle wasting per inspection.       NUTR

## 2020-01-21 NOTE — CONSULTS
[facility]  Report of Consultation    Baltazar Molian Patient Status:  Observation    10/23/1950 MRN [id number]   [facility] 4NW-A Attending Keila Soni MD   Hosp Day # 0 PCP Poly Interiano MD     Reason for Consultation:  Recent abdomi and pelvis is pending.       History:  Past Medical History:   Diagnosis Date   • Back problem     back surgery 20 years ago   • BPH (benign prostatic hyperplasia)    • Cancer ([facility] [address] 02-12    GIST   • Carcinoma of gastrointestinal tract ([facility] [address]    • Disorder o used smokeless tobacco. He reports current alcohol use. He reports that he does not use drugs.     Allergies:    Radiology Contrast *    HIVES, RESPIRATORY FAILURE, OTHER                            (SEE COMMENTS)    Comment:Originally with \"lower GI enema\ disturbance  Psychiatric:  Negative for inappropriate interaction and psychiatric symptoms  Respiratory:  Negative for cough, dyspnea and wheezing      Physical Exam:  Blood pressure 138/85, pulse 52, temperature 97.9 °F (36.6 °C), temperature source Oral, PTT in the last 168 hours.       Impression:  Patient Active Problem List:     GIST (gastrointestinal stroma tumor), malignant, colon ([facility] [address]     Mixed hyperlipidemia     Benign prostatic hyperplasia with urinary retention     Anemia of chronic disease     Hy AM    Addendum:    I have seen and examined the patient in the presence of my physician assistant who scribed this encounter; I obtained and performed the above history, physical examination, assessment and plan.   I have I have edited the above to reflect large gist tumor from the pelvis. · CT scan shows no acute pathology; there is an anticipated seroma in the area of prior hernia repair. · May advance diet as tolerated. · Continue MiraLAX and bowel regimen. · GI and DVT prophylaxis.   · Further medical

## 2020-01-21 NOTE — CONSULTS
THE [facility] Hematology Oncology Group Consultation Note      Patient Name: Cristina Morocho   YOB: 1950  Medical Record Number: [id number]  Consulting Physician: Jeff Ang MD  Attending Physician: Jun Hernandes MD    Date of Consultation: 1/2 exenteration including en-bloc resection of pelvic GIST with abdominoperineal resection, total cystectomy, prostatectomy, end colostomy, and urostomy.  Pathology showed a 10.2 cm GIST with 80% necrosis; necrotic tumor was present in prostatic tissue and wal nausea now. Past Medical History  Benign prostatic hyperplasia; hypothyroidism; hypercholesterolemia.      Past Surgical History  Inguinal hernia repair x 4; lumbar discectomy; resection of GIST (as above).      Family History   Mother with breast cance headache, blurred vision, areas of focal weakness or numbness, peripheral neuropathy, changes in gait. Psychiatric          No new or worsening depression, roxana, mood swings, insomnia.       Vital Signs   Height: 185.4 cm (6' 0.99\") (01/20 1114)  Weight CBC W/ DIFFERENTIAL    Collection Time: 01/20/20 11:04 AM   Result Value Ref Range    WBC 8.4 4.0 - 11.0 x10(3) uL    RBC 5.22 3.80 - 5.80 x10(6)uL    HGB 15.3 13.0 - 17.5 g/dL    HCT 45.8 39.0 - 53.0 %    .0 150.0 - 450.0 10(3)uL    MCV 87.7 80.0 4.00 x10(3) uL    Monocyte Absolute 0.03 (L) 0.10 - 1.00 x10(3) uL    Eosinophil Absolute 0.00 0.00 - 0.70 x10(3) uL    Basophil Absolute 0.02 0.00 - 0.20 x10(3) uL    Immature Granulocyte Absolute 0.03 0.00 - 1.00 x10(3) uL    Neutrophil % 86.0 %    Lymph

## 2020-01-23 NOTE — TELEPHONE ENCOUNTER
Patient called stated he is not feeling good. Restarted his chemo pills yesterday (stivarga 80mg). Stated today feels terrible light headed,week, ears ringing. Instructed not to take stivarga today. Stated he already took them.  Offered APN visit today pat

## 2020-01-24 NOTE — TELEPHONE ENCOUNTER
Patient is feeling better today since stopping stivarga. His rectal burning pain has returned now. He stopped lyrica. Will have him restart this. He if off fentanyl patch. Will continue to hold this for now. He will resume dilaudid as needed.

## 2020-01-26 PROBLEM — R52 INTRACTABLE PAIN: Status: ACTIVE | Noted: 2020-01-26

## 2020-01-26 NOTE — ED PROVIDER NOTES
Patient Seen in: [facility] Emergency Department      History   Patient presents with:  Pain    Stated Complaint: pain    HPI    72-year-old male comes to the hospital complaint of having difficulty with pain in the area of his lower abdomen towards exenteration to include en-bloc resection of pelvic gastrointestinal stromal tumor with abdominoperineal resection, total cystectomy, prostatectomy, end colostomy, and urostomy   • OTHER SURGICAL HISTORY  12/26/2019    DIAGNOSTIC LAPAROSCOPY, ROBOTIC LYSIS tenderness  Extremity no clubbing, cyanosis or edema noted.   Full range of motion noted without tenderness  Neuro: No focal deficits noted    ED Course     Labs Reviewed   URINALYSIS WITH CULTURE REFLEX - Abnormal; Notable for the following components: has sacral pain due to multiple tumors in that region. FINDINGS:  No fracture, dislocation or osseous abnormality. Bilateral pelvic surgical clips. L5-S1 degenerative disc disease.       CONCLUSION:  No abnormality demonstrated in the sacrum and coccyx quadrant ileostomy with herniated loop of colon. Stable appearance of left lower quadrant colostomy. Few uncomplicated colonic diverticula.   4.0 x 2.6 cm oval structure of mixed fluid and relatively hyperattenuation in the abdominal wall of the left lowe Patient complains of not feeling well, trouble eating and weakness. CONTRAST USED:  100cc of Omnipaque 350  FINDINGS:  LIVER:  No enlargement, atrophy, abnormal density, or significant focal lesion. BILIARY:  Small gallstones in gallbladder lumen.   No b BASES:  No visible pulmonary or pleural disease. CONCLUSION:  1. No acute abdominal or pelvic pathology.  2. The previously demonstrated parastomal herniation of a small bowel loop in the left lower quadrant colostomy is no longer present, however th

## 2020-01-26 NOTE — H&P
[facility]  History and Physical     Lenward Hair Patient Status:  Emergency    10/23/1950 MRN [id number]   Location [address] [facility] Attending David Cloud MD   Hosp Day # 0 PCP Lauren Turner MD     Chief Complaint: [employer] DIAGNOSTIC LAPAROSCOPY, ROBOTIC LYSIS OF ADHESION   • PART REMOVAL COLON W END COLOSTOMY     • PELVIC EXENTERATION N/A 10/3/2017    Performed by Jaquelin Mclean MD at [facility] MAIN OR   • ROBOT-ASSISTED [address] Deann Guzman N/A 12/26/2019    Perform Multiple Vitamin (MULTI-VITAMIN) Oral Tab, Take 1 tablet by mouth daily. , Disp: , Rfl:         Review of Systems:   A comprehensive 14 point review of systems was completed. Pertinent positives and negatives noted in the HPI.     Physical Exam:    BP ( acute findings on CT A/P  2. Xray L-S spine- ? fracture  3. IV dilaudid  4. Palliative eval   5. Onc on Cs  3. Anxiety   4. Hypothyroid   1.  Continue levothyroxine     Quality:  · DVT Prophylaxis: Lovenox  · CODE status: full  · Quigley: no    Plan of care d

## 2020-01-26 NOTE — ED INITIAL ASSESSMENT (HPI)
Patient arrives in severe pain from metastatic gastrointestinal stroma tumor of the colon. Patient took oral Dilaudid at [place] [id number], [address] and [phone] without relief in pain. Pain is severe in sacral area above buttocks.  Patient shaking in pain on arrival.   Patient

## 2020-01-26 NOTE — CM/SW NOTE
Asked to see patient at request of ER MD.     Patient is a 70 yo man with GIST diagnosed in 2012. Patient has been receiving treatment since initial diagnosis. Lately, patient condition had deteriorated.  Acute condition today is pain refractory to po Dilau

## 2020-01-27 PROBLEM — Z51.5 PALLIATIVE CARE ENCOUNTER: Status: ACTIVE | Noted: 2020-01-27

## 2020-01-27 NOTE — CONSULTS
[address] Arnold  [id number]  Hospital Day #1   Date of Consult: 01/27/20  Patient seen at: [facility]    Reason for Consultation:      Consult requested by Dr. Victor Hugo Arita for evaluation of palliat • LAPAROSCOPY, SURGICAL PROSTATECTOMY, RETROPUBIC RADICAL, W/NERVE SPARING     • OTHER SURGICAL HISTORY  12/3/2015    fracture radiius and ulna  Right arm -    • OTHER SURGICAL HISTORY  10/03/2017    Pelvic exenteration to include en-bloc resection of pe her.   Living Situation Prior to Hospitalization: lives alone in his home  Does Patient Live Alone: yes  Is Patient Confused: no  Occupational History: deferred    Functional Status: was independent but with increased pain with standing/mobilizing it has b Intravenous, Q8H        Labs/ imaging     Hematology:  Lab Results   Component Value Date    WBC 6.8 01/27/2020    HGB 13.9 01/27/2020    HCT 42.3 01/27/2020    .0 01/27/2020       Coags:  Lab Results   Component Value Date    INR 1.07 12/26/2019 effort. Abdomen: Soft, non-tender, non-distended, normal bowel sounds X 4 quadrants, no rebound or guarding. Urostomy (clear yellow urine) and colostomy (light brown soft stool) present.  Passing lots of gas today, states he has had to expel from bag sever Alvaro Ang has one more chemo up his sleeve\". He shared how the previous chemo, regorafenib worked \"immediately\" for his pain but with several attempts the side effects became intolerable.  He described that he had profound fatigue \"I was down and couldn't needs addressed: Unable to assess      Disposition: Resume outpatient palliative care with Ashley Parekh NP    Problem List   Patient Active Problem List:     GIST (gastrointestinal stroma tumor), malignant, colon ([facility] [address]     Mixed hyperlipidemia     Benign pros of your patient. We will continue to follow. Above plan reviewed with Jonatan Aguirre NP, Dr. Victor Hugo Arita via [employer] message and the patient's nurse.      [facility]  Palliative Care  Phone [phone]  1/27/2020  12:47 PM

## 2020-01-27 NOTE — CONSULTS
[facility] Hematology Oncology Group Consultation Note      Patient Name: Andre Torres   YOB: 1950  Medical Record Number: [id number]  Consulting Physician: Faith Roy MD  Attending Physician: Radha Duffy MD    Date of Consultation: 1/27 en-bloc resection of pelvic GIST with abdominoperineal resection, total cystectomy, prostatectomy, end colostomy, and urostomy.  Pathology showed a 10.2 cm GIST with 80% necrosis; necrotic tumor was present in prostatic tissue and wall of rectum; 10/10 lymp resulted in diagnosis of recurrent disease. Pain better but persistent this morning. He states that he is trying not to use IV hydromorphone due to concerns re: constipation. Laboratory studies in the ED were remarkable for dirty urinalysis.      I reviewed 650 mg, Oral, Q6H PRN  [START ON 1/27/2020] Enoxaparin Sodium (LOVENOX) 40 MG/0.4ML injection 40 mg, 40 mg, Subcutaneous, Daily  ondansetron HCl (ZOFRAN) injection 4 mg, 4 mg, Intravenous, Q6H PRN  Metoclopramide HCl (REGLAN) injection 10 mg, 10 mg, Melony Love Constitutional NAD. Head   Normocephalic and atraumatic. Eyes   Conjunctiva clear; sclera anicteric. ENMT   External nose normal; external ears normal.  Neck   No JVD. Hematologic/Lymphatic No petechiae or purpura.   Respiratory  Normal effort; no res Hold Lavender Auto Resulted    URINALYSIS WITH CULTURE REFLEX    Collection Time: 01/26/20 10:12 AM   Result Value Ref Range    Urine Color Yellow Yellow    Clarity Urine Cloudy (A) Clear    Spec Gravity 1.016 1.001 - 1.030    Glucose Urine Negative Neg %   EKG 12-LEAD    Collection Time: 01/26/20 10:12 AM   Result Value Ref Range    Ventricular rate 60 BPM    Atrial rate 60 BPM    P-R Interval 178 ms    QRS Duration 84 ms    Q-T Interval 412 ms    QTC Calculation (Bezet) 412 ms    P Axis 61 degrees    R colostomy. Few uncomplicated colonic diverticula.   4.0 x 2.6 cm oval structure of mixed fluid and relatively hyperattenuation in the   abdominal wall of the left lower quadrant is adjacent to the stomal colonic loops and is not significantly changed in si in clinical trial. No acute intervention. 2.   Pain, neoplasm related: Palliative care consulted and will manage.  At home patient was on fentanyl patch but it is not listed in his inpatient medication list. Will review CT scans in GI conference later th

## 2020-01-27 NOTE — PLAN OF CARE
Pt AOX4 with complaints of sacral pain rating at 9/10 for which pt is requesting IV dilaudid approx Q3H so far. Pt reports he usually takes 4mg oral dilaudid at home but that it was not working at all yesterday.  Pt with cloudy yellow urine with foul odor f

## 2020-01-27 NOTE — PLAN OF CARE
,Awake , alert , respirations easy , afebrile , self care of urostomy and colostomy , appetite good , medicated for persistant sacral/rectal pain , states worsened when walking , Fentynal patch initiated , denies nausea , IV Abx , premedicated the ambulate

## 2020-01-27 NOTE — PROGRESS NOTES
[facility]  Progress Note     Sharlette Music Patient Status:  Inpatient    10/23/1950 MRN [id number]   [facility] 4NW-A Attending Deondre Rinaldi MD   Hosp Day # 1 PCP Molly Cheadle, MD     Chief Complaint: Pain    S: Patient notes his hours. No results for input(s): TROP, CK in the last 168 hours. Imaging: Imaging data reviewed in Epic.     Medications:   • PEG 3350  17 g Oral Daily   • magnesium hydroxide  30 mL Oral Once   • fentaNYL  1 patch Transdermal Q72H   • aspirin  81

## 2020-01-28 NOTE — PROGRESS NOTES
[facility]  Progress Note     Thanh Swanson Patient Status:  Inpatient    10/23/1950 MRN [id number]   [facility] 4NW-A Attending Liza Huerta MD   Hosp Day # 2 PCP Gunner Mackey MD     Chief Complaint: Pain    S: Patient slept thro reviewed in Epic.     Medications:   • pregabalin  75 mg Oral TID   • PEG 3350  17 g Oral Daily   • magnesium hydroxide  30 mL Oral Once   • fentaNYL  1 patch Transdermal Q72H   • aspirin  81 mg Oral Daily   • escitalopram  10 mg Oral Daily   • Levothyroxin

## 2020-01-28 NOTE — PLAN OF CARE
Pt AOX4 with complaints of sacral and buttock pain with movement and walking. Pt reports he feels fine as long as he is laying down. Pt went for a walk in the halls. Pt reports pain increases from 4 or 5 out of 10 to 8 or 9 out of 10.  Pt requested dilaudid

## 2020-01-28 NOTE — DIETARY NOTE
[address] ASSESSMENT    Pt does not meet malnutrition criteria at this time.      NUTRITION DIAGNOSIS/PROBLEM:    Unintentional weight loss related to physiologic causes increase nutrient needs (CA) as evidenced by ~14 lb (6.4%) i per inspection.      NUTRITION PRESCRIPTION:93.1 kg  Calories: 5083-9483 calories/day (23-25 calories per kg)  Protein: 111-121 grams protein/day (1.2-1.3 grams protein per kg)  Fluid: ~1 ml/kcal or per MD discretion    MONITOR AND EVALUATE/NUTRITION GOALS:

## 2020-01-28 NOTE — PROGRESS NOTES
THE [facility] OF [facility] Hematology Oncology Group Progress Note      Patient Name: Joe Rodriguez   YOB: 1950  Medical Record Number: [id number]    Oncologic History  Niko Hodge is a 71year old male who originally presented in 2012 with left lower quadrant a wall of rectum; 10/10 lymph nodes were negative for metastasis; tumor showed 16 mitoses per 50 hpf; surgical margins were negative. Patient presented to ED on 12/02/2019 with rectal pain.  CT abdomen/pelvis with contrast on that day showed multiple new 17 g, Oral, Daily  magnesium hydroxide (MILK OF MAGNESIA) 400 MG/5ML suspension 30 mL, 30 mL, Oral, Once  fentaNYL (DURAGESIC) 50 MCG/HR 1 patch, 1 patch, Transdermal, Q72H  [] Naloxone HCl (NARCAN) 0.4 MG/ML injection 0.4 mg, 0.4 mg, Intravenous, O anicteric. ENMT External nose normal; external ears normal.  Neck No JVD. Hematologic/Lymphatic No petechiae or purpura. Respiratory Normal effort; no respiratory distress. .  Cardiovascular Regular rate and rhythm.   Abdomen Soft; ostomies in place; nont

## 2020-01-28 NOTE — PROGRESS NOTES
[address] Follow Up    Thanh Swanson  [id number]  Patient seen at: [facility]    Subjective: \"The dilaudid used to get rid of the pain right away but now it hardly touches it. \"    Patient seen and evaluated, no f injection 40 mg, 40 mg, Subcutaneous, Daily  •  ondansetron HCl (ZOFRAN) injection 4 mg, 4 mg, Intravenous, Q6H PRN  •  Metoclopramide HCl (REGLAN) injection 10 mg, 10 mg, Intravenous, Q8H PRN  •  PEG 3350 (MIRALAX) powder packet 17 g, 17 g, Oral, Daily [facility] by: Michael Hinojosa MD on 1/26/2020 at 11:12              Physical Exam:     Vital Signs: BP 91/49 (BP Location: Left arm)   Pulse 71   Temp 98.5 °F (36.9 °C) (Oral)   Resp 18   Wt 205 lb 4.8 oz (93.1 kg)   SpO2 99%   BMI 27.09 kg/m²     General: awake and i care Drowsy or coma   0 Death       Psychosocial: Emotional support provided       Goals of Care:     Pain Management:   As noted above, patient reporting no real change in pain control/intensity since yesterday.  He is aware it takes the Fentanyl patch sofya Peristomal hernia     Colostomy status ([facility] [address]     Attention to urostomy [facility])     Perineal pain in male     Diet-controlled diabetes mellitus ([facility] [address]     Rectal pain     Acute neoplasm-related pain     Acquired hypothyroidism     Hyperlipidemia     Malignant

## 2020-01-29 NOTE — CONSULTS
[facility]        Patient Name:  Landy Ledbetter   YOB: 1950   Gender:  Male   Appt Date:  1/26/2020   Provider:  Narayan Carter MD     PATIENT PROVIDERS  Primary Care Joel Bernstein MD   Phone #: [phone] 2012 - Repeat CT obtained.  Per patient he was found to have \"large\" mass in abdomen.  He also developed bladder dysfunction requiring a chronic vo catheter at that time.  He was referred to [facility].  Diagnosis of GIST (exon 11 mutation) The patient presented to the ED on 1/26/2020 with complaints of persistent perineal pain worse with standing. Repeat CT abd/pelvis was without significant change from previous imaging. XR sacrum and coccyx was WNL. Labs are WNL.       Vital Signs:  /8 • Cancer [facility]) 02-12    GIST   • Carcinoma of gastrointestinal tract ([facility] [address]    • Depression    • Disorder of thyroid    • Esophageal reflux    • Hearing impairment    • High cholesterol    • Hypothyroidism    • Other and unspecified hyperlipidemia    • Deric Terrellres · SKIN: no change in mole.    · HEENT: denies pain  · RESPIRATORY: denies shortness of breath, wheezing or cough   · CARDIOVASCULAR: denies chest pain, SOB, edema,orthopnea, no palpitations   · GI: denies nausea, vomiting, constipation, diarrhea; no rectal ABDOMINAL WALL:  Right lower quadrant ileostomy with herniated loop of colon. Stable appearance of left lower quadrant colostomy. Few uncomplicated colonic diverticula.   4.0 x 2.6 cm oval structure of mixed fluid and relatively hyperattenuation in the Palliative following for pain control.  We will discuss with our pain service team. Lidocaine infusion has worked in the past.         Electronically Signed by:   Marsha Doll PA-C

## 2020-01-29 NOTE — PROGRESS NOTES
THE [facility] [place] [facility] Hematology Oncology Group Progress Note      Patient Name: Susanne Nunez   YOB: 1950  Medical Record Number: [id number]    Oncologic History  Sylvia Loera is a 71year old male who originally presented in 2012 with left lower quadrant a wall of rectum; 10/10 lymph nodes were negative for metastasis; tumor showed 16 mitoses per 50 hpf; surgical margins were negative. Patient presented to ED on 12/02/2019 with rectal pain.  CT abdomen/pelvis with contrast on that day showed multiple new Intravenous, Q2H PRN    Or  HYDROmorphone HCl (DILAUDID) 1 MG/ML injection 1 mg, 1 mg, Intravenous, Q2H PRN  PEG 3350 (MIRALAX) powder packet 17 g, 17 g, Oral, Daily  magnesium hydroxide (MILK OF MAGNESIA) 400 MG/5ML suspension 30 mL, 30 mL, Oral, Once  fe JVD.  Hematologic/Lymphatic No petechiae or purpura. Respiratory Normal effort; no respiratory distress. .  Cardiovascular Regular rate and rhythm. Abdomen Soft; ostomies in place; nontender; cannot reproduced the pain he describes.    Extremities No lower

## 2020-01-29 NOTE — PROGRESS NOTES
[address] [facility] Follow Up    Niko Hodge Patient Status:  Inpatient    10/23/1950 MRN [id number]   [facility] 4NW-A [facility] Day [facility] 3 PCP Linda Steen MD     Date of visit:  2020  Day 01/27/2020    BUN 11 01/27/2020     01/27/2020    K 3.9 01/27/2020     01/27/2020    CO2 24.0 01/27/2020     (H) 01/27/2020    CA 8.6 01/27/2020    ALB 3.4 01/26/2020    ALKPHO 93 01/26/2020    BILT 0.6 01/26/2020    TP 7.3 01/26/2020 0 Death       Palliative Care Assessment:     Symptoms:  Symptom assessment and requirements as assessed above. Sanjuanita Call stated his pain is acceptable at rest, but is not acceptable with ambulating.  He has not been experiencing benefit with opioids Budd Chemo to have adequate pain control - he states that his QOL is suffering right now due to pain, and he believes this is manageable.  He feels that the pain is the limiting factor to him being able to enjoy his life, and otherwise his perception of his overall he gangrene     Malignant gastrointestinal stromal tumor ([facility] [address]     Dehydration     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     Left lower quadrant abdominal pain     Pain, neoplasm-related     Intractable back pain     Intractable pain     Constipation due to opioid ther

## 2020-01-29 NOTE — PROGRESS NOTES
[facility]  Progress Note     Jossy Jay Patient Status:  Inpatient    10/23/1950 MRN [id number]   [facility] 4NW-A Attending Gonz[facility] Day # 3 PCP Ean Pantoja MD     Chief Complaint: abdominal pain    S: for input(s): TROP, CK in the last 168 hours. Imaging: Imaging data reviewed in Epic.     Medications:   • pregabalin  75 mg Oral TID   • PEG 3350  17 g Oral Daily   • magnesium hydroxide  30 mL Oral Once   • fentaNYL  1 patch Transdermal Q72H   • a

## 2020-01-29 NOTE — PLAN OF CARE
Pt is aox4 on room air no tele, and pt denies any SOB at this time. Pt has stated he has some pain the the abdomen ,more pain when pt is ambulating. Pt reviewed medication IV and pt states that helps with he pain.  PT vitals have been stable & pt is afebril

## 2020-01-30 NOTE — PROGRESS NOTES
[address] Follow Up    Emilie Pierce  [id number]  Patient seen at: [facility]    Subjective: \"We have a plan! \" States that Dr. Radha Roy has connected him with a clinical trial to receive a new chemo drug available MG/0.4ML injection 40 mg, 40 mg, Subcutaneous, Daily  •  ondansetron HCl (ZOFRAN) injection 4 mg, 4 mg, Intravenous, Q6H PRN  •  Metoclopramide HCl (REGLAN) injection 10 mg, 10 mg, Intravenous, Q8H PRN  •  PEG 3350 (MIRALAX) powder packet 17 g, 17 g, Oral, proposed interventions will help pain control and treat disease. Skin: Pink, warm and dry.     Palliative Performance Scale: 70%    Palliative Performance Scale   % Ambulation Activity Level Self-Care Intake Consciousness   100 Full Normal Full   Normal F will hold off on any changes today and await NB and assess outcome prior to further med changes. Sanjuanita Call was agreeable.      Advance Care Planning counseling and discussion:    POLST/CODE STATUS: FULL CODE; discussion deferred    HCPOA: Sanjuanita Call has HCPOA document a to PO for discharge. 3. CODE STATUS: FULL CODE  4. HCPOA: Has document and is discussing with family  5.  Disposition: Resume outpatient palliative care f/u for symptom management and GOC/ACP with Jhonatan Mohan NP    A total of 25 minutes were spent on this v

## 2020-01-30 NOTE — PLAN OF CARE
Pt alert/oriented x 4. Complaints of sacral pain with activity as well as standing. Pain 10/10 when activity. Duragesic patch in use, lyrica given as scheduled, IV dilaudid for breakthrough pain. Mirza Chan from palliative here to see re: pain management.   P

## 2020-01-30 NOTE — PLAN OF CARE
Assumed care in AM, Bedresting , sharp severe pain w/ getting up to standing position, Afebrile , respirations easy , self care of urostomy and colostomy , appetite good , medicated as per request for discomfort , Fentynal patch in place to left upper arm,

## 2020-01-30 NOTE — PLAN OF CARE
Pt alert and oriented x4. Pt with reports of sacral pain. PRN pain medication given x1 overnight. Pt up with standby aissist, free from falls and injury. Pt changed urostomy bag, UA collected and sent. Pt with moderate output from colostomy.   Call lig

## 2020-01-30 NOTE — PROGRESS NOTES
Pain Service  70 yo with metastatic colon cancer admitted with intractable perineal pain. Discussed with Jarrod Morocho - patient being treated for UTI - sample obtained from urostomy bag that had been placed 14 hours prior. Ancef x 72 hours.      Patient rec

## 2020-01-30 NOTE — PROGRESS NOTES
Pain Service  Patient seen at bedside with Dr. Elva Lemons - options discussed with patient. Hypogastric plexus block may not give sufficient relief considering the severity of the neuropathic pain.   Implanted intrathecal pain pump would provide better analgesia

## 2020-01-30 NOTE — PROGRESS NOTES
[facility]  Progress Note     Britt Hammans Patient Status:  Inpatient    10/23/1950 MRN [id number]   [facility] 4NW-A Attending H[facility] Day # 4 PCP Faiza Mills MD     Chief Complaint: abdominal pain    S: TROP, CK in the last 168 hours. Imaging: Imaging data reviewed in Epic.     Medications:   • pregabalin  75 mg Oral TID   • PEG 3350  17 g Oral Daily   • magnesium hydroxide  30 mL Oral Once   • fentaNYL  1 patch Transdermal Q72H   • aspirin  81 mg

## 2020-01-30 NOTE — PROGRESS NOTES
[facility]    Progress Note    Erin Trejo Patient Status:  Inpatient    10/23/1950 MRN [id number]   [facility] 4NW[facility]A [facility] PCP Roscoe Kapoor MD     Subjective:  Erin Trejo is a 71 year o

## 2020-01-30 NOTE — PROGRESS NOTES
[facility] Progress Note      Patient Name: Susanne Nunez   YOB: 1950  Medical Record Number: [id number]    Oncologic History  Sylvia Loera is a 71year old male who originally presented in 2012 with left lower quadrant a wall of rectum; 10/10 lymph nodes were negative for metastasis; tumor showed 16 mitoses per 50 hpf; surgical margins were negative. Patient presented to ED on 12/02/2019 with rectal pain.  CT abdomen/pelvis with contrast on that day showed multiple new Intravenous, Q2H PRN    Or  HYDROmorphone HCl (DILAUDID) 1 MG/ML injection 1 mg, 1 mg, Intravenous, Q2H PRN  PEG 3350 (MIRALAX) powder packet 17 g, 17 g, Oral, Daily  magnesium hydroxide (MILK OF MAGNESIA) 400 MG/5ML suspension 30 mL, 30 mL, Oral, Once  fe JVD.  Hematologic/Lymphatic No petechiae or purpura. Respiratory Normal effort; no respiratory distress. Cardiovascular Regular rate and rhythm. Abdomen Ostomies in place. Extremities No lower extremity edema. Integumentary Skin is warm and dry.   Krista inhibitor in short term. 4.   Prophylaxis: Lovenox SC. Electronically signed by:    FEROZ Kirkpatrick [facility] Hematology Oncology Group  Director, Bone and Soft Tissue Sarcoma Program  Section of Hematology/Oncology, [facility]

## 2020-01-30 NOTE — CM/SW NOTE
Care Progression Note:  Active Acute Medical Issue: GIST (gastrointestinal stroma tumor), malignant, colon ([facility] [address]   Other Contributing Medical Factors/Dx. : Severe pain. Length of stay: 4  Avoidable Delays:   Discharge Barriers: Pain consult.  Neurosurgery o

## 2020-01-30 NOTE — TELEPHONE ENCOUNTER
Per Brenda Palacios, Dr. Izabel Zuniga will be seeing patient and asking that Dr. Mariana Portillo is made aware of possible plan. Patient has a urostomy and a colostomy. Plan is a possible intrathecal pump insertion.

## 2020-01-31 NOTE — PROGRESS NOTES
[address] Follow Up    Katlin Liao  [id number]  Patient seen at: [facility]    Subjective:      Frustrated that nerve block or intrathecal pump are not an option for pain control.  Continues to report intolerable pain (LIPITOR) tab 10 mg, 10 mg, Oral, Nightly  •  acetaminophen (TYLENOL) tab 650 mg, 650 mg, Oral, Q6H PRN  •  Enoxaparin Sodium (LOVENOX) 40 MG/0.4ML injection 40 mg, 40 mg, Subcutaneous, Daily  •  ondansetron HCl (ZOFRAN) injection 4 mg, 4 mg, Intravenous, normal bowel sounds X 4 quadrants, no rebound or guarding, urostomy and colostomy present; passing stool; urine clear  Extremities: moving all extremities without difficulty; trying to walk short distances   Neurologic: Alert and oriented to person, place controlled so he can leave the hospital to begin treatment. Will make the following changes:   1. INCREASE Fentanyl Patch from 50mcg to 100mcg (50mcg increase equivalent to the 9mg IV dilaudid he received in the last 24hrs)  2.  INCREASE Lyrica to 100mg T Recommendations/Plan:     1. Goals of care: To pursue chemo treatment through [facility] as arranged by Dr. Chrissy North. Maximize pain management to tolerate ambulation. 2. CODE STATUS: FULL CODE  3. Pain Management:   1.  INCREASE Fentanyl Patch fr

## 2020-01-31 NOTE — PROGRESS NOTES
Pain Service  Dr. Guanakito Mckeon was asked to consider any pain interventions that may help better control patient's pain. Patient is not a candidate for intrathecal pump at this time due to chemotherapy scheduling.    If an intrathecal pump is an option in the f

## 2020-01-31 NOTE — PROGRESS NOTES
[facility] Progress Note      Patient Name: William Blanco   YOB: 1950  Medical Record Number: [id number]    Oncologic History  Tatyana Head is a 71year old male who originally presented in 2012 with left lower quadrant a wall of rectum; 10/10 lymph nodes were negative for metastasis; tumor showed 16 mitoses per 50 hpf; surgical margins were negative. Patient presented to ED on 12/02/2019 with rectal pain.  CT abdomen/pelvis with contrast on that day showed multiple new MG/ML injection 1 mg, 1 mg, Intravenous, Q2H PRN  ketorolac tromethamine (TORADOL) 30 MG/ML injection 30 mg, 30 mg, Intravenous, Once  pregabalin (LYRICA) cap 75 mg, 75 mg, Oral, TID  PEG 3350 (MIRALAX) powder packet 17 g, 17 g, Oral, Daily  magnesium hydr anicteric. ENMT External nose normal; external ears normal.  Neck No JVD. Hematologic/Lymphatic No petechiae or purpura. Respiratory Normal effort; no respiratory distress. Cardiovascular Regular rate and rhythm. Abdomen Ostomies in place.    Lam Mediate This would be consider too low a dose for anticancer effect but may help his pain. However, if the compassionate use program requires a significant drug free interval between previous therapy and start of new drug, patient will again have a spike in pain.

## 2020-01-31 NOTE — PROGRESS NOTES
[facility]  Progress Note     Jairo Peña Patient Status:  Inpatient    10/23/1950 MRN [id number]   [facility] 4NW-A [facility] PCP Daljit Hatch MD     Chief Complaint: abdominal pain    S: for input(s): TROP, CK in the last 168 hours. Imaging: Imaging data reviewed in Epic.     Medications:   • pregabalin  100 mg Oral TID   • fentaNYL  1 patch Transdermal Q72H   • ketorolac (TORADOL) injection  30 mg Intravenous Q6H   • NON FORMULARY

## 2020-01-31 NOTE — PROGRESS NOTES
Hem/Onc Inpatient Note    Patient Name: José Miguel Liz   YOB: 1950   Medical Record Number: [id number]   CSN: [id number]   Attending Physician: Dr. Octavio Kemp    Patient reports improvement in pain after receiving toradol injection this approximat

## 2020-01-31 NOTE — PLAN OF CARE
Patient A+Ox4. C/o rectal pain, PRN IV dilaudid given x3. IV abt per MAR, afebrile. Fall precautions in place. Urostomy intact and draining adequate amounts of urine. Has colostomy also minimal output overnight.      Problem: PAIN - ADULT  Goal: Verbalizes/ strengthening/mobility  - Encourage toileting schedule  Outcome: Progressing

## 2020-02-01 NOTE — PROGRESS NOTES
Pt AOX4 with complaints of sacral/rectal pain. While laying down pain is not present. After walking pain increases to 8/10. Pt given oral dilaudid before bed and scheduled toradol per MAR. Pt with adequate sleep. IV abx given for UTI.  Pt resumed oral chemo

## 2020-02-01 NOTE — PLAN OF CARE
Awake & alert,ambulating in the room,fall precautions observed due to pain medications. Voiding with no difficulty. Remains on IV antibiotics for UTI,no adverse reactions noted.  Scheduled toradol & dilaudid po RN given for c/o rectal pain,with relief noted

## 2020-02-01 NOTE — PROGRESS NOTES
[facility]  Progress Note     Maddy Rodriguez Patient Status:  Inpatient    10/23/1950 MRN [id number]   [facility] 4NW-A Attending Beti Bonilla[address] Day # 6 PCP Diane Eduardo MD     Chief Complaint: abdominal pain    S: 91.6 mL/min (based on SCr of 0.86 mg/dL). No results for input(s): PTP, INR in the last 168 hours. No results for input(s): TROP, CK in the last 168 hours. Imaging: Imaging data reviewed in Epic.     Medications:   • pregabalin  100 mg Oral TI

## 2020-02-01 NOTE — PROGRESS NOTES
THE [facility] Hematology Oncology Group Progress Note      Patient Name: Susanne Nunez   YOB: 1950  Medical Record Number: [id number]    Subjective    Saw this morning. He had just woken up, still in bed. Pain was ok, bit better than yesterday.  He to 30 mL, Oral, Daily PRN  bisacodyl (DULCOLAX) rectal suppository 10 mg, 10 mg, Rectal, Daily PRN  FLEET ENEMA (FLEET) 7-19 GM/118ML enema 133 mL, 1 enema, Rectal, Once PRN  ceFAZolin (ANCEF) IVPB 1g/100ml in 0.9% NaCl minibag/add-van, 1 g, Intravenous, Q8H DIFFERENTIAL    Collection Time: 02/01/20  6:27 AM   Result Value Ref Range    WBC 7.7 4.0 - 11.0 x10(3) uL    RBC 4.57 3.80 - 5.80 x10(6)uL    HGB 13.7 13.0 - 17.5 g/dL    HCT 42.3 39.0 - 53.0 %    .0 150.0 - 450.0 10(3)uL    MCV 92.6 80.0 - 100.0 Bessy Rondon  [facility] [place], [id number]    2/1/2020    11:58 AM

## 2020-02-01 NOTE — PLAN OF CARE
Assumed patient care at 0730. Vital signs stable. Patient alert and oriented x 4. See flowsheets and EMAR for pain documentation. Patient managing his own colostomy and ileostomy.    Problem: PAIN - ADULT  Goal: Verbalizes/displays adequate comfort lev bladder emptying  Outcome: Progressing

## 2020-02-02 NOTE — PROGRESS NOTES
THE [facility] Hematology Oncology Group Progress Note      Patient Name: Garfield Brink   YOB: 1950  Medical Record Number: [id number]    Subjective    Better. No diarrhea. Tolerated regorafenib.  Walked a bit yesterday and had pain but definite improv PRN  bisacodyl (DULCOLAX) rectal suppository 10 mg, 10 mg, Rectal, Daily PRN  FLEET ENEMA (FLEET) 7-19 GM/118ML enema 133 mL, 1 enema, Rectal, Once PRN  [COMPLETED] ceFAZolin (ANCEF) IVPB 1g/100ml in 0.9% NaCl minibag/add-van, 1 g, Intravenous, Q8H      [facility] Maren Adkins  [place], [place], [address]      2/2/2020    12:00 PM

## 2020-02-02 NOTE — PLAN OF CARE
RECEIVED PATIENT ALERT AND ORIENTED X4. VSS ON ROOM AIR. DENIES CHEST PAIN AND SOB. C/O SACRAL PAIN, INCREASED WITH AMBULATION. IV TORADOL AND PO DILAUDID GIVEN WITH SOME RELIEF. IV ABX AS ORDERED. UROSTOMY/COLOSTOMY NOTED WITH ADEQUATE OUTPUT.   SELF

## 2020-02-02 NOTE — PROGRESS NOTES
[facility]  Progress Note     Priti To Patient Status:  Inpatient    10/23/1950 MRN [id number]   [facility] 4NW-A Attending [facility] Day # 7 PCP Shelby Hernández MD     Chief Complaint: abdominal pain    S: Epic.    Medications:   • pregabalin  100 mg Oral TID   • fentaNYL  1 patch Transdermal Q72H   • ketorolac (TORADOL) injection  30 mg Intravenous Q6H   • Regorafenib  40 mg Oral After dinner   • PEG 3350  17 g Oral Daily   • magnesium hydroxide  30 mL Oral

## 2020-02-03 NOTE — PROGRESS NOTES
[address] Follow Up    Sofia Manual  [id number]  Patient seen at: [facility]    Subjective:      Feeling better today. States they \"doubled\" the chemo drug today.  Has been tolerating lower dose with improved pain joyce (TYLENOL) tab 650 mg, 650 mg, Oral, Q6H PRN  •  Enoxaparin Sodium (LOVENOX) 40 MG/0.4ML injection 40 mg, 40 mg, Subcutaneous, Daily  •  ondansetron HCl (ZOFRAN) injection 4 mg, 4 mg, Intravenous, Q6H PRN  •  Metoclopramide HCl (REGLAN) injection 10 mg, 10 with improved pain control  Skin: pink, warm and dry.     Palliative Performance Scale: 70%    Palliative Performance Scale   % Ambulation Activity Level Self-Care Intake Consciousness   100 Full Normal Full   Normal Full   90 Full No disease  Normal Full N effects: sedation, dizziness, confusion, respiratory distress or constipation (noted diarrhea today). Advance Care Planning counseling and discussion:    POLST/CODE STATUS: FULL CODE    HCPOA: Has document for review.  Will try to address again prior to needed due to increased doses during this hospitalization. A total of 25 minutes were spent on this visit; >50% was spent counseling and coordinating care. Thank you for allowing the Palliative Care Team to participate in the care of your patient.

## 2020-02-03 NOTE — PROGRESS NOTES
[facility]    Progress Note    Hoang Yip Patient Status:  Inpatient    10/23/1950 MRN [id number]   [facility] 4NW-A Attending Lester Samuel [facility] Day # 8 PCP Boris Ribeiro MD     Subjective:  Hoang Yip is a 71 year o

## 2020-02-03 NOTE — PROGRESS NOTES
[facility]  Progress Note     Fina Aldrich Patient Status:  Inpatient    10/23/1950 MRN [id number]   [facility] 4NW-A Attending Me[address] [place] [address] [place] Day # 8 PCP Gabriella Feliciano MD     Chief Complaint: abdominal pain    S: Q72H   • PEG 3350  17 g Oral Daily   • magnesium hydroxide  30 mL Oral Once   • aspirin  81 mg Oral Daily   • escitalopram  10 mg Oral Daily   • Levothyroxine Sodium  125 mcg Oral Before breakfast   • atorvastatin  10 mg Oral Nightly   • enoxaparin  40 mg

## 2020-02-03 NOTE — DIETARY NOTE
1230 [facility] ASSESSMENT    Pt does not meet malnutrition criteria at this time.      NUTRITION DIAGNOSIS/PROBLEM:    Unintentional weight loss related to physiologic causes increase nutrient needs (CA) as evidenced by ~14 lb (6.4%) intake  Food Allergies: No  Cultural/Ethnic/Alevism Preferences Addresses: Yes    NUTRITION RELATED PHYSICAL FINDINGS:     1. Body Fat/Muscle Mass: noted no evident signs of fat/muscle wasting per inspection.      NUTRITION PRESCRIPTION:93.1 kg  Calories:

## 2020-02-03 NOTE — PROGRESS NOTES
[facility] Progress Note      Patient Name: Elva Sutherland   YOB: 1950  Medical Record Number: [id number]  Attending Provider: Dr. Tristan Birmingham    Subjective  Pain overall improved but patient does not feel safe for discharge y (DULCOLAX) rectal suppository 10 mg, 10 mg, Rectal, Daily PRN  FLEET ENEMA (FLEET) 7-19 GM/118ML enema 133 mL, 1 enema, Rectal, Once PRN  [COMPLETED] ceFAZolin (ANCEF) IVPB 1g/100ml in 0.9% NaCl minibag/add-van, 1 g, Intravenous, Q8H      Allergies    Reese Corin Range: 16 - 61 U/L 13 (L)   Total Bilirubin Latest Ref Range: 0.1 - 2.0 mg/dL 0.4   Globulin Latest Ref Range: 2.8 - 4.4 g/dL 3.3   A/G Ratio Latest Ref Range: 1.0 - 2.0  0.9 (L)   TOTAL PROTEIN Latest Ref Range: 6.4 - 8.2 g/dL 6.2 (L)   Albumin Latest Ref Prophylaxis: Lovenox SC  .   Emily Yip NP-C  Nurse Practitioner  [facility]

## 2020-02-03 NOTE — PLAN OF CARE
Pt AOX4 with complaints of severe pain during walking. Pain subsides to a 2/10 once sitting/laying in bed. Scheduled toradol given per MAR. No oral dilaudid requested. IV abx completed. Urine light yellow. Pt with adequate sleep. VSS.  Needs met at this kaity

## 2020-02-04 NOTE — PROGRESS NOTES
Asssumed care imn AM, awake , alert , states increased discomfort w/ ambulation , Fentynal 100 mcg replaced to right chest , medicated as per request for breakthru discomfort , ambulates in hallway ,, appetite fair

## 2020-02-04 NOTE — PROGRESS NOTES
[facility] Progress Note      Patient Name: Darin Last   YOB: 1950  Medical Record Number: [id number]    Oncologic History  Loly Jiang is a 71year old male who originally presented in 2012 with left lower quadrant a wall of rectum; 10/10 lymph nodes were negative for metastasis; tumor showed 16 mitoses per 50 hpf; surgical margins were negative. Patient presented to ED on 12/02/2019 with rectal pain.  CT abdomen/pelvis with contrast on that day showed multiple new Patient reports no adverse effects     Current Medications  Regorafenib TABS 40 mg, 40 mg, Oral, BID with meals  [COMPLETED] ketorolac tromethamine (TORADOL) 30 MG/ML injection 30 mg, 30 mg, Intravenous, Once  pregabalin (LYRICA) cap 100 mg, 100 mg, Oral, Q8H      Allergies   Mr. Jaime Corbett is allergic to radiology contrast iodinated dyes.      Vital Signs   Height: --  Weight: --  BSA (Calculated - sq m): --  Pulse: 55 (02/04 0601)  BP: 119/74 (02/04 0601)  Temp: 98.2 °F (36.8 °C) (02/04 0601)  Do Not Use - Res

## 2020-02-04 NOTE — PLAN OF CARE
Pt AOX4 with complaints of sacral rectal pain, however pt states that resuming his oral chemo has cut the pain in half. Chemo increased to twice a day. Second dose given before bed at 10pm. Pt has not needed any oral or iv dilaudid this shift.  Fentanyl pat physical limitations  - Instruct pt to call for assistance with activity based on assessment  - Modify environment to reduce risk of injury  - Provide assistive devices as appropriate  - Consider OT/PT consult to assist with strengthening/mobility  - Encou

## 2020-02-04 NOTE — PROGRESS NOTES
[facility]ITAL[facility]  Progress Note     Niko Hodge Patient Status:  Inpatient    10/23/1950 MRN [id number]   [facility] P[facility] Linda Steen MD     Chief Complaint: abdominal pain    S: Q72H   • PEG 3350  17 g Oral Daily   • magnesium hydroxide  30 mL Oral Once   • aspirin  81 mg Oral Daily   • escitalopram  10 mg Oral Daily   • Levothyroxine Sodium  125 mcg Oral Before breakfast   • atorvastatin  10 mg Oral Nightly   • enoxaparin  40 mg

## 2020-02-04 NOTE — PLAN OF CARE
Assumed care @ 0730. Patient c/o mild pain on lower back. Patient's vital signs stable. Patient able to ambulate without difficulty.

## 2020-02-04 NOTE — PROGRESS NOTES
[facility] Follow Up    Priti To  [id number]  Patient seen at: [facility]    Subjective:      Ready to go home. States pain control improved with ambulation. No side effects (weakness) noted yet from chemo.      Debbi injection 40 mg, 40 mg, Subcutaneous, Daily  •  ondansetron HCl (ZOFRAN) injection 4 mg, 4 mg, Intravenous, Q6H PRN  •  Metoclopramide HCl (REGLAN) injection 10 mg, 10 mg, Intravenous, Q8H PRN  •  PEG 3350 (MIRALAX) powder packet 17 g, 17 g, Oral, Daily TX HEENT: Normocephalic, sclera anicteric, no injection. Oral mucous membranes moist   Lungs: Normal excursions and effort.   Abdomen: ostomies present  Extremities: BLE Edema not present  Neurologic: Alert and oriented to person, place and time   Psychiatri call Nakia Kamara NP so these changes can be documented and monitored. He voiced understanding.      Advance Care Planning counseling and discussion:    POLST/CODE STATUS: FULL CODE; discussion deferred    HCPOA: Yohana Licona document completed and will be scanned and will be scanned to EMR. Original and copies given to Maritza Ball. 4. Disposition: Home with continued outpatient palliative care with Kristine Cordero NP; f/u within 2 weeks for med review and pain assessment. Appointment reminder added to DC instructions.    5. Pa

## 2020-02-07 NOTE — DISCHARGE SUMMARY
[facility]PITAL[facility]  DISCHARGE SUMMARY     Perri Lopez Patient Status:  Inpatient    10/23/1950 MRN [id number]   [facility] 4NW-A Attending No att. providers found   [facility] Day # 9 PCP Feliberto Nayak MD     Date of Admission: 2020  Date o Medications      START taking these medications      Instructions Prescription details   fentaNYL 100 MCG/HR Pt72  Commonly known as:  2033 CamioCam 1 patch onto the skin every third day for 15 days.    Quantity:  5 patch  Refills:  0        CHANGE h daily. Refills:  0     PEG 3350 Pack  Commonly known as:  MIRALAX      Take 17 g by mouth nightly.    Refills:  0           Where to Get Your Medications      [facility] [address] [place] [address] - [facility] Stephanie WASSERMAN edema.  -----------------------------------------------------------------------------------------------  PATIENT DISCHARGE INSTRUCTIONS: See electronic chart    Sean Lucas DO    Time spent:  > 30 minutes

## 2020-02-07 NOTE — PROGRESS NOTES
Patient is here today for follow up with John Paul Angeles for Malignant GIST. Patient denies pain with Fentanyl 100mg Patch. Stated is on Regorafenib 40mg BID. Stated when increased to BID dose from 40mg daily he became very fatigued, malaise, shakey and fuzzy.

## 2020-02-09 NOTE — PROGRESS NOTES
[facility]ematology Oncology Group Progress Note      Patient Name: Andre Torres   YOB: 1950  Medical Record Number: [id number]    Date of Visit: 2/7/2020      Chief Complaint  Metastatic gastrointestinal stromal tumor - follow up.     Oncologic Pathology showed a 10.2 cm GIST with 80% necrosis; necrotic tumor was present in prostatic tissue and wall of rectum; 10/10 lymph nodes were negative for metastasis; tumor showed 16 mitoses per 50 hpf; surgical margins were negative.      Patient presented physician)  GIST (as above); benign prostatic hyperplasia; hypothyroidism; hypercholesterolemia.      Past Surgical History (historical data, reviewed by physician)  Stomal hernia repair; inguinal hernia repair x 4; lumbar discectomy; resection of GIST (as (Tympanic)   Resp 18   Ht 1.854 m (6' 0.99\")   Wt 92.1 kg (203 lb)   SpO2 95%   BMI 26.79 kg/m²     Physical Examination   Constitutional Well developed, well nourished; no apparent distress. Head Normocephalic and atraumatic.   Eyes Conjunctiva clear; s Hematology/Oncology, [facility]

## 2020-02-13 NOTE — PROGRESS NOTES
[facility] Oncology [facility] Progress Note      Patient Name: Griselda Gudino   YOB: 1950  Medical Record Number: [id number]    Date of Visit: 2/14/2020      Chief Complaint  Metastatic gastrointestinal stromal tumor - follow up.     Oncologi urostomy. Pathology showed a 10.2 cm GIST with 80% necrosis; necrotic tumor was present in prostatic tissue and wall of rectum; 10/10 lymph nodes were negative for metastasis; tumor showed 16 mitoses per 50 hpf; surgical margins were negative.      Patient anxiety attacks as the symptoms quickly resolved after taking the drug.      Past Medical History (historical data, reviewed by physician)  GIST (as above); benign prostatic hyperplasia; hypothyroidism; hypercholesterolemia.      Past Surgical History (hist constipation, melena, hematochezia. Genitourinary  No hematuria. Integumentary  No acute or chronic rashes.     Vital Signs   /66 (BP Location: Right arm, Patient Position: Sitting, Cuff Size: large)   Pulse 68   Temp 96.8 °F (36 °C) (Tympanic)   Re Time: 02/14/20  9:32 AM   Result Value Ref Range    WBC 7.6 4.0 - 11.0 x10(3) uL    RBC 4.94 3.80 - 5.80 x10(6)uL    HGB 14.5 13.0 - 17.5 g/dL    HCT 43.7 39.0 - 53.0 %    .0 150.0 - 450.0 10(3)uL    MCV 88.5 80.0 - 100.0 fL    MCH 29.4 26.0 - 34.0 day.     Planned Follow Up  Patient will return for follow up in 1 week. Risk level: High - metastatic GIST on therapy. Electronically signed by:    aKli Delgado M.D.   Associate Medical Director of [place] [id number]

## 2020-02-14 NOTE — PROGRESS NOTES
Patient is here today for follow up with Beatriz Garcia for Malignant GIST. Currently on Regorafenib 40mg BID. Denies fatigue. Patient stated intermittent pain from his hands is a 2 on a scale of 0-10. Currently on Fentanyl 100mcg Q3days. Not using Dilaudid.  Wo

## 2020-02-16 NOTE — PROGRESS NOTES
THE [facility]ematology Oncology Group Progress Note      Patient Name: Jason Olivo   YOB: 1950  Medical Record Number: [id number]    Date of Visit: 2/21/2020      Chief Complaint  Metastatic gastrointestinal stromal tumor - follow up.     Oncologi urostomy. Pathology showed a 10.2 cm GIST with 80% necrosis; necrotic tumor was present in prostatic tissue and wall of rectum; 10/10 lymph nodes were negative for metastasis; tumor showed 16 mitoses per 50 hpf; surgical margins were negative.      Patient Past Medical History (historical data, reviewed by physician)  GIST (as above); benign prostatic hyperplasia; hypothyroidism; hypercholesterolemia.      Past Surgical History (historical data, reviewed by physician)  Stomal hernia repair; inguinal thuy developed, well nourished; no apparent distress. Head   Normocephalic and atraumatic. Eyes   Conjunctiva clear; sclera anicteric. ENMT   External nose normal; external ears normal.  Neck   No JVD. Hematologic/Lymphatic No petechiae or purpura.   Respir Absolute 4.72 1.50 - 7.70 x10(3) uL    Lymphocyte Absolute 2.14 1.00 - 4.00 x10(3) uL    Monocyte Absolute 0.53 0.10 - 1.00 x10(3) uL    Eosinophil Absolute 0.15 0.00 - 0.70 x10(3) uL    Basophil Absolute 0.04 0.00 - 0.20 x10(3) uL    Immature Granulocyte

## 2020-02-21 NOTE — PROGRESS NOTES
Patient is here today for follow up with Nicko Cuevas for Malignant GIST. Patient is on Regorafenib 40mg twice daily. Denies nausea, appetite is good. Patient denies pain with Fentanyl 100mcg patch. Hands red and peeling stated not painful.   Medication list,

## 2020-02-24 NOTE — TELEPHONE ENCOUNTER
MD Dyana Moreland, RN             Let him know that labs were good on Friday. He can start regorafenib 1 tab TID. Spoke with patient stated - Sunday morning. Sweating and fatigued - took a Xanax - felt a little better.  Took anot

## 2020-02-25 NOTE — PROGRESS NOTES
Patient presents with:  Pain: APN assessment - sick call    Pt is here for a sick call - general malaise/pain. Pt arrived to cancer center and was laying down in the waiting room prior to apt time; moved pt to treatment room with bed.  Pt complains of non-s

## 2020-02-25 NOTE — TELEPHONE ENCOUNTER
Patient called stated - increased stomach pain and nausea. Stated feels terrible. Wants to come in today or go to ER - crying. Stated his daughter can bring him in this afternoon. Appointment scheduled with Oral BOTELLO today at 1:30pm. AYAN and PSR notified.

## 2020-02-26 NOTE — PROGRESS NOTES
[facility] Progress Note    Patient Name: Osvaldo Eubanks   YOB: 1950   Medical Record Number: [id number]   CSN: [id number]   Date of visit: 2/25/2020   Provider: HUMPHREY Montgomery  Referring Physician: No ref.  provider found    Problem States he had nausea this morning therefore unable to eat. He states he has sensation that he wants to crawl out of his skin. He took Xanax this morning and 10 minutes prior to this encounter. Has not noted relief of symptoms.   Follows with Jenny granados needed for Anxiety.  (Patient not taking: Reported on 2/21/2020 ), Disp: 90 tablet, Rfl: 0  •  HYDROmorphone HCl 8 MG Oral Tab, Take 1 tablet (8 mg total) by mouth every 3 (three) hours as needed for Pain (Breakthrough pain not controlled with Fentanyl patc ALKALINE PHOSPHATASE Latest Ref Range: 45 - 117 U/L 95   AST (SGOT) Latest Ref Range: 15 - 37 U/L 16   ALT (SGPT) Latest Ref Range: 16 - 61 U/L 23   Total Bilirubin Latest Ref Range: 0.1 - 2.0 mg/dL 0.7   Globulin Latest Ref Range: 2.8 - 4.4 g/dL 3.9   M addition to his oral Xanax he took. On escitalopram which he states he is taking but does not feel it is helping. Jennifer Bolivar is managing and will ask her to follow up.       Nausea:  Zofran    Dehydration:  1 liter 0.9NS    GIST:  Has held his regorafenib

## 2020-02-28 NOTE — PROGRESS NOTES
[facility] Progress Note      Patient Name: Ramirez Christensen   YOB: 1950  Medical Record Number: [id number]    Date of Visit: 2/28/2020      Chief Complaint  Metastatic gastrointestinal stromal tumor - follow up.     Oncologi urostomy. Pathology showed a 10.2 cm GIST with 80% necrosis; necrotic tumor was present in prostatic tissue and wall of rectum; 10/10 lymph nodes were negative for metastasis; tumor showed 16 mitoses per 50 hpf; surgical margins were negative.      Patient ativan which improved his symptoms. He currently he has no pain. No diarrhea. Stable mild desquamation in the palms bilaterally without pain. There is none in the feet. No respiratory complaints. Energy level is good.      Past Medical History (historical d nausea, vomiting, diarrhea, constipation, melena, hematochezia. Genitourinary  No hematuria. Integumentary  No acute or chronic rashes.     Vital Signs   /70 (BP Location: Left arm, Cuff Size: large)   Pulse 53   Temp 98.3 °F (36.8 °C) (Tympanic) 02/25/20  1:22 PM   Result Value Ref Range    Magnesium 2.1 1.6 - 2.6 mg/dL   CBC W/ DIFFERENTIAL    Collection Time: 02/25/20  1:22 PM   Result Value Ref Range    WBC 7.9 4.0 - 11.0 x10(3) uL    RBC 5.13 3.80 - 5.80 x10(6)uL    HGB 14.8 13.0 - 17.5 g/dL Hand/foot syndrome: Due to regorafenib. Patient advised to moisturize aggressively several times per day. Planned Follow Up  Patient will return for follow up in 1 week. Risk level: High - metastatic GIST on therapy.     Electronically signed by:

## 2020-03-05 NOTE — PROGRESS NOTES
[facility] Progress Note      Patient Name: Thelma Mcnamara   YOB: 1950  Medical Record Number: [id number]    Date of Visit: 3/6/2020      Chief Complaint  Metastatic gastrointestinal stromal tumor - follow up.     Oncologic Pathology showed a 10.2 cm GIST with 80% necrosis; necrotic tumor was present in prostatic tissue and wall of rectum; 10/10 lymph nodes were negative for metastasis; tumor showed 16 mitoses per 50 hpf; surgical margins were negative.      Patient presented benign prostatic hyperplasia; hypothyroidism; hypercholesterolemia.      Past Surgical History (historical data, reviewed by physician)  Stomal hernia repair; inguinal hernia repair x 4; lumbar discectomy; resection of GIST (as above).      Family History ( Sitting, Cuff Size: large)   Pulse 67   Temp 97.1 °F (36.2 °C) (Tympanic)   Resp 16   Ht 1.854 m (6' 0.99\")   Wt 93.1 kg (205 lb 3.2 oz)   SpO2 97%   BMI 27.08 kg/m²     Physical Examination   Constitutional  Well developed, well nourished; no apparent di week if patient is doing well. Plan for imaging studies at the beginning of 04/2020.    2.   Pelvic pain, neoplasm related: Resolved with restart of regorafenib. He remains on fentanyl 100 mcg/hr. He is not using any breakthrough medication.  Decrease fenta

## 2020-03-09 NOTE — PROGRESS NOTES
[facility] Progress Note    Patient Name: Jossy Jay   YOB: 1950   Medical Record Number: [id number]   CSN: [id number]   Date of visit: 3/9/2020   Provider: HUMPHREY Benoit  Referring Physician: No ref.  provider found    Problem Height: 185.4 cm (6' 0.99\") (03/09 1249)  Weight: 93.2 kg (205 lb 6.4 oz) (03/09 1249)  BSA (Calculated - sq m): 2.18 sq meters (03/09 1249)  Pulse: 93 (03/09 1249)  BP: 128/77 (03/09 1249)  Temp: 99.7 °F (37.6 °C) (03/09 1249)  Do Not Use - Resp Rate: lesion/thrush, mucous membranes are moist.  Neck:  Supple, no tenderness, no masses or adenopathy  Lungs:  Clear to auscultation bilaterally  CV:  Regular rate and rhythm  Abdomen:  Ostomy present, clear light urine in pouch.   Non-distended, normoactive curtis

## 2020-03-09 NOTE — PROGRESS NOTES
Patient presents with:  Pain: acute apn assessment     Patient c/o cramping, lower back and kidney spasms symptoms since yesterday with chills and low grade temperature.   Patient took tylenol and ibuprofen yesterday but non today, patient has history of ki

## 2020-03-09 NOTE — TELEPHONE ENCOUNTER
Patient called stated he thinks he has his annual kidney infection. Having chills, back and kidney spasms. Denies fever. Per Daljit Garcia he would like him seen by APN today. Patient notified.  Scheduled for APN at 1pm today

## 2020-03-20 NOTE — PROGRESS NOTES
Patient is here today for follow up with Gabbie Calvin for Malignant GIST. Regorafenib 40mg bid. Patient stated abdominal pressure. Currently on Fentanyl 100mcg q 72 hours. Stated fatigued. No appetite, nausea, fatigued.  Constipation - taking milk of magnesi

## 2020-03-21 NOTE — PROGRESS NOTES
THE [facility] Hematology Oncology Group Progress Note      Patient Name: Jhonathan Mckenzie   YOB: 1950  Medical Record Number: [id number]    Date of Visit: 3/20/2020      Chief Complaint  Metastatic gastrointestinal stromal tumor - follow up.     Oncologi urostomy. Pathology showed a 10.2 cm GIST with 80% necrosis; necrotic tumor was present in prostatic tissue and wall of rectum; 10/10 lymph nodes were negative for metastasis; tumor showed 16 mitoses per 50 hpf; surgical margins were negative.      Patient be discontinued today.      Past Medical History (historical data, reviewed by physician)  GIST (as above); benign prostatic hyperplasia; hypothyroidism; hypercholesterolemia.      Past Surgical History (historical data, reviewed by physician)  Stomal herni sweats. Respiratory  No dyspnea, cough, hemoptysis, pleuritic chest pain. Gastrointestinal  No nausea, vomiting, diarrhea, constipation, melena, hematochezia. Genitourinary  No hematuria. Integumentary  No acute or chronic rashes.     Vital Signs   BP ( Anxiety: Patient on antidepressant and anti anxiolytic therapy. Planned Follow Up  Patient will return for follow up in 1 week. Risk level: High - metastatic GIST on therapy. Electronically signed by:    Prabha Velázquez M.D.   Associate Medical

## 2020-03-21 NOTE — PROGRESS NOTES
[facility] Progress Note      Patient Name: Felix Brunner   YOB: 1950  Medical Record Number: [id number]    Date of Visit: 3/13/2020      Chief Complaint  Metastatic gastrointestinal stromal tumor - follow up.     Oncologi urostomy. Pathology showed a 10.2 cm GIST with 80% necrosis; necrotic tumor was present in prostatic tissue and wall of rectum; 10/10 lymph nodes were negative for metastasis; tumor showed 16 mitoses per 50 hpf; surgical margins were negative.      Patient (historical data, reviewed by physician)  Stomal hernia repair; inguinal hernia repair x 4; lumbar discectomy; resection of GIST (as above).      Family History (historical data, reviewed by physician)  Mother with breast cancer.      Social History (histor chronic rashes.     Vital Signs   /77 (BP Location: Left arm, Patient Position: Sitting, Cuff Size: large)   Pulse 50   Temp 97.7 °F (36.5 °C) (Tympanic)   Resp 18   Ht 1.854 m (6' 0.99\")   Wt 92.1 kg (203 lb)   SpO2 97%   BMI 26.79 kg/m²     Physica 4.   Hypothyroidism: Continue levothyroxine without modificaiton. 5.   Anxiety: Patient on antidepressant and anti anxiolytic therapy. Planned Follow Up  Patient will return for follow up in 1 week.      Risk level: High - metastatic GIST on the

## 2020-03-27 NOTE — PROGRESS NOTES
THE [facility]ematology Oncology Group Progress Note      Patient Name: Wendy Connor   YOB: 1950  Medical Record Number: [id number]    Date of Visit: 3/20/2020      Chief Complaint  Metastatic gastrointestinal stromal tumor - follow up.     Oncologi urostomy. Pathology showed a 10.2 cm GIST with 80% necrosis; necrotic tumor was present in prostatic tissue and wall of rectum; 10/10 lymph nodes were negative for metastasis; tumor showed 16 mitoses per 50 hpf; surgical margins were negative.      Patient prostatic hyperplasia; hypothyroidism; hypercholesterolemia.      Past Surgical History (historical data, reviewed by physician)  Stomal hernia repair; inguinal hernia repair x 4; lumbar discectomy; resection of GIST (as above).      Family History (histori pain.  Gastrointestinal No nausea, vomiting, diarrhea. Genitourinary No hematuria. Integumentary No acute or chronic rashes.     Vital Signs   /86 (BP Location: Left arm, Patient Position: Sitting, Cuff Size: large)   Pulse 56   Temp (!) 96.3 °F (35 9:20 AM   Result Value Ref Range    WBC 8.1 4.0 - 11.0 x10(3) uL    RBC 5.53 3.80 - 5.80 x10(6)uL    HGB 16.2 13.0 - 17.5 g/dL    HCT 48.4 39.0 - 53.0 %    .0 150.0 - 450.0 10(3)uL    MCV 87.5 80.0 - 100.0 fL    MCH 29.3 26.0 - 34.0 pg    MCHC 33.5 visible mass, obstruction, or bowel wall thickening. ABDOMINAL WALL:  Bilateral lower quadrant ostomies is noted. Previously noted herniation of colon into the right lower quadrant ostomy is no longer identified.   URINARY BLADDER:  Sequelae of cystecto longer using narcotic pain medication. 4.   Hypothyroidism: Continue levothyroxine without modificaiton. TFTs within normal limits today. 5.   Anxiety: Patient on antidepressant and anti anxiolytic therapy.      Planned Follow Up  Patient will Jesús Camp

## 2020-04-06 NOTE — TELEPHONE ENCOUNTER
Patient called for refill of lexapro. He feels this has been very beneficial for him. He saw psychiatrist who made no other changes. Will refill and he can follow up in May.

## 2020-04-19 NOTE — PROGRESS NOTES
Virtual Check-In    Katlin Liao verbally consents to a [employer] Check-In visit on 4/24/2020. Patient understands and accepts financial responsibility for any deductible, co-insurance and/or co-pays associated with this service.     Summary of showed small progression of the tumor.  Patient was also experiencing increased pain, difficulty moving his bowels, and symptoms of urinary obstruction.      On 10/03/2017 he underwent pelvic exenteration including en-bloc resection of pelvic GIST with abdo video visit. Patient initiated the visit through [address] He denies any specific toxicities to therapy. He denies any diarrhea. He denies any bloating with eating. He reports mild dryness of his palms but no pain or erythema.  He states that his appetit External nose normal; external ears normal.  Neck                   No apparent JVD. Hematologic/Lymphatic No obvious petechiae or purpura on face abdomen, or upper and lower extremities.   Respiratory          Normal effort; no respiratory .0 150.0 - 450.0 10(3)uL    MCV 87.1 80.0 - 100.0 fL    MCH 29.1 26.0 - 34.0 pg    MCHC 33.4 31.0 - 37.0 g/dL    RDW 13.7 11.0 - 15.0 %    RDW-SD 43.8 35.1 - 46.3 fL    Neutrophil Absolute Prelim 5.13 1.50 - 7.70 x10 (3) uL    Neutrophil Absolute 5.

## 2020-04-27 NOTE — TELEPHONE ENCOUNTER
Per Dr. Neyda Jade:  Please call patient. He needs CT A/P with IV and PO contrast on 5/25 or 5/26 and f/u with and labs on Thursday 5/28. Order for CT in [address] [place] [address] Rd - He wants to do scan in [place].  Let him know that I sent his premeds for the CT to the pharmacy

## 2020-05-05 NOTE — PROGRESS NOTES
Due to the COVID-19 pandemic, a video visit was offered and performed with the patient. Patient understands and accepts financial responsibility for any deductible, co-insurance and/or co-pays associated with this service.     Palliative Care Follow Up (acquired)     Palliative care encounter     Medical History:  Past Medical History:   Diagnosis Date   • Back problem     back surgery 20 years ago   • BPH (benign prostatic hyperplasia)    • Cancer ([facility] [address] 02-12    GIST   • Carcinoma of gastrointestinal tr History:  Family History   Problem Relation Age of Onset   • Alcohol and Other Disorders Associated Father    • Cancer Mother         Breast   • Other (Other) Sister         parkinsons   • Heart Disorder Brother      Social History:  Social History    Soci DAILY (Patient taking differently: 20 mg nightly.  ) 90 tablet 1   • LEVOTHYROXINE SODIUM 125 MCG Oral Tab TAKE 1 TABLET BEFORE       BREAKFAST 90 tablet 1   • CRANBERRY OR Take 1 tablet by mouth daily.        • aspirin 81 MG Oral Tab Take 81 mg by mouth da

## 2020-05-19 PROBLEM — K62.89 RECTAL PAIN: Status: RESOLVED | Noted: 2019-12-05 | Resolved: 2020-05-19

## 2020-05-19 PROBLEM — R52 INTRACTABLE PAIN: Status: RESOLVED | Noted: 2020-01-26 | Resolved: 2020-05-19

## 2020-05-19 PROBLEM — K46.9 PERISTOMAL HERNIA: Status: RESOLVED | Noted: 2018-11-15 | Resolved: 2020-05-19

## 2020-05-19 PROBLEM — E86.0 DEHYDRATION: Status: RESOLVED | Noted: 2020-01-20 | Resolved: 2020-05-19

## 2020-05-19 PROBLEM — G89.3 NEOPLASM RELATED PAIN: Status: RESOLVED | Noted: 2019-12-12 | Resolved: 2020-05-19

## 2020-05-19 PROBLEM — R10.9 ABDOMINAL PAIN OF UNKNOWN ETIOLOGY: Status: RESOLVED | Noted: 2019-12-26 | Resolved: 2020-05-19

## 2020-05-19 PROBLEM — Z51.5 PALLIATIVE CARE BY SPECIALIST: Status: RESOLVED | Noted: 2019-12-12 | Resolved: 2020-05-19

## 2020-05-19 NOTE — PATIENT INSTRUCTIONS
Diabetes: Activity Tips    Being more active can help you manage your diabetes. The tips on this sheet can help you get the most from your exercise. They can also help you stay safe.    Staying active   It’s important for adults to spend less time sitting You may be told to plan your exercise for 1 to 2 hours after a meal. In most cases, you don’t need to eat while being active. Test your blood sugar before exercising if you take insulin or medicine that can cause low blood sugar.  And carry a fast-acting guthrie

## 2020-05-19 NOTE — PROGRESS NOTES
HPI:    Patient ID: Laith Glez is a 71year old male. HPI  HPI:   Laith Glez is a 71year old male who presents for recheck of his diabetes, hyperlipidemia and hypothyroidism. Patient’s FBS have been stable.  Elevated recently due to current GIST yao Normal     ug/mg creatinine   Microalbuminuria   >300 ug/mg creatinine      Albuminuria           Current Outpatient Medications   Medication Sig Dispense Refill   • Levothyroxine Sodium 125 MCG Oral Tab Take 1 tablet (125 mcg total) by mouth e STENT     • HERNIA SURGERY Left 2006   • HERNIA SURGERY Left 1975   • HERNIA SURGERY Left 12/26/2019   • LAPAROSCOPY, SURGICAL PROSTATECTOMY, RETROPUBIC RADICAL, W/NERVE SPARING     • OTHER SURGICAL HISTORY  12/3/2015    fracture radiius and ulna  Right ar developed, well nourished,in no apparent distress  SKIN: no rashes,no suspicious lesions  NECK: supple,no adenopathy,no bruits  LUNGS: clear to auscultation  CARDIO: RRR without murmur  GI: good BS's,no masses, HSM or tenderness, ostomy and urostomy in corinne Oral Powd Pack Take 17 g by mouth nightly. • CRANBERRY OR Take 1 tablet by mouth daily. • aspirin 81 MG Oral Tab Take 81 mg by mouth daily. • Multiple Vitamin (MULTI-VITAMIN) Oral Tab Take 1 tablet by mouth daily.      • diphenhydrAMINE HCl (B

## 2020-05-24 NOTE — PROGRESS NOTES
THE [facility] Hematology Oncology Group Progress Note      Patient Name: Reza Pressley   YOB: 1950  Medical Record Number: [id number]    Date of Visit: 5/28/2020      Chief Complaint  Metastatic gastrointestinal stromal tumor - follow up.     Oncologi urostomy. Pathology showed a 10.2 cm GIST with 80% necrosis; necrotic tumor was present in prostatic tissue and wall of rectum; 10/10 lymph nodes were negative for metastasis; tumor showed 16 mitoses per 50 hpf; surgical margins were negative.      Patient complaints and has denies any fever.      Past Medical History (historical data, reviewed by physician)  GIST (as above); benign prostatic hyperplasia; hypothyroidism; hypercholesterolemia.      Past Surgical History (historical data, reviewed by physician) well nourished. Appears close to chronological age. No apparent distress. Head                   Normocephalic and atraumatic. Eyes                  Conjunctiva clear; sclera anicteric.   ENMT                 External nose normal; external ears normal. Value Ref Range    WBC 9.5 4.0 - 11.0 x10(3) uL    RBC 5.31 3.80 - 5.80 x10(6)uL    HGB 15.5 13.0 - 17.5 g/dL    HCT 46.9 39.0 - 53.0 %    .0 150.0 - 450.0 10(3)uL    MCV 88.3 80.0 - 100.0 fL    MCH 29.2 26.0 - 34.0 pg    MCHC 33.0 31.0 - 37.0 g/dL allyson-pancreatic inflammatory stranding  ADRENALS:  Stable 1.5 x 1.5 cm right adrenal nodule. KIDNEYS:  Kidneys are symmetrical in size without evidence of hydronephrosis. Stable cyst off the right kidney measuring 1.7 x 1.4 cm.   BOWEL/MESENTERY:  Bowel i AM     I independently visualized the radiologic images in addition to reviewing the written report: In comparison with previous CT scan, there are no new metastatic lesions. Some of the lesions are smaller in size. Some are stable in size.  There are two l

## 2020-06-18 NOTE — TELEPHONE ENCOUNTER
Last year of our office notes faxed along with signed order. Also provided oncology Dr. Den Vasquez contact information as well.

## 2020-06-18 NOTE — TELEPHONE ENCOUNTER
Myah Waddell  [facility]  Ph: [phone]  Fx:  [phone]        Adhesive removed wipes  Skin Barrier, Wipes, Swabs, each    Fax in triage

## 2020-06-22 NOTE — PROGRESS NOTES
[facility] Progress Note      Patient Name: Reza Pressley   YOB: 1950  Medical Record Number: [id number]    Date of Visit: 6/25/2020      Chief Complaint  Metastatic gastrointestinal stromal tumor - follow up.     Oncologi urostomy. Pathology showed a 10.2 cm GIST with 80% necrosis; necrotic tumor was present in prostatic tissue and wall of rectum; 10/10 lymph nodes were negative for metastasis; tumor showed 16 mitoses per 50 hpf; surgical margins were negative.      Patient complaints and has denies any fever.      Past Medical History (historical data, reviewed by physician)  GIST (as above); benign prostatic hyperplasia; hypothyroidism; hypercholesterolemia.      Past Surgical History (historical data, reviewed by physician) (BP Location: Left arm, Patient Position: Sitting, Cuff Size: adult)   Pulse 62   Resp 16   Ht 1.854 m (6' 0.99\")   Wt 91.8 kg (202 lb 6.4 oz)   SpO2 98%   BMI 26.71 kg/m²     Physical Examination  Constitutional      Well developed, well nourished.  Appea 1.0 - 2.0    FASTING No    TSH+FREE T4    Collection Time: 06/25/20 12:11 PM   Result Value Ref Range    Free T4 1.2 0.8 - 1.7 ng/dL    TSH 2.330 0.358 - 3.740 mIU/mL   CBC W/ DIFFERENTIAL    Collection Time: 06/25/20 12:11 PM   Result Value Ref Range    W

## 2020-06-27 NOTE — TELEPHONE ENCOUNTER
Steve Zaldivar [facility]   Ph: [phone]  Fx: [phone]    Order Req for Medical Supplies  Adhesive Remover Wipes  Skin Barrier Wipes, Swabs    80 Day supply    Fax in triage

## 2020-07-13 NOTE — TELEPHONE ENCOUNTER
Patient called stated has hand foot syndrome again. Hands burn when washing them and feet are red and painful - hard to walk. He did not take is Stivarga yesterday (currently on Stivarga 3 tablets daily). Would like to know what to do. Per Dr. Tarun Gutierrez.

## 2020-07-27 NOTE — PROGRESS NOTES
THE [facility] Hematology Oncology Group Progress Note      Patient Name: Willy Bahena   YOB: 1950  Medical Record Number: [id number]    Date of Visit: 7/30/2020      Chief Complaint  Metastatic gastrointestinal stromal tumor - follow up.     Oncologi urostomy. Pathology showed a 10.2 cm GIST with 80% necrosis; necrotic tumor was present in prostatic tissue and wall of rectum; 10/10 lymph nodes were negative for metastasis; tumor showed 16 mitoses per 50 hpf; surgical margins were negative.      Patient complaints and denies any fever.      Past Medical History (historical data, reviewed by physician)  GIST (as above); benign prostatic hyperplasia; hypothyroidism; hypercholesterolemia.      Past Surgical History (historical data, reviewed by physician)  Claudy Stern diarrhea. Genitourinary  No hematuria. Integumentary  No acute or chronic rashes.     Vital Signs   BP (!) 170/96 (BP Location: Left arm, Patient Position: Sitting, Cuff Size: large)   Pulse 63   Temp 98 °F (36.7 °C) (Temporal)   Resp 16   Ht 1.854 m (6' Total 0.5 0.1 - 2.0 mg/dL    Total Protein 7.5 6.4 - 8.2 g/dL    Albumin 3.9 3.4 - 5.0 g/dL    Globulin  3.6 2.8 - 4.4 g/dL    A/G Ratio 1.1 1.0 - 2.0    FASTING Patient not present    TSH+FREE T4    Collection Time: 07/30/20  2:41 PM   Result Value Ref Ra Technologist)  Eval for Gastrointestinal stromal tumor of large intestine. No current complaints. CONTRAST USED:  100cc of Omnipaque 350     FINDINGS:    LUNG BASE:  Unremarkable. LIVER:  Unremarkable. BILIARY:  Cholelithiasis.   SPLEEN:  Unremarkabl 7/28/2020 at 10:27 AM      I independently visualized the radiologic images in addition to reviewing the written report: In comparison with previous CT scan, there are no new metastatic lesions. Most lesions have decreased in size.  The remainder are stable

## 2020-08-27 NOTE — PROGRESS NOTES
Patient is here today for follow up with  with Simran Wang for Malignant GIST - currently on Stivarga 120mg daily . Patient denies pain and fatigue. Denies nausea Appetite is good. Stated has intermittent cramping in left calf. Stated he is feeling good.   Ayla Tocsano

## 2020-08-27 NOTE — PROGRESS NOTES
THE [facility] Hematology Oncology Group Progress Note      Patient Name: Crispin Wilhelm   YOB: 1950  Medical Record Number: [id number]    Date of Visit: 8/27/2020      Chief Complaint  Metastatic gastrointestinal stromal tumor - follow up.     Oncologi urostomy. Pathology showed a 10.2 cm GIST with 80% necrosis; necrotic tumor was present in prostatic tissue and wall of rectum; 10/10 lymph nodes were negative for metastasis; tumor showed 16 mitoses per 50 hpf; surgical margins were negative.      Patient complaints and denies any fever.      Past Medical History (historical data, reviewed by physician)  GIST (as above); benign prostatic hyperplasia; hypothyroidism; hypercholesterolemia.      Past Surgical History (historical data, reviewed by physician)  Ericka Taylor diarrhea. Genitourinary  No hematuria. Integumentary  No acute or chronic rashes. Vital Signs   /87 (BP Location: Left arm, Patient Position: Sitting, Cuff Size: large)   Pulse 76   Temp 98.2 °F (36.8 °C) (Temporal)   Resp 16   Ht 1.854 m (6' 0. Bilirubin, Total 0.6 0.1 - 2.0 mg/dL    Total Protein 7.3 6.4 - 8.2 g/dL    Albumin 3.6 3.4 - 5.0 g/dL    Globulin  3.7 2.8 - 4.4 g/dL    A/G Ratio 1.0 1.0 - 2.0    FASTING Patient not present    TSH+FREE T4    Collection Time: 08/27/20  9:51 AM   Result V diphenhydramine for planned CT. Planned Follow Up  Patient will return for follow up in 1 month. Risk level: High - metastatic GIST on therapy. Electronically signed by:    Bibi Jade M.D.   Associate Medical Director of Oncology Services

## 2020-09-10 NOTE — PROGRESS NOTES
Nutrition screen complete as triggered by [employer] dx of GIST. Chart reviewed. Pt appears nutritionally stable at present. RD available on consult.

## 2020-09-15 NOTE — PROGRESS NOTES
THE [facility] Hematology Oncology Group Progress Note      Patient Name: Cristina Morocho   YOB: 1950  Medical Record Number: [id number]    Date of Visit: 9/24/2020      Chief Complaint  Metastatic gastrointestinal stromal tumor - follow up.     Oncologi urostomy. Pathology showed a 10.2 cm GIST with 80% necrosis; necrotic tumor was present in prostatic tissue and wall of rectum; 10/10 lymph nodes were negative for metastasis; tumor showed 16 mitoses per 50 hpf; surgical margins were negative.      Patient History (historical data, reviewed by physician)  GIST (as above); benign prostatic hyperplasia; hypothyroidism; hypercholesterolemia.      Past Surgical History (historical data, reviewed by physician)  Stomal hernia repair; inguinal hernia repair x 4; lay Signs   /88 (BP Location: Left arm, Patient Position: Sitting, Cuff Size: large)   Pulse 71   Temp 97.8 °F (36.6 °C) (Temporal)   Resp 18   Ht 1.854 m (6' 0.99\")   Wt 94 kg (207 lb 3.2 oz)   SpO2 97%   BMI 27.34 kg/m²     Physical Examination  Const 3.5 3.4 - 5.0 g/dL    Globulin  4.7 (H) 2.8 - 4.4 g/dL    A/G Ratio 0.7 (L) 1.0 - 2.0    FASTING Patient not present    TSH+FREE T4    Collection Time: 09/24/20 10:27 AM   Result Value Ref Range    Free T4 1.1 0.8 - 1.7 ng/dL    TSH 19.200 (H) 0.358 - 3.74 Omnipaque 350     FINDINGS:    LIVER:  Mild hepatomegaly measuring 21 cm in craniocaudal dimension. No abnormal hepatic densities, or significant focal lesion. BILIARY:  There are multiple small gallstones in the dependent portion of the gallbladder. multiple soft tissue nodules, presumably representing adenopathy involving the right inguinal region, right pelvic sidewall and bilateral posterior pelvic regions.   Within the right inguinal region   is a nodule/lymph node measuring 2.0 x 2.3 cm, appearing Stable left lower quadrant colostomy with stable appearing parastomal hernia containing small bowel. 5. Cholelithiasis without acute cholecystitis or biliary ductal dilatation. No significant change.         Dictated by (CST): Luz To DO on 9/21/202

## 2020-09-24 NOTE — TELEPHONE ENCOUNTER
MD Jt Edwards, RN             Let him know that his potassium is a little low. If he takes Imodium to slow down his bowels, it will probably correct. His thyroid is under functioning. It is due to Nael.  Make sure that he is t

## 2020-09-24 NOTE — PROGRESS NOTES
Patient is here today for follow up with [facility] for Malignant GIST. Patient is currently on Regorafenib therapy. Patient denies pain. Stated he is feeling good. No nausea. appetite is good. Denies fatigue.   Medication list. medical history and toxiciti

## 2020-09-25 NOTE — TELEPHONE ENCOUNTER
Patient notified. Taking current levothyroxine dose on an empty stomach. Sent Levothyroxine 150mcg to [facility] pharmacy as requested. Patient will recheck labs in one month. Patient will also  Imodium today.  Transferred to [facility] to schedule lab appointmen

## 2020-10-13 NOTE — TELEPHONE ENCOUNTER
Stuart Marvin is calling to get a medical clearance for Dental Work including Local Anesthetics and Antibiotics, please fax to [phone]. Eben Zapata.

## 2020-11-17 PROBLEM — I73.9 PERIPHERAL VASCULAR DISEASE: Status: ACTIVE | Noted: 2020-11-17

## 2020-11-17 PROBLEM — Z78.9 HEPATITIS C ANTIBODY TEST NEGATIVE: Status: ACTIVE | Noted: 2020-11-17

## 2020-11-17 PROBLEM — I73.9 PERIPHERAL VASCULAR DISEASE (HCC): Status: ACTIVE | Noted: 2020-11-17

## 2020-11-17 PROBLEM — E03.4 HYPOTHYROIDISM DUE TO ACQUIRED ATROPHY OF THYROID: Status: RESOLVED | Noted: 2018-05-15 | Resolved: 2020-11-17

## 2020-11-17 NOTE — PROGRESS NOTES
THE [facility] Hematology Oncology Group Progress Note      Patient Name: Jhonathan Mckenzie   YOB: 1950  Medical Record Number: [id number]    Date of Visit: 11/19/2020    Chief Complaint  Metastatic gastrointestinal stromal tumor - follow up.     Oncologic Pathology showed a 10.2 cm GIST with 80% necrosis; necrotic tumor was present in prostatic tissue and wall of rectum; 10/10 lymph nodes were negative for metastasis; tumor showed 16 mitoses per 50 hpf; surgical margins were negative.      Patient presented History (historical data, reviewed by physician)  GIST (as above); benign prostatic hyperplasia; hypothyroidism; hypercholesterolemia.      Past Surgical History (historical data, reviewed by physician)  Stomal hernia repair; inguinal hernia repair x 4; lay by mouth daily. , Disp: , Rfl:     Allergies   Mr. Don Abdi is allergic to radiology contrast iodinated dyes. Review of Systems   Constitutional  No fevers, chills, night sweats. Respiratory  No dyspnea, cough, hemoptysis, pleuritic chest pain.   [facility] BUN/CREA Ratio 17.9 10.0 - 20.0    Calcium, Total 9.2 8.5 - 10.1 mg/dL    Calculated Osmolality 297 (H) 275 - 295 mOsm/kg    GFR, Non- 81 >=60    GFR, -American 93 >=60    AST 31 15 - 37 U/L    ALT 39 16 - 61 U/L    Alkaline Phosphat 150.0 - 450.0 10(3)uL    MCV 92.0 80.0 - 100.0 fL    MCH 29.8 26.0 - 34.0 pg    MCHC 32.4 31.0 - 37.0 g/dL    RDW 13.5 11.0 - 15.0 %    RDW-SD 45.9 35.1 - 46.3 fL    Neutrophil Absolute Prelim 4.69 1.50 - 7.70 x10 (3) uL    Neutrophil Absolute 4.69 1.50 -

## 2020-11-17 NOTE — PROGRESS NOTES
HPI:   Jossy Jay is a 79year old male who presents for a Medicare Subsequent Annual Wellness visit (Pt already had Initial Annual Wellness). HPI:  Here for AWV  Feels great.   Pain controlled  Denies cp or sob    PAST MEDICAL, SOCIAL, FAMILY HISTORIE including the explanation and discussion of advance directives standard forms performed Face to Face with patient and Family/surrogate (if present), and forms available to patient in AVS     He has a Power of  for [employer] on file in [place].     He Palliative care encounter     Peripheral vascular disease ([facility] [address]    Wt Readings from Last 3 Encounters:  11/17/20 : 204 lb (92.5 kg)  09/24/20 : 207 lb 3.2 oz (94 kg)  08/27/20 : 205 lb 12.8 oz (93.4 kg)     Last Cholesterol Labs:   Lab Results   Component High cholesterol, Hypothyroidism, Other and unspecified hyperlipidemia, Personal history of antineoplastic chemotherapy, Thyroid disease, and Visual impairment.     He  has a past surgical history that includes back surgery (1998); colonoscopy (2006); other midline, mucosa normal, no drainage or sinus tenderness   Throat: Lips, mucosa, and tongue normal; teeth and gums normal   Neck: Supple, symmetrical, trachea midline, no adenopathy, thyroid: not enlarged, symmetric, no tenderness/mass/nodules, no carotid b Thanh Swanson is a 79year old male who presents for a Medicare Assessment.      PLAN SUMMARY:   Diagnoses and all orders for this visit:    Annual physical exam    Colostomy status ([address]    Peripheral vascular disease ([facility] [address]    Perineal pain in male    Par Jv Galindo MD, 11/17/2020     General Health     In the past six months, have you lost more than 10 pounds without trying?: 2 - No  Has your appetite been poor?: No  How does the patient maintain a good energy level?: Daily Walks  How would you describe your da Annually 10/4/2019   Please get every year    Pneumococcal 13 (Prevnar)  Covered Once after 65 08/28/2020 Please get once after your 65th birthday    Pneumococcal 23 (Pneumovax)  Covered Once after 65 10/27/2015 Please get once after your 65th birthday

## 2020-11-17 NOTE — PATIENT INSTRUCTIONS
Romayne Beans Parker's SCREENING SCHEDULE   Tests on this list are recommended by your physician but may not be covered, or covered at this frequency, by your insurer. Please check with your insurance carrier before scheduling to verify coverage.     PREVENTATIVE the following criteria:   • Men who are 73-68 years old and have smoked more than 100 cigarettes in their lifetime   • Anyone with a family history    Colorectal Cancer Screening Covered up to Age 76     Colonoscopy Screen   Covered every 10 years- more of renal disease   Hemophiliacs who received Factor VIII or IX concentrates   Clients of institutions for the mentally retarded   Persons who live in the same house as a HepB virus carrier   Homosexual men   Illicit injectable drug abusers     Tetanus Toxoid-

## 2020-11-19 NOTE — PROGRESS NOTES
Patient is here today for follow up with Efrain Clark for Malignant GIST. Current treatment is  Regorafenib 120mg daily. Stated \"pain is returning in his butt\" stated no pain when sitting - increased to an 8 on a scale of 0-10 with walking.  Denies adverse

## 2020-11-28 NOTE — TELEPHONE ENCOUNTER
Fax received regarding \"non-adherence therapy advisory\" for SIMVASTATIN 20MG. Fax placed in triage.

## 2020-11-29 PROBLEM — I48.91 ATRIAL FIBRILLATION, NEW ONSET (HCC): Status: ACTIVE | Noted: 2020-11-29

## 2020-11-29 NOTE — ED NOTES
Report given to Dionte, [address] [facility] at 1686. Dr. Jeimy Rasheed in the ED to see Pt. Will page transport after [facility] evaluates Pt.

## 2020-11-29 NOTE — PROGRESS NOTES
Patient seen and examined. Medically stable, HR improved (90's at rest). Feeling some fluttering/abnormal sensation in his chest, but otherwise, no new complaints. Echo completed, results not back. Ordered regorafenib for his GIST.  Heparin drip stopped and

## 2020-11-29 NOTE — ED INITIAL ASSESSMENT (HPI)
Pt arrived via EMS for palpitations felt around 8:30pm, then states watch notified him of fast heart rate of 130s. Denies chest pain, states \"a little short of breath\".

## 2020-11-29 NOTE — PLAN OF CARE
Initiated on Eliquis 5mg PO BID. Have sent Rx to [facility] for cost analysis.     AYAN Spears

## 2020-11-29 NOTE — H&P
[facility]  History and Physical     [facility] Patient Status:  Emergency    10/23/1950 MRN [id number]   Location [address] [facility] Attending Diaz Gaspar MD   Hosp Day # 0 PCP Sarah Martinez MD     Chief Complaint: palpi PROSTATECTOMY, RETROPUBIC RADICAL, W/NERVE SPARING     • OTHER SURGICAL HISTORY  12/3/2015    fracture radiius and ulna  Right arm -    • OTHER SURGICAL HISTORY  10/03/2017    Pelvic exenteration to include en-bloc resection of pelvic gastrointestinal stro MG Oral Tab, Take 1 tablet (20 mg total) by mouth daily. , Disp: 90 tablet, Rfl: 3    •  Regorafenib 40 MG Oral Tab, Take 40 mg by mouth 2 (two) times daily with meals. 21 days on and 7 days off.  (Patient taking differently: Take 40 mg by mouth 2 (two) time deformity. Abdomen: Soft, nontender, nondistended. Positive bowel sounds. No rebound, guarding or organomegaly. colostmoy, ileostomy  Neurologic: No focal neurological deficits. CNII-XII grossly intact. Musculoskeletal: Moves all extremities.   Extremitie

## 2020-11-29 NOTE — PROGRESS NOTES
Assumed care of the patient at [address] [place] [address]. Patient alert and oriented x4. Adequate saturation at RA with clear lungs sounds. Afib on cardiac monitor. Urostomy RLQ. Colostomy LLQ. Hep drip running at 1000units/Hr. Denies any pain at the time. No N/V/D.  Bed at the

## 2020-11-29 NOTE — PLAN OF CARE
PT AOX4. DENIES PAIN. AFIB ON TELE. THIS MORNING 'S NOW 80'S TO 90'S. AFIB. ELIQUIS STARTED. METOPROLOL STARTED AS ORDERED. PT HAS OWN CHEMO MEDS FROM HOME WHICH WERE IDENTIFIED BY PHARMACY AND LOCKED IN PTS BIN.  PT UP IN CHAIR, PT HAS UROSTOMY AND C

## 2020-11-29 NOTE — CONSULTS
[facility] [place]    Inpatient Cardiology Consultation Note    Ashley Cordero Patient Status:  Observation    10/23/1950 MRN [id number]   [facility] 2NE-A Attending Diego King, [address] [facility] Day # 0 PCP Mila Bee MD Cardiology    --------------------------------------------------------------------------------------------------------------------------------  10 point ROS performed.  All negative, except as documented above  ROS    History:  Past Medical History:   Diagn Family History   Problem Relation Age of Onset   • Alcohol and Other Disorders Associated Father    • Cancer Mother         Breast   • Other (Other) Sister         parkinsons   • Heart Disorder Brother       reports that he quit smoking about 4 years a SWALLOW. •  ALPRAZolam 0.5 MG Oral Tab, Take 1 tablet (0.5 mg total) by mouth 3 (three) times daily as needed for Anxiety. •  CRANBERRY OR, Take 1 tablet by mouth daily.           Physical Exam:   /82 (BP Location: Left arm)   Pulse 108   Temp 9

## 2020-11-29 NOTE — ED PROVIDER NOTES
Patient Seen in: [facility] Emergency Department      History   Patient presents with:  Arrythmia/Palpitations    Stated Complaint: Palpitations    HPI    This is a pleasant 42-year-old man, history of metastatic GIST, hypothyroidism, here for evalu 12/3/2015    fracture radiius and ulna  Right arm -    • OTHER SURGICAL HISTORY  10/03/2017    Pelvic exenteration to include en-bloc resection of pelvic gastrointestinal stromal tumor with abdominoperineal resection, total cystectomy, prostatectomy, end c Mucous membranes are moist.   Cardiovascular:  Normal rate and regular rhythm. No Edema  Pulmonary:  Pulmonary effort is normal.  Normal breath sounds. No wheezing, rhonchi or rales.    Abdominal: Soft nontender nondistended, normal bowel sounds, no guardi stable blood pressure 130s over 80s. Has no history of atrial fibrillation and EKG does show atrial fibrillation here today.   States his thyroid medication was recently increased, will start him on a heparin drip, admit for further evaluation and treatmen

## 2020-11-30 NOTE — DISCHARGE SUMMARY
[facility] [place] HOSPITAL[facility]  DISCHARGE SUMMARY     Tatyana Head Patient Status:  Observation    10/23/1950 MRN [id number]   [facility] 2NE-A Attending Marysol Peck DO   Hosp Day # 0 PCP Manju Salazar MD     Date of Admission: 2020  Da Discharge:   · none    Consultants:  • Cardiology     Discharge Medication List:     Discharge Medications      START taking these medications      Instructions Prescription details   apixaban 5 MG Tabs  Commonly known as: NATIVIDAD      Take 1 tablet (5 mg taking these medications    amLODIPine Besylate 10 MG Tabs  Commonly known as: NORVASC        aspirin 81 MG Tabs              Where to Get Your Medications      These medications were sent to [id number] allowed to accompany you to a physician visit. -No visitors are allowed in the infusion area.  -All visitors and patients will be screened for a temperatue greater than 100 degrees before entering our sites. -We encourage patients, who are receiving treatme

## 2020-11-30 NOTE — PLAN OF CARE
All d/c paperwork/instrutions given to patient and patient verbalized understanding. Medications reviewed in detail with patient and he verbalized understanding. Home meds returned to patient, Iv removed and patient taken to car via w/c by pct.

## 2020-11-30 NOTE — PLAN OF CARE
AOx4. Calm, cooperative. Up independently. Afib on cardiac monitor. Adequate saturation on RA. Lung sounds clear. Denies sob, chest discomfort, n/v. Denies pain. RLQ Urostomy. LLQ colostomy. Hourly and prn rounding. Bed at the lowest position.  Call light

## 2020-11-30 NOTE — PROGRESS NOTES
[facility] Cardiology Progress Note    Subjective:  He notices his heart rates racing when he is up this am, and is feeling a little sob with activity.      Objective:  /76 (BP Location: Left arm)   Pulse 86   Temp 97.9 °F (36.6 °C) (Oral)   Resp 18   Ht Gen: Alert and oriented  CV: RRR nl S1 S2  Pulm: CTA b/l  Ext: No CCE, 2+ distal pulse     Assessment and Plan:    79year old male with PMHx sig for metastatic GIST on treatment, GERD, BPH, hypothyroidism, HTN, HL consulted for new onset atrial fibri

## 2020-12-01 PROBLEM — I48.91 ATRIAL FIBRILLATION WITH RVR (HCC): Status: ACTIVE | Noted: 2020-11-29

## 2020-12-01 NOTE — PROGRESS NOTES
Initial Post Discharge Follow Up   Discharge Date: 11/30/20  Contact Date: 12/1/2020    Consent Verification:  Assessment Completed With: Patient  HIPAA Verified?   Yes    Discharge Dx:     A-Fib, new onset    Was TCC ordered: yes         General:   • Ho heart rates are sustained at rate of > 120, may take extra 1/2 tablet daily as needed. 60 tablet 3   • apixaban 5 MG Oral Tab Take 1 tablet (5 mg total) by mouth 2 (two) times daily.  60 tablet 3   • predniSONE 50 MG Oral Tab Take 1 tablet PO 13, 7, and 1 h visit with your PCP?  (DME, meds, disease concerns, Etc): No     Follow up appointments:      Your appointments     Date & Time Appointment Department [facility])    Dec 02, 2020  9:30 AM [facility] Follow Up with Valdo Velez [facility] any questions or needs, he states he will.     CCM referral placed:  Yes    BOOK BY DATE: 12/14/2020    [x]  Discharge Summary, Discharge medications reviewed/discussed/and reconciled against outpatient medications with patient, and orders reviewed and disc

## 2020-12-02 NOTE — PROGRESS NOTES
TCM VISIT  [facility]      HISTORY   CHIEF COMPLAINT: afib    HPI: Sofia Overton is a 79year old male here today for hospital follow up for afib. Sofia Overton was discharged from Inpatient hospital, [facility] to Home. take extra 1/2 tablet daily as needed. , Disp: 60 tablet, Rfl: 3    •  apixaban 5 MG Oral Tab, Take 1 tablet (5 mg total) by mouth 2 (two) times daily. , Disp: 60 tablet, Rfl: 3    •  predniSONE 50 MG Oral Tab, Take 1 tablet PO 13, 7, and 1 hours before CT s BACK SURGERY  1998   • BLADDER CYSTECTOMY WITH ILEAL CONDUIT N/A 10/3/2017    Performed by Richard Hudson MD at [facility] MAIN OR   • COLONOSCOPY  2006   • CYSTOSCOPY,INSERT URETERAL STENT     • HERNIA SURGERY Left 2006   • HERNIA SURGERY Left 1975   • HERNIA disease in the abdomen or pelvis. 2. Stable slightly smaller pelvic lymphadenopathy as above. 3. Stable 1.5 cm right adrenal mass. 4. Cholelithiasis. 5. Stable postoperative changes as above. Please see above for further details.      Dictated by (CST) diarrhea, nausea, vomiting, confusion, anxiety, bruising, bleeding, rashes, hives, gait instability, sensation changes        PHYSICAL EXAM:   12/02/20  0951   BP: 118/70   Pulse: 84   Resp: (!) 28   Temp: (!) 96 °F (35.6 °C)       GENERAL: well developed, Visit:    •  Regorafenib 40 MG Oral Tab, Take 3 tablets by mouth daily. , Disp: , Rfl:     •  HYDROmorphone HCl 8 MG Oral Tab, Take 8 mg by mouth every 3 (three) hours as needed for Pain., Disp: , Rfl:     •  CRANBERRY OR, Take 25,000 mg by mouth daily. Kayleigh Akers documents   ? Reviewed need for follow-up on pending tests, need for follow-up on diagnostic tests and need for follow-up on treatments  ? specialists already aware of assumption/resumption of care  ?  Education given to patient on self-management, independ

## 2020-12-02 NOTE — PROGRESS NOTES
TRANSITIONAL CARE CLINIC PHARMACIST MEDICATION RECONCILIATION        Vinnie Patterson MRN [id number]    10/23/1950 PCP Alice Johnson MD       Comments: Medication history completed by the [address] [place] [address] Pharmacist with the patient in the office. patient and allowing him to relax for a little while the patient calmed down everything stabilized. Patient had trouble finding our clinic this morning and was wandering around for approximately 45 minutes and was worked up when he came in.   In checking h

## 2020-12-02 NOTE — PATIENT INSTRUCTIONS
Continue metoprolol, eliquis  Monitor heart rate and blood pressure at home  Follow up with cardiology  Call cardiologist or primary care if HR <55 or if HR sustains >110  Call cardiologist or primary care if blood pressure less than 90 or if higher than 1

## 2020-12-07 PROBLEM — I48.0 PAF (PAROXYSMAL ATRIAL FIBRILLATION) (HCC): Status: ACTIVE | Noted: 2020-12-07

## 2020-12-07 PROBLEM — I48.91 ATRIAL FIBRILLATION WITH RVR (HCC): Status: RESOLVED | Noted: 2020-11-29 | Resolved: 2020-12-07

## 2020-12-07 NOTE — PROGRESS NOTES
HPI:    Emilie Pierce is a 79year old male here today for hospital follow up.    He was discharged from Inpatient hospital, [facility] to Home   Admission Date: 11/29/20   Discharge Date: 11/30/20  Hospital Discharge Diagnoses (since 11/7/2020)   None started on metoprolol and Eliquis. Metoprolol uptitrated for better HR control and he was discharged to home with improvement in HR and plan for close follow up with EP.   Feels better today  Has been in NSR  No complaints at this time    PAST MEDICAL, SOCI colonoscopy (2006); other surgical history (12/3/2015); other surgical history (10/03/2017); other surgical history (12/26/2019); hernia surgery (Left, 2006); hernia surgery (Left, 1975); hernia surgery (Left, 12/26/2019); part removal colon w end colostom Diagnoses and all orders for this visit:    Hospital discharge follow-up    Atrial fibrillation with RVR ([address]    - CHADS2=2 cont rate control and DOAC for stroke reduction. Agree with EP for eval ablation.  Doubt levothyroxine change as TSH is at goal an

## 2020-12-07 NOTE — PATIENT INSTRUCTIONS
Understanding Atrial Fibrillation  An arrhythmia is any problem with the speed or pattern of the heartbeat. Atrial fibrillation (AFib) is the most common type of arrhythmia. It causes fast, chaotic electrical signals in the atria.  This makes it hard for · A fast, pounding, irregular heartbeat  · Shortness of breath  · Tiredness  · Dizziness or fainting  · Chest pain  How is atrial fibrillation treated? Treatments for AFib can include any of the options below. · Medicines.  You may be prescribed:  ? Heart · Hybrid surgical-catheter ablation for AFib. This treatment is used for people with AFib that continues or is hard to treat. . It combines surgery with a catheter ablation.  During the surgery, the surgeon makes small cuts (incisions) between the ribs in th

## 2020-12-09 NOTE — TELEPHONE ENCOUNTER
Central Scheduling called in requesting that the [address] placed on 11/17 for \" US Arterial Duplex Upper Extremity Bilateral\" be changed to Lower Extremity to match reason for exam.

## 2020-12-15 NOTE — PROGRESS NOTES
THE [facility] Hematology Oncology Group Progress Note      Patient Name: Cristine Nguyen   YOB: 1950  Medical Record Number: [id number]    Date of Visit: 12/17/2020    Chief Complaint  Metastatic gastrointestinal stromal tumor - follow up.     Oncologic Pathology showed a 10.2 cm GIST with 80% necrosis; necrotic tumor was present in prostatic tissue and wall of rectum; 10/10 lymph nodes were negative for metastasis; tumor showed 16 mitoses per 50 hpf; surgical margins were negative.      Patient presented intermittent cramping in the right pelvis.      Past Medical History (historical data, reviewed by physician)  GIST (as above); benign prostatic hyperplasia; hypothyroidism; hypercholesterolemia; paroxysmal atrial fibrillation.      Past Surgical History (h Constitutional  No fevers, chills, night sweats. Respiratory  No dyspnea, cough, hemoptysis, pleuritic chest pain. Gastrointestinal  No diarrhea. Genitourinary  No hematuria. Integumentary  No acute or chronic rashes.     Vital Signs   /78 (BP L mood is good and stable. 4.   Hyperlipidemia: Remains on simvastatin. 5.   Paroxysmal atrial fibrillation: Patient is on metoprolol and apixaban. He is scheduled to see cardiology in 01/2021 to discuss ablation.      6.   Contrast allergy: Premedicat Color consistent with ethnicity/race, warm, dry intact, resilient.

## 2020-12-17 NOTE — PROGRESS NOTES
Patient is here today for follow up with Rene Mack for Malignant GIST - Current Treatment Regorafenib 120mg daily. Patient denies pain. Stated he is feeling good. Stated had recent Hospitalization for Afib.  Medication list, medical history and toxicities

## 2021-01-05 NOTE — TELEPHONE ENCOUNTER
Marylu Rincon MD    [facility]    [facility]  [address]  [address]    [phone]    Discussed with patient results and recommendations. Understanding was expressed. Order placed.

## 2021-01-05 NOTE — TELEPHONE ENCOUNTER
----- Message from Marshall Cummings MD sent at 1/5/2021  1:15 PM CST -----  PAD noted  Recommend seeing DR Kenny Payan (vasular) to eval for further imaging, workup and treatment options

## 2021-01-19 NOTE — IMAGING NOTE
Pt reports CT contrast allergy. Will take prednisone and benadryl per protocol. Reviewed and verbalized understanding of meds, dosage, and time.

## 2021-01-20 NOTE — TELEPHONE ENCOUNTER
Call to pt at this time. Pt verified name and . Pt sts he does have CT contrast allergy.   Pt instructed on premedication protocol for contrast allergy with prednisone 50 mg po at 13, 7, and 1 hour prior to scan and benadryl 50 mg po 1 hour prior to sc

## 2021-01-26 NOTE — PROGRESS NOTES
THE [facility] Hematology Oncology Group Progress Note      Patient Name: Reza Pressley   YOB: 1950  Medical Record Number: [id number]    Date of Visit: 1/28/2021    Chief Complaint  Metastatic gastrointestinal stromal tumor - follow up.     Oncologic Pathology showed a 10.2 cm GIST with 80% necrosis; necrotic tumor was present in prostatic tissue and wall of rectum; 10/10 lymph nodes were negative for metastasis; tumor showed 16 mitoses per 50 hpf; surgical margins were negative.      Patient presented physician)  Stomal hernia repair; inguinal hernia repair x 4; lumbar discectomy; resection of GIST (as above).      Family History (historical data, reviewed by physician)  Mother with breast cancer.      Social History (historical data, reviewed by physici large)   Pulse 55   Temp 97.3 °F (36.3 °C) (Temporal)   Resp 16   Ht 1.854 m (6' 0.99\")   Wt 90.3 kg (199 lb)   SpO2 98%   BMI 26.26 kg/m²     Physical Examination  Constitutional      Well developed, well nourished. Appears close to chronological age.  No 2. 0    FASTING Patient not present    TSH+FREE T4    Collection Time: 01/28/21 10:46 AM   Result Value Ref Range    Free T4 1.2 0.8 - 1.7 ng/dL    TSH 3.810 (H) 0.358 - 3.740 mIU/mL   CBC W/ DIFFERENTIAL    Collection Time: 01/28/21 10:46 AM   Result Value STATED HISTORY:(As transcribed by Technologist)  Patient states he is having some difficulty walking. CONTRAST USED:  120cc of Omnipaque 350     FINDINGS:  There is mild calcified atherosclerosis involving the abdominal aorta and iliac arteries.   The adrenal nodule is unchanged. The kidneys are seen in normal   anatomic positions. Scattered areas of mild cortical thinning. No hydronephrosis. 1.7 cm exophytic cortical cyst lower pole right kidney is unchanged.      Prior distal colon resection, inclu artery. 6. Numerous masses in the pelvis have increased in size consistent with metastatic progression. 7. Stable small right adrenal nodule. 8. Cholelithiasis. 9. Details as above. Continued clinical correlation recommended.     Dictated by (CST): Aly Director of Saravanan Peguero, [place]

## 2021-01-28 NOTE — PROGRESS NOTES
Patient is here today for follow up with Nicko Citizen for Malignant GIST. Currently treated with Regorafenib. Patient denies pain. Stated he is feeling good. Medication list and medical history were reviewed and updated.      Education Record    Learner:  Chely Lucio

## 2021-02-10 NOTE — PROGRESS NOTES
Nursing Consultation Note  Patient: Ashley Cordero  YOB: 1950  Age: 79year old  Radiation Oncologist: Dr. Jose Curry  Referring Physician: Eduardo Woody, Dr. Humaira Medina, Dr. Kemal Montenegro ([facility]), Dr. Brandy Gordon (PCP)  Diagnosis: Metastatic GI showed increased size of pelvic masses. Case discussed in GI Clinic, referred for RT consult. Pt feels well. No c/o pelvic/back pain. Emptied urostomy bag every hour, and colostomy bag 3-4x/day.  To see Dr. Joanie Rojas for \"clogged arteries\", having pain to low daily. 60 tablet 3   • escitalopram 10 MG Oral Tab Take 1 tablet (10 mg total) by mouth once daily. 90 tablet 3   • simvastatin 20 MG Oral Tab Take 1 tablet (20 mg total) by mouth daily.  90 tablet 3   • ALPRAZolam 0.25 MG Oral Tab Take 1 tablet (0.25 mg to LAPAROSCOPY, SURGICAL PROSTATECTOMY, RETROPUBIC RADICAL, W/NERVE SPARING     • OTHER SURGICAL HISTORY  12/3/2015    fracture radiius and ulna  Right arm -    • OTHER SURGICAL HISTORY  10/03/2017    Pelvic exenteration to include en-bloc resection of pelvic Minutes per session: Not on file      Stress: Not on file    Relationships      Social connections        Talks on phone: Not on file        Gets together: Not on file        Attends [employer] service: Not on file        Active member of club or organ transportation available for expected visits    Pain:   ;Pain Score: 0   ;    ;

## 2021-02-11 PROBLEM — I77.89 ENLARGEMENT OF AORTIC ROOT (HCC): Status: ACTIVE | Noted: 2021-02-11

## 2021-02-11 PROBLEM — I77.89 ENLARGEMENT OF AORTIC ROOT: Status: ACTIVE | Noted: 2021-02-11

## 2021-02-11 PROBLEM — I10 ESSENTIAL HYPERTENSION, BENIGN: Status: ACTIVE | Noted: 2021-02-11

## 2021-02-11 NOTE — CONSULTS
[facility] RADIATION ONCOLOGY CONSULTATION     PATIENT:   Tressie Galeazzi MD:  Matthias Neal MD      DIAGNOSIS:   Recurrent and metastatic GIST       CC:    GIST    HPI   80-year-old man here for consult.     He was diagnosed with a pelv Left colostomy. Right urostomy.   EXT:  No edema  NEURO: Alert oriented    IMPRESSION:   20-year-old man with regionally recurring pelvic GIST    -Appears to be progressing on regorafenib in 3 locations  -We discussed role of radiation therapy in GIST  -sm

## 2021-02-11 NOTE — PATIENT INSTRUCTIONS
- FOLLOW-UP WITH DR. KWONG AS NEEDED        - IF YOU HAVE ANY QUESTIONS OR CONCERNS, PLEASE CALL [phone]

## 2021-02-16 NOTE — ANESTHESIA PREPROCEDURE EVALUATION
PRE-OP EVALUATION    Patient Name: José Miguel Liz    Pre-op Diagnosis: claudication    Procedure(s):  bilateral lower extremity angiogram, posible left leg percutaneous transluminal angioplasty/stent and endarterectomy with vein patch      Surgeon(s) and Rol (three) times daily as needed for Anxiety. , Disp: 90 tablet, Rfl: 2    •  LEVOTHYROXINE SODIUM 150 MCG Oral Tab, TAKE 1 TABLET (150 MCG TOTAL) BY MOUTH BEFORE BREAKFAST., Disp: 30 tablet, Rfl: 0        Allergies: Radiology Contrast Iodinated Dyes      Anes OR   • COLONOSCOPY  2006   • CYSTOSCOPY,INSERT URETERAL STENT     • HERNIA SURGERY Left 2006   • HERNIA SURGERY Left 1975   • HERNIA SURGERY Left 12/26/2019   • LAPAROSCOPY, SURGICAL PROSTATECTOMY, RETROPUBIC RADICAL, W/NERVE SPARING     • OTHER SURGICAL H 02/15/2021     (H) 02/15/2021    CA 9.0 02/15/2021     Lab Results   Component Value Date    INR 1.06 02/15/2021         Airway      Mallampati: II  Mouth opening: 3 FB  TM distance: 4 - 6 cm  Neck ROM: full Cardiovascular      Rhythm: regular  Rate

## 2021-02-16 NOTE — H&P
[facility]    PATIENT'S NAME: Charli Renetta   ATTENDING PHYSICIAN: Penelope Crooks M.D.    PATIENT ACCOUNT#:   [de-identified]    LOCATION:    MEDICAL RECORD #:   [id number]       YOB: 1950  ADMISSION DATE:       02/17/2021    HISTORY AND PHYS history tobacco abuse of at least 50 years. He is retired. 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 He is  with 3 children. He states he has stopped smoking now. No ETOH abuse or drug abuse.      REVIEW OF SYSTEMS:  The patient has no gangr femoral bypass graft on him. Dr. Johana Barraza thinks his prognosis is still good for at least over 1 year of living, possibly longer. Will try at least to do endovascular approach for this. These films as previously stated were reviewed with Dr. Ajith Brock.   Al

## 2021-02-17 PROBLEM — I48.91 ATRIAL FIBRILLATION (HCC): Status: ACTIVE | Noted: 2020-12-07

## 2021-02-17 PROBLEM — I70.222 ATHEROSCLEROSIS OF NATIVE ARTERIES OF EXTREMITIES WITH REST PAIN, LEFT LEG (HCC): Status: ACTIVE | Noted: 2021-02-17
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
## 2021-02-17 NOTE — PLAN OF CARE
Received patient at 1330 from cardiac operating room. Sultana present. Left incision upper thigh dry and intact. Left  PT pulse palpable DP not present. Right DP/PT palpable. Declines pain medications.    Problem: CARDIOVASCULAR - ADULT  Goal: Maintains o

## 2021-02-17 NOTE — ANESTHESIA PROCEDURE NOTES
Peripheral IV  Date/Time: 2/17/2021 10:26 AM  Inserted by: Truong Rees MD    Placement  Needle size: 18 G  Laterality: left  Location: wrist  Site prep: alcohol  Technique: anatomical landmarks  Attempts: 1

## 2021-02-17 NOTE — ANESTHESIA POSTPROCEDURE EVALUATION
[facility]  Patient Status:  Inpatient   Age/Gender 79year old male MRN [id number]   [facility] 6NE-A Attending Moses Kiser MD   Hosp Day # 0 PCP Radha Duffy MD       Anesthesia Post-op Note    Procedure(s):  Cut key

## 2021-02-17 NOTE — OPERATIVE REPORT
[facility]    PATIENT'S NAME: Nuzhat Canas   ATTENDING PHYSICIAN: Brett Cobos M.D. OPERATING PHYSICIAN: Brett Cobos M.D.    PATIENT ACCOUNT#:   [de-identified]    LOCATION:  [address] [place] [address]  MEDICAL RECORD #:   [id number]       DATE OF BIRTH:  [birth year] diversion as well as a stool diversion for his colon secondary to a pelvic exenteration for his tumor 3 years ago by Dr. Jake Heart.   Case had been reviewed and discussed with Dr. Sapna Chen who thinks that the patient has more than definitely longer than a year o dilator, and then angiogram was performed of the lower legs with multiple films done looking at the superficial femoral artery just below the sheath, the popliteal artery tibioperoneal trunk.   It was noted that the patient had a chronic total occlusion of completion of this, it appeared to be widely open with a follow-up angiogram.  Final films showed flow basically through the peroneal artery, but there was a reconstituted flow through the posterior tibial artery that went down to the area of the foot.   Kai Camarena 120 mm balloon angioplasty to the adductor canal.     Dictated By Rigoberto Lopez M.D.  d: 2021 12:34:08  t: 2021 12:56:54  [facility] [id number]  JJW/    cc: FEROZ Rivera M.D. Silvana Natal, M.D.

## 2021-02-17 NOTE — OPERATIVE REPORT
Pre-op Dx: Left leg rest pain. Metastatic GIST. Post-op Dx: same. Procedure: Open left SFA/ Left leg angiogram/ Left SFA/ Above knee popliteal - with PTA 9v940mp Balloon angioplasty times 2, PTA 6x120 DCB, &x120mm PTA left SFA.  Co-surgeons: Ang/ Hernando

## 2021-02-17 NOTE — CONSULTS
[facility]  Annemarie Kim Patient Status:  Inpatient    10/23/1950 MRN [id number]   [facility] 6NE-A Attending Frida Buenrostro MD   Hosp Day # 0 PCP Daljit Hatch MD     Reason for consult: medical management    Request SURGICAL HISTORY  12/3/2015    fracture radiius and ulna  Right arm -    • OTHER SURGICAL HISTORY  10/03/2017    Pelvic exenteration to include en-bloc resection of pelvic gastrointestinal stromal tumor with abdominoperineal resection, total cystectomy, pr Take for 21 days followed by 7 days rest period. , Disp: 84 tablet, Rfl: 11    •  amLODIPine Besylate 10 MG Oral Tab, Take 10 mg by mouth daily. , Disp: , Rfl:     •  ALPRAZolam 0.25 MG Oral Tab, Take 1 tablet (0.25 mg total) by mouth 3 (three) times daily a bruits. Respiratory: Clear to auscultation bilaterally. No wheezes. No rhonchi. Cardiovascular: S1, S2. Regular rate and rhythm. No murmurs, rubs or gallops. Equal pulses. Chest and Back: No tenderness or deformity.   Abdomen:costomy and ileostomy  Neur

## 2021-02-17 NOTE — ANESTHESIA PROCEDURE NOTES
Airway  Date/Time: 2/17/2021 10:20 AM  Urgency: elective    Airway not difficult    General Information and Staff    Patient location during procedure: OR  Anesthesiologist: Mackenzie Rhodes MD  Performed: anesthesiologist     Indications and Patien

## 2021-02-17 NOTE — ANESTHESIA PROCEDURE NOTES
Arterial Line  Performed by: Louie Dakin, MD  Authorized by: Louie Dakin, MD     General Information and Staff    Procedure Start:  2/17/2021 10:21 AM  Procedure End:  2/17/2021 10:24 AM  Anesthesiologist:  Fay Curtis

## 2021-02-18 NOTE — DISCHARGE SUMMARY
[facility]    PATIENT'S NAME: Martha Valencia   ATTENDING PHYSICIAN: Shraddha Perez M.D.    PATIENT ACCOUNT#:   [de-identified]    LOCATION:  [address] [place] [address]  MEDICAL RECORD #:   [id number]       YOB: 1950  ADMISSION DATE:       02/17/2021

## 2021-02-18 NOTE — PLAN OF CARE
No overnight events. Patient alert and oriented. On room air. Vital signs stable. Sinus rhythm on monitor. Incision site slightly bruising. Dressing clean, dry, intact. Left lower pulses doppler. No complaints of pain. Patient understands plan of care.

## 2021-02-18 NOTE — PLAN OF CARE
Assumed cared [address] [place] [address]. A&O x4, MAXWELL, following comands. Up walking with little difficulty. Incision CDI, dressing changed. +1 pedal and post-tib pulses. See flow sheet for detailed vitals and assessments. Dr. Alessia Gibson to bedside, see orders.  Discharge instruction 01-Feb-2021

## 2021-02-18 NOTE — PROGRESS NOTES
[facility]  Progress Note     Katlin Liao Patient Status:  Inpatient    10/23/1950 MRN [id number]   [facility] 6NE-A Attending Nancy Osler, MD   Hosp Day # 1 PCP Thomas Lake MD     Chief Complaint: follow up medical management 02/17/21  1255   PTP 13.8 16.4*   INR 1.06 1.28*       No results for input(s): TROP, CK in the last 168 hours. Imaging: Imaging data reviewed in Epic.     Medications:   • aspirin  81 mg Oral Daily   • docusate sodium  100 mg Oral BID   • pantopraz

## 2021-02-19 NOTE — TELEPHONE ENCOUNTER
Patient called - had Surgery on 2/17 - stated he can walk again. Home from the hospital yesterday. Would like to know when to restart Stivarga.

## 2021-02-19 NOTE — TELEPHONE ENCOUNTER
Per Chica Carver - patient notified. Ok to restart Stivarga on 2/24  One week post surgery. Patient stated understanding.

## 2021-02-19 NOTE — PROGRESS NOTES
Initial Post Discharge Follow Up   Discharge Date: 2/18/21  Contact Date: 2/19/2021    Consent Verification:  Assessment Completed With: Patient  HIPAA Verified?   Yes    Discharge Dx:     S/p LE revascularization  Hyperglycemia/DM   GIST  Hypothyroidism home? yes  • How well was your pain managed while in the hospital?   o On a scale of 1-5   1- Very Poor and 5- Very well   o Very well  • When you were leaving the hospital were your discharge instructions reviewed with you? yes  • How well were your disch mouth daily. • ALPRAZolam 0.25 MG Oral Tab Take 1 tablet (0.25 mg total) by mouth 3 (three) times daily as needed for Anxiety. 90 tablet 1   • CRANBERRY OR Take 25,000 mg by mouth daily.      • Multiple Vitamins-Minerals (CENTRUM SILVER 50+MEN OR) Take D/C?No       Needs post D/C:   Now that you are home, are there any needs or concerns you need addressed before your next visit with your PCP?  (DME, meds, disease concerns, Etc): No     Follow up appointments:      Your appointments     Date & Time Appoin version  [email or web address]. pdf     COVID-19 Vaccine Consent Form for Employees of [facility]  [facility]. appointments? no; as mentioned above. Is there any reason as to why you cannot make your appointments? No     NCM Reviewed upcoming Specialist Appt with patient     Yes;  The patient is scheduled with Dr. Ben Tatum on 3/2/2021, and Dr. Koko Sullivan on 3/18/202

## 2021-02-19 NOTE — TELEPHONE ENCOUNTER
Spoke with patient and offered assistance in scheduling a TCM HFU and patient declined at this time. Patient informed me he will COB after his upcomming appointments to schedule Understanding was expressed.

## 2021-02-19 NOTE — TELEPHONE ENCOUNTER
Spoke to the pt today for TCM to follow up on the recent hospitalization and left femoral endarterectomy procedure. The pt does not have HFU appt scheduled at this time.   A TCM/HFU appt is recommended by 3/4/2021 as the pt is a moderate risk for readmissio

## 2021-03-02 PROBLEM — R73.9 HYPERGLYCEMIA: Status: RESOLVED | Noted: 2019-07-07 | Resolved: 2021-03-02

## 2021-03-02 NOTE — PROGRESS NOTES
HPI:    Patient ID: Emilie Pierce is a 79year old male. [facility]   Hospital follow up  underwent left lower leg endovascular PTA of left superficial femoral popliteal artery with a drug-coated balloon and 2 separate 5 x 120 and 7 x 120 balloon catheters. daily. 90 tablet 3   • ALPRAZolam 0.5 MG Oral Tab Take 1 tablet (0.5 mg total) by mouth 3 (three) times daily as needed for Anxiety. 90 tablet 2   • HYDROmorphone HCl 4 MG Oral Tab Take 4 mg by mouth every 6 (six) hours as needed for Pain.        Allergies:

## 2021-03-15 NOTE — PROGRESS NOTES
[facility] Hematology Oncology Group Progress Note      Patient Name: Griselda Gudino   YOB: 1950  Medical Record Number: [id number]    Date of Visit: 3/18/2021    Chief Complaint  Metastatic gastrointestinal stromal tumor - follow up.     Oncologic Pathology showed a 10.2 cm GIST with 80% necrosis; necrotic tumor was present in prostatic tissue and wall of rectum; 10/10 lymph nodes were negative for metastasis; tumor showed 16 mitoses per 50 hpf; surgical margins were negative.      Patient presented (historical data, reviewed by physician)  Stomal hernia repair; inguinal hernia repair x 4; lumbar discectomy; resection of GIST (as above); angioplasty left lower extremity arteries.     Family History (historical data, reviewed by physician)  Mother with daily., Disp: 60 tablet, Rfl: 3  escitalopram 10 MG Oral Tab, Take 1 tablet (10 mg total) by mouth once daily. , Disp: 90 tablet, Rfl: 3  simvastatin 20 MG Oral Tab, Take 1 tablet (20 mg total) by mouth daily. , Disp: 90 tablet, Rfl: 3  ALPRAZolam 0.5 MG Ora 21.0 - 32.0 mmol/L    Anion Gap 5 0 - 18 mmol/L    BUN 14 7 - 18 mg/dL    Creatinine 0.90 0.70 - 1.30 mg/dL    BUN/CREA Ratio 15.6 10.0 - 20.0    Calcium, Total 8.7 8.5 - 10.1 mg/dL    Calculated Osmolality 297 (H) 275 - 295 mOsm/kg    GFR, Non-African Cheryl refilled. 4.   Pain, neoplasm related: Refilled hydromorphone PRN. Planned Follow Up  Patient will return for follow up at the end of 04/2021. Electronically signed by:    Porsha Early M.D.   Associate Medical Director of Oncology Services  E

## 2021-03-25 NOTE — TELEPHONE ENCOUNTER
Last time medication was refilled 11/29/20  Quantity and number of refills 60 w/ 3   Last OV 3/2/21  Next OV 5/17/21

## 2021-04-28 NOTE — PROGRESS NOTES
[facility] Progress Note      Patient Name: Jerry Hernadnez   YOB: 1950  Medical Record Number: [id number]    Date of Visit: 4/30/2021    Chief Complaint  Metastatic gastrointestinal stromal tumor - follow up.     Oncologic Pathology showed a 10.2 cm GIST with 80% necrosis; necrotic tumor was present in prostatic tissue and wall of rectum; 10/10 lymph nodes were negative for metastasis; tumor showed 16 mitoses per 50 hpf; surgical margins were negative.      Patient presented (historical data, reviewed by physician)  Stomal hernia repair; inguinal hernia repair x 4; lumbar discectomy; resection of GIST (as above); angioplasty left lower extremity arteries.     Family History (historical data, reviewed by physician)  Mother with Tab, Take 1 tablet (20 mg total) by mouth daily. , Disp: 90 tablet, Rfl: 3  ALPRAZolam 0.5 MG Oral Tab, Take 1 tablet (0.5 mg total) by mouth 3 (three) times daily as needed for Anxiety. , Disp: 90 tablet, Rfl: 2    Allergies   Mr. Oren English is allergic to radi g/dL    Globulin  3.4 2.8 - 4.4 g/dL    A/G Ratio 1.1 1.0 - 2.0    FASTING Patient not present    CBC W/ DIFFERENTIAL    Collection Time: 04/30/21 11:03 AM   Result Value Ref Range    WBC 9.9 4.0 - 11.0 x10(3) uL    RBC 5.15 3.80 - 5.80 x10(6)uL    HGB 15. regorafenib for longer. If acceptable to patient and surgery, I prefer the later as he does not have further systemic options after ripretinib. I walked patient to Dr. Lillian Oliveira office to make an appointment.      2.   Hypothyroidism: Continue levot

## 2021-05-05 NOTE — PATIENT INSTRUCTIONS
Surgery:  Resection of pelvic tumors, possible bowel resection    Date of Surgery: 6/18/21    Hospital:    [place] [address] [place], [address]  Phone: [phone]    · This is an Inpatient procedure.   · Use the provided Chlorhe procedure. · Inform your primary care physician of your surgery and ask if him/her will need to see you prior to surgery. · If you develop symptoms of another illness prior to surgery please contact our office immediately.    · If this is an inpatient michele

## 2021-05-05 NOTE — H&P
[facility]        Patient Name:  Kelsie Shaikh   YOB: 1950   Gender:  Male   Appt Date:  5/5/2021   Provider:  Ben Romero MD     PATIENT PROVIDERS  Primary Care Roberta Bauer MD   Address: [address] [place] [address] daily.  In August 2012, catheter was removed.  He has transitioned his care to [address] [place] [address]. 11/21/2014 - MRI - Pelvic GIST tumor.  No metastatic disease.  [facility] - reviewed initial pathology slides and confirmed GIST tumor, spindle kimberly Patient Position: Sitting, Cuff Size: large)   Pulse 53   Resp 16   Wt 91.4 kg (201 lb 9.6 oz)   SpO2 98%   BMI 26.60 kg/m²      Medications Reviewed:    Current Outpatient Medications:   •  escitalopram 10 MG Oral Tab, Take 1 tablet (10 mg total) by mouth as needed for Anxiety. , Disp: 90 tablet, Rfl: 2     Allergies Reviewed:    Radiology Contrast *    HIVES, RESPIRATORY FAILURE, OTHER                            (SEE COMMENTS)    Comment:Originally with \"lower GI enema\"     History:  Reviewed:  Past Medic Use: Never used    Alcohol use:  Yes      Alcohol/week: 3.0 standard drinks      Types: 3 Cans of beer per week      Comment: 1-2x/week 1 beer or liquor    Drug use: No     Reviewed:  Family History   Problem Relation Age of Onset   • Alcohol and Other Diso  No evidence of bowel obstruction or bowel ischemia.  Ileal conduit.  Left colostomy.  Parastomal hernia on the left. .     Gallstones.    Stable right adrenal nodule measuring 1.4 cm        Procedure(s):  None     Assessment / Plan:  Recurrent rectal GIST

## 2021-05-17 NOTE — PROGRESS NOTES
HPI/Subjective:   Patient ID: Linda Johnson is a 79year old male. Hypertension      HPI:   Linda Johnson is a 79year old male who presents for recheck of his diabetes, HTN and PAF. Patient’s FBS have been at goal. No hypo sxs.    Recently had CT abd:  Im Lab Results   Component Value Date     (H) 11/12/2020     (H) 11/20/2019    LDL 87 11/16/2018     Lab Results   Component Value Date    TRIG 130 11/12/2020    TRIG 183 (H) 11/20/2019    TRIG 195 (H) 11/16/2018     Lab Results   Component related pain. ). 60 tablet 0   • Regorafenib 40 MG Oral Tab Take 4 tablets by mouth daily. Take for 21 days followed by 7 days rest period. 84 tablet 11   • amLODIPine Besylate 10 MG Oral Tab Take 10 mg by mouth daily.      • ALPRAZolam 0.25 MG Oral Tab Take COLOSTOMY        Social History: Social History    Tobacco Use      Smoking status: Former Smoker        Packs/day: 1.00        Years: 45.00        Pack years: 39        Types: Cigars        Quit date: 2016        Years since quittin.6      Smokel understanding of these issues and agrees to the plan. The patient is asked to return in 6 m but plan rtc in 3 weeks for preop.     History/Other:   Review of Systems  Current Outpatient Medications   Medication Sig Dispense Refill   • Rosuvastatin Calcium ([facility]  (primary encounter diagnosis)  Paroxysmal atrial fibrillation ([facility]  Essential hypertension, benign  Pain, neoplasm-related    No orders of the defined types were placed in this encounter.       Meds This Visit:  Requested Prescriptions     Signed Pr

## 2021-05-20 NOTE — TELEPHONE ENCOUNTER
Called patient to see if he has appointment with Dr. Carolyn Portillo regarding hernia repair. Pt stated that he is seeing Dr. Carolyn Portillo on 6/7/21 at 10:30.

## 2021-06-07 NOTE — IMAGING NOTE
Spoke to patient and confirmed Radiology contrast allergy. Already has pre medication prescribed. Discussed and verified 13 hour pre-med protocol:  Prednisone 50mg 13 hrs prior to CT, 7 hours prior to CT and one hour prior to CT.   Benadryl 50mg one hour p

## 2021-06-07 NOTE — PROGRESS NOTES
Office Visit Note       Active Problems      1. Parastomal hernia with obstruction and without gangrene    2. GIST (gastrointestinal stroma tumor), malignant, colon ([address]    3. Diet-controlled diabetes mellitus ([address]    4. 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 GIST   • Carcinoma of gastrointestinal tract ([facility]    • Depression    • Disorder of thyroid    • Esophageal reflux    • Hearing impairment    • High blood pressure    • High cholesterol    • Hypothyroidism    • Other and unspecified hyperlipidemia    • Per 1 beer or liquor    Drug use: No     simvastatin 20 MG Oral Tab, Take 20 mg by mouth nightly. Rosuvastatin Calcium (CRESTOR) 20 MG Oral Tab, Take 1 tablet (20 mg total) by mouth nightly.   HYDROmorphone HCl 4 MG Oral Tab, Take 2 tablets (8 mg total) by nicolas mucosa at each is healthy. There is a soft subtle bulge around the colostomy without skin breakdown. This is nontender.        Assessment   Parastomal hernia with obstruction and without gangrene  (primary encounter diagnosis)  GIST (gastrointestinal stro

## 2021-06-10 PROBLEM — I48.91 ATRIAL FIBRILLATION (HCC): Status: RESOLVED | Noted: 2020-12-07 | Resolved: 2021-06-10

## 2021-06-10 NOTE — H&P
[address] [facility], [address] [place] [address]    History and Physical    Emilie Cake Patient Status:  No patient class for patient encounter    10/23/1950 MRN [id number]   Location [facility] Attending No att. provide increased. 5. Right atrium: The atrium was mildly to moderately dilated. 6. Pulmonary arteries: PA peak pressure: 21mm Hg (S).      Impressions:  This study is compared with previous dated 06-29--17: Compared   to the prior study, there has been no sign Comment:Originally with \"lower GI enema\"   Past Medical History:   Diagnosis Date   • Arrhythmia     atrial fibrillation intermittently   • Back problem     back surgery 20 years ago   • BPH (benign prostatic hyperplasia)    • Cancer ([facility] 02-12    GIST comment: \"occasional social cigar\"     Vaping Use      Vaping Use: Never used    Alcohol use: Yes      Alcohol/week: 3.0 standard drinks      Types: 3 Cans of beer per week      Comment: 1-2x/week 1 beer or liquor    Drug use: No     Occ: reited.   ACC/AHA guidelines, the patient does not have a history of acute coronary syndrome.  His estimated perioperative risk for major adverse cardiac event is low based on combined clinical and surgical risks.  He is therefore cleared with low cardiac risk.  He w LAPAROSCOPY, ROBOTIC LYSIS OF ADHESION   • PART REMOVAL COLON W END COLOSTOMY       Family History   Problem Relation Age of Onset   • Alcohol and Other Disorders Associated Father    • Cancer Mother         Breast   • Other (Other) Sister         Jerry Thomas No results found.         Assessment/Plan:         Manju Salazar MD  6/10/2021

## 2021-06-16 NOTE — TELEPHONE ENCOUNTER
Confirmed with pt that he is aware of surgery cancellation. Pt verbalized he is aware and will be evaluated again in 2 months.

## 2021-08-03 NOTE — IMAGING NOTE
8/3 @ 1050 Reviewed Prednisone and Benadryl doses and times for patient appt for CT with IV contrast dye. Patient verbalized understanding and aware he needs a .  [facility]

## 2021-08-11 NOTE — PROGRESS NOTES
[facility] Oncology Group Progress Note      Patient Name: Sheldon Stacy   YOB: 1950  Medical Record Number: [id number]    Date of Visit: 8/12/2021    Chief Complaint  Metastatic gastrointestinal stromal tumor - follow up.     Oncologic Pathology showed a 10.2 cm GIST with 80% necrosis; necrotic tumor was present in prostatic tissue and wall of rectum; 10/10 lymph nodes were negative for metastasis; tumor showed 16 mitoses per 50 hpf; surgical margins were negative.      Patient presented (historical data, reviewed by physician)  Stomal hernia repair; inguinal hernia repair x 4; lumbar discectomy; resection of GIST (as above); angioplasty left lower extremity arteries.     Family History (historical data, reviewed by physician)  Mother with developed, well nourished. Appears close to chronological age. No apparent distress. Head                   Normocephalic and atraumatic. Eyes                  Conjunctiva clear; sclera anicteric.   ENMT                 External nose normal; external ear 10:46 AM   Result Value Ref Range    Free T4 1.1 0.8 - 1.7 ng/dL    TSH 3.440 0.358 - 3.740 mIU/mL   CBC W/ DIFFERENTIAL    Collection Time: 08/12/21 10:46 AM   Result Value Ref Range    WBC 9.3 4.0 - 11.0 x10(3) uL    RBC 5.07 3.80 - 5.80 x10(6)uL    HGB

## 2021-08-12 NOTE — PROGRESS NOTES
Patient is here today for follow up  with Marielos Mccann for Malignant GIST. Currently treated with Regorafenib 120mg daily. Patient denies pain. Stated he is feeling good. Denies fatigue -  appetite is good.   Medication list,  medical history and toxicities

## 2021-08-13 NOTE — TELEPHONE ENCOUNTER
Alerted in OFTs that patient may be more appropriate for discharge home with home care rather than [facility] as previously recommended by skilled therapies.  Message sent to KATIE Dodd NP to confirm this discharge disposition and obtain orders.  Awaiting orders.     Met with patient and family, discussed home care needs. Verified demographic info is correct as found in Epic. Explained hospital affiliation with [facility] and home bound status.   Discussed home care choice. Patient declined agency list and is agreeable to [facility]. All questions regarding home care answered. Case Management to follow for additional home care needs.   MD to please place service to home care orders prior to discharge.   Placed to  follow up list.     MD Suzanne Dyson RN             Please call patient. He should restart Stivarga at two tablets per day and come for his appt on Monday. Patient notified.

## 2021-11-03 NOTE — TELEPHONE ENCOUNTER
Olga calling from [place] [address] they had called last week regarding Humberto Tarun from Dr Maria Luisa Ramos for Extractions. Questioning if Chemo should be stopped. .  When to stop/ When to start. Please return call to Jhony Costa.   Or fax the information to 68

## 2021-11-03 NOTE — PROGRESS NOTES
[facility] Note      Patient Name: Janeth Hsu   YOB: 1950  Medical Record Number: [id number]    Date of Encounter: 11/3/2021    José Miguel Liz is a 70year old male who is under my care for GIST.  He is currently on thera

## 2021-11-11 NOTE — PROGRESS NOTES
[facility] Progress Note      Patient Name: Adela Lizarraga   YOB: 1950  Medical Record Number: [id number]    Date of Visit: 11/11/2021    Chief Complaint  Metastatic gastrointestinal stromal tumor - follow up.     Oncologic Pathology showed a 10.2 cm GIST with 80% necrosis; necrotic tumor was present in prostatic tissue and wall of rectum; 10/10 lymph nodes were negative for metastasis; tumor showed 16 mitoses per 50 hpf; surgical margins were negative.      Patient presented data, reviewed by physician)  Stomal hernia repair; inguinal hernia repair x 4; lumbar discectomy; resection of GIST (as above); angioplasty left lower extremity arteries.     Family History (historical data, reviewed by physician)  Mother with breast canc iodinated dyes.      Vital Signs   BP (!) 162/78 (BP Location: Left arm, Patient Position: Sitting, Cuff Size: large)   Pulse 58   Temp 97.8 °F (36.6 °C) (Temporal)   Resp 18   Wt 90.7 kg (200 lb)   SpO2 98%   BMI 26.39 kg/m²     Physical Examination  Const Alkaline Phosphatase 102 45 - 117 U/L    Bilirubin, Total 0.6 0.1 - 2.0 mg/dL    Total Protein 7.2 6.4 - 8.2 g/dL    Albumin 3.7 3.4 - 5.0 g/dL    Globulin  3.5 2.8 - 4.4 g/dL    A/G Ratio 1.1 1.0 - 2.0    FASTING No    TSH+FREE T4    Collection Time: 11/1 PRN.    Planned Follow Up  Patient will return for follow up in 3 months. Electronically signed by:    Ree Chen M.D.   Associate Medical Director of Oncology [facility], [place], [place]

## 2021-11-15 PROBLEM — F33.9 MAJOR DEPRESSIVE DISORDER, RECURRENT, UNSPECIFIED (HCC): Status: ACTIVE | Noted: 2021-11-15

## 2021-11-15 PROBLEM — F33.9 MAJOR DEPRESSIVE DISORDER, RECURRENT, UNSPECIFIED: Status: ACTIVE | Noted: 2021-11-15

## 2021-11-15 NOTE — PATIENT INSTRUCTIONS
Diabetes: Activity Tips    Being more active can help you manage your diabetes. The tips on this sheet can help you get the most from your exercise. They can also help you stay safe.    Staying active   It’s important for adults to spend less time sitting being active. Test your blood sugar before exercising if you take insulin or medicine that can cause low blood sugar. And carry a fast-acting sugar that will raise your blood sugar level quickly. This includes glucose tablets or glucose gel in a tube.  Use Screening    Fasting Blood Sugar / Glucose    One screening every 12 months if never tested or if previously tested but not diagnosed with pre-diabetes   One screening every 6 months if diagnosed with pre-diabetes Lab Results   Component Value Date    GLU Not covered by Medicare Part B unless medically necessary (cut with metal); may be covered with your pharmacy prescription benefits -    Tetanus, Diptheria and Pertusis TD and TDaP Not covered by Medicare Part B -  No recommendations at this time    Zoster

## 2021-11-15 NOTE — PROGRESS NOTES
HPI:   José Miguel Liz is a 70year old male who presents for a Medicare Subsequent Annual Wellness visit (Pt already had Initial Annual Wellness).     HPI:  Here for AWV  No issues or complaints  Denies cp or sob    PAST MEDICAL, SOCIAL, FAMILY HISTORIES REVI (Nurse Practitioner)  HUMPHREY Alvarez (Nurse Practitioner)  HUMPHREY Huber (Nurse Practitioner Family)  Tomasa Mccormick MD as Consulting Physician (NEUROSURGERY)  HUMPHREY Grady as Palliative Care Provider (Nurse Practitioner)  Amilcar Christianson- dyes.    CURRENT MEDICATIONS:   diphenhydrAMINE (BENADRYL ALLERGY) 25 MG Oral Cap, 2 tabs PO 1 hour prior to scan.  predniSONE 50 MG Oral Tab, 1 tab PO 13, 7, and 1 hour prior to scan.   LEVOTHYROXINE 150 MCG Oral Tab, TAKE 1 TABLET (150 MCG TOTAL) BY MOUTH (10/03/2017); other surgical history (12/26/2019); hernia surgery (Left, 2006); hernia surgery (Left, 1975); hernia surgery (Left, 12/26/2019); part removal colon w end colostomy; cystoscopy,insert ureteral stent; and laparoscopy, surgical prostatectomy, r no curvature, ROM normal, no CVA tenderness   Lungs:   Clear to auscultation bilaterally, respirations unlabored   Chest Wall:  No tenderness or deformity   Heart:  Regular rate and rhythm, S1, S2 normal, no murmur, rub or gallop   Abdomen:   Soft, non-ten exam    Peripheral vascular disease ([facility] [address]    Perineal pain in male    Parastomal hernia with obstruction and without gangrene    Pain, neoplasm-related    PAF (paroxysmal atrial fibrillation) (HCC)    Mixed hyperlipidemia    Malignant gastrointestinal stro indicates understanding of these issues and agrees to the plan. Continue with current treatment plan. Lab work ordered. Reinforced healthy diet, lifestyle, and exercise. Return in about 6 months (around 5/15/2022).      Dick Caballero MD, 11/15/2021 and have ever smoked   • Anyone with a family history -     Colorectal Cancer Screening  Covered for ages 52-80; only need ONE of the following:    Colonoscopy   Covered every 10 years    Covered every 2 years if patient is at high risk or previous colonos

## 2022-02-01 NOTE — IMAGING NOTE
Spoke with patient regarding CT study with IV contrast. Patient has medications and schedule for 13 hour prep.

## 2022-02-10 NOTE — PROGRESS NOTES
Patient is here today for follow up with Elliott Flores for Malignant GIST- patient is on Regorafenib 120mg daily. Patient denies pain. Stated is feeling good. Appetite is good. Medication list,  medical history and toxicities were reviewed and updated. Education Record    Learner:  Patient    Disease / Diagnosis: Malignant GIST    Barriers / Limitations:  None   Comments:    Method:  Brief focused, Discussion, Printed material and Reinforcement   Comments:    General Topics:  Medication, Pain, Procedure and Plan of care reviewed   Comments:    Outcome:  Shows understanding   Comments:    AVS provided and follow up reviewed. Patient instructed to call as needed.

## 2022-05-16 PROBLEM — I48.0 PAF (PAROXYSMAL ATRIAL FIBRILLATION) (HCC): Status: RESOLVED | Noted: 2020-12-07 | Resolved: 2022-05-16

## 2022-05-16 PROBLEM — I48.91 ATRIAL FIBRILLATION (HCC): Status: ACTIVE | Noted: 2022-05-16

## 2022-06-01 NOTE — TELEPHONE ENCOUNTER
I contacted patient for upcoming CT scan on 6/6 @ 1300 in [place]. Pt states he has pretreatment for known iodinated contrast allergy. He is familiar with 13/7/1 hour dosing of prednisone and benadryl at 1 hour prior. States he'd done pretreatment for many years and hasn't had any breakthrough symptoms. Pt also aware of SE of benadryl and advised to have .

## 2022-06-09 NOTE — PROGRESS NOTES
Patient is here today for follow up with Kathya Rivera for Malignant GIST. Patient is currently taking Regorafenib 3 tablets 120mg daily. Patient denies pain. Stated he is feeling good. No adverse side effects from Regorafenib therapy. Medication list,  medical history and toxicities were reviewed and updated. Education Record    Learner:  Patient and daughter    Disease / Diagnosis: Malignant GIST    Barriers / Limitations:  None   Comments:    Method:  Brief focused, Discussion, Printed material and Reinforcement   Comments:    General Topics:  Medication, Pain, Procedure and Plan of care reviewed   Comments:    Outcome:  Shows understanding   Comments:    AVS provided and follow up reviewed. Patient instructed to call as needed.

## 2022-06-20 NOTE — ED NOTES
Patient able to pass small amount of urine, about 15-25 ml. Instructed by MD to evaluate for need for vo catheter at this time. Patient wanting to walk around a bit to see if that will help him void more, as it usually does.  Patient does not want cathet yes

## 2022-07-11 NOTE — PROGRESS NOTES
Nutrition rescreen complete as triggered by Best Practice dx of metastatic GIST. Chart reviewed. Pt appears nutritionally stable at present. RD available on consult.

## 2022-08-12 NOTE — IMAGING NOTE
8/12 @ 1112 Reviewed Prednisone and Benadryl doses and times for patient CT contrast allergies. Instructed to have a  to take him home.  ERIC

## 2022-08-24 NOTE — PROGRESS NOTES
Patient is here for 2 month MD f/u for GIST. Patient continues on Stivarga 3 tabs per day. He had a CT scan on 8/19. Patient needs a refill on Dilaudid and Levothyroxine. He has chronic pain in buttock. Appetite is good. Mild fatigue but no SOB.        Education Record    Learner:  Patient and Family Member    Disease / Diagnosis:  GIST    Barriers / Limitations:  None   Comments:    Method:  Discussion   Comments:    General Topics:  Plan of care reviewed   Comments:    Outcome:  Shows understanding   Comments:

## 2022-08-29 NOTE — TELEPHONE ENCOUNTER
Atorvastatin 40 mg tab  Last time medication was refilled 6/2/22  Quantity and # of refills 90 tab no refill  Last OV 5/16/22  Next OV 11/18/22

## 2022-09-01 PROBLEM — I44.2 THIRD DEGREE AV BLOCK (HCC): Status: ACTIVE | Noted: 2022-09-01

## 2022-09-01 PROBLEM — I21.19 ACUTE MI, INFERIOR WALL (HCC): Status: ACTIVE | Noted: 2022-09-01

## 2022-09-01 NOTE — DIETARY NOTE
Clinical Nutrition     Dietitian consult received per cardiac rehab standing order. Pt to be educated by cardiac rehab staff and encouraged to attend outpatient classes taught by RD. RD available PRN.     Iban Arellano, 66 [address], [address] [place] [address]   Clinical Dietitian  Spectra: [phone]

## 2022-09-01 NOTE — PROGRESS NOTES
09/01/22 1624   Clinical Encounter Type   Visited With Health care provider;Patient   Routine Visit Follow-up   Surgical Visit Post-op   Patient's Supportive Strategies/Resources Identified his family support, an, values and practices. Respected and supported. Patient Spiritual Encounters   Spiritual Assessment Completed Yes   Taxonomy   Intended Effects Establish rapport and connectedness   Methods Encourage self reflection;Encourage sharing of feelings;Encourage self care; Offer spiritual/Mormonism support; Offer emotional support   Interventions Acknowledge current situation; Active listening;Assist with identifying strengths;Explain  role; Share words of hope and inspiration    followed up w/Chepe. Assessed for spiritual and psychosocial needs. Provided a calming space to open up his thoughts and feelings. Pt appeared to be happy and eating his lunch. Explored and identified family support/an community resources/coping strategies. Explained 's role in care and support. Established supportive/trusting/caring relationship. Empowered Antonio Erickson to request  support through his nurse. Antonio Erickson was appreciative of spiritual care and emotional support. Spiritual care can be provided by  by entering a consult followed by contacting pager # [phone] as needed.

## 2022-09-01 NOTE — PROCEDURES
[address]    Cardiac Catheterization Note    Primary Proceduralist: Pieter Ortiz MD  Procedure Performed: LHC, RCA PCI, LM IVUS  Date of Procedure: 9/1/2022   Indication: STEMI  Estimated blood loss: 10cc  Specimens: None    Consent obtained from the patient and documented in the paper chart, unless verbally obtained in an emergency setting. The benefits and risk of the procedure, including but not limited to infection, bleeding, myocardial infarction, stroke, vascular injury, emergency surgery, renal failure requiring dialysis and death were discussed with the patient. These complications occur in approximately 1-2% of elective procedures, but the risk may be significantly elevated in the setting of acute coronary syndrome, electrical or hemodynamic instability, multivessel disease, reduced LVEF or . The patient consented to any additional procedures performed emergently in order to address a complication or prevent medical deterioration. Viable alternatives were explained to the patient, including continuing medical therapy, with the risks incurred along that course. Procedure/Technique:    Lidocaine 1% was administered locally. Access of right femoral artery obtained using Seldinger technique. Ultrasound was not used. 6 Fr Sheath was inserted. J-wire advanced into the aorta under fluoroscopy. Left coronary system engaged using 6 Fr JL4 diagnostic cath. Selective angiogram performed. Right coronary system engaged using 6 Fr [address] [place] cath. Selective angiogram performed. 6 Fr pigtail Catheter advanced into the LV. Hemodynamics were obtained. Coronary Angiogram Findings:  1. LM: Large caliber artery giving rise to LAD & LCX. 30% proximal stenosis. 2. LAD: Large caliber artery giving rise to diagonal and septal branches. Mild diffuse disease. 80% distal LAD focal stenosis. Significant mid LAD bridging.   3. LCX: Medium to large caliber artery giving rise to OM branches. 95% OM2 subtotal stenosis. 4. RCA: Large caliber artery giving rise to acute marginal branches, and bifurcates into RPDA & RPL. % proximal RCA thrombotic occlusion. SADIQ 1 flow. Right dominant coronary circulation. Hemodynamics:  /3, LVEDP 10  /74, mean 92  No significant gradient upon Ao-LV pullback  LVEF 60%, moderate inferior hypokinesis    Intervention:  6 Fr JR 4 Guide  Runthrough crossed into distal RCA  Pre-dilated using 3.0 then 3.0 NC balloons  Guideliner used for support  LUIS E placement x 2 (4.0 x 34mm & 3.5 x 30mm [place])  P.E using 4.0 NC up to 18 adalberto. Multiple inflations  Angiogram showed SADIQ III flow  6 Fr EBU 3.75 Guide used to engage LM  Rinku blue used to cross into LAD  IVS of LM performed, showing only 30% stenosis    Monitored sedation administered by the cath lab RN, and supervised throughout the procedure by myself. Total time 49 minutes. Closure: Femoral angiogram performed. Perclosure successful. No immediate complications. A/P:  1. Successful PCI of % pRCA thrombotic occlusion. LUIS E x 2. (4.0 x 34mm & 3.5 x 30mm). P.E 4.0.  2. IVUS of LM (30% stenosis). No intervention necessary. 3. 80% distal LAD focal stenosis. Mid LAD bridging noted. 4. 95% OM2 stenosis (small caliber). 5. Will plan for staged PCI of LAD +/- OM2.  6. DAPT. Statin. IVF.       Jerson Gonzales MD  Interventional Cardiology  [address] [place] [address]

## 2022-09-01 NOTE — PROGRESS NOTES
09/01/22 1002   Clinical Encounter Type   Visited With Health care provider   Routine Visit   (Responded to cardiac alert)   Crisis Visit ED   Taxonomy   Intended Effects Promote a sense of peace   Methods Offer support   Interventions Acknowledge current situation; Active listening    provided compassionate listening presence and support to the patient and to our team members. Per patient's request, left a message on Rik's phone number ([phone]) that is currently listed as Emergency contact \"POA. \" Kaitlynn Rogers remain available via Epic consult or at the pager [phone] for quicker response.

## 2022-09-01 NOTE — ED INITIAL ASSESSMENT (HPI)
Pt called for chest pain starting this am.  Pt was given 40mcg fentanyl and 4 81mg asa. Elevation seen in V1,V2, V3, V4.

## 2022-09-01 NOTE — PLAN OF CARE
Pt from cath lab just before noon. Pt recovered per ccu protocol and orders. EKG done. Right groin perclose, soft/ nontender. No bleeding or hematoma. Pt denies chest pain stating he \"feels way better then he did\". Pt flat for two hours then [facility] raised slowly monitoring groin site. Around 1700 pt OOB to chair and ambulated to bathroom, tolerated well. Eating without any issues, appetite good. Pt states he feels great and ready to go home! POC discussed. Questions encouraged and answered. Daughter at bedside on/off today. Problem: CARDIOVASCULAR - ADULT  Goal: Maintains optimal cardiac output and hemodynamic stability  Description: INTERVENTIONS:  - Monitor vital signs, rhythm, and trends  - Monitor for bleeding, hypotension and signs of decreased cardiac output  - Evaluate effectiveness of vasoactive medications to optimize hemodynamic stability  - Monitor arterial and/or venous puncture sites for bleeding and/or hematoma  - Assess quality of pulses, skin color and temperature  - Assess for signs of decreased coronary artery perfusion - ex.  Angina  - Evaluate fluid balance, assess for edema, trend weights  Outcome: Progressing  Goal: Absence of cardiac arrhythmias or at baseline  Description: INTERVENTIONS:  - Continuous cardiac monitoring, monitor vital signs, obtain 12 lead EKG if indicated  - Evaluate effectiveness of antiarrhythmic and heart rate control medications as ordered  - Initiate emergency measures for life threatening arrhythmias  - Monitor electrolytes and administer replacement therapy as ordered  Outcome: Progressing

## 2022-09-02 PROBLEM — I21.19 ACUTE MI, INFERIOR WALL (HCC): Status: RESOLVED | Noted: 2022-09-01 | Resolved: 2022-09-02

## 2022-09-02 NOTE — CM/SW NOTE
CM met with 69 y/o male admitted s/p cardiac cath with stent placement. Pt states that he lives at home alone, but is independent with ADLS. He does not feel he needs any additional assistance at discharge. Provided coupon for Brilinta and provided additional information/details in the AVS. Pt verbalized understanding and appreciation for resources.     BJ RamsayN, RN-BC    [id number]

## 2022-09-02 NOTE — PLAN OF CARE
Assumed care this morning. Pt alert and oriented, slept well. Denies chest pain or sob. C/o \"butt\" pain (tumor site), which is his baseline. Dilaudid given as ordered and improved discomfort. NSR/ SR on monitor, BP dropped this afternoon after first dose of Coreg, to the 80s, pt also c/o feeling lightheaded. Parul Barker APN updated, Dose lowered. Once BP stabilized, pt ambulating in halls and to bathroom without difficulty. Tolerating diet, appetite great. Managing ostomy sites himself. May transfer to CTU, anticipate discharge tomorrow. POC discussed. Problem: CARDIOVASCULAR - ADULT  Goal: Maintains optimal cardiac output and hemodynamic stability  Description: INTERVENTIONS:  - Monitor vital signs, rhythm, and trends  - Monitor for bleeding, hypotension and signs of decreased cardiac output  - Evaluate effectiveness of vasoactive medications to optimize hemodynamic stability  - Monitor arterial and/or venous puncture sites for bleeding and/or hematoma  - Assess quality of pulses, skin color and temperature  - Assess for signs of decreased coronary artery perfusion - ex.  Angina  - Evaluate fluid balance, assess for edema, trend weights  Outcome: Progressing  Goal: Absence of cardiac arrhythmias or at baseline  Description: INTERVENTIONS:  - Continuous cardiac monitoring, monitor vital signs, obtain 12 lead EKG if indicated  - Evaluate effectiveness of antiarrhythmic and heart rate control medications as ordered  - Initiate emergency measures for life threatening arrhythmias  - Monitor electrolytes and administer replacement therapy as ordered  Outcome: Progressing

## 2022-09-02 NOTE — CARDIAC REHAB
Cardiac rehab education completed with patient  Stent card given to patient  Patient referred to cardiac rehab

## 2022-09-02 NOTE — PLAN OF CARE
Assumed care of pt approx 1930. Pt is alert and oriented. NSR on tele, normotensive, on RA. pulses palpable. R groin site is c/d/i, soft, no hematoma. Urostomy and colostomy bag are intact, ostomy appears in good color, surrounding skin is intact. Pt states that they are not needing to be changed yet, but does have supplies if needed. U/o charted. Ambulating with standby assist. Pt denies chest pain and SOB. Will continue current plan of care.

## 2022-09-03 NOTE — PLAN OF CARE
Assumed care of pt this am, he is sitting up in chair with no c/o pain or discomfort at this time. Right groin soft with no drainage, pulses palpable. Order rcvd for pt to discharge home with return on Friday for planned angio. IV removed with no complications. Discharge information give, all questions answered. All bedside belongings and Tommie Erm taken with patient. Pt escorted to [facility] entrance in wheelchair.

## 2022-09-03 NOTE — PLAN OF CARE
Assumed care of pt approx 1930. Pt alert and oriented. Denies any CP or SOB. NSR on tele, BP normotensive this shift. No episodes hypotension. Ambulating excellently. Ostomy sites intact. Questions answered. Will continue current plan.

## 2022-09-06 NOTE — PROGRESS NOTES
LM for pt to call [facility] for TCM since discharge. [facility] phone number was provided for pt to call back.

## 2022-09-07 NOTE — TELEPHONE ENCOUNTER
Attempted to contact pf for TCM however unsuccessful. Pt does not have HFU appt scheduled at this time. TCM/HFU appt recommended by 9/10/22 as pt is a high risk for readmission. Please advise. BOOK BY DATE (last date for TCM): 9/17/22    Clinical staff:  Please f/u with pt and try to get them to schedule as pt would greatly benefit from TCM/HFU appt. Thank you!

## 2022-09-09 NOTE — DIETARY NOTE
Clinical Nutrition    Dietitian consult received per cardiac rehab standing order. Pt to be educated by cardiac rehab staff and encouraged to attend outpatient classes taught by RD. RD available PRN.     Jimena Snider MS, RD, LDN  Clinical Dietitian  Pager #: [phone]

## 2022-09-09 NOTE — PROGRESS NOTES
Pt s/p PCI with stent X1 LAD via R radial artery. TR Band placed in lab and air released per protocol throughout recovery period. Site remained without any bleeding/hematoma. Coban dressing applied after TR Band removed. Pt denied pain or numbness/tingling to RUE. VSS. Tolerated PO intake and ambulation in hallway. Post procedure EKG done. Cardiac Rehab saw pt. Discharge instructions given, including PCI RN phone number-pt verbalized understanding. Pt to lobby via [facility] and friend to drive home.

## 2022-09-09 NOTE — PROCEDURES
[address]    Cardiac Catheterization Note    Primary Proceduralist: Sherrill Brennan MD  Procedure Performed: LAD PCI  Date of Procedure: 9/9/2022   Indication: STEMI  Estimated blood loss: 5cc  Specimens: None    Consent obtained from the patient and documented in the paper chart, unless verbally obtained in an emergency setting. The benefits and risk of the procedure, including but not limited to infection, bleeding, myocardial infarction, stroke, vascular injury, emergency surgery, renal failure requiring dialysis and death were discussed with the patient. These complications occur in approximately 1-2% of elective procedures, but the risk may be significantly elevated in the setting of acute coronary syndrome, electrical or hemodynamic instability, multivessel disease, reduced LVEF or . The patient consented to any additional procedures performed emergently in order to address a complication or prevent medical deterioration. Viable alternatives were explained to the patient, including continuing medical therapy, with the risks incurred along that course. Procedure/Technique:    Lidocaine 1% was administered locally. Access of right radial artery obtained using Seldinger technique. Ultrasound was not used. 6 Fr Sheath was inserted. J-wire advanced into the aorta under fluoroscopy. Coronary Angiogram Findings:  1. LM: Large caliber artery giving rise to LAD & LCX. No significant stenosis. 2. LAD: Large caliber artery giving rise to diagonal and septal branches. 70% distal LAD stenosis. 3. LCX: Medium to large caliber artery giving rise to OM branches. 95% OM2 subtotal stenosis.       Hemodynamics:  AO 82/46, mean 62    Intervention:  6 Fr EBU 3.75 Guide  Runthough crossed distal LAD lesion  Pre-dilatd using 2.5 x 10  LUIS E 2.5 x 15mm  Post-expanded using 2.5 NC up to 18 adalberto  Angiogram showed SADIQ III flow    Monitored sedation administered by the cath lab RN, and supervised throughout the procedure by myself. Total time 19 minutes. Closure: Radial band. No immediate complications. A/P:  1. 70% distal LAD s/p LUIS E placement 2.5 x 15mm. P.E 2.5 NC.  2. Continue DAPT, statin. 3. Switch Coreg to metoprolol 12.5mg PO every day. 4. TR band.  Follow up in clinic      Tabatha Monroe MD  Interventional Cardiology  [address] [place] [address]

## 2022-09-15 NOTE — TELEPHONE ENCOUNTER
Patient call stated recovered from his heart attack. Wants to know when to restart his Stivarga and how many tablets to start with - was on three tablets previously. Ok to send My Chart response.

## 2022-10-02 PROBLEM — R06.00 DYSPNEA, UNSPECIFIED TYPE: Status: ACTIVE | Noted: 2022-10-02

## 2022-10-02 PROBLEM — I48.91 ATRIAL FIBRILLATION, UNSPECIFIED TYPE (HCC): Status: ACTIVE | Noted: 2022-10-02

## 2022-10-02 PROBLEM — R79.89 TROPONIN LEVEL ELEVATED: Status: ACTIVE | Noted: 2022-10-02

## 2022-10-02 NOTE — PROGRESS NOTES
10/02/22 1537 10/02/22 1542 10/02/22 1544   Vital Signs   BP (!) 87/56 (!) 87/56 (!) 68/47   MAP (mmHg) 61 61 53   BP Location Left arm Left arm Left arm   BP Method Automatic Automatic Automatic   Patient Position Lying Sitting Standing

## 2022-10-02 NOTE — PLAN OF CARE
Assumed care of pt at 1530  AxO x4, up with SBA  R/A, pt notes soreness in chest at a 3/10  A fib with controlled rates, Eliquis started  Colostomy LLQ  Urostomy RLQ  Fall precautions in place, bed alarm on  Updated on plan of care, will continue to monitor          Problem: CARDIOVASCULAR - ADULT  Goal: Maintains optimal cardiac output and hemodynamic stability  Description: INTERVENTIONS:  - Monitor vital signs, rhythm, and trends  - Monitor for bleeding, hypotension and signs of decreased cardiac output  - Evaluate effectiveness of vasoactive medications to optimize hemodynamic stability  - Monitor arterial and/or venous puncture sites for bleeding and/or hematoma  - Assess quality of pulses, skin color and temperature  - Assess for signs of decreased coronary artery perfusion - ex.  Angina  - Evaluate fluid balance, assess for edema, trend weights  Outcome: Progressing  Goal: Absence of cardiac arrhythmias or at baseline  Description: INTERVENTIONS:  - Continuous cardiac monitoring, monitor vital signs, obtain 12 lead EKG if indicated  - Evaluate effectiveness of antiarrhythmic and heart rate control medications as ordered  - Initiate emergency measures for life threatening arrhythmias  - Monitor electrolytes and administer replacement therapy as ordered  Outcome: Progressing     Problem: RESPIRATORY - ADULT  Goal: Achieves optimal ventilation and oxygenation  Description: INTERVENTIONS:  - Assess for changes in respiratory status  - Assess for changes in mentation and behavior  - Position to facilitate oxygenation and minimize respiratory effort  - Oxygen supplementation based on oxygen saturation or ABGs  - Provide Smoking Cessation handout, if applicable  - Encourage broncho-pulmonary hygiene including cough, deep breathe, Incentive Spirometry  - Assess the need for suctioning and perform as needed  - Assess and instruct to report SOB or any respiratory difficulty  - Respiratory Therapy support as indicated  - Manage/alleviate anxiety  - Monitor for signs/symptoms of CO2 retention  Outcome: Progressing     Problem: Patient/Family Goals  Goal: Patient/Family Long Term Goal  Description: Patient's Long Term Goal: to go home    Interventions:  - Medications as ordered  - Testing as ordered  - See additional Care Plan goals for specific interventions  Outcome: Progressing  Goal: Patient/Family Short Term Goal  Description: Patient's Short Term Goal: Feel better    Interventions:   - Medications as ordered  - Testing as ordered  - See additional Care Plan goals for specific interventions  Outcome: Progressing

## 2022-10-02 NOTE — ED QUICK NOTES
Orders for admission, patient is aware of plan and ready to go upstairs. Any questions, please call ED RN Kwesi Chong at extension [phone].      Patient Covid vaccination status: Fully vaccinated     COVID Test Ordered in ED: Rapid SARS-CoV-2 by PCR    COVID Suspicion at Admission: Low clinical suspicion for COVID    Running Infusions:  None    Mental Status/LOC at time of transport: AAOx3    Other pertinent information:   CIWA score: N/A   NIH score:  N/A

## 2022-10-02 NOTE — ED INITIAL ASSESSMENT (HPI)
Pt states at 1600 yesterday his watch told him he was in afib. Pt c/o mild chest pain and neck pain. Pt also c/o SOB.

## 2022-10-03 NOTE — PLAN OF CARE
Assumed care of pt at [facility]. Pt A&Ox 4; [place]. On RA; SpO2 remaining greater than 92% with no complaints of SOB. A-fib on tele; c/o cardiac symptoms; lightheadedness. Pt has Left colostomy and right urostomy. Pt denies chest pain or discomfort; seems to be resting comfortably at this time. Pt up with one assist, tolerating fairly well. Plan of care reviewed with pt; all questions answered at this time. Bed in lowest position; call light within reach. High fall risk precautions are in place per unit protocol. Problem: CARDIOVASCULAR - ADULT  Goal: Maintains optimal cardiac output and hemodynamic stability  Description: INTERVENTIONS:  - Monitor vital signs, rhythm, and trends  - Monitor for bleeding, hypotension and signs of decreased cardiac output  - Evaluate effectiveness of vasoactive medications to optimize hemodynamic stability  - Monitor arterial and/or venous puncture sites for bleeding and/or hematoma  - Assess quality of pulses, skin color and temperature  - Assess for signs of decreased coronary artery perfusion - ex.  Angina  - Evaluate fluid balance, assess for edema, trend weights  Outcome: Progressing  Goal: Absence of cardiac arrhythmias or at baseline  Description: INTERVENTIONS:  - Continuous cardiac monitoring, monitor vital signs, obtain 12 lead EKG if indicated  - Evaluate effectiveness of antiarrhythmic and heart rate control medications as ordered  - Initiate emergency measures for life threatening arrhythmias  - Monitor electrolytes and administer replacement therapy as ordered  Outcome: Progressing     Problem: RESPIRATORY - ADULT  Goal: Achieves optimal ventilation and oxygenation  Description: INTERVENTIONS:  - Assess for changes in respiratory status  - Assess for changes in mentation and behavior  - Position to facilitate oxygenation and minimize respiratory effort  - Oxygen supplementation based on oxygen saturation or ABGs  - Provide Smoking Cessation handout, if applicable  - Encourage broncho-pulmonary hygiene including cough, deep breathe, Incentive Spirometry  - Assess the need for suctioning and perform as needed  - Assess and instruct to report SOB or any respiratory difficulty  - Respiratory Therapy support as indicated  - Manage/alleviate anxiety  - Monitor for signs/symptoms of CO2 retention  Outcome: Progressing     Problem: Patient/Family Goals  Goal: Patient/Family Long Term Goal  Description: Patient's Long Term Goal: to go home    Interventions:  - Medications as ordered  - Testing as ordered  - See additional Care Plan goals for specific interventions  Outcome: Progressing     Problem: Patient/Family Goals  Goal: Patient/Family Long Term Goal  Description: Patient's Long Term Goal: to go home    Interventions:  - Medications as ordered  - Testing as ordered  - See additional Care Plan goals for specific interventions  Outcome: Progressing  Goal: Patient/Family Short Term Goal  Description: Patient's Short Term Goal: Feel better    Interventions:   - Medications as ordered  - Testing as ordered  - See additional Care Plan goals for specific interventions  Outcome: Progressing     Problem: PAIN - ADULT  Goal: Verbalizes/displays adequate comfort level or patient's stated pain goal  Description: INTERVENTIONS:  - Encourage pt to monitor pain and request assistance  - Assess pain using appropriate pain scale  - Administer analgesics based on type and severity of pain and evaluate response  - Implement non-pharmacological measures as appropriate and evaluate response  - Consider cultural and social influences on pain and pain management  - Manage/alleviate anxiety  - Utilize distraction and/or relaxation techniques  - Monitor for opioid side effects  - Notify MD/LIP if interventions unsuccessful or patient reports new pain  - Anticipate increased pain with activity and pre-medicate as appropriate  Outcome: Progressing     Problem: RISK FOR INFECTION - ADULT  Goal: Absence of fever/infection during anticipated neutropenic period  Description: INTERVENTIONS  - Monitor WBC  - Administer growth factors as ordered  - Implement neutropenic guidelines  Outcome: Progressing     Problem: SAFETY ADULT - FALL  Goal: Free from fall injury  Description: INTERVENTIONS:  - Assess pt frequently for physical needs  - Identify cognitive and physical deficits and behaviors that affect risk of falls.   - [place] fall precautions as indicated by assessment.  - Educate pt/family on patient safety including physical limitations  - Instruct pt to call for assistance with activity based on assessment  - Modify environment to reduce risk of injury  - Provide assistive devices as appropriate  - Consider OT/PT consult to assist with strengthening/mobility  - Encourage toileting schedule  Outcome: Progressing     Problem: DISCHARGE PLANNING  Goal: Discharge to home or other facility with appropriate resources  Description: INTERVENTIONS:  - Identify barriers to discharge w/pt and caregiver  - Include patient/family/discharge partner in discharge planning  - Arrange for needed discharge resources and transportation as appropriate  - Identify discharge learning needs (meds, wound care, etc)  - Arrange for interpreters to assist at discharge as needed  - Consider post-discharge preferences of patient/family/discharge partner  - Complete POLST form as appropriate  - Assess patient's ability to be responsible for managing their own health  - Refer to Case Management Department for coordinating discharge planning if the patient needs post-hospital services based on physician/LIP order or complex needs related to functional status, cognitive ability or social support system  Outcome: Progressing

## 2022-10-03 NOTE — IVS NOTE
Bedside KYLAH completed. Secretions clear post procedure. VSS. Handoff report given to Kwesi jesus RN.

## 2022-10-03 NOTE — PLAN OF CARE
Pt alert able to make needs known. Afib on tele. Denies any chest pain , dizziness or lightheadedness. SBP in the upper 80s on the right arm. Pt scheduled for a KYLAH and cardioversion this afternoon . Consent signed in chart. Plan of care discussed with pt, verbalized understanding. Call light within reach. Problem: CARDIOVASCULAR - ADULT  Goal: Maintains optimal cardiac output and hemodynamic stability  Description: INTERVENTIONS:  - Monitor vital signs, rhythm, and trends  - Monitor for bleeding, hypotension and signs of decreased cardiac output  - Evaluate effectiveness of vasoactive medications to optimize hemodynamic stability  - Monitor arterial and/or venous puncture sites for bleeding and/or hematoma  - Assess quality of pulses, skin color and temperature  - Assess for signs of decreased coronary artery perfusion - ex.  Angina  - Evaluate fluid balance, assess for edema, trend weights  Outcome: Progressing  Goal: Absence of cardiac arrhythmias or at baseline  Description: INTERVENTIONS:  - Continuous cardiac monitoring, monitor vital signs, obtain 12 lead EKG if indicated  - Evaluate effectiveness of antiarrhythmic and heart rate control medications as ordered  - Initiate emergency measures for life threatening arrhythmias  - Monitor electrolytes and administer replacement therapy as ordered  Outcome: Progressing     Problem: RESPIRATORY - ADULT  Goal: Achieves optimal ventilation and oxygenation  Description: INTERVENTIONS:  - Assess for changes in respiratory status  - Assess for changes in mentation and behavior  - Position to facilitate oxygenation and minimize respiratory effort  - Oxygen supplementation based on oxygen saturation or ABGs  - Provide Smoking Cessation handout, if applicable  - Encourage broncho-pulmonary hygiene including cough, deep breathe, Incentive Spirometry  - Assess the need for suctioning and perform as needed  - Assess and instruct to report SOB or any respiratory difficulty  - Respiratory Therapy support as indicated  - Manage/alleviate anxiety  - Monitor for signs/symptoms of CO2 retention  Outcome: Progressing     Problem: Patient/Family Goals  Goal: Patient/Family Long Term Goal  Description: Patient's Long Term Goal: to go home    Interventions:  - Medications as ordered  - Testing as ordered  - See additional Care Plan goals for specific interventions  Outcome: Progressing  Goal: Patient/Family Short Term Goal  Description: Patient's Short Term Goal: Feel better    Interventions:   - Medications as ordered  - Testing as ordered  - See additional Care Plan goals for specific interventions  Outcome: Progressing     Problem: PAIN - ADULT  Goal: Verbalizes/displays adequate comfort level or patient's stated pain goal  Description: INTERVENTIONS:  - Encourage pt to monitor pain and request assistance  - Assess pain using appropriate pain scale  - Administer analgesics based on type and severity of pain and evaluate response  - Implement non-pharmacological measures as appropriate and evaluate response  - Consider cultural and social influences on pain and pain management  - Manage/alleviate anxiety  - Utilize distraction and/or relaxation techniques  - Monitor for opioid side effects  - Notify MD/LIP if interventions unsuccessful or patient reports new pain  - Anticipate increased pain with activity and pre-medicate as appropriate  Outcome: Progressing     Problem: RISK FOR INFECTION - ADULT  Goal: Absence of fever/infection during anticipated neutropenic period  Description: INTERVENTIONS  - Monitor WBC  - Administer growth factors as ordered  - Implement neutropenic guidelines  Outcome: Progressing     Problem: SAFETY ADULT - FALL  Goal: Free from fall injury  Description: INTERVENTIONS:  - Assess pt frequently for physical needs  - Identify cognitive and physical deficits and behaviors that affect risk of falls.   - [place] fall precautions as indicated by assessment.  - Educate pt/family on patient safety including physical limitations  - Instruct pt to call for assistance with activity based on assessment  - Modify environment to reduce risk of injury  - Provide assistive devices as appropriate  - Consider OT/PT consult to assist with strengthening/mobility  - Encourage toileting schedule  Outcome: Progressing     Problem: DISCHARGE PLANNING  Goal: Discharge to home or other facility with appropriate resources  Description: INTERVENTIONS:  - Identify barriers to discharge w/pt and caregiver  - Include patient/family/discharge partner in discharge planning  - Arrange for needed discharge resources and transportation as appropriate  - Identify discharge learning needs (meds, wound care, etc)  - Arrange for interpreters to assist at discharge as needed  - Consider post-discharge preferences of patient/family/discharge partner  - Complete POLST form as appropriate  - Assess patient's ability to be responsible for managing their own health  - Refer to Case Management Department for coordinating discharge planning if the patient needs post-hospital services based on physician/LIP order or complex needs related to functional status, cognitive ability or social support system  Outcome: Progressing

## 2022-10-04 NOTE — PLAN OF CARE
Pt is A/Ox4. On room air. A-fib on tele, rates controlled. Taking eliquis. Urostomy with good output. Colostomy with output. Denies pain. Up with sba. All needs met. Problem: CARDIOVASCULAR - ADULT  Goal: Maintains optimal cardiac output and hemodynamic stability  Description: INTERVENTIONS:  - Monitor vital signs, rhythm, and trends  - Monitor for bleeding, hypotension and signs of decreased cardiac output  - Evaluate effectiveness of vasoactive medications to optimize hemodynamic stability  - Monitor arterial and/or venous puncture sites for bleeding and/or hematoma  - Assess quality of pulses, skin color and temperature  - Assess for signs of decreased coronary artery perfusion - ex.  Angina  - Evaluate fluid balance, assess for edema, trend weights  Outcome: Progressing  Goal: Absence of cardiac arrhythmias or at baseline  Description: INTERVENTIONS:  - Continuous cardiac monitoring, monitor vital signs, obtain 12 lead EKG if indicated  - Evaluate effectiveness of antiarrhythmic and heart rate control medications as ordered  - Initiate emergency measures for life threatening arrhythmias  - Monitor electrolytes and administer replacement therapy as ordered  Outcome: Progressing     Problem: RESPIRATORY - ADULT  Goal: Achieves optimal ventilation and oxygenation  Description: INTERVENTIONS:  - Assess for changes in respiratory status  - Assess for changes in mentation and behavior  - Position to facilitate oxygenation and minimize respiratory effort  - Oxygen supplementation based on oxygen saturation or ABGs  - Provide Smoking Cessation handout, if applicable  - Encourage broncho-pulmonary hygiene including cough, deep breathe, Incentive Spirometry  - Assess the need for suctioning and perform as needed  - Assess and instruct to report SOB or any respiratory difficulty  - Respiratory Therapy support as indicated  - Manage/alleviate anxiety  - Monitor for signs/symptoms of CO2 retention  Outcome: Progressing     Problem: Patient/Family Goals  Goal: Patient/Family Long Term Goal  Description: Patient's Long Term Goal: to go home    Interventions:  - Medications as ordered  - Testing as ordered  - See additional Care Plan goals for specific interventions  Outcome: Progressing  Goal: Patient/Family Short Term Goal  Description: Patient's Short Term Goal: Feel better    Interventions:   - Medications as ordered  - Testing as ordered  - See additional Care Plan goals for specific interventions  Outcome: Progressing     Problem: PAIN - ADULT  Goal: Verbalizes/displays adequate comfort level or patient's stated pain goal  Description: INTERVENTIONS:  - Encourage pt to monitor pain and request assistance  - Assess pain using appropriate pain scale  - Administer analgesics based on type and severity of pain and evaluate response  - Implement non-pharmacological measures as appropriate and evaluate response  - Consider cultural and social influences on pain and pain management  - Manage/alleviate anxiety  - Utilize distraction and/or relaxation techniques  - Monitor for opioid side effects  - Notify MD/LIP if interventions unsuccessful or patient reports new pain  - Anticipate increased pain with activity and pre-medicate as appropriate  Outcome: Progressing     Problem: RISK FOR INFECTION - ADULT  Goal: Absence of fever/infection during anticipated neutropenic period  Description: INTERVENTIONS  - Monitor WBC  - Administer growth factors as ordered  - Implement neutropenic guidelines  Outcome: Progressing     Problem: SAFETY ADULT - FALL  Goal: Free from fall injury  Description: INTERVENTIONS:  - Assess pt frequently for physical needs  - Identify cognitive and physical deficits and behaviors that affect risk of falls.   - [place] fall precautions as indicated by assessment.  - Educate pt/family on patient safety including physical limitations  - Instruct pt to call for assistance with activity based on assessment  - Modify environment to reduce risk of injury  - Provide assistive devices as appropriate  - Consider OT/PT consult to assist with strengthening/mobility  - Encourage toileting schedule  Outcome: Progressing     Problem: DISCHARGE PLANNING  Goal: Discharge to home or other facility with appropriate resources  Description: INTERVENTIONS:  - Identify barriers to discharge w/pt and caregiver  - Include patient/family/discharge partner in discharge planning  - Arrange for needed discharge resources and transportation as appropriate  - Identify discharge learning needs (meds, wound care, etc)  - Arrange for interpreters to assist at discharge as needed  - Consider post-discharge preferences of patient/family/discharge partner  - Complete POLST form as appropriate  - Assess patient's ability to be responsible for managing their own health  - Refer to Case Management Department for coordinating discharge planning if the patient needs post-hospital services based on physician/LIP order or complex needs related to functional status, cognitive ability or social support system  Outcome: Progressing

## 2022-10-04 NOTE — PROGRESS NOTES
10/04/22 0831 10/04/22 0834   Vital Signs   BP 98/68 (!) 78/57   MAP (mmHg) 74 60   BP Location Right arm Right arm   BP Method Automatic Automatic   Patient Position Lying Sitting

## 2022-10-04 NOTE — PLAN OF CARE
Pt alert able to make needs known. Denies any pain / orthostatic only able to get lying and sitting only  . Pt unable to stand long enough to do the standing one . States he feels like he was going to "[employer] . Pt sat back in bed and bp retaken 90/70.

## 2022-10-04 NOTE — PROGRESS NOTES
10/04/22 1109 10/04/22 1110 10/04/22 1111   Vital Signs   Pulse  --  99 112   /81  --   --    MAP (mmHg) 90  --   --    BP Location Right arm  --   --    BP Method Automatic  --   --    Patient Position Lying  --   --       10/04/22 1112 10/04/22 1113 10/04/22 1114   Vital Signs   Pulse 104 115 (!) 122   /77  --  102/73   MAP (mmHg) 86  --  79   BP Location Right arm  --  Right arm   BP Method Automatic  --  Automatic   Patient Position Sitting  --  Standing     ortho bps

## 2022-10-04 NOTE — HOME CARE LIAISON
Received referral via [facility] for Home Health services. Spoke w/ patient and provided with list of [facility] providers from [facility], choice is [facility]. Agency reserved in [facility] and contact information placed on AVS.Financial interest disclosure provided.  Notified Gerber Matta

## 2022-10-04 NOTE — CM/SW NOTE
Patient is a 69 y/o male who admitted to atrial fibrillation. Discussed patient with RN during rounds and with MM[facility]. Met with patient, who was alert and oriented, to discuss discharge planning. He lives alone, his daughter usually stops by every day or two but she will be out of town for a few weeks. He has a supportive neighbor who is leaving for [facility] tomorrow. Patient normally ambulates independently. Discussed HH RN, patient agreeable. Sent referral in aidin. [facility] accepted and will follow up with patient regarding choice. SW will remain available. Addendum  SW acknowledged order for POLST form, Hospitalist MM completed form with patient. SW remains available.      THADDEUS Peterson  Discharge Planner  [phone]

## 2022-10-05 NOTE — PROGRESS NOTES
10/05/22 0523 10/05/22 0524 10/05/22 0527   Vital Signs   /74 (!) 75/50 91/45   MAP (mmHg) 84 82 55   BP Location Right arm Right arm Right arm   BP Method Automatic Automatic Automatic   Patient Position Lying Sitting Standing

## 2022-10-05 NOTE — PLAN OF CARE
Pt is A/Ox4. On room air. A-fib on tele. Orthos this am (+) and symptomatic. Got SOB when standing. Colostomy and urostomy with output. Denies pain. Up with sba in the room. Bed alarm on. Problem: CARDIOVASCULAR - ADULT  Goal: Maintains optimal cardiac output and hemodynamic stability  Description: INTERVENTIONS:  - Monitor vital signs, rhythm, and trends  - Monitor for bleeding, hypotension and signs of decreased cardiac output  - Evaluate effectiveness of vasoactive medications to optimize hemodynamic stability  - Monitor arterial and/or venous puncture sites for bleeding and/or hematoma  - Assess quality of pulses, skin color and temperature  - Assess for signs of decreased coronary artery perfusion - ex.  Angina  - Evaluate fluid balance, assess for edema, trend weights  Outcome: Progressing  Goal: Absence of cardiac arrhythmias or at baseline  Description: INTERVENTIONS:  - Continuous cardiac monitoring, monitor vital signs, obtain 12 lead EKG if indicated  - Evaluate effectiveness of antiarrhythmic and heart rate control medications as ordered  - Initiate emergency measures for life threatening arrhythmias  - Monitor electrolytes and administer replacement therapy as ordered  Outcome: Progressing     Problem: RESPIRATORY - ADULT  Goal: Achieves optimal ventilation and oxygenation  Description: INTERVENTIONS:  - Assess for changes in respiratory status  - Assess for changes in mentation and behavior  - Position to facilitate oxygenation and minimize respiratory effort  - Oxygen supplementation based on oxygen saturation or ABGs  - Provide Smoking Cessation handout, if applicable  - Encourage broncho-pulmonary hygiene including cough, deep breathe, Incentive Spirometry  - Assess the need for suctioning and perform as needed  - Assess and instruct to report SOB or any respiratory difficulty  - Respiratory Therapy support as indicated  - Manage/alleviate anxiety  - Monitor for signs/symptoms of CO2 retention  Outcome: Progressing     Problem: Patient/Family Goals  Goal: Patient/Family Long Term Goal  Description: Patient's Long Term Goal: to go home    Interventions:  - Medications as ordered  - Testing as ordered  - See additional Care Plan goals for specific interventions  Outcome: Progressing  Goal: Patient/Family Short Term Goal  Description: Patient's Short Term Goal: Feel better    Interventions:   - Medications as ordered  - Testing as ordered  - See additional Care Plan goals for specific interventions  Outcome: Progressing     Problem: PAIN - ADULT  Goal: Verbalizes/displays adequate comfort level or patient's stated pain goal  Description: INTERVENTIONS:  - Encourage pt to monitor pain and request assistance  - Assess pain using appropriate pain scale  - Administer analgesics based on type and severity of pain and evaluate response  - Implement non-pharmacological measures as appropriate and evaluate response  - Consider cultural and social influences on pain and pain management  - Manage/alleviate anxiety  - Utilize distraction and/or relaxation techniques  - Monitor for opioid side effects  - Notify MD/LIP if interventions unsuccessful or patient reports new pain  - Anticipate increased pain with activity and pre-medicate as appropriate  Outcome: Progressing     Problem: RISK FOR INFECTION - ADULT  Goal: Absence of fever/infection during anticipated neutropenic period  Description: INTERVENTIONS  - Monitor WBC  - Administer growth factors as ordered  - Implement neutropenic guidelines  Outcome: Progressing     Problem: SAFETY ADULT - FALL  Goal: Free from fall injury  Description: INTERVENTIONS:  - Assess pt frequently for physical needs  - Identify cognitive and physical deficits and behaviors that affect risk of falls.   - [place] fall precautions as indicated by assessment.  - Educate pt/family on patient safety including physical limitations  - Instruct pt to call for assistance with activity based on assessment  - Modify environment to reduce risk of injury  - Provide assistive devices as appropriate  - Consider OT/PT consult to assist with strengthening/mobility  - Encourage toileting schedule  Outcome: Progressing     Problem: DISCHARGE PLANNING  Goal: Discharge to home or other facility with appropriate resources  Description: INTERVENTIONS:  - Identify barriers to discharge w/pt and caregiver  - Include patient/family/discharge partner in discharge planning  - Arrange for needed discharge resources and transportation as appropriate  - Identify discharge learning needs (meds, wound care, etc)  - Arrange for interpreters to assist at discharge as needed  - Consider post-discharge preferences of patient/family/discharge partner  - Complete POLST form as appropriate  - Assess patient's ability to be responsible for managing their own health  - Refer to Case Management Department for coordinating discharge planning if the patient needs post-hospital services based on physician/LIP order or complex needs related to functional status, cognitive ability or social support system  Outcome: Progressing

## 2022-10-05 NOTE — TELEPHONE ENCOUNTER
[facility] is informing us that pt will be discharged from the hospital today and will start nursing with Allen Oliver. They will continue to keep us updated.

## 2022-10-06 NOTE — PLAN OF CARE
RN SHIFT NOTE    Assumed care of pt at 0700. Pt complains of 5/10 dull aching back pain at this time. Pain had started last night per night shift RN. MD aware of situation. Awaiting plans for patient this morning from MD. Patient is alert and oriented x 4 (Reminders and reorientation completed). Patient is Afib on tele, and pt denies cardiac symptoms. Abdomen is soft and round. Pt has left colostomy and urostomy present. Skin is intact. Tolerating medications and care needs have been met at this time. POC: monitor HR, pain management, possible scan of back. Problem: CARDIOVASCULAR - ADULT  Goal: Maintains optimal cardiac output and hemodynamic stability  Description: INTERVENTIONS:  - Monitor vital signs, rhythm, and trends  - Monitor for bleeding, hypotension and signs of decreased cardiac output  - Evaluate effectiveness of vasoactive medications to optimize hemodynamic stability  - Monitor arterial and/or venous puncture sites for bleeding and/or hematoma  - Assess quality of pulses, skin color and temperature  - Assess for signs of decreased coronary artery perfusion - ex.  Angina  - Evaluate fluid balance, assess for edema, trend weights  Outcome: Progressing  Goal: Absence of cardiac arrhythmias or at baseline  Description: INTERVENTIONS:  - Continuous cardiac monitoring, monitor vital signs, obtain 12 lead EKG if indicated  - Evaluate effectiveness of antiarrhythmic and heart rate control medications as ordered  - Initiate emergency measures for life threatening arrhythmias  - Monitor electrolytes and administer replacement therapy as ordered  Outcome: Progressing     Problem: RESPIRATORY - ADULT  Goal: Achieves optimal ventilation and oxygenation  Description: INTERVENTIONS:  - Assess for changes in respiratory status  - Assess for changes in mentation and behavior  - Position to facilitate oxygenation and minimize respiratory effort  - Oxygen supplementation based on oxygen saturation or ABGs  - Provide Smoking Cessation handout, if applicable  - Encourage broncho-pulmonary hygiene including cough, deep breathe, Incentive Spirometry  - Assess the need for suctioning and perform as needed  - Assess and instruct to report SOB or any respiratory difficulty  - Respiratory Therapy support as indicated  - Manage/alleviate anxiety  - Monitor for signs/symptoms of CO2 retention  Outcome: Progressing     Problem: Patient/Family Goals  Goal: Patient/Family Long Term Goal  Description: Patient's Long Term Goal: to go home    Interventions:  - Medications as ordered  - Testing as ordered  - See additional Care Plan goals for specific interventions  Outcome: Progressing  Goal: Patient/Family Short Term Goal  Description: Patient's Short Term Goal: Feel better    Interventions:   - Medications as ordered  - Testing as ordered  - See additional Care Plan goals for specific interventions  Outcome: Progressing     Problem: PAIN - ADULT  Goal: Verbalizes/displays adequate comfort level or patient's stated pain goal  Description: INTERVENTIONS:  - Encourage pt to monitor pain and request assistance  - Assess pain using appropriate pain scale  - Administer analgesics based on type and severity of pain and evaluate response  - Implement non-pharmacological measures as appropriate and evaluate response  - Consider cultural and social influences on pain and pain management  - Manage/alleviate anxiety  - Utilize distraction and/or relaxation techniques  - Monitor for opioid side effects  - Notify MD/LIP if interventions unsuccessful or patient reports new pain  - Anticipate increased pain with activity and pre-medicate as appropriate  Outcome: Progressing     Problem: RISK FOR INFECTION - ADULT  Goal: Absence of fever/infection during anticipated neutropenic period  Description: INTERVENTIONS  - Monitor WBC  - Administer growth factors as ordered  - Implement neutropenic guidelines  Outcome: Progressing     Problem: SAFETY ADULT - FALL  Goal: Free from fall injury  Description: INTERVENTIONS:  - Assess pt frequently for physical needs  - Identify cognitive and physical deficits and behaviors that affect risk of falls.   - [place] fall precautions as indicated by assessment.  - Educate pt/family on patient safety including physical limitations  - Instruct pt to call for assistance with activity based on assessment  - Modify environment to reduce risk of injury  - Provide assistive devices as appropriate  - Consider OT/PT consult to assist with strengthening/mobility  - Encourage toileting schedule  Outcome: Progressing     Problem: DISCHARGE PLANNING  Goal: Discharge to home or other facility with appropriate resources  Description: INTERVENTIONS:  - Identify barriers to discharge w/pt and caregiver  - Include patient/family/discharge partner in discharge planning  - Arrange for needed discharge resources and transportation as appropriate  - Identify discharge learning needs (meds, wound care, etc)  - Arrange for interpreters to assist at discharge as needed  - Consider post-discharge preferences of patient/family/discharge partner  - Complete POLST form as appropriate  - Assess patient's ability to be responsible for managing their own health  - Refer to Case Management Department for coordinating discharge planning if the patient needs post-hospital services based on physician/LIP order or complex needs related to functional status, cognitive ability or social support system  Outcome: Progressing

## 2022-10-06 NOTE — PLAN OF CARE
Alert and o x 4 , breathing pattern easy and non labored , denies any pain or discomfort . Tele shows Afib HR low 100's at rest  ; 130's with activity. Pt. taking Brilinta and eliquis. POC discussed with pt. , verbalized understanding. Cont. Monitor per tele/labs/v/s. Meds as ordered. Fall/safety precautions instructed , placed call light w/in reach. Problem: CARDIOVASCULAR - ADULT  Goal: Maintains optimal cardiac output and hemodynamic stability  Description: INTERVENTIONS:  - Monitor vital signs, rhythm, and trends  - Monitor for bleeding, hypotension and signs of decreased cardiac output  - Evaluate effectiveness of vasoactive medications to optimize hemodynamic stability  - Monitor arterial and/or venous puncture sites for bleeding and/or hematoma  - Assess quality of pulses, skin color and temperature  - Assess for signs of decreased coronary artery perfusion - ex.  Angina  - Evaluate fluid balance, assess for edema, trend weights  Outcome: Progressing  Goal: Absence of cardiac arrhythmias or at baseline  Description: INTERVENTIONS:  - Continuous cardiac monitoring, monitor vital signs, obtain 12 lead EKG if indicated  - Evaluate effectiveness of antiarrhythmic and heart rate control medications as ordered  - Initiate emergency measures for life threatening arrhythmias  - Monitor electrolytes and administer replacement therapy as ordered  Outcome: Progressing     Problem: RESPIRATORY - ADULT  Goal: Achieves optimal ventilation and oxygenation  Description: INTERVENTIONS:  - Assess for changes in respiratory status  - Assess for changes in mentation and behavior  - Position to facilitate oxygenation and minimize respiratory effort  - Oxygen supplementation based on oxygen saturation or ABGs  - Provide Smoking Cessation handout, if applicable  - Encourage broncho-pulmonary hygiene including cough, deep breathe, Incentive Spirometry  - Assess the need for suctioning and perform as needed  - Assess and instruct to report SOB or any respiratory difficulty  - Respiratory Therapy support as indicated  - Manage/alleviate anxiety  - Monitor for signs/symptoms of CO2 retention  Outcome: Progressing     Problem: PAIN - ADULT  Goal: Verbalizes/displays adequate comfort level or patient's stated pain goal  Description: INTERVENTIONS:  - Encourage pt to monitor pain and request assistance  - Assess pain using appropriate pain scale  - Administer analgesics based on type and severity of pain and evaluate response  - Implement non-pharmacological measures as appropriate and evaluate response  - Consider cultural and social influences on pain and pain management  - Manage/alleviate anxiety  - Utilize distraction and/or relaxation techniques  - Monitor for opioid side effects  - Notify MD/LIP if interventions unsuccessful or patient reports new pain  - Anticipate increased pain with activity and pre-medicate as appropriate  Outcome: Progressing     Problem: SAFETY ADULT - FALL  Goal: Free from fall injury  Description: INTERVENTIONS:  - Assess pt frequently for physical needs  - Identify cognitive and physical deficits and behaviors that affect risk of falls.   - [place] fall precautions as indicated by assessment.  - Educate pt/family on patient safety including physical limitations  - Instruct pt to call for assistance with activity based on assessment  - Modify environment to reduce risk of injury  - Provide assistive devices as appropriate  - Consider OT/PT consult to assist with strengthening/mobility  - Encourage toileting schedule  Outcome: Progressing

## 2022-10-06 NOTE — PROGRESS NOTES
10/06/22 1219 10/06/22 1221 10/06/22 1223   Vital Signs   /77 103/78 92/61   MAP (mmHg) 88 84 68   Patient Position Lying Sitting Standing   orthostatic BP

## 2022-10-06 NOTE — PROGRESS NOTES
0410    Pt. Woke up with severe pain at his Right flank that radiates to his Right lower lung. Pt. Also c/o nausea , mild SOB. Applied O2 , Tylenol and Zofran given. Notified hospitalist ; concerning for PE? At first , MD will order stat CTA chest , but pt.  allergic to contrast. Pt. Currently taking Brilinta and eliquis. Pt. Feeling better ; pain is subsiding , not in any form of of distress. Closely monitored.

## 2022-10-06 NOTE — DISCHARGE PLANNING
NURSING DISCHARGE NOTE    Discharged Home via Wheelchair. Accompanied by RN  Belongings Taken by patient/family. AVS printed and given to patient. Education completed and questions answered. IV and tele removed. Patient was discharged with no complaints.

## 2022-10-10 PROBLEM — Z98.890 HISTORY OF UROSTOMY: Status: ACTIVE | Noted: 2022-10-10

## 2022-10-10 PROBLEM — I95.1 ORTHOSTATIC HYPOTENSION: Status: ACTIVE | Noted: 2022-10-10

## 2022-10-10 PROBLEM — R79.89 LOW TSH LEVEL: Status: ACTIVE | Noted: 2022-10-10

## 2022-10-10 PROBLEM — I48.91 ATRIAL FIBRILLATION WITH RVR (HCC): Status: ACTIVE | Noted: 2022-10-10

## 2022-10-24 NOTE — TELEPHONE ENCOUNTER
Sara Rich calling for refill on stivarga from [facility].  They won't refill with out new script. Anatoliy Santiago

## 2022-11-02 NOTE — TELEPHONE ENCOUNTER
Orders for Stivarga 40mg - take 3 tablets daily (120mg total) #90 with 11 refills. [facility] notified.

## 2022-11-18 PROBLEM — R79.89 LOW TSH LEVEL: Status: RESOLVED | Noted: 2022-10-10 | Resolved: 2022-11-18

## 2022-11-18 PROBLEM — I48.91 ATRIAL FIBRILLATION, UNSPECIFIED TYPE (HCC): Status: RESOLVED | Noted: 2022-10-02 | Resolved: 2022-11-18

## 2022-11-18 PROBLEM — Z93.3 COLOSTOMY STATUS (HCC): Status: RESOLVED | Noted: 2019-03-31 | Resolved: 2022-11-18

## 2022-11-18 PROBLEM — R79.89 TROPONIN LEVEL ELEVATED: Status: RESOLVED | Noted: 2022-10-02 | Resolved: 2022-11-18

## 2022-11-18 PROBLEM — R06.00 DYSPNEA, UNSPECIFIED TYPE: Status: RESOLVED | Noted: 2022-10-02 | Resolved: 2022-11-18

## 2022-11-18 PROBLEM — I48.91 ATRIAL FIBRILLATION WITH RVR (HCC): Status: RESOLVED | Noted: 2022-10-10 | Resolved: 2022-11-18

## 2022-11-18 PROBLEM — I95.1 ORTHOSTATIC HYPOTENSION: Status: RESOLVED | Noted: 2022-10-10 | Resolved: 2022-11-18

## 2022-11-18 PROBLEM — R10.2 PERINEAL PAIN IN MALE: Status: RESOLVED | Noted: 2019-05-01 | Resolved: 2022-11-18

## 2022-11-18 PROBLEM — F13.20 SEDATIVE, HYPNOTIC OR ANXIOLYTIC DEPENDENCE, UNCOMPLICATED (HCC): Status: ACTIVE | Noted: 2022-11-18

## 2022-12-15 NOTE — TELEPHONE ENCOUNTER
Patient will receive his Ripretinib tomorrow. Would like instructions. Per  patient to stop stivarga for 2 days. Than start Ripretinib 1 pill daily X 4 days    Than 2 pills daily X 4 days     Increase to 3 pill daily      Call with any issues. Followup with Dr. Jarret Merchant and labs 2 week post starting Ripretinib.     Patient stated understanding

## 2022-12-27 NOTE — TELEPHONE ENCOUNTER
Telephone call to patient - stated he still has over 20 tablets of his potassium. Diarrhea resolved after stopping Stivarga. Does not need refill.

## 2023-01-10 NOTE — TELEPHONE ENCOUNTER
Carry called regarding CT order patient states he doesn't need oral contrast. Will need an updated order.

## 2023-01-13 NOTE — TELEPHONE ENCOUNTER
MD Audrey Toscano, RN  Please call patient. I sent him a My Chart but he hasn't read it. He is hyperthyroid and I need him to decrease his levothyroxine. Confirm that his current dose is 175 mcg. Assuming this is the correct dose, I will send to his pharmacy a script for 125 mcg. He can start the new dose the day after he gets it (don't take both on the same day). Patient is currently on Levothyroxine 175mcg daily. He will start Levothyroxine 125mcg as ordered. Also need Prednisone sent to pharmacy for his CT scan. Sent as requested.

## 2023-01-24 NOTE — PROGRESS NOTES
ONCOLOGY F/U NUTRITION SCREEN complete as triggered by [facility] dx of Metastatic GIST. Chart reviewed. Pt appears nutritionally stable at present. RD available on consult.

## 2023-02-07 NOTE — PROGRESS NOTES
Patient is here today for follow up with Sadi Ovalle for Malignant GIST. Ripretinib 150mg daily. Patient denies pain. Stated he is feeling good. Appetite is good. Denies adverse side effects of Ripretinib therapy. Medication list,medical history and toxicities were reviewed and updated. Education Record    Learner:  Patient and daughter      Disease / Diagnosis: Malignant GIST    Barriers / Limitations:  None   Comments:    Method:  Brief focused, Discussion, Printed material and Reinforcement   Comments:    General Topics:  Medication, Pain, Procedure and Plan of care reviewed   Comments:    Outcome:  Shows understanding   Comments:    AVS provided and follow up reviewed. Patient instructed to call as needed.

## 2023-02-21 NOTE — PROGRESS NOTES
Patient is here today for follow up with Damion Ruth for Malignant Gist.  Patient denies pain. Stated he is feeling good. Except for issues with his gallbladder. Seeing surgeon tomorrow. Currently taking Ripretinib 150mg daily - for his GIST - denies adverse side effects. Medication list,medical history and toxicities were reviewed and updated. Education Record    Learner:  Patient and daughter    Disease / Diagnosis: Malignant GIST    Barriers / Limitations:  None   Comments:    Method:  Brief focused, Discussion, Printed material and Reinforcement   Comments:    General Topics:  Medication, Pain, Procedure and Plan of care reviewed   Comments:    Outcome:  Shows understanding   Comments:      AVS provided and follow up reviewed. Patient instructed to call as needed.

## 2023-02-22 PROBLEM — K81.1 CHRONIC CHOLECYSTITIS: Status: ACTIVE | Noted: 2023-02-22

## 2023-02-24 NOTE — TELEPHONE ENCOUNTER
Pt called back to inform that his cardiologist doesn't want him to have a procedure done yet. His cardiologist wants him to wait until mid-march because he's on a medication that the dr doesn't want to take him off of yet, it hasn't been six months. Pt wants to speak to someone about this because he doesn't think that it's his call to cancel the procedure.      Please advise  Best callback number is [phone]

## 2023-02-24 NOTE — TELEPHONE ENCOUNTER
Pt can't in contact with his cardiologist, and wants to double check when to stop taking his blood thinners    Please advise  Best callback number is [phone]

## 2023-02-24 NOTE — TELEPHONE ENCOUNTER
Notified pt I would try to get in touch with Dr. James Rao and get his recommendations. Will contact patient, surgery scheduling and Dr. Thanh Tam once receive call back.   Call out to Dr. James Rao

## 2023-02-27 NOTE — TELEPHONE ENCOUNTER
Returned call to patient. Had left a message over the weekend. His surgery was postponed. He should resume his Ripretinib. Surgery is now in April. Patient is to stop Ripretinib 7 days prior to his surgery. Patient stated understanding.

## 2023-03-02 NOTE — TELEPHONE ENCOUNTER
Received cardiac clearance from [facility] cardiology.   Faxed to PAT and sent to scan

## 2023-03-17 NOTE — PROGRESS NOTES
Patient is here today for follow up with Dr. Tian Billy for GIST. Patient denies pain. Currently on Gleevec 800mg daily. Denies adverse side effects. Medication list, medical history and toxicities were reviewed and updated.     Education Record    Learner:  P no cough/no wheezing/no hemoptysis

## 2023-03-23 NOTE — TELEPHONE ENCOUNTER
MD Kamilah Petersen, RN  I sent him a my chart message about his thyroid tests. Tell him to look at and respond. Patient will look at [address] and respond.

## 2023-05-02 NOTE — IMAGING NOTE
I spoke with the pt who states 50 years ago he had a lower GI with a barium enema. During the procedure he developed hives/rash and had respiratory arrest. At that time the physician told him that he had an Iodine contrast allergy. The pt has had multiple documented CT's at [facility] with iodinated contrast and has taken the 13 hr prep with no break-through reactions. I spoke with Dr Alivia Solomon (Radiologist) who states it is ok with the pt proceeding with CT with IV contrast as long as pre-medicated. I reviewed 13 hr pre-med protocol with the pt and instructed him to have a . The pt verbalized understanding and denies further questions. - - -

## 2023-05-16 NOTE — PROGRESS NOTES
Patient is here today for follow up with Sadi Ovalle for Malignant GIST. Current treatment Ripretinib 150mg daily. Patient denies pain. C/O delayed wound healing. Appetite is good no nausea. Medication list,medical history and toxicities were reviewed and updated. Education Record    Learner:  Patient and daughter    Disease / Diagnosis: Malignant GIST    Barriers / Limitations:  None   Comments:    Method:  Brief focused, Discussion, Printed material and Reinforcement   Comments:    General Topics:  Medication, Pain, Procedure and Plan of care reviewed   Comments:    Outcome:  Shows understanding   Comments:  Anibal Zacarias MD visit - CT orders given follow up in 3 months. AVS provided and follow up reviewed. Patient instructed to call as needed.

## 2023-07-26 NOTE — TELEPHONE ENCOUNTER
Per Maren Leos - patient to look at [facility] message and respond. Patient notified will check MyChart and respond.

## 2023-08-07 NOTE — IMAGING NOTE
Spoke to patient and confirmed iodinated contrast allergy. Discussed and reviewed 13 hour pre-med protocol: Pt already has his premedication. Prednisone 50mg 13 hrs prior to CT, 7 hours prior to CT and one hour prior to CT. Benadryl 50mg one hour prior to CT. Pt aware he needs a . Verbalized understanding.

## 2023-08-23 NOTE — PROGRESS NOTES
Patient is here today for follow up with Kathya Rivera  for Malignant GIST. Currently on Ripretinib 150mg  daily. Patient denies pain. Stated eyes feal puffy. Hair thinning. Medication list,medical history and toxicities were reviewed and updated. Education Record    Learner:  Patient and daughter    Disease / Diagnosis: Malignant GIST    Barriers / Limitations:  None   Comments:    Method:  Brief focused, Discussion, Printed material and Reinforcement   Comments:    General Topics:  Medication, Pain, Procedure and Plan of care reviewed   Comments:    Outcome:  Shows understanding   Comments:    AVS provided and follow up reviewed. Patient instructed to call as needed.

## 2023-08-24 NOTE — TELEPHONE ENCOUNTER
Test(s) completed: TSH    Results: per Dr Lidia Sofia call patient. Let him know that his levothyroxine dose is too low. Confirm that he is on levothyroxine 75 mcg daily and that he is taking it on an empty stomach. If he's not taking it correctly, explain to him how to do. Assuming he is taking it correctly and that's the dose that he's on, send in script for levothyroxine 88 mcg daily before breakfast #30 with 1 refill and set him for labs only in about 6 weeks. Plan:spoke with patient. He is taking his Levothyroxine as directed. Will increase dose as Dr Tammie Madison recommended.

## 2023-09-28 NOTE — TELEPHONE ENCOUNTER
Please call patient and find out if his urinating improved after start of tamsulosin.          DC PT IND 9/28/23 SS/No skilled PT needs

## 2023-11-14 PROBLEM — Z51.5 PALLIATIVE CARE ENCOUNTER: Status: RESOLVED | Noted: 2020-01-27 | Resolved: 2023-11-14

## 2023-11-23 NOTE — TELEPHONE ENCOUNTER
Daylin Fernandez from [facility] is reporting that pt will be discharged today and will start his cardiac therapy starting tomorrow 10/27/2022 Elevated troponin level

## 2023-12-10 PROBLEM — R19.09 OTHER INTRA-ABDOMINAL AND PELVIC SWELLING, MASS AND LUMP: Status: RESOLVED | Noted: 2023-12-10 | Resolved: 2023-12-10

## 2023-12-10 PROBLEM — R19.00 PELVIC MASS: Status: ACTIVE | Noted: 2023-12-10

## 2023-12-10 PROBLEM — R19.09 OTHER INTRA-ABDOMINAL AND PELVIC SWELLING, MASS AND LUMP: Status: ACTIVE | Noted: 2023-12-10
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
## 2023-12-18 NOTE — TELEPHONE ENCOUNTER
Last time medication was refilled 6/23/23  Quantity and # of refills 90 w 1  Last OV 11/14/23  Next OV 5/14/24  Sent to Dr. Meghan Cummings for approval.

## 2023-12-28 NOTE — ED INITIAL ASSESSMENT (HPI)
Pt states hx of cancerous tumor. Pt started having pain last night. Pt states taking oral dilaudid and seemed to work. Pt taking oral chemo meds. Pt states pain has returned. Pt states he is now vomiting.

## 2023-12-29 NOTE — PROGRESS NOTES
1st attempt ER f/up apt request  No answer, LVMTCB to schedule apt  PCP -existing apt (5/14/24), unable to contact  SUR[facility] -existing apt (1/3/24)  Closing encounter

## 2023-12-29 NOTE — TELEPHONE ENCOUNTER
Per Dr Jacob Carias \"I used to prescribe it for pain and then his pain resolved. His CT from yesterday doesn't show any cause for new pain. No sure what's going on. I will send prescription for dilaudid to his pharmacy but I'm not giving him the 8 mg he used to get. He got 1.5 mg yesterday with improvement. I will send 2 mg tablets and he can take as many as he needs. However, tell him that he should not start at 8 mg. Finally, since he has new symptoms, I need to see him within the next 2 weeks for follow up. \"      Patient notified and verbalized understanding. Follow up appointment scheduled for 1/10 in [place]. elke observer/cultural considerations

## 2023-12-29 NOTE — TELEPHONE ENCOUNTER
Hina Messina calling asking for refill on HYDROmorphone (Dilaudid) 8mg tablet's he is out of medication and needs refilled asap.  I did not see tablets on meds list. Thank you dee

## 2024-01-03 NOTE — PROGRESS NOTES
I was present at the time of ultrasonography.  Tumor readily noted in prone position to the right of midline about 2.5 cm from the skin.  This should be readily accessible with localization if surgery to be undertaken.  Patient has an upcoming appointment with Dr. Tapia.    David Daniels MD

## 2024-01-10 NOTE — PROGRESS NOTES
Patient is here for MD post ER follow up. Patient was in the ER on 12/28 due to perineal pain. CT scan completed while there. He is on Dilaudid 2 mg twice daily for pain relief. Patient reports, pain has improved. Denies any bowel changes.     Education Record    Learner:  Patient    Disease / Diagnosis:  GIST    Barriers / Limitations:  None   Comments:    Method:  Discussion   Comments:    General Topics:  Plan of care reviewed   Comments:    Outcome:  Shows understanding   Comments:

## 2024-01-11 NOTE — TELEPHONE ENCOUNTER
Test(s) completed:TSH    Results: per Dr Tapia \"TSH is a bit high. Confirm that his current dose is 88 mcg. If so, send 100 mcg to his pharmacy (unless he has that dose at home). Make sure he's taking it on an empty stomach before breakfast\"    Plan:Patient states he has been taking his Levothyroxine 88 mcg on an empty stomach. Informed patient of lab results and change in dose. Prescription sent to patient's pharmacy.

## 2024-01-11 NOTE — TELEPHONE ENCOUNTER
[facility] [place] [facility] not [facility]  [facility] [employer] [facility]  [phone]
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
## 2024-01-15 PROBLEM — C79.89: Status: ACTIVE | Noted: 2024-01-15

## 2024-01-15 NOTE — PATIENT INSTRUCTIONS
Surgery:Resection of perineal soft tissue tumor with wire localization    Date of Surgery:    Hospital:    [address] [address]  Phone: [phone]    This is an Outpatient procedure.  Use the provided Chlorhexadine surgical soap(instructions attached) to shower the night before and morning of your procedure.  Do not apply powders, creams, lotions or deodorant after showering.  Do not apply any kind of makeup and make sure to remove nail polish prior to your surgery.  For faster recovery from anesthesia and surgery please follow the instructions below regarding your pre-op diet:  12 hours prior to your surgery time you are to drink one 10oz bottle of Ensure Pre-Surgery Drink. You are to have NO solid food or water after 11pm the night before your surgery EXCEPT one additional 10oz bottle of Ensure Pre-Surgery Drink. You need to finish drinking this 4 hours prior to surgery time.  Take Tylenol 1000mg by mouth at the time of your second Ensure Pre-Surgery drink(4hrs prior to surgery time), unless instructed otherwise by your surgeon.   Bowel Prep: No   If you take Insulin contact your primary care physician for specific instructions regarding dosing around your surgery.  Do not drink alcohol or smoke tobacco products 24 hours prior to procedure.   Bring your picture ID and insurance card with you.  Wear comfortable clothing that can easily be removed. Preferably, something that zips, snaps, or buttons up the front.   You will be contacted by the hospital  for pre-admission COVID-19 testing and the day prior to your surgery to confirm details and give you specific instructions about when and where to arrive the day of your procedure.   If you are taking blood thinners including: Plavix, Eliquis, Xarelto, Coumadin, full strength Aspirin you will need to contact the prescribing provider for specific instructions on holding these medications for your procedure.  Inform your  primary care physician of your surgery and ask if him/her will need to see you prior to surgery.  If you develop symptoms of another illness prior to surgery please contact our office immediately.   If this is an inpatient surgery, attending the Surgical Oncology Pre-operative Education Class is strongly encouraged. Email [email or web address] to RSVP or for more class information.       Pre-Operative Testing  x CBC done x CMP done  BMP    PT, PTT, INR  UA x EKG done    Chest X-Ray x Cardiac Clearance and anticoagulation hold  [facility] Clearance     David Daniels MD, FACS  Chief of Surgical Oncology  Co-Medical Director, Oncology Services  Professor of Surgery    For Dr. Daniels's office: [phone]  Fax: [phone]  After hours you will reach the answering service    Pre-Admission [phone]  Central [phone]  Medical Records:   [phone]

## 2024-01-19 NOTE — TELEPHONE ENCOUNTER
Patient is having surgery 1/3024.  He would like to know when Dr Tapia would like him to hold his Qinlock.  Please call to discuss.  Thank you.

## 2024-01-19 NOTE — TELEPHONE ENCOUNTER
Spoke with patient. He verbalized understanding.     Tell him last dose is 1/23. We will tell him when to restart based upon his recovery from surgery.

## 2024-01-21 NOTE — PROGRESS NOTES
[facility] Progress Note      Patient Name: Chepe Hernández   YOB: 1950  Medical Record Number: [id number]    The 21st Century Cures Act makes medical notes like these available to patients in the interest of transparency. Please be advised this is a medical document. Medical documents are intended to carry relevant information, facts as evident, and the clinical opinion of the practitioner. The medical note is intended as peer to peer communication and may appear blunt or direct. It is written in medical language and may contain abbreviations or verbiage that are unfamiliar.     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    Oncologic History  Chepe Hernández is a 73 year old male who originally presented in 2012 with left lower quadrant abdominal pain and obstructive uropathy. He was found to have a 16 cm mass abutting the bladder and rectum. 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 He was started on imatinib 400 mg daily with decrease in size of the tumor. While he has no metastatic disease he has been seen by several surgical oncologists and told that complete surgical resection would require colostomy and urostomy.     Routine imaging studies on 03/15/2016 showed increase in size of established tumor without metastatic disease. Increase in tumor size coincided with increased urinary frequency and hesitancy. As a result on 03/25/2016 patient increased imatinib dose to 800 mg daily.      On 06/19/2017 he presented to the emergency room with rectal pain. He reports that he was constipated and pushed to have a bowel movement. After the bowel movement he developed severe pain. He states that he had noticed that for the one month prior, he was having increasing issues with constipation. 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     On 06/20/2017 patient began therapy  with sunitinib 50 mg daily. At the end of 07/2016 he continued sunitinib at a dose of 37.5 mg daily. Follow up imaging studies on 08/31/2017 showed small progression of the tumor. Patient was also experiencing increased pain, difficulty moving his bowels, and symptoms of urinary obstruction.      On 10/03/2017 he underwent pelvic exenteration including en-bloc resection of pelvic GIST with abdominoperineal resection, total cystectomy, prostatectomy, end colostomy, and urostomy. Pathology showed a 10.2 cm GIST with 80% necrosis; necrotic tumor was present in prostatic tissue and wall of rectum; 10/10 lymph nodes were negative for metastasis; tumor showed 16 mitoses per 50 hpf; surgical margins were negative.     Patient presented to ED on 12/02/2019 with rectal pain. CT abdomen/pelvis with contrast on that day showed multiple new pelvic nodules that were not present in 07/2019. On 12/06/2019 patient underwent biopsy of a left lower quadrant soft tissue mass. Pathology confirmed gastrointestinal stromal tumor. 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     On 12/19/2019 patient started regorafenib. He states that within hours of taking the first dose, his pain resolved.     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 He was taken to the OR emergently for lysis of adhesions, repair of the parastomal hernia with mesh, and excision of the left lower quadrant tumor. Pathology from the tumor showed GIST. Regorafenib was discontinued to allow for post surgical healing.     On 01/13/2020 patient restarted regorafenib. With therapy his pain resolved but he repeatedly discontinued therapy. Once it was due to stomal herniation requiring surgery. A second time it was due to dehydration. A third time it was due to severe pelvic pain.     Since 01/31/2020 he has consistently remained on regorafenib.     In  09/2022 he was diagnosed with myocardial infarction and had coronary artery stents placed.     On 10/02/2022 patient was admitted with atrial fibrillation which was managed with medications. He was found to a left atrial appendage thrombus and started on DOAC.    CT imaging on 11/28/2022 showed progressive disease.     In 12/2022 he began therapy with ripretinib. CT imaging on 02/17/2023 showed partial response.     History of Present Illness  Patient returns for follow up earlier than scheduled. He was seen in the ED on 12/28/2023 with intractable perineal pain. He was treated with Dilaudid.     He remains on ripretinib. He denies any adverse effects. He denies any new pain, diarrhea, fatigue, shortness of breath.     Past Medical History (historical data, reviewed by physician)  GIST (as above); benign prostatic hyperplasia; hypothyroidism; hypercholesterolemia; paroxysmal atrial fibrillation; peripheral vascular disease.      Past Surgical History (historical data, reviewed by physician)  Stomal hernia repair; inguinal hernia repair x 4; lumbar discectomy; resection of GIST (as above); angioplasty left lower extremity arteries.     Family History (historical data, reviewed by physician)  Mother with breast cancer.     Social History (historical data, reviewed by physician)  Current cigar smoker; social alcohol use.      Current Medications   [DISCONTINUED] HYDROmorphone 2 MG Oral Tab Take 1-4 tablets (2-8 mg total) by mouth every 3 (three) hours as needed (cancer related pain.). 120 tablet 0    [DISCONTINUED] pantoprazole 40 MG Oral Tab EC Take 1 tablet (40 mg total) by mouth daily. (Patient not taking: Reported on 1/15/2024) 30 tablet 0    escitalopram 10 MG Oral Tab Take 1 tablet (10 mg total) by mouth daily. 90 tablet 1    QINLOCK 50 MG Oral Tab Take 3 tablets by mouth daily. Total dose 150 mg daily (Patient taking differently: Take 3 tablets by mouth nightly. Total dose 150 mg daily) 90 tablet 11    aspirin  81 MG Oral Tab EC Take 1 tablet (81 mg total) by mouth daily.      clopidogrel 75 MG Oral Tab Take 1 tablet (75 mg total) by mouth daily.      [DISCONTINUED] levothyroxine 88 MCG Oral Tab Take 1 tablet (88 mcg total) by mouth before breakfast.      atorvastatin 40 MG Oral Tab Take 1 tablet (40 mg total) by mouth nightly.      [DISCONTINUED] metoprolol succinate ER 25 MG Oral Tablet 24 Hr Take 1 tablet (25 mg total) by mouth Daily Beta Blocker. 30 tablet 2   Allergies   Mr. Hernández is allergic to radiology contrast iodinated dyes.     Vital Signs   /72 (BP Location: Left arm, Patient Position: Sitting, Cuff Size: large)   Pulse (!) 46   Temp 97.9 °F (36.6 °C) (Tympanic)   Resp 18   Ht 1.829 m (6' 0.01\")   Wt 98.7 kg (217 lb 8 oz)   SpO2 100%   BMI 29.49 kg/m²     Physical Examination  Constitutional      Well developed, well nourished. Appears close to chronological age. No apparent distress.   Head                   Normocephalic and atraumatic.  Eyes                   Conjunctiva clear; sclera anicteric.  ENMT                 External nose normal; external ears normal.  Neck   Supple; no masses.   Respiratory          Normal effort; no respiratory distress.  Cardiovascular  Regular rate and rhythm.  Extremities  No lower extremity edema.   Neurologic           Motor and sensory grossly intact.  Psychiatric          Mood and affect appropriate.    Laboratory  Recent Results (from the past 1008 hour(s))   EKG 12 Lead    Collection Time: 12/28/23  3:12 PM   Result Value Ref Range    Ventricular rate 65 BPM    Atrial rate 326 BPM    P-R Interval  ms    QRS Duration 100 ms    Q-T Interval 414 ms    QTC Calculation (Bezet) 430 ms    P Axis  degrees    R Axis -65 degrees    T Axis 62 degrees   RAINBOW DRAW BLUE    Collection Time: 12/28/23  3:51 PM   Result Value Ref Range    Hold Blue Auto Resulted    RAINBOW DRAW LAVENDER    Collection Time: 12/28/23  3:52 PM   Result Value Ref Range    Hold Lavender Auto  Resulted    RAINBOW DRAW LIGHT GREEN    Collection Time: 12/28/23  3:52 PM   Result Value Ref Range    Hold Lt Green Auto Resulted    Comp Metabolic Panel (14)    Collection Time: 12/28/23  3:52 PM   Result Value Ref Range    Glucose 163 (H) 70 - 99 mg/dL    Sodium 138 136 - 145 mmol/L    Potassium 4.1 3.5 - 5.1 mmol/L    Chloride 107 98 - 112 mmol/L    CO2 19.0 (L) 21.0 - 32.0 mmol/L    Anion Gap 12 0 - 18 mmol/L    BUN 24 (H) 9 - 23 mg/dL    Creatinine 1.28 0.70 - 1.30 mg/dL    Calcium, Total 9.5 8.5 - 10.1 mg/dL    Calculated Osmolality 294 275 - 295 mOsm/kg    eGFR-Cr 59 (L) >=60 mL/min/1.73m2    AST 21 15 - 37 U/L    ALT 19 16 - 61 U/L    Alkaline Phosphatase 99 45 - 117 U/L    Bilirubin, Total 0.6 0.1 - 2.0 mg/dL    Total Protein 7.6 6.4 - 8.2 g/dL    Albumin 4.0 3.4 - 5.0 g/dL    Globulin  3.6 2.8 - 4.4 g/dL    A/G Ratio 1.1 1.0 - 2.0   Troponin I (High Sensitivity)    Collection Time: 12/28/23  3:52 PM   Result Value Ref Range    Troponin I (High Sensitivity) 27 <=79 ng/L   CBC W/ DIFFERENTIAL    Collection Time: 12/28/23  3:52 PM   Result Value Ref Range    WBC 12.1 (H) 4.0 - 11.0 x10(3) uL    RBC 5.17 3.80 - 5.80 x10(6)uL    HGB 14.8 13.0 - 17.5 g/dL    HCT 43.3 39.0 - 53.0 %    .0 150.0 - 450.0 10(3)uL    MCV 83.8 80.0 - 100.0 fL    MCH 28.6 26.0 - 34.0 pg    MCHC 34.2 31.0 - 37.0 g/dL    RDW 14.6 %    Neutrophil Absolute Prelim 9.43 (H) 1.50 - 7.70 x10 (3) uL    Neutrophil Absolute 9.43 (H) 1.50 - 7.70 x10(3) uL    Lymphocyte Absolute 1.69 1.00 - 4.00 x10(3) uL    Monocyte Absolute 0.69 0.10 - 1.00 x10(3) uL    Eosinophil Absolute 0.16 0.00 - 0.70 x10(3) uL    Basophil Absolute 0.09 0.00 - 0.20 x10(3) uL    Immature Granulocyte Absolute 0.08 0.00 - 1.00 x10(3) uL    Neutrophil % 77.7 %    Lymphocyte % 13.9 %    Monocyte % 5.7 %    Eosinophil % 1.3 %    Basophil % 0.7 %    Immature Granulocyte % 0.7 %   Lipase    Collection Time: 12/28/23  3:52 PM   Result Value Ref Range    Lipase 30 13 - 75 U/L    TSH + FREE T4 [E]    Collection Time: 01/10/24 11:53 AM   Result Value Ref Range    Free T4 1.0 0.8 - 1.7 ng/dL    TSH 22.700 (H) 0.358 - 3.740 mIU/mL     Radiology  12/28/2023:  CT abdomen/pelvis w contrast - I independently visualized the radiologic images in addition to reviewing the written report: Further increase in size of mass in right ischial fossa.     Impression and Plan   1.   Metastatic GIST: CT shows stable disease. Continue ripretinib without modification.     2.   Hypothyroidism: Increase levothyroxine dose to 100 mcg daily.     3.   Mass in right ischial fat: It continues to increase in size. It appears to be the source of the patient's pain for which he requires hydromorphone. Not consistent with metastatic GIST. Patient has seen Dr. Daniels and surgery is being considered. I recommend he proceed with surgery. He should hold ripiretinib 1 week prior to surgery.     4.   Contrast allergy: Prednisone prescribed for next CT scan.    5.   Pain: Hydromorphone PRN.    Patient will continue to receive longitudinal care by me for the complex care required for the cancer diagnosis including the expected complications related to anticancer therapy.     Planned Follow Up  Patient will return for follow up in 03/2024.    Electronically signed by:    Zaire Tapia M.D.  System Medical Director of Oncology Services  [facility]

## 2024-01-29 ENCOUNTER — ANESTHESIA EVENT (OUTPATIENT)
Dept: SURGERY | Facility: HOSPITAL | Age: 74
End: 2024-01-29
Payer: MEDICARE

## 2024-01-29 DIAGNOSIS — C79.89 SECONDARY MALIGNANCY OF SOFT TISSUES OF PERINEUM (HCC): Primary | ICD-10-CM

## 2024-01-29 RX ORDER — FLUMAZENIL 0.1 MG/ML
0.2 INJECTION INTRAVENOUS AS NEEDED
Status: CANCELLED | OUTPATIENT
Start: 2024-01-29

## 2024-01-29 RX ORDER — MIDAZOLAM HYDROCHLORIDE 1 MG/ML
1 INJECTION INTRAMUSCULAR; INTRAVENOUS EVERY 5 MIN PRN
Status: CANCELLED | OUTPATIENT
Start: 2024-01-30 | End: 2024-01-30

## 2024-01-29 RX ORDER — DIAZEPAM 5 MG/1
5 TABLET ORAL AS NEEDED
Status: CANCELLED | OUTPATIENT
Start: 2024-01-29

## 2024-01-29 RX ORDER — SODIUM CHLORIDE 9 MG/ML
INJECTION, SOLUTION INTRAVENOUS CONTINUOUS
Status: CANCELLED | OUTPATIENT
Start: 2024-01-29

## 2024-01-29 RX ORDER — NALOXONE HYDROCHLORIDE 0.4 MG/ML
80 INJECTION, SOLUTION INTRAMUSCULAR; INTRAVENOUS; SUBCUTANEOUS AS NEEDED
Status: CANCELLED | OUTPATIENT
Start: 2024-01-29

## 2024-01-29 NOTE — IMAGING NOTE
Preadmission Rn called the patient, he will be going to surgery on arrival, Called the patient to see when the last dose of Plavix and ASA 81 mg, 01/30/23 in the am. Orders placed, Dr. Ortega will be the doctor.

## 2024-01-30 ENCOUNTER — HOSPITAL ENCOUNTER (OUTPATIENT)
Facility: HOSPITAL | Age: 74
Setting detail: HOSPITAL OUTPATIENT SURGERY
Discharge: HOME | End: 2024-01-30
Attending: SURGERY | Admitting: SURGERY
Payer: MEDICARE

## 2024-01-30 ENCOUNTER — ANESTHESIA (OUTPATIENT)
Dept: SURGERY | Facility: HOSPITAL | Age: 74
End: 2024-01-30
Payer: MEDICARE

## 2024-01-30 ENCOUNTER — HOSPITAL ENCOUNTER (OUTPATIENT)
Dept: CT IMAGING | Facility: HOSPITAL | Age: 74
Discharge: HOME | End: 2024-01-30
Attending: SURGERY | Admitting: SURGERY
Payer: MEDICARE

## 2024-01-30 VITALS
WEIGHT: 218.63 LBS | TEMPERATURE: 98 F | RESPIRATION RATE: 18 BRPM | DIASTOLIC BLOOD PRESSURE: 82 MMHG | SYSTOLIC BLOOD PRESSURE: 130 MMHG | OXYGEN SATURATION: 98 % | HEART RATE: 69 BPM | BODY MASS INDEX: 29.61 KG/M2 | HEIGHT: 72.01 IN

## 2024-01-30 DIAGNOSIS — C79.89 SECONDARY MALIGNANCY OF SOFT TISSUES OF PERINEUM (HCC): Primary | ICD-10-CM

## 2024-01-30 DIAGNOSIS — C79.89 SECONDARY MALIGNANCY OF SOFT TISSUES OF PERINEUM (HCC): ICD-10-CM

## 2024-01-30 LAB
ERYTHROCYTE [DISTWIDTH] IN BLOOD BY AUTOMATED COUNT: 15 %
GLUCOSE BLD-MCNC: 142 MG/DL (ref 70–99)
HCT VFR BLD AUTO: 40.7 %
HGB BLD-MCNC: 13.1 G/DL
INR BLD: 1.07 (ref 0.8–1.2)
MCH RBC QN AUTO: 28.1 PG (ref 26–34)
MCHC RBC AUTO-ENTMCNC: 32.2 G/DL (ref 31–37)
MCV RBC AUTO: 87.3 FL
PLATELET # BLD AUTO: 206 10(3)UL (ref 150–450)
PROTHROMBIN TIME: 13.9 SECONDS (ref 11.6–14.8)
RBC # BLD AUTO: 4.66 X10(6)UL
WBC # BLD AUTO: 6.8 X10(3) UL (ref 4–11)

## 2024-01-30 PROCEDURE — 10035 PLMT SFT TISS LOCLZJ DEV 1ST: CPT | Performed by: SURGERY

## 2024-01-30 PROCEDURE — 0WBM0ZZ EXCISION OF MALE PERINEUM, OPEN APPROACH: ICD-10-PCS | Performed by: SURGERY

## 2024-01-30 PROCEDURE — 85027 COMPLETE CBC AUTOMATED: CPT | Performed by: RADIOLOGY

## 2024-01-30 PROCEDURE — 99152 MOD SED SAME PHYS/QHP 5/>YRS: CPT | Performed by: SURGERY

## 2024-01-30 PROCEDURE — 85610 PROTHROMBIN TIME: CPT | Performed by: RADIOLOGY

## 2024-01-30 PROCEDURE — 82962 GLUCOSE BLOOD TEST: CPT

## 2024-01-30 PROCEDURE — 88304 TISSUE EXAM BY PATHOLOGIST: CPT | Performed by: SURGERY

## 2024-01-30 PROCEDURE — 77012 CT SCAN FOR NEEDLE BIOPSY: CPT | Performed by: SURGERY

## 2024-01-30 RX ORDER — SODIUM CHLORIDE, SODIUM LACTATE, POTASSIUM CHLORIDE, CALCIUM CHLORIDE 600; 310; 30; 20 MG/100ML; MG/100ML; MG/100ML; MG/100ML
INJECTION, SOLUTION INTRAVENOUS CONTINUOUS
Status: DISCONTINUED | OUTPATIENT
Start: 2024-01-30 | End: 2024-01-30

## 2024-01-30 RX ORDER — NICOTINE POLACRILEX 4 MG
15 LOZENGE BUCCAL
Status: DISCONTINUED | OUTPATIENT
Start: 2024-01-30 | End: 2024-01-30

## 2024-01-30 RX ORDER — BUPIVACAINE HYDROCHLORIDE 2.5 MG/ML
INJECTION, SOLUTION EPIDURAL; INFILTRATION; INTRACAUDAL AS NEEDED
Status: DISCONTINUED | OUTPATIENT
Start: 2024-01-30 | End: 2024-01-30 | Stop reason: HOSPADM

## 2024-01-30 RX ORDER — ONDANSETRON 2 MG/ML
INJECTION INTRAMUSCULAR; INTRAVENOUS AS NEEDED
Status: DISCONTINUED | OUTPATIENT
Start: 2024-01-30 | End: 2024-01-30 | Stop reason: SURG

## 2024-01-30 RX ORDER — CLOPIDOGREL BISULFATE 75 MG/1
75 TABLET ORAL DAILY
Status: SHIPPED | COMMUNITY
Start: 2024-01-30

## 2024-01-30 RX ORDER — NALOXONE HYDROCHLORIDE 0.4 MG/ML
0.08 INJECTION, SOLUTION INTRAMUSCULAR; INTRAVENOUS; SUBCUTANEOUS AS NEEDED
Status: DISCONTINUED | OUTPATIENT
Start: 2024-01-30 | End: 2024-01-30

## 2024-01-30 RX ORDER — DEXTROSE MONOHYDRATE 25 G/50ML
50 INJECTION, SOLUTION INTRAVENOUS
Status: DISCONTINUED | OUTPATIENT
Start: 2024-01-30 | End: 2024-01-30

## 2024-01-30 RX ORDER — HYDROMORPHONE HYDROCHLORIDE 1 MG/ML
0.6 INJECTION, SOLUTION INTRAMUSCULAR; INTRAVENOUS; SUBCUTANEOUS EVERY 5 MIN PRN
Status: DISCONTINUED | OUTPATIENT
Start: 2024-01-30 | End: 2024-01-30

## 2024-01-30 RX ORDER — HYDROCODONE BITARTRATE AND ACETAMINOPHEN 5; 325 MG/1; MG/1
1 TABLET ORAL ONCE AS NEEDED
Status: DISCONTINUED | OUTPATIENT
Start: 2024-01-30 | End: 2024-01-30

## 2024-01-30 RX ORDER — ACETAMINOPHEN 500 MG
1000 TABLET ORAL ONCE
Status: DISCONTINUED | OUTPATIENT
Start: 2024-01-30 | End: 2024-01-30 | Stop reason: HOSPADM

## 2024-01-30 RX ORDER — CEFAZOLIN SODIUM/WATER 2 G/20 ML
2 SYRINGE (ML) INTRAVENOUS ONCE
Status: COMPLETED | OUTPATIENT
Start: 2024-01-30 | End: 2024-01-30

## 2024-01-30 RX ORDER — LIDOCAINE HYDROCHLORIDE 10 MG/ML
INJECTION, SOLUTION EPIDURAL; INFILTRATION; INTRACAUDAL; PERINEURAL AS NEEDED
Status: DISCONTINUED | OUTPATIENT
Start: 2024-01-30 | End: 2024-01-30 | Stop reason: SURG

## 2024-01-30 RX ORDER — NALOXONE HYDROCHLORIDE 0.4 MG/ML
80 INJECTION, SOLUTION INTRAMUSCULAR; INTRAVENOUS; SUBCUTANEOUS AS NEEDED
Status: DISCONTINUED | OUTPATIENT
Start: 2024-01-30 | End: 2024-01-30 | Stop reason: HOSPADM

## 2024-01-30 RX ORDER — HYDROCODONE BITARTRATE AND ACETAMINOPHEN 5; 325 MG/1; MG/1
1 TABLET ORAL EVERY 6 HOURS PRN
Qty: 20 TABLET | Refills: 0 | Status: SHIPPED | OUTPATIENT
Start: 2024-01-30

## 2024-01-30 RX ORDER — MIDAZOLAM HYDROCHLORIDE 1 MG/ML
1 INJECTION INTRAMUSCULAR; INTRAVENOUS EVERY 5 MIN PRN
Status: ACTIVE | OUTPATIENT
Start: 2024-01-30 | End: 2024-01-30

## 2024-01-30 RX ORDER — MIDAZOLAM HYDROCHLORIDE 1 MG/ML
INJECTION INTRAMUSCULAR; INTRAVENOUS
Status: DISCONTINUED
Start: 2024-01-30 | End: 2024-01-31

## 2024-01-30 RX ORDER — HYDROCODONE BITARTRATE AND ACETAMINOPHEN 5; 325 MG/1; MG/1
2 TABLET ORAL ONCE AS NEEDED
Status: DISCONTINUED | OUTPATIENT
Start: 2024-01-30 | End: 2024-01-30

## 2024-01-30 RX ORDER — FLUMAZENIL 0.1 MG/ML
0.2 INJECTION INTRAVENOUS AS NEEDED
Status: DISCONTINUED | OUTPATIENT
Start: 2024-01-30 | End: 2024-01-30 | Stop reason: HOSPADM

## 2024-01-30 RX ORDER — ONDANSETRON 4 MG/1
4 TABLET, ORALLY DISINTEGRATING ORAL EVERY 4 HOURS PRN
Qty: 20 TABLET | Refills: 0 | Status: SHIPPED | OUTPATIENT
Start: 2024-01-30

## 2024-01-30 RX ORDER — HEPARIN SODIUM 5000 [USP'U]/ML
5000 INJECTION, SOLUTION INTRAVENOUS; SUBCUTANEOUS ONCE
Status: DISCONTINUED | OUTPATIENT
Start: 2024-01-30 | End: 2024-01-30 | Stop reason: HOSPADM

## 2024-01-30 RX ORDER — MIDAZOLAM HYDROCHLORIDE 1 MG/ML
INJECTION INTRAMUSCULAR; INTRAVENOUS AS NEEDED
Status: DISCONTINUED | OUTPATIENT
Start: 2024-01-30 | End: 2024-01-30 | Stop reason: SURG

## 2024-01-30 RX ORDER — SODIUM CHLORIDE 9 MG/ML
INJECTION, SOLUTION INTRAVENOUS CONTINUOUS
Status: ACTIVE | OUTPATIENT
Start: 2024-01-30 | End: 2024-01-30

## 2024-01-30 RX ORDER — EPHEDRINE SULFATE 50 MG/ML
INJECTION INTRAVENOUS AS NEEDED
Status: DISCONTINUED | OUTPATIENT
Start: 2024-01-30 | End: 2024-01-30 | Stop reason: SURG

## 2024-01-30 RX ORDER — HYDROMORPHONE HYDROCHLORIDE 1 MG/ML
0.4 INJECTION, SOLUTION INTRAMUSCULAR; INTRAVENOUS; SUBCUTANEOUS EVERY 5 MIN PRN
Status: DISCONTINUED | OUTPATIENT
Start: 2024-01-30 | End: 2024-01-30

## 2024-01-30 RX ORDER — SODIUM CHLORIDE 9 MG/ML
INJECTION, SOLUTION INTRAVENOUS CONTINUOUS
Status: DISCONTINUED | OUTPATIENT
Start: 2024-01-30 | End: 2024-01-30

## 2024-01-30 RX ORDER — ROCURONIUM BROMIDE 10 MG/ML
INJECTION, SOLUTION INTRAVENOUS AS NEEDED
Status: DISCONTINUED | OUTPATIENT
Start: 2024-01-30 | End: 2024-01-30 | Stop reason: SURG

## 2024-01-30 RX ORDER — NICOTINE POLACRILEX 4 MG
30 LOZENGE BUCCAL
Status: DISCONTINUED | OUTPATIENT
Start: 2024-01-30 | End: 2024-01-30

## 2024-01-30 RX ORDER — ONDANSETRON 2 MG/ML
4 INJECTION INTRAMUSCULAR; INTRAVENOUS EVERY 6 HOURS PRN
Status: DISCONTINUED | OUTPATIENT
Start: 2024-01-30 | End: 2024-01-30

## 2024-01-30 RX ORDER — METOCLOPRAMIDE HYDROCHLORIDE 5 MG/ML
10 INJECTION INTRAMUSCULAR; INTRAVENOUS EVERY 8 HOURS PRN
Status: DISCONTINUED | OUTPATIENT
Start: 2024-01-30 | End: 2024-01-30

## 2024-01-30 RX ORDER — DEXAMETHASONE SODIUM PHOSPHATE 4 MG/ML
VIAL (ML) INJECTION AS NEEDED
Status: DISCONTINUED | OUTPATIENT
Start: 2024-01-30 | End: 2024-01-30 | Stop reason: SURG

## 2024-01-30 RX ORDER — ACETAMINOPHEN 500 MG
1000 TABLET ORAL ONCE AS NEEDED
Status: DISCONTINUED | OUTPATIENT
Start: 2024-01-30 | End: 2024-01-30

## 2024-01-30 RX ORDER — HYDROMORPHONE HYDROCHLORIDE 1 MG/ML
0.2 INJECTION, SOLUTION INTRAMUSCULAR; INTRAVENOUS; SUBCUTANEOUS EVERY 5 MIN PRN
Status: DISCONTINUED | OUTPATIENT
Start: 2024-01-30 | End: 2024-01-30

## 2024-01-30 RX ADMIN — SODIUM CHLORIDE, SODIUM LACTATE, POTASSIUM CHLORIDE, CALCIUM CHLORIDE: 600; 310; 30; 20 INJECTION, SOLUTION INTRAVENOUS at 13:41:00

## 2024-01-30 RX ADMIN — SODIUM CHLORIDE, SODIUM LACTATE, POTASSIUM CHLORIDE, CALCIUM CHLORIDE: 600; 310; 30; 20 INJECTION, SOLUTION INTRAVENOUS at 15:52:00

## 2024-01-30 RX ADMIN — LIDOCAINE HYDROCHLORIDE 100 MG: 10 INJECTION, SOLUTION EPIDURAL; INFILTRATION; INTRACAUDAL; PERINEURAL at 13:46:00

## 2024-01-30 RX ADMIN — CEFAZOLIN SODIUM/WATER 2 G: 2 G/20 ML SYRINGE (ML) INTRAVENOUS at 14:02:00

## 2024-01-30 RX ADMIN — ROCURONIUM BROMIDE 50 MG: 10 INJECTION, SOLUTION INTRAVENOUS at 13:48:00

## 2024-01-30 RX ADMIN — DEXAMETHASONE SODIUM PHOSPHATE 4 MG: 4 MG/ML VIAL (ML) INJECTION at 14:12:00

## 2024-01-30 RX ADMIN — EPHEDRINE SULFATE 5 MG: 50 INJECTION INTRAVENOUS at 14:02:00

## 2024-01-30 RX ADMIN — EPHEDRINE SULFATE 5 MG: 50 INJECTION INTRAVENOUS at 14:35:00

## 2024-01-30 RX ADMIN — ONDANSETRON 4 MG: 2 INJECTION INTRAMUSCULAR; INTRAVENOUS at 15:13:00

## 2024-01-30 RX ADMIN — MIDAZOLAM HYDROCHLORIDE 2 MG: 1 INJECTION INTRAMUSCULAR; INTRAVENOUS at 13:45:00

## 2024-01-30 RX ADMIN — EPHEDRINE SULFATE 5 MG: 50 INJECTION INTRAVENOUS at 13:55:00

## 2024-01-30 NOTE — DISCHARGE INSTRUCTIONS
Post Op Instructions    Thank you for choosing the Surgical Oncology team at [facility].  Managing Your Pain  Take your medications as directed and as needed. You may also take 500-1000 mg of acetaminophen (Tylenol®) every 6 hours as needed for pain. There is also 325 mg of acetaminophen in the Norco pills. Do not exceed 4,000 mg of acetaminophen in 24 hours.   Call our office if the medication prescribed for you doesn't ease your pain.  Don't drive or drink alcohol while you're taking prescription pain medication  Pain medication should help you resume your normal activities. Take enough medication to do your exercises comfortably. However, it's normal for your pain to increase a little as you start to be more active.  Keep track of when you take your pain medication. It works best 30 to 45 minutes after you take it. Taking it when your pain first begins is better than waiting for the pain to get worse.  Pain medication may cause constipation  Managing Constipation  Go to the bathroom at the same time every day.   Exercise. Walking is an excellent form of exercise.  Drink 8 (8-ounce) glasses (2 liters) of liquids daily, if you can. Drink water, juices (such as prune juice), soups, ice cream shakes, and other drinks that don't have caffeine.   If you haven't had a bowel movement in 3 days, call our office  Caring for Your Incision  It's normal for the skin below your incision to feel numb. This happens because some of the nerves were cut during your surgery. The numbness will go away over time.  Leave the dressing on for 48 hours after surgery. The clear outer dressing (Tegaderm) can then come off.  The sutures (stitches) in your incision are dissolvable, and will not need to be removed.   If the area around your incision is red, puffy, or if you have any drainage from your incision, contact our office.  Showering  You may shower 48 hours after surgery. When you shower, use mild soap to gently wash your  incision. After you shower, pat the area dry with a clean towel. Don't rub over your incision. Leave your incision uncovered, unless there's drainage.  Avoid tub baths until your surgeon says it's okay.  Eating and Drinking  You may resume a regular diet when you go home. You may not be able to eat as much as you did before your surgery. Try to eat 4 to 6 small meals a day.  Drink plenty of liquids. Try to drink 8 (8-ounce) glasses of liquids every day. Don't drink alcohol until you check with your surgeon.  Activity and Exercise  Remember, recovery after surgery takes several weeks. It is common to feel tired or fatigued. Rest as needed.   Doing aerobic exercise, such as walking and stair climbing, will help you gain strength and feel better. Gradually increase the distance you walk. Rest or stop as needed.  Don't lift anything heavier than 10 pounds for at least 8 weeks after your surgery or until your surgeon says it's okay.   Avoid strenuous activity and exercises until your surgeon says it's okay.  Ask your surgeon when it is okay for you to drive.   Ask your surgeon when you can return to work.  When to Call:  Let us know if you experience the following symptoms:  Fever of 100.4° F (38°C) or higher  Cloudy or smelly drainage from the incision site  The skin around your incision is warm/red/swollen  Sudden increase in pain or new pain  Shaking chills  Fast pulse  Shortness of breath or chest pain  Nausea or vomiting.   Diarrhea  Constipation that isn't relieved in 3 days  Signs of a bladder infection (urinating more often than usual, burning while urinating, bleeding or hesitancy while urinating).  Any new or unexplained symptoms    Our office will contact you for a follow up appointment.     Please arrive at the following location:  [facility]: [address] [place] [address]  [facility]: [address] [place] [address]  Please call us at  [phone] if you are unable to make it to your appointment or if you have any questions.       May start Plavix tomorrow, hold Qinlock until surgeon and/or oncologist orders to resume.

## 2024-01-30 NOTE — INTERVAL H&P NOTE
There has been no significant change since Dr. Daniels saw patient as documented in EPIC.   Surgery revisted.   To proceed as planned.    Patient seen and examined by ALE Goss

## 2024-01-30 NOTE — ANESTHESIA PREPROCEDURE EVALUATION
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  Temp: 96.6 °F (35.9 °C)  Pulse: 67  Resp: 16  BP: 127/88  SpO2: 100 %  Body mass index is 29.64 kg/m².    Current medications reviewed.  Hospital Medications:   acetaminophen (Tylenol Extra Strength) tab 1,000 mg  1,000 mg Oral Once    lactated ringers infusion   Intravenous Continuous    heparin (Porcine) 5000 UNIT/ML injection 5,000 Units  5,000 Units Subcutaneous Once    ceFAZolin (Ancef) 2 g in 20mL IV syringe premix  2 g Intravenous Once    sodium chloride 0.9% infusion   Intravenous Continuous    midazolam (Versed) 2 MG/2ML injection 1 mg  1 mg Intravenous Q5 Min PRN    fentaNYL (Sublimaze) 50 mcg/mL injection 50 mcg  50 mcg Intravenous Q5 Min PRN    naloxone (Narcan) 0.4 MG/ML injection 80 mcg  80 mcg Intravenous PRN    flumazenil (Romazicon) 0.5 mg/5mL injection 0.2 mg  0.2 mg Intravenous PRN       Outpatient Medications:     Medications Prior to Admission   Medication Sig Dispense Refill Last Dose    levothyroxine 100 MCG Oral Tab Take 1 tablet (100 mcg total) by mouth before breakfast. Take on an empty stomach 30 tablet 1 1/30/2024    HYDROmorphone 2 MG Oral Tab Take 1-4 tablets (2-8 mg total) by mouth every 3 (three) hours as needed (cancer related pain.). 50 tablet 0 1/29/2024 at 2100    escitalopram 10 MG Oral Tab Take 1 tablet (10 mg total) by mouth daily. 90 tablet 1 1/30/2024    QINLOCK 50 MG Oral Tab Take 3 tablets by mouth daily. Total dose 150 mg daily (Patient taking differently: Take 3 tablets by mouth nightly. Total dose 150 mg daily) 90  tablet 11 1/23/2024    aspirin 81 MG Oral Tab EC Take 1 tablet (81 mg total) by mouth daily.   1/20/2024    clopidogrel 75 MG Oral Tab Take 1 tablet (75 mg total) by mouth daily.   1/20/2024    atorvastatin 40 MG Oral Tab Take 1 tablet (40 mg total) by mouth nightly.   1/29/2024    diphenhydrAMINE (BENADRYL ALLERGY) 25 MG Oral Cap Please take 1 hour prior to scheduled scan. (Patient not taking: Reported on 1/10/2024) 2 capsule 0 More than a month    predniSONE 50 MG Oral Tab Take 13 hr, 7 hr, and 1 hr prior to CT scan. (Patient not taking: Reported on 1/10/2024) 3 tablet 0 More than a month       Allergies: Radiology contrast iodinated dyes      Anesthesia Evaluation    Patient summary reviewed.    Anesthetic Complications  (-) history of anesthetic complications         GI/Hepatic/Renal             (-) chronic renal disease   (-) liver disease                 Cardiovascular        Exercise tolerance: good     MET: >4    (+) obesity  (+) hypertension   (+) hyperlipidemia  (+) CAD  (+) past MI  (+) CABG/stent         (+) dysrhythmias and atrial fibrillation                  Endo/Other      (-) diabetes     (+) hypothyroidism                       Pulmonary    Negative pulmonary ROS.                       Neuro/Psych      (+) depression                                Past Surgical History:   Procedure Laterality Date    BACK SURGERY  1998    CATH DRUG ELUTING STENT  09/09/2022    LAD    COLONOSCOPY  2006    COLOSTOMY  10/03/2017    CYSTOSCOPY,INSERT URETERAL STENT      HERNIA SURGERY Left 2006    HERNIA SURGERY Left 1975    HERNIA SURGERY Left 12/26/2019    LAPAROSCOPY, SURGICAL PROSTATECTOMY, RETROPUBIC RADICAL, W/NERVE SPARING      OTHER SURGICAL HISTORY  12/03/2015    fracture radiius and ulna  Right arm -     OTHER SURGICAL HISTORY  10/03/2017    Pelvic exenteration to include en-bloc resection of pelvic gastrointestinal stromal tumor with abdominoperineal resection, total cystectomy, prostatectomy, end colostomy,  and urostomy    OTHER SURGICAL HISTORY  2019    DIAGNOSTIC LAPAROSCOPY, ROBOTIC LYSIS OF ADHESION    PART REMOVAL COLON W END COLOSTOMY       Social History     Socioeconomic History    Marital status:     Number of children: 3   Occupational History    Occupation: RETIRED TEACHER   Tobacco Use    Smoking status: Former     Packs/day: 1.00     Years: 45.00     Additional pack years: 0.00     Total pack years: 45.00     Types: Cigarettes     Quit date: 2016     Years since quittin.3    Smokeless tobacco: Never   Vaping Use    Vaping Use: Never used   Substance and Sexual Activity    Alcohol use: Yes     Alcohol/week: 1.0 standard drink of alcohol     Types: 1 Cans of beer per week    Drug use: No    Sexual activity: Not Currently   Other Topics Concern    Caffeine Concern Yes     Comment: coffee three times a day     Exercise No    Seat Belt No    Special Diet No    Stress Concern No    Weight Concern Yes     History   Drug Use No     Available pre-op labs reviewed.  Lab Results   Component Value Date    WBC 6.8 2024    RBC 4.66 2024    HGB 13.1 2024    HCT 40.7 2024    MCV 87.3 2024    MCH 28.1 2024    MCHC 32.2 2024    RDW 15.0 2024    .0 2024     Lab Results   Component Value Date     2023    K 4.1 2023     2023    CO2 19.0 (L) 2023    BUN 24 (H) 2023    CREATSERUM 1.28 2023     (H) 2023    CA 9.5 2023            Airway      Mallampati: IV  Mouth opening: >3 FB  TM distance: > 6 cm  Neck ROM: full Cardiovascular    Cardiovascular exam normal.         Dental             Pulmonary    Pulmonary exam normal.                 Other findings              ASA: 3   Plan: general  NPO status verified and patient meets guidelines.  Patient has not taken beta blockers in last 24 hours.  Post-procedure pain management plan discussed with surgeon and patient.    Comment: I spoke  with the patient and discussed the risks of general anesthesia, which include allergic reaction, nausea, vomiting, dental injury, sore throat, awareness, hypotension, as well as more serious cardiac and pulmonary complications. The patient understands and consents to receiving general anesthesia for this procedure.    Plan/risks discussed with: patient                Present on Admission:  **None**

## 2024-01-30 NOTE — IMAGING NOTE
Spoke with Tabatha-Pre-op RN and asked to please make sure CBC w/PLT count and PT/INR is ordered for STAT and sent down once pt arrives. Informed per Dr Ortega that the WIRE LOC is deeper that the pt will need conscious sedation and to hold off on the PO Valium (ordered by PAT). The pt will need to be fully consented (OR and Anesthesia) prior to coming to the CT dept for WIRE LOC due to sedation. Tabatha verbalized understanding and denies further questions.

## 2024-01-30 NOTE — ANESTHESIA PROCEDURE NOTES
Airway  Date/Time: 1/30/2024 1:50 PM  Urgency: Elective    Airway not difficult    General Information and Staff    Patient location during procedure: OR  Anesthesiologist: Yu Tijerina MD  Performed: anesthesiologist   Performed by: Yu Tijerina MD  Authorized by: Yu Tijerina MD      Indications and Patient Condition  Indications for airway management: anesthesia  Spontaneous Ventilation: absent  Sedation level: deep  Preoxygenated: yes  Patient position: sniffing  Mask difficulty assessment: 2 - vent by mask + OA or adjuvant +/- NMBA    Final Airway Details  Final airway type: endotracheal airway      Successful airway: ETT  Cuffed: yes   Successful intubation technique: direct laryngoscopy  Facilitating devices/methods: intubating stylet  Blade: GlideScope  Blade size: #4  ETT size (mm): 8.0    Cormack-Lehane Classification: grade I - full view of glottis  Placement verified by: capnometry   Cuff volume (mL): 7  Measured from: lips  ETT to lips (cm): 23  Number of attempts at approach: 1  Number of other approaches attempted: 0

## 2024-01-30 NOTE — PROCEDURES
[facility]  Procedure Note    Luciano Hernández Patient Status:  Outpatient    10/23/1950 MRN [id number]   Location [facility] CT Attending David Daniels MD    Day # 0 PCP Gavin Bernal MD     Procedure: Needle localization of right gluteal cleft nodule    Pre-Procedure Diagnosis:  Right gluteal cleft nodule    Post-Procedure Diagnosis: Same    Anesthesia:  Sedation    Findings:  No complication, 10.5 cm Bruington needle utilized    Specimens: None    Blood Loss:  < 5 cc    Tourniquet Time: None  Complications:  None  Drains:  None    Secondary Diagnosis:  N/A    Marion Ortega MD  2024

## 2024-01-30 NOTE — ANESTHESIA POSTPROCEDURE EVALUATION
[facility]    Luciano Hernández Patient Status:  Hospital Outpatient Surgery   Age/Gender 73 year old male MRN [id number]   Location [facility] POST ANESTHESIA CARE UNIT Attending David Daniels MD   Hosp Day # 0 PCP Gavin Bernal MD       Anesthesia Post-op Note    Exploration perineum. Resection subcutaneous fat with wire localization.    Procedure Summary       Date: 01/30/24 Room / Location:  MAIN OR 10 /  MAIN OR    Anesthesia Start: 1341 Anesthesia Stop: 1552    Procedure: Exploration perineum. Resection subcutaneous fat with wire localization. 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    Patient Location: PACU    Anesthesia Type: general    Airway Patency: patent and extubated    Postop Pain Control: adequate    Mental Status: preanesthetic baseline    Nausea/Vomiting: none    Cardiopulmonary/Hydration status: stable euvolemic    Complications: no apparent anesthesia related complications    Postop vital signs: stable    Dental Exam: Unchanged from Preop    Patient to be discharged from PACU when criteria met.

## 2024-01-30 NOTE — BRIEF OP NOTE
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 Resection subcutaneous fat with wire localization.    Surgeon(s) and Role:     * David Daniels MD - Primary    Assistant(s):  PA: Graciela Sales PA     Surgical Findings: See full operative note     Specimen: Yes     Estimated Blood Loss: Blood Output: 2 mL (1/30/2024  3:21 PM)    ALE Phillips  1/30/2024  3:47 PM

## 2024-01-30 NOTE — IMAGING NOTE
1320 Report called to RN-Wendy, melanie on r/a, dressing CDI, presently denies pain and tolerated procedure well. Wire LOC securred with 4x4 gauze and a styrofoam cup taped into place.  Pt transported to [facility] E with Radiology RN at bedside.

## 2024-01-31 ENCOUNTER — TELEPHONE (OUTPATIENT)
Dept: SURGERY | Facility: CLINIC | Age: 74
End: 2024-01-31

## 2024-01-31 NOTE — TELEPHONE ENCOUNTER
Called to check on patient after surgery.  Pt doing well with minimal pain.  Pt stated he has just a small amount of pain when he sits down.  Informed patient to hold Qinlock for now.  Confirmed post op appointment for Monday.

## 2024-01-31 NOTE — OPERATIVE REPORT
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    POSTOPERATIVE DIAGNOSIS:  Pending final pathology report.      PROCEDURE:    Exploration of perineum with excision of subcutaneous fat with wire localization.  Intraoperative ultrasound.    ASSISTANT:  Graciela Sales PA-C.    ANESTHESIA:  General.    INDICATIONS:  The patient is a 73-year-old man who had undergone pelvic exenteration for rectal gastrointestinal stromal tumor.  He is known to have metastatic disease that is symptomatic within the right perineum.  This had been previously localized by ultrasound and noted by CT scan.  He presents today for surgical management.    On this day, patient presented to CT department where he underwent wire localization.  Subsequently, the patient presents to the operating room.    OPERATIVE TECHNIQUE:  Patient was brought to the operating room and was placed in supine position.  He was given general anesthesia by the anesthesiology service.  Sequential compression boots were placed.  The patient received preoperative intravenous antibiotic prophylaxis.  He was then placed in the prone position, was prepped and draped in normal sterile fashion.  A longitudinal incision around the wire to the right of the midline in the gluteal crease was made and deepened.  Circumferential dissection was carried out and the area compressed with a wire toward the hook was identified.  The hooked area had fat without evidence for any tumor.  This was excised and sent to Pathology for permanent section evaluation.  No nodularity or tumor  could be identified.  I then thoroughly explored the wound to include ultrasound evaluation and palpation.  I was not able to identify any tumor within the area of concern.  In fact, small bowel loops could be noted on the more anterior side by ultrasound.  At this point, I did not proceed with any further resection or exploration.  The wound was infiltrated with 0.25% Marcaine.  It was closed in layers using 3-0 Vicryl sutures.  The skin was then reapproximated using 4-0 Vicryl suture.  Dermabond was then applied.    The patient tolerated the procedure well without immediate complications.  He was extubated in the operating room and was sent in stable condition to recovery room.  Counts were correct.  I was present throughout the procedure.    Dictated By David Daniels MD  d: 01/30/2024 16:09:04  t: 01/30/2024 20:03:22  Job [id number]  [facility]/
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
## 2024-02-05 ENCOUNTER — TELEPHONE (OUTPATIENT)
Dept: INTERNAL MEDICINE CLINIC | Facility: CLINIC | Age: 74
End: 2024-02-05

## 2024-02-05 ENCOUNTER — OFFICE VISIT (OUTPATIENT)
Dept: SURGERY | Facility: CLINIC | Age: 74
End: 2024-02-05
Payer: MEDICARE

## 2024-02-05 ENCOUNTER — OFFICE VISIT (OUTPATIENT)
Dept: FAMILY MEDICINE CLINIC | Facility: CLINIC | Age: 74
End: 2024-02-05
Payer: MEDICARE

## 2024-02-05 VITALS
TEMPERATURE: 97 F | SYSTOLIC BLOOD PRESSURE: 93 MMHG | DIASTOLIC BLOOD PRESSURE: 67 MMHG | RESPIRATION RATE: 20 BRPM | BODY MASS INDEX: 29 KG/M2 | HEART RATE: 89 BPM | WEIGHT: 217.38 LBS

## 2024-02-05 VITALS
RESPIRATION RATE: 16 BRPM | HEIGHT: 72 IN | OXYGEN SATURATION: 100 % | DIASTOLIC BLOOD PRESSURE: 64 MMHG | WEIGHT: 217.38 LBS | TEMPERATURE: 98 F | SYSTOLIC BLOOD PRESSURE: 100 MMHG | HEART RATE: 80 BPM | BODY MASS INDEX: 29.44 KG/M2

## 2024-02-05 DIAGNOSIS — R53.1 WEAKNESS: ICD-10-CM

## 2024-02-05 DIAGNOSIS — R19.00 PELVIC MASS: ICD-10-CM

## 2024-02-05 DIAGNOSIS — C49.A4 GIST (GASTROINTESTINAL STROMA TUMOR), MALIGNANT, COLON (HCC): Primary | ICD-10-CM

## 2024-02-05 DIAGNOSIS — R42 DIZZINESS: Primary | ICD-10-CM

## 2024-02-05 PROCEDURE — 99024 POSTOP FOLLOW-UP VISIT: CPT

## 2024-02-05 PROCEDURE — 99213 OFFICE O/P EST LOW 20 MIN: CPT | Performed by: PHYSICIAN ASSISTANT

## 2024-02-05 NOTE — PROGRESS NOTES
CHIEF COMPLAINT:     Chief Complaint   Patient presents with    Covid     Need a Covid test done - Entered by patient  Weakness, chills, SOB, intestinal pain.  no otc meds taken.  S/s for 2 days.           HPI:   Luciano Hernández is a 73 year old male who presents with complaints of 2 days of fatigue, weakness, dizziness, intestinal cramping, SOB, and hot flashes. The patient reports he normally goes on daily walks and was unable to walk without feeling he would pass out.  The patient had a post operative visit today at another location and was found to be hypotensive.  Per the patient he was instructed to come to the [facility] for a COVID test.  The patient denies fever, nasal congestion, nasal drainage, sore throat, and cough.  The patient also reports he has had significantly decreased urine output over the past two days.     Current Outpatient Medications   Medication Sig Dispense Refill    clopidogrel 75 MG Oral Tab Take 1 tablet (75 mg total) by mouth daily. OK to resume medication tomorrow 1/31/2024      ondansetron 4 MG Oral Tablet Dispersible Take 1 tablet (4 mg total) by mouth every 4 (four) hours as needed for Nausea. 20 tablet 0    HYDROcodone-acetaminophen 5-325 MG Oral Tab Take 1 tablet by mouth every 6 (six) hours as needed for Pain. 20 tablet 0    levothyroxine 100 MCG Oral Tab Take 1 tablet (100 mcg total) by mouth before breakfast. Take on an empty stomach 30 tablet 1    HYDROmorphone 2 MG Oral Tab Take 1-4 tablets (2-8 mg total) by mouth every 3 (three) hours as needed (cancer related pain.). 50 tablet 0    escitalopram 10 MG Oral Tab Take 1 tablet (10 mg total) by mouth daily. 90 tablet 1    QINLOCK 50 MG Oral Tab Take 3 tablets by mouth daily. Total dose 150 mg daily (Patient taking differently: Take 3 tablets by mouth nightly. Total dose 150 mg daily) 90 tablet 11    aspirin 81 MG Oral Tab EC Take 1 tablet (81 mg total) by mouth daily.      diphenhydrAMINE (BENADRYL ALLERGY) 25 MG Oral Cap  Please take 1 hour prior to scheduled scan. 2 capsule 0    predniSONE 50 MG Oral Tab Take 13 hr, 7 hr, and 1 hr prior to CT scan. 3 tablet 0    atorvastatin 40 MG Oral Tab Take 1 tablet (40 mg total) by mouth nightly.        Past Medical History:   Diagnosis Date    Anxiety state     Arrhythmia     atrial fibrillation intermittently    Back problem     back surgery 20 years ago    BPH (benign prostatic hyperplasia)     Cancer (HCC) 2012    GIST    Carcinoma of gastrointestinal tract (HCC)     Coronary atherosclerosis     Depression     Diabetes (HCC)     per pcp not 22 diet controlled dm- pt denies any hx of dm    Disorder of thyroid     Esophageal reflux     Hearing impairment     Does not wear hearing aids    Heart attack (HCC) 2022    High blood pressure     High cholesterol     Hx of motion sickness     > 30 yrs ago while on a boat    Hypothyroidism     Other and unspecified hyperlipidemia     Peripheral vascular disease (HCC)     Thyroid disease     Visual impairment     glasses      Social History:  Social History     Socioeconomic History    Marital status:     Number of children: 3   Occupational History    Occupation: RETIRED TEACHER   Tobacco Use    Smoking status: Former     Packs/day: 1.00     Years: 45.00     Additional pack years: 0.00     Total pack years: 45.00     Types: Cigarettes     Quit date: 2016     Years since quittin.3    Smokeless tobacco: Never   Vaping Use    Vaping Use: Never used   Substance and Sexual Activity    Alcohol use: Yes     Alcohol/week: 1.0 standard drink of alcohol     Types: 1 Cans of beer per week    Drug use: No    Sexual activity: Not Currently   Other Topics Concern    Caffeine Concern Yes     Comment: coffee three times a day     Exercise No    Seat Belt No    Special Diet No    Stress Concern No    Weight Concern Yes   Social History Narrative    , lives alone    Has 2 daughters and 1 son - living close by        REVIEW OF SYSTEMS:    GENERAL: Denies fever, chills,weight change, decreased appetite  SKIN: Denies rashes, skin wounds or ulcers.  EYES: Denies blurred vision or double vision  HENT: See HPI  CHEST: Denies chest pain, or palpitations  LUNGS: See HPI  GI: See HPI  MUSCULOSKELETAL: no arthralgia or swollen joints  LYMPH:  Denies lymphadenopathy  NEURO: See HPI      EXAM:   /64   Pulse 80   Temp 98.3 °F (36.8 °C) (Oral)   Resp 16   Ht 6' (1.829 m)   Wt 217 lb 6.4 oz (98.6 kg)   SpO2 100%   BMI 29.48 kg/m²   GENERAL: well developed, well nourished,in no apparent distress, cooperative   SKIN: no rashes, nosuspicious lesions, no abnormal pigmentation  HEAD: atraumatic, normocephalic  EYES: EOM intact, PERRL.  Conjunctiva normal.  Cornea clear.  Lid margins normal.  No active drainage.  EARS: Right TM normal, no bulging, no retraction, no fluid, bony landmarks normal.  Left TM normal, no bulging, no retraction, no fluid, bony landmarks normal.    NOSE: nostrils patent, no discharge, nasal mucosa pink and not inflamed.  No sinus tenderness.  THROAT: oral mucosa pink, moist and intact. No visible dental caries. Posterior pharynx without erythema or exudates.  NECK: supple, non-tender.  LUNGS: clear to auscultation bilaterally, no wheezes or rhonchi. Breathing is non labored.  CARDIO: RRR without murmur  GI: No visible scars, or masses. BS's present x4. No palpable masses or hepatosplenomegaly.  Non tender.  No guarding or rebound tenderness.  +Ostomy bag  EXTREMITIES: no cyanosis, clubbing or edema.  Homans NEG.  Dorsalis Pedis 2+.  LYMPH:  No lymphadenopathy.    NEURO: A&Ox3.  CN II-XII intact.  No focal deficits.  Coordination and Gait normal.  Kernig and Brudzinski's are negative.    Discussed the limitations of the [facility]. The patient was instructed to go to the ER for workup of his symptoms.  The patient refused and reports he has no intension of going to the ER.       ASSESSMENT AND PLAN:     ASSESSMENT:  Encounter Diagnoses    Name Primary?    Dizziness Yes    Weakness        PLAN:    Patient Instructions   To [facility]

## 2024-02-05 NOTE — TELEPHONE ENCOUNTER
Intestinal cramping, weakness, hot flashes. Symptom onset yesterday. Lightheaded and faint. Patient seen for post op appt today and was advised to proceed to UC or WIC per patient. Patient going now for Covid test and further evaluation into symptoms.

## 2024-02-05 NOTE — TELEPHONE ENCOUNTER
Left surgeon's office from post op appt and they are requesting covid test     They are thinking he has covid     Needs to get covid test    Please advise

## 2024-02-08 ENCOUNTER — OFFICE VISIT (OUTPATIENT)
Dept: SURGERY | Facility: CLINIC | Age: 74
End: 2024-02-08
Payer: MEDICARE

## 2024-02-08 VITALS
HEART RATE: 67 BPM | SYSTOLIC BLOOD PRESSURE: 125 MMHG | TEMPERATURE: 98 F | RESPIRATION RATE: 20 BRPM | BODY MASS INDEX: 30 KG/M2 | WEIGHT: 218 LBS | DIASTOLIC BLOOD PRESSURE: 76 MMHG

## 2024-02-08 DIAGNOSIS — C49.A4 GIST (GASTROINTESTINAL STROMA TUMOR), MALIGNANT, COLON (HCC): ICD-10-CM

## 2024-02-08 DIAGNOSIS — E03.4 HYPOTHYROIDISM DUE TO ACQUIRED ATROPHY OF THYROID: ICD-10-CM

## 2024-02-08 DIAGNOSIS — T81.31XA POSTOPERATIVE WOUND DEHISCENCE, INITIAL ENCOUNTER: ICD-10-CM

## 2024-02-08 DIAGNOSIS — R19.00 PELVIC MASS: Primary | ICD-10-CM

## 2024-02-08 PROCEDURE — 99024 POSTOP FOLLOW-UP VISIT: CPT
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
## 2024-02-09 RX ORDER — HYDROMORPHONE HYDROCHLORIDE 2 MG/1
TABLET ORAL
Qty: 120 TABLET | Refills: 0 | Status: SHIPPED | OUTPATIENT
Start: 2024-02-09

## 2024-02-14 ENCOUNTER — TELEPHONE (OUTPATIENT)
Dept: HEMATOLOGY/ONCOLOGY | Age: 74
End: 2024-02-14

## 2024-02-14 DIAGNOSIS — E03.4 HYPOTHYROIDISM DUE TO ACQUIRED ATROPHY OF THYROID: ICD-10-CM

## 2024-02-14 DIAGNOSIS — C49.A4 GIST (GASTROINTESTINAL STROMA TUMOR), MALIGNANT, COLON (HCC): ICD-10-CM

## 2024-02-14 RX ORDER — HYDROMORPHONE HYDROCHLORIDE 4 MG/1
TABLET ORAL
Qty: 120 TABLET | Refills: 0 | Status: SHIPPED | OUTPATIENT
Start: 2024-02-14

## 2024-02-14 RX ORDER — HYDROMORPHONE HYDROCHLORIDE 2 MG/1
TABLET ORAL
Qty: 120 TABLET | Refills: 0 | Status: CANCELLED | OUTPATIENT
Start: 2024-02-14

## 2024-02-14 NOTE — TELEPHONE ENCOUNTER
Luciano [phone] [facility] called to tell him they didn't have the  2mg of HYDROmorphone 2 MG Oral Tab  it on back order but the do have the HYDROmorphone 4 MG Oral Tab  Luciano said this would be fine he is running low on the medication. Thanks Fina

## 2024-02-15 ENCOUNTER — TELEPHONE (OUTPATIENT)
Dept: SURGERY | Facility: CLINIC | Age: 74
End: 2024-02-15

## 2024-02-15 ENCOUNTER — OFFICE VISIT (OUTPATIENT)
Dept: SURGERY | Facility: CLINIC | Age: 74
End: 2024-02-15
Payer: MEDICARE

## 2024-02-15 VITALS
TEMPERATURE: 98 F | SYSTOLIC BLOOD PRESSURE: 138 MMHG | WEIGHT: 218.81 LBS | HEART RATE: 60 BPM | BODY MASS INDEX: 30 KG/M2 | RESPIRATION RATE: 20 BRPM | DIASTOLIC BLOOD PRESSURE: 83 MMHG

## 2024-02-15 DIAGNOSIS — R19.00 PELVIC MASS: Primary | ICD-10-CM

## 2024-02-15 DIAGNOSIS — T81.31XA POSTOPERATIVE WOUND DEHISCENCE, INITIAL ENCOUNTER: ICD-10-CM

## 2024-02-15 PROCEDURE — 99024 POSTOP FOLLOW-UP VISIT: CPT
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
## 2024-02-15 NOTE — TELEPHONE ENCOUNTER
Called patient, informed him that appointment has been scheduled for wound care clinic on Feb 22 at 10 AM. Patient also has CT appointment that day. He expressed understanding. Directions given to wound clinic.     ALE Phillips

## 2024-02-20 DIAGNOSIS — C49.A4 GIST (GASTROINTESTINAL STROMA TUMOR), MALIGNANT, COLON (HCC): ICD-10-CM

## 2024-02-20 DIAGNOSIS — E03.4 HYPOTHYROIDISM DUE TO ACQUIRED ATROPHY OF THYROID: ICD-10-CM

## 2024-02-20 RX ORDER — HYDROMORPHONE HYDROCHLORIDE 4 MG/1
TABLET ORAL
Qty: 100 TABLET | Refills: 0 | Status: SHIPPED | OUTPATIENT
Start: 2024-02-20

## 2024-02-20 RX ORDER — HYDROMORPHONE HYDROCHLORIDE 4 MG/1
TABLET ORAL
Qty: 120 TABLET | Refills: 0 | Status: CANCELLED | OUTPATIENT
Start: 2024-02-20

## 2024-02-20 RX ORDER — HYDROMORPHONE HYDROCHLORIDE 4 MG/1
TABLET ORAL
Qty: 100 TABLET | Refills: 0 | Status: CANCELLED | OUTPATIENT
Start: 2024-02-20

## 2024-02-20 NOTE — TELEPHONE ENCOUNTER
[id number] - [facility], [place] - [address] AT [address], [phone], [phone] is calling to get a new prescription for Hydromorphone 4 mg oral tablet Called 2/20/24

## 2024-02-21 NOTE — PROGRESS NOTES
Subjective   Patient ID: Zack Medrano is a 88 y.o. male who presents for 6 month follow up ahd labs completed. Medication refills.     HPI     Review of Systems    Objective   There were no vitals taken for this visit.    Physical Exam    Assessment/Plan           THE [facility] Progress Note      Patient Name: Reza Pressley   YOB: 1950  Medical Record Number: [id number]  Attending Physician: Brant Hammans Aisha Shad, M.D.       SUBJECTIVE:  Magaly Salas is a(n) 71year old male with presumed Collection Time: 12/06/19  5:16 AM   Result Value Ref Range    Free T4 1.2 0.8 - 1.7 ng/dL    TSH 4.720 (H) 0.358 - 3.740 mIU/mL   CBC W/ DIFFERENTIAL    Collection Time: 12/06/19  5:16 AM   Result Value Ref Range    WBC 7.7 4.0 - 11.0 x10(3) uL    RBC 4.6

## 2024-02-22 ENCOUNTER — HOSPITAL ENCOUNTER (OUTPATIENT)
Dept: CT IMAGING | Age: 74
Discharge: HOME | End: 2024-02-22
Attending: SPECIALIST
Payer: MEDICARE

## 2024-02-22 ENCOUNTER — TELEPHONE (OUTPATIENT)
Dept: INTERNAL MEDICINE CLINIC | Facility: CLINIC | Age: 74
End: 2024-02-22

## 2024-02-22 ENCOUNTER — APPOINTMENT (OUTPATIENT)
Dept: WOUND CARE | Facility: HOSPITAL | Age: 74
End: 2024-02-22
Attending: NURSE PRACTITIONER
Payer: MEDICARE

## 2024-02-22 VITALS
HEART RATE: 90 BPM | TEMPERATURE: 98 F | WEIGHT: 215 LBS | HEIGHT: 73 IN | BODY MASS INDEX: 28.49 KG/M2 | SYSTOLIC BLOOD PRESSURE: 144 MMHG | DIASTOLIC BLOOD PRESSURE: 99 MMHG

## 2024-02-22 DIAGNOSIS — E03.4 HYPOTHYROIDISM DUE TO ACQUIRED ATROPHY OF THYROID: ICD-10-CM

## 2024-02-22 DIAGNOSIS — T81.31XD DISRUPTION OF EXTERNAL OPERATION (SURGICAL) WOUND, NOT ELSEWHERE CLASSIFIED, SUBSEQUENT ENCOUNTER: Primary | ICD-10-CM

## 2024-02-22 DIAGNOSIS — E11.628 TYPE 2 DIABETES MELLITUS WITH OTHER SKIN COMPLICATIONS (HCC): ICD-10-CM

## 2024-02-22 DIAGNOSIS — C49.A4 GIST (GASTROINTESTINAL STROMA TUMOR), MALIGNANT, COLON (HCC): ICD-10-CM

## 2024-02-22 DIAGNOSIS — E11.9 DIET-CONTROLLED DIABETES MELLITUS (HCC): Primary | ICD-10-CM

## 2024-02-22 LAB — GLUCOSE BLD-MCNC: 297 MG/DL (ref 70–99)

## 2024-02-22 PROCEDURE — 99215 OFFICE O/P EST HI 40 MIN: CPT | Performed by: NURSE PRACTITIONER

## 2024-02-22 PROCEDURE — 74177 CT ABD & PELVIS W/CONTRAST: CPT | Performed by: SPECIALIST

## 2024-02-22 NOTE — PATIENT INSTRUCTIONS
Call us when you receive your wound vac.  Plan that you will come to wound clinic on Tuesdays and Fridays for wound vac changes.      Patient discharge and wound care instructions  Luciano Hernández  2/22/2024       You may shower and cleanse area with mild soap and water, rinse wound, cleanse with PURACYN hydrogel on a gauze and apply your bandage.     Offloading:  Off-loading is an important part of your wound treatment program. It is a part that only you can assure is accomplished. If you have any concerns about methods to off-load your wound, or feel that you might have trouble following the off-loading plan prescribed for you, talk to your doctor so that alternatives can be discussed that will work in your situation.    EHOB waffle cushion can be purchased online or through a pharmacy listed in your welcome folder. Please use at all times when sitting, even when transporting in the car if possible.     Nutrition and blood sugar control:  Focus on the following:  Protein: Meats, beans, eggs, milk and yogurt particularly Greek yogurt), tofu, soy nuts, soy protein products (Follow the protein handout in your welcome folder)  Vitamin C: Citrus fruits and juices, strawberries, tomatoes, tomato juice, peppers, baked potatoes, spinach, broccoli, cauliflower, [place] sprouts, cabbage  Vitamin A: Dark green, leafy vegetables, orange or yellow vegetables, cantaloupe, fortified dairy products, liver, fortified cereals  Zinc: Fortified cereals, red meats, seafood  Consider supplementing with Frnaky by [employer]. It can be purchased on amazon, [employer] website, or local pharmacy may be able to order it for you.  (These are essential branch chain amino acids that help with tissue building and wound healing).   When your blood sugar is consistently elevated greater than 180 your body can't heal or fight infection.     Concerns:  Signs of infection may include the following:  Increase in redness  Red \"streaks\" from wound   Increase in swelling  Fever  Unusual odor  Change in the amount of wound drainage     Should you experience any significant changes in your wound(s) or have any questions regarding your home care instructions please contact the wound center [facility] @ [phone] If after regular business hours, please call your family doctor or local emergency room. The treatment plan has been discussed at length between you and your provider. Follow all instructions carefully, it is very important. If you do not follow all instructions you are at risk of your wound not healing, infection, possible loss of limb and even loss of life.

## 2024-02-22 NOTE — PROGRESS NOTES
.Weekly Wound Education Note    Teaching Provided To: Patient  Training Topics: Dressing;Cleasing and general instructions;Discharge instructions;Off-loading  Training Method: Explain/Verbal;Written  Training Response: Patient responds and understands        Notes: Inital visit for buttock wound. Will order NPWT today. Sample Franky provided. Information provided about EHOB cushion. Will dress with puracell gel and bordered gauze.

## 2024-02-22 NOTE — PROGRESS NOTES
CHIEF COMPLAINT:     Chief Complaint   Patient presents with    Wound Care     Patient is here for initial visit for wound on right buttocks. He states the wound is from a tumor removal a month ago. He has been treating the wound with a band-aid and was sent here by his PCP.     HPI:   Information obtained from Patient and chart  2-22-24 INITIAL:  Patient is a 72 yo male with pmhx of  dm (8-23-23 A1c 7.1), hl, bph, htn, afib, gastrointestinal stromal tumor (GIST) in 2014.  In 2017 he underwent end colosotomy and urostomy. 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  In 2021 he noted a new mass in isciorectal fossa and perineal pain. He was maintained on chemo.  January 2024 a right gluteal cleft lesion was causing pain, he elected to undergo surgery which was on 1-30-24 where he had expoloration of the perineum and resection of subq fat with wire localization.  The pathology was negative for malignancy.  He followed up with dr. alford office on 2-5 (mayelin mcintosh) and it was noted that there was wound dehiscence possibly secondary to VEGF inhibitor chemo and patient referred to wound clinic.  He has been dressing the area with gauze.  There is undermining noted.  We discussed wound vac, offloading, and nutritional supplementation. He is really wanting to restart his chemo as he is not wanting to remain on the dilaudid for pain control (that he needs when he is not on chemo.). I will touch base with dr white, primary and mayelin mcintosh regarding treatment plan.    MEDICATIONS:     Current Outpatient Medications:     HYDROmorphone 4 MG Oral Tab, Take 1-2 tablets (4-8 mg total) by mouth every 3 (three) hours as needed (cancer related pain.)., Disp: 100 tablet, Rfl: 0    clopidogrel 75 MG Oral Tab, Take 1 tablet (75 mg total) by mouth daily. OK to resume medication tomorrow  1/31/2024, Disp: , Rfl:     ondansetron 4 MG Oral Tablet Dispersible, Take 1 tablet (4 mg total) by mouth every 4 (four) hours as needed for Nausea. (Patient not taking: Reported on 2/22/2024), Disp: 20 tablet, Rfl: 0    HYDROcodone-acetaminophen 5-325 MG Oral Tab, Take 1 tablet by mouth every 6 (six) hours as needed for Pain. (Patient not taking: Reported on 2/22/2024), Disp: 20 tablet, Rfl: 0    levothyroxine 100 MCG Oral Tab, Take 1 tablet (100 mcg total) by mouth before breakfast. Take on an empty stomach, Disp: 30 tablet, Rfl: 1    escitalopram 10 MG Oral Tab, Take 1 tablet (10 mg total) by mouth daily., Disp: 90 tablet, Rfl: 1    QINLOCK 50 MG Oral Tab, Take 3 tablets by mouth daily. Total dose 150 mg daily (Patient taking differently: Take 3 tablets by mouth nightly. Total dose 150 mg daily), Disp: 90 tablet, Rfl: 11    aspirin 81 MG Oral Tab EC, Take 1 tablet (81 mg total) by mouth daily., Disp: , Rfl:     diphenhydrAMINE (BENADRYL ALLERGY) 25 MG Oral Cap, Please take 1 hour prior to scheduled scan. (Patient not taking: Reported on 2/22/2024), Disp: 2 capsule, Rfl: 0    predniSONE 50 MG Oral Tab, Take 13 hr, 7 hr, and 1 hr prior to CT scan., Disp: 3 tablet, Rfl: 0    atorvastatin 40 MG Oral Tab, Take 1 tablet (40 mg total) by mouth nightly., Disp: , Rfl:   ALLERGIES:     Allergies   Allergen Reactions    Radiology Contrast Iodinated Dyes HIVES, RESPIRATORY FAILURE and OTHER (SEE COMMENTS)     Originally with \"Barium enema for lower GI\" 50 years ago. He states he developed hives and went into respiratory arrest. Pt has since had CT iodinated contrast with 13 hr pre-meds and no break-through reactions, HUMBERTO Mera, Radiology RN.       REVIEW OF SYSTEMS:   This information was obtained from the patient/family and chart.    See HPI for pertinent positives, otherwise 10 pt ROS negative.    HISTORY:     Past Medical History:   Diagnosis Date    Anxiety state     Arrhythmia     atrial fibrillation intermittently     Back problem     back surgery 20 years ago    BPH (benign prostatic hyperplasia)     Cancer (HCC) 02/2012    GIST    Carcinoma of gastrointestinal tract (HCC)     Coronary atherosclerosis     Depression     Diabetes (HCC)     per pcp not 11/28/22 diet controlled dm- pt denies any hx of dm    Disorder of thyroid     Esophageal reflux     Hearing impairment     Does not wear hearing aids    Heart attack (HCC) 09/2022    High blood pressure     High cholesterol     Hx of motion sickness     > 30 yrs ago while on a boat    Hypothyroidism     Other and unspecified hyperlipidemia     Peripheral vascular disease (HCC)     Thyroid disease     Visual impairment     glasses     Past Surgical History:   Procedure Laterality Date    Back surgery  1998    Cath drug eluting stent  09/09/2022    LAD    Colonoscopy  2006    Colostomy  10/03/2017    Cystoscopy,insert ureteral stent      Hernia surgery Left 2006    Hernia surgery Left 1975    Hernia surgery Left 12/26/2019    Laparoscopy, surgical prostatectomy, retropubic radical, w/nerve sparing      Other surgical history  12/03/2015    fracture radiius and ulna  Right arm -     Other surgical history  10/03/2017    Pelvic exenteration to include 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 Resection subcutaneous fat with wire localization.    Part removal colon w end colostomy        Social History     Socioeconomic History    Marital status:     Number of children: 3   Occupational History    Occupation: RETIRED TEACHER   Tobacco Use    Smoking status: Former     Packs/day: 1.00     Years: 45.00     Additional pack years: 0.00     Total pack years: 45.00     Types: Cigarettes     Quit date: 9/22/2016     Years  since quittin.4    Smokeless tobacco: Never   Vaping Use    Vaping Use: Never used   Substance and Sexual Activity    Alcohol use: Yes     Alcohol/week: 1.0 standard drink of alcohol     Types: 1 Cans of beer per week    Drug use: No    Sexual activity: Not Currently   Other Topics Concern    Caffeine Concern Yes     Comment: coffee three times a day     Exercise No    Seat Belt No    Special Diet No    Stress Concern No    Weight Concern Yes   Social History Narrative    , lives alone    Has 2 daughters and 1 son - living close by     PHYSICAL EXAM:     Vitals:    24 0948   BP: (!) 144/99  Comment: patient denies headache; glucose 257   Pulse: 90   Temp: 97.8 °F (36.6 °C)   Weight: 215 lb (97.5 kg)   Height: 73\"      Estimated body mass index is 28.37 kg/m² as calculated from the following:    Height as of this encounter: 73\".    Weight as of this encounter: 215 lb (97.5 kg).   POC Glucose   Date Value Ref Range Status   2024 297 (H) 70 - 99 mg/dL Final   2024 142 (H) 70 - 99 mg/dL Final   2021 105 (H) 70 - 99 mg/dL Final       Vital signs reviewed.Appears stated age, well groomed.    Constitutional:  Bp elevated, denies symptoms. Pulse Regular and wnl for patient. Respirations easy and unlabored. Temperature wnl. Weight normal for height. Appearance neat and clean. Appears in no acute distress. Well nourished and well developed.    Respiratory: Respiratory effort is easy and symmetric bilaterally. Rate is normal at rest and on room air.    Cardiovascular:  Heart rhythm and rate regular, without murmur or gallop.     Musculoskeletal:  Gait and station stable   Integumentary:  refer to wound characteristics and images   Psychiatric:  Judgment and insight intact. Alert and oriented times 3. No evidence of depression, anxiety, or agitation. Calm, cooperative, and communicative. Appropriate interactions and affect.  DIAGNOSTICS:     Lab Results   Component Value Date    BUN 24 (H)  12/28/2023    CREATSERUM 1.28 12/28/2023    ALB 4.0 12/28/2023    TP 7.6 12/28/2023    A1C 7.1 (H) 08/23/2023 1-30-24 pathology-perineal  Final Diagnosis:   Subcutaneous fat:  -Mature adipose tissue.  -Small portion of unremarkable skin.  -Negative for malignancy.     WOUND ASSESSMENT:     Wound 01/30/24 Perineum (Active)   Date First Assessed/Time First Assessed: 01/30/24 1406   Primary Wound Type: Incision  Location: Perineum      Assessments 1/30/2024  3:10 PM 1/30/2024  5:15 PM   Drainage Amount -- None   Dressing Tegaderm;Telfa;Steri strips;Mastisol Dermabond   Dressing Status -- Clean;Dry;Intact       No associated orders.       Wound 02/22/24 #1- Right buttocks Buttocks Right (Active)   Date First Assessed/Time First Assessed: 02/22/24 0943    Wound Number (Wound Clinic Only): #1- Right buttocks  Primary Wound Type: Old surgical  Location: Buttocks  Wound Location Orientation: Right      Assessments 2/22/2024  9:44 AM   Wound Image     Drainage Amount Small   Drainage Description Serosanguineous   Wound Length (cm) 4.1 cm   Wound Width (cm) 0.5 cm   Wound Surface Area (cm^2) 2.05 cm^2   Wound Depth (cm) 1 cm   Wound Volume (cm^3) 2.05 cm^3   Margins Well-defined edges;Epibole (Rolled edges)   Non-staged Wound Description Full thickness   Wound Granulation Tissue Pink;Spongy   Wound Bed Granulation (%) 60 %   Wound Bed Slough (%) 40 %   Wound Odor None   Tunneling? No   Undermining? Yes   Sinus Tracts? No       No associated orders.          ASSESSMENT AND PLAN:    1. Disruption of external operation (surgical) wound, not elsewhere classified, subsequent encounter    2. GIST (gastrointestinal stroma tumor), malignant, colon (HCC)    3. Type 2 diabetes mellitus with other skin complications (HCC)          Discussed with patient the aspects of wound healing including:  blood flow and how pressure can affect this and offloading to assist with this, wound bed optimization including moist wound healing, removal  of necrosis, bioburden control, monitoring for infection, and finally the patient as a whole including nutrition and increased protein intake and blood glucose control. Patient states he does not have a blood glucose monitor but whenever his bs is checked it is >200.  Discussed optimizing everything we can to offset healing issues related diabetes and his chemo.      Risks, benefits, and alternatives of current treatment plan discussed in detail.  Questions and concerns addressed. Red flags to RTC or ED reviewed.  Patient (or parent) agrees to plan.      NOTE TO PATIENT: The 21st Century Cures Act makes clinical notes like these available to patients in the interest of transparency. Clinical notes are medical documents used by physicians and care providers to communicate with each other. These documents include medical language and terminology, abbreviations, and treatment information that may sound technical and at times possibly unfamiliar. In addition, at times, the verbiage may appear blunt or direct. These documents are one tool providers use to communicate relevant information and clinical opinions of the care providers in a way that allows common understanding of the clinical context.   Patient is new to clinic, pcp is jena.  I spent   55   minutes with the patient. This time included:    preparing to see the patient (eg, review notes and recent diagnostics),  seeing the patient, obtaining and/or reviewing separately obtained history, performing a medically appropriate examination and/or evaluation, counseling and educating the patient, documenting in the record, communication with pcp, onco, surgery  DISCHARGE:      Patient Instructions   Call us when you receive your wound vac.  Plan that you will come to wound clinic on Tuesdays and Fridays for wound vac changes.      Patient discharge and wound care instructions  Luciano Hernández  2/22/2024       You may shower and cleanse area with mild soap and  water, rinse wound, cleanse with PURACYN hydrogel on a gauze and apply your bandage.     Offloading:  Off-loading is an important part of your wound treatment program. It is a part that only you can assure is accomplished. If you have any concerns about methods to off-load your wound, or feel that you might have trouble following the off-loading plan prescribed for you, talk to your doctor so that alternatives can be discussed that will work in your situation.    EHOB waffle cushion can be purchased online or through a pharmacy listed in your welcome folder. Please use at all times when sitting, even when transporting in the car if possible.     Nutrition and blood sugar control:  Focus on the following:  Protein: Meats, beans, eggs, milk and yogurt particularly Greek yogurt), tofu, soy nuts, soy protein products (Follow the protein handout in your welcome folder)  Vitamin C: Citrus fruits and juices, strawberries, tomatoes, tomato juice, peppers, baked potatoes, spinach, broccoli, cauliflower, [place] sprouts, cabbage  Vitamin A: Dark green, leafy vegetables, orange or yellow vegetables, cantaloupe, fortified dairy products, liver, fortified cereals  Zinc: Fortified cereals, red meats, seafood  Consider supplementing with Franky by [employer]. It can be purchased on amazon, [employer] website, or local pharmacy may be able to order it for you.  (These are essential branch chain amino acids that help with tissue building and wound healing).   When your blood sugar is consistently elevated greater than 180 your body can't heal or fight infection.     Concerns:  Signs of infection may include the following:  Increase in redness  Red \"streaks\" from wound  Increase in swelling  Fever  Unusual odor  Change in the amount of wound drainage     Should you experience any significant changes in your wound(s) or have any questions regarding your home care instructions please contact the wound center [facility] @ [phone]  If after regular business hours, please call your family doctor or local emergency room. The treatment plan has been discussed at length between you and your provider. Follow all instructions carefully, it is very important. If you do not follow all instructions you are at risk of your wound not healing, infection, possible loss of limb and even loss of life.       Jaylin Sawant FNP-C, CWCN-AP, CFCN, CSWS, WCC, DWC  2/22/2024

## 2024-02-22 NOTE — TELEPHONE ENCOUNTER
Gavin Bernal MD  [facility] Clinical Staff  Refer pt to [facility] dm2 clinic for glucometer    DT     Referral placed, pt notified  He is at other doctors office and not able to write information  Pt will call back to get information for DM clinic    Referred to Provider Information:  Provider Address Phone   Stephenie Sagastume, APRN [address] [place] [address] [id number] [phone]

## 2024-02-27 ENCOUNTER — APPOINTMENT (OUTPATIENT)
Dept: WOUND CARE | Facility: HOSPITAL | Age: 74
End: 2024-02-27
Payer: MEDICARE

## 2024-02-27 ENCOUNTER — TELEPHONE (OUTPATIENT)
Dept: WOUND CARE | Facility: HOSPITAL | Age: 74
End: 2024-02-27

## 2024-03-01 ENCOUNTER — OFFICE VISIT (OUTPATIENT)
Dept: WOUND CARE | Facility: HOSPITAL | Age: 74
End: 2024-03-01
Attending: NURSE PRACTITIONER
Payer: MEDICARE

## 2024-03-01 ENCOUNTER — TELEPHONE (OUTPATIENT)
Dept: INTERNAL MEDICINE CLINIC | Facility: CLINIC | Age: 74
End: 2024-03-01

## 2024-03-01 VITALS
SYSTOLIC BLOOD PRESSURE: 143 MMHG | RESPIRATION RATE: 16 BRPM | TEMPERATURE: 98 F | DIASTOLIC BLOOD PRESSURE: 83 MMHG | HEART RATE: 71 BPM

## 2024-03-01 DIAGNOSIS — E11.628 TYPE 2 DIABETES MELLITUS WITH OTHER SKIN COMPLICATIONS (HCC): ICD-10-CM

## 2024-03-01 DIAGNOSIS — C49.A4 GIST (GASTROINTESTINAL STROMA TUMOR), MALIGNANT, COLON (HCC): ICD-10-CM

## 2024-03-01 DIAGNOSIS — T81.31XD DISRUPTION OF EXTERNAL OPERATION (SURGICAL) WOUND, NOT ELSEWHERE CLASSIFIED, SUBSEQUENT ENCOUNTER: Primary | ICD-10-CM

## 2024-03-01 LAB — GLUCOSE BLD-MCNC: 183 MG/DL (ref 70–99)

## 2024-03-01 PROCEDURE — 99214 OFFICE O/P EST MOD 30 MIN: CPT | Performed by: NURSE PRACTITIONER

## 2024-03-01 NOTE — PATIENT INSTRUCTIONS
Please return:  next Tuesday and Friday for RN visit for wound assessment, and if applicable application of NPWT   (See below orders)    Patient discharge and wound care instructions  Luciano Hernández  3/1/2024         You may shower and cleanse area with mild soap and water, rinse wound, cleanse with PURACYN hydrogel on a gauze and apply your bandage.     Today in clinic: lu to the undermining and wound bed.    Negative Pressure Wound Therapy:  Location:right buttock  Frequency: twice weekly  Remove vac sponges and drape, noting and documenting the number of sponges removed.  Moisten gauze with Dakins or other hypochlorous cleanser and place into wound bed for 5-10 minutes while supplies are prepped.  Apply skin prep to periwound and border with drape. Always label dressing with number of sponges used in the wound.   Place lu in undermining  Place a strip of foam into the wound bed, slightly in the 6-12 undermined area, then bolster OVER the undermined areas on draped skin  Apply Negative Pressure Wound Therapy with setting of 125 mmHg continuous.     REMOVE YOUR VAC DRESSING AND APPLY A NORMAL SALINE MOISTENED GAUZE, DRY GAUZE AND TAPE IF:  For any reason the VAC cannot be applied.  Your VAC alarms and you are unable to trouble shoot to correct the issue and the VAC has been off for 2 or more hours.  If you experience these or any other problems with the VAC, call your Home Health Nurse, the Wound Care Center or the VAC company for assistance.  Offloading:  Off-loading is an important part of your wound treatment program. It is a part that only you can assure is accomplished. If you have any concerns about methods to off-load your wound, or feel that you might have trouble following the off-loading plan prescribed for you, talk to your doctor so that alternatives can be discussed that will work in your situation.    EHOB waffle cushion can be purchased online or through a pharmacy listed in your  welcome folder. Please use at all times when sitting, even when transporting in the car if possible.     Nutrition and blood sugar control:  Focus on the following:  Protein: Meats, beans, eggs, milk and yogurt particularly Greek yogurt), tofu, soy nuts, soy protein products (Follow the protein handout in your welcome folder)  Vitamin C: Citrus fruits and juices, strawberries, tomatoes, tomato juice, peppers, baked potatoes, spinach, broccoli, cauliflower, [place] sprouts, cabbage  Vitamin A: Dark green, leafy vegetables, orange or yellow vegetables, cantaloupe, fortified dairy products, liver, fortified cereals  Zinc: Fortified cereals, red meats, seafood  Consider supplementing with Franky by [employer]. It can be purchased on amazon, [employer] website, or local pharmacy may be able to order it for you.  (These are essential branch chain amino acids that help with tissue building and wound healing).   When your blood sugar is consistently elevated greater than 180 your body can't heal or fight infection.     Concerns:  Signs of infection may include the following:  Increase in redness  Red \"streaks\" from wound  Increase in swelling  Fever  Unusual odor  Change in the amount of wound drainage     Should you experience any significant changes in your wound(s) or have any questions regarding your home care instructions please contact the [facility] @ [phone] If after regular business hours, please call your family doctor or local emergency room. The treatment plan has been discussed at length between you and your provider. Follow all instructions carefully, it is very important. If you do not follow all instructions you are at risk of your wound not healing, infection, possible loss of limb and even loss of life.

## 2024-03-01 NOTE — TELEPHONE ENCOUNTER
Spoke with pt and explained what our DM clinic is.  He expressed understanding and will schedule.  He asked that address be sent via [employer] along with the phone #.  This was done.

## 2024-03-01 NOTE — TELEPHONE ENCOUNTER
PT WAS MADE AWARE OF DIABETIC CLINIC AND ORDER THAT WAS PLACED    PT SAID HE WILL CALL US ABOUT IT TO SPEAK WITH SOMEONE ONCE HE IS FINISHED WITH SURGERIES.    HE IS DONE WITH SURGERIES AND WILL LIKE TO DISCUSS INFO ABOUT WHAT A DIABETIC CLINIC IS & INFO ABOUT THE ORDER    PLEASE CALL BACK

## 2024-03-01 NOTE — PROGRESS NOTES
CHIEF COMPLAINT:     Chief Complaint   Patient presents with    Wound Care     Patients is here for a follow up. Patients denies any concern     HPI:   Information obtained from Patient and chart  2-22-24 INITIAL:  Patient is a 74 yo male with pmhx of  dm (8-23-23 A1c 7.1), hl, bph, htn, afib, gastrointestinal stromal tumor (GIST) in 2014.  In 2017 he underwent end colosotomy and urostomy. 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  In 2021 he noted a new mass in isciorectal fossa and perineal pain. He was maintained on chemo.  January 2024 a right gluteal cleft lesion was causing pain, he elected to undergo surgery which was on 1-30-24 where he had expoloration of the perineum and resection of subq fat with wire localization.  The pathology was negative for malignancy.  He followed up with dr. alford office on 2-5 (mayelin mcintosh) and it was noted that there was wound dehiscence possibly secondary to VEGF inhibitor chemo and patient referred to wound clinic.  He has been dressing the area with gauze.  There is undermining noted.  We discussed wound vac, offloading, and nutritional supplementation. He is really wanting to restart his chemo as he is not wanting to remain on the dilaudid for pain control (that he needs when he is not on chemo.). I will touch base with mau mccartney and mayelin mcintosh regarding treatment plan.    3-1-24 patient returns, I touched base with onc and pcp.  Per onc that if his vegf inibitor would be restarted there is no chance he can heal. Primary recommend he see [facility] to get a glucometer. Patient states that the referral was entered by dr. Bernal. I asked him to please make that appointment because it may be a \"wait\" to get in.   Patient did not receive the wound vac he has to be home to sign for it he states that they called this  am that it was on it's way.  Will have him come next Tuesday and Friday for placemetn.  He states he is using the ehob cushion and is drinking owen.  Wound is about the same. No acute s/s of infection.    MEDICATIONS:     Current Outpatient Medications:     HYDROmorphone 4 MG Oral Tab, Take 1-2 tablets (4-8 mg total) by mouth every 3 (three) hours as needed (cancer related pain.)., Disp: 100 tablet, Rfl: 0    clopidogrel 75 MG Oral Tab, Take 1 tablet (75 mg total) by mouth daily. OK to resume medication tomorrow 1/31/2024, Disp: , Rfl:     ondansetron 4 MG Oral Tablet Dispersible, Take 1 tablet (4 mg total) by mouth every 4 (four) hours as needed for Nausea. (Patient not taking: Reported on 2/22/2024), Disp: 20 tablet, Rfl: 0    HYDROcodone-acetaminophen 5-325 MG Oral Tab, Take 1 tablet by mouth every 6 (six) hours as needed for Pain. (Patient not taking: Reported on 2/22/2024), Disp: 20 tablet, Rfl: 0    levothyroxine 100 MCG Oral Tab, Take 1 tablet (100 mcg total) by mouth before breakfast. Take on an empty stomach, Disp: 30 tablet, Rfl: 1    escitalopram 10 MG Oral Tab, Take 1 tablet (10 mg total) by mouth daily., Disp: 90 tablet, Rfl: 1    QINLOCK 50 MG Oral Tab, Take 3 tablets by mouth daily. Total dose 150 mg daily (Patient taking differently: Take 3 tablets by mouth nightly. Total dose 150 mg daily), Disp: 90 tablet, Rfl: 11    aspirin 81 MG Oral Tab EC, Take 1 tablet (81 mg total) by mouth daily., Disp: , Rfl:     diphenhydrAMINE (BENADRYL ALLERGY) 25 MG Oral Cap, Please take 1 hour prior to scheduled scan. (Patient not taking: Reported on 2/22/2024), Disp: 2 capsule, Rfl: 0    predniSONE 50 MG Oral Tab, Take 13 hr, 7 hr, and 1 hr prior to CT scan., Disp: 3 tablet, Rfl: 0    atorvastatin 40 MG Oral Tab, Take 1 tablet (40 mg total) by mouth nightly., Disp: , Rfl:   ALLERGIES:     Allergies   Allergen Reactions    Radiology Contrast Iodinated Dyes HIVES, RESPIRATORY FAILURE and OTHER (SEE COMMENTS)      Originally with \"Barium enema for lower GI\" 50 years ago. He states he developed hives and went into respiratory arrest. Pt has since had CT iodinated contrast with 13 hr pre-meds and no break-through reactions, HUMBERTO Mera, Radiology RN.       REVIEW OF SYSTEMS:   This information was obtained from the patient/family and chart.    See HPI for pertinent positives, otherwise 10 pt ROS negative.    HISTORY:   Past medical, surgical, family and social history updated where appropriate.      PHYSICAL EXAM:     Vitals:    03/01/24 1045   BP: 143/83  Comment: 183 glucose   Pulse: 71   Resp: 16   Temp: 98 °F (36.7 °C)        Estimated body mass index is 28.37 kg/m² as calculated from the following:    Height as of 2/22/24: 73\".    Weight as of 2/22/24: 215 lb (97.5 kg).   POC Glucose   Date Value Ref Range Status   03/01/2024 183 (H) 70 - 99 mg/dL Final   02/22/2024 297 (H) 70 - 99 mg/dL Final   01/30/2024 142 (H) 70 - 99 mg/dL Final       Vital signs reviewed.Appears stated age, well groomed.    Constitutional:  Bp wnl for patient. Pulse Regular and wnl for patient. Respirations easy and unlabored. Temperature wnl. Weight normal for height. Appearance neat and clean. Appears in no acute distress. Well nourished and well developed.    Musculoskeletal:  Gait and station stable   Integumentary:  refer to wound characteristics and images   Psychiatric:  Judgment and insight intact. Alert and oriented times 3. No evidence of depression, anxiety, or agitation. Calm, cooperative, and communicative. Appropriate interactions and affect.  DIAGNOSTICS:     Lab Results   Component Value Date    BUN 24 (H) 12/28/2023    CREATSERUM 1.28 12/28/2023    ALB 4.0 12/28/2023    TP 7.6 12/28/2023    A1C 7.1 (H) 08/23/2023 1-30-24 pathology-perineal  Final Diagnosis:   Subcutaneous fat:  -Mature adipose tissue.  -Small portion of unremarkable skin.  -Negative for malignancy.     WOUND ASSESSMENT:     Wound 02/22/24 #1- Right buttocks  Buttocks Right (Active)   Date First Assessed/Time First Assessed: 02/22/24 0943    Wound Number (Wound Clinic Only): #1- Right buttocks  Primary Wound Type: Old surgical  Location: Buttocks  Wound Location Orientation: Right      Assessments 2/22/2024  9:44 AM 3/1/2024 10:46 AM   Wound Image       Drainage Amount Small Small   Drainage Description Serosanguineous Serosanguineous   Wound Length (cm) 4.1 cm 3.5 cm   Wound Width (cm) 0.5 cm 0.4 cm   Wound Surface Area (cm^2) 2.05 cm^2 1.4 cm^2   Wound Depth (cm) 1 cm 0.5 cm   Wound Volume (cm^3) 2.05 cm^3 0.7 cm^3   Wound Healing % -- 66   Margins Well-defined edges;Epibole (Rolled edges) Well-defined edges   Non-staged Wound Description Full thickness Full thickness   Bibiana-wound Assessment -- Induration   Wound Granulation Tissue Pink;Spongy Spongy;Red   Wound Bed Granulation (%) 60 % 75 %   Wound Bed Slough (%) 40 % 25 %   Wound Odor None None   Tunneling? No --   Undermining? Yes Yes   Number of Undermines -- 1   Undermine 1 Start Position -- 7   Undermine 1 End Position -- 10   Sinus Tracts? No --       No associated orders.          ASSESSMENT AND PLAN:    1. Disruption of external operation (surgical) wound, not elsewhere classified, subsequent encounter    2. GIST (gastrointestinal stroma tumor), malignant, colon (HCC)    3. Type 2 diabetes mellitus with other skin complications (HCC)      Risks, benefits, and alternatives of current treatment plan discussed in detail.  Questions and concerns addressed. Red flags to RTC or ED reviewed.  Patient (or parent) agrees to plan.      NOTE TO PATIENT: The 21st Century Cures Act makes clinical notes like these available to patients in the interest of transparency. Clinical notes are medical documents used by physicians and care providers to communicate with each other. These documents include medical language and terminology, abbreviations, and treatment information that may sound technical and at times possibly  unfamiliar. In addition, at times, the verbiage may appear blunt or direct. These documents are one tool providers use to communicate relevant information and clinical opinions of the care providers in a way that allows common understanding of the clinical context.   I spent  30 minutes with the patient. This time included:    preparing to see the patient (eg, review notes and recent diagnostics),  seeing the patient, obtaining and/or reviewing separately obtained history, performing a medically appropriate examination and/or evaluation, counseling and educating the patient, documenting in the record,   DISCHARGE:      Patient Instructions   Please return:  next Tuesday and Friday for RN visit for wound assessment, and if applicable application of NPWT   (See below orders)    Patient discharge and wound care instructions  Luciano Hernández  3/1/2024         You may shower and cleanse area with mild soap and water, rinse wound, cleanse with PURACYN hydrogel on a gauze and apply your bandage.     Today in clinic: lu to the undermining and wound bed.    Negative Pressure Wound Therapy:  Location:right buttock  Frequency: twice weekly  Remove vac sponges and drape, noting and documenting the number of sponges removed.  Moisten gauze with Dakins or other hypochlorous cleanser and place into wound bed for 5-10 minutes while supplies are prepped.  Apply skin prep to periwound and border with drape. Always label dressing with number of sponges used in the wound.   Place ul in undermining  Place a strip of foam into the wound bed, slightly in the 6-12 undermined area, then bolster OVER the undermined areas on draped skin  Apply Negative Pressure Wound Therapy with setting of 125 mmHg continuous.     REMOVE YOUR VAC DRESSING AND APPLY A NORMAL SALINE MOISTENED GAUZE, DRY GAUZE AND TAPE IF:  For any reason the VAC cannot be applied.  Your VAC alarms and you are unable to trouble shoot to correct the issue and the  VAC has been off for 2 or more hours.  If you experience these or any other problems with the VAC, call your Home Health Nurse, the Wound Care Center or the VAC company for assistance.  Offloading:  Off-loading is an important part of your wound treatment program. It is a part that only you can assure is accomplished. If you have any concerns about methods to off-load your wound, or feel that you might have trouble following the off-loading plan prescribed for you, talk to your doctor so that alternatives can be discussed that will work in your situation.    EHOB waffle cushion can be purchased online or through a pharmacy listed in your welcome folder. Please use at all times when sitting, even when transporting in the car if possible.     Nutrition and blood sugar control:  Focus on the following:  Protein: Meats, beans, eggs, milk and yogurt particularly Greek yogurt), tofu, soy nuts, soy protein products (Follow the protein handout in your welcome folder)  Vitamin C: Citrus fruits and juices, strawberries, tomatoes, tomato juice, peppers, baked potatoes, spinach, broccoli, cauliflower, [place] sprouts, cabbage  Vitamin A: Dark green, leafy vegetables, orange or yellow vegetables, cantaloupe, fortified dairy products, liver, fortified cereals  Zinc: Fortified cereals, red meats, seafood  Consider supplementing with Franky by [employer]. It can be purchased on amazon, [employer] website, or local pharmacy may be able to order it for you.  (These are essential branch chain amino acids that help with tissue building and wound healing).   When your blood sugar is consistently elevated greater than 180 your body can't heal or fight infection.     Concerns:  Signs of infection may include the following:  Increase in redness  Red \"streaks\" from wound  Increase in swelling  Fever  Unusual odor  Change in the amount of wound drainage     Should you experience any significant changes in your wound(s) or have any questions  regarding your home care instructions please contact the wound center [facility] @ [phone] If after regular business hours, please call your family doctor or local emergency room. The treatment plan has been discussed at length between you and your provider. Follow all instructions carefully, it is very important. If you do not follow all instructions you are at risk of your wound not healing, infection, possible loss of limb and even loss of life.       Jalyin Sawant FNP-C, CWCN-AP, CFCN, CSWS, WCC, DWC  3/1/2024

## 2024-03-01 NOTE — PROGRESS NOTES
.Weekly Wound Education Note    Teaching Provided To: Patient  Training Topics: Discharge instructions;Dressing;Cleasing and general instructions;Off-loading  Training Method: Explain/Verbal;Written  Training Response: Patient responds and understands;Reinforcement needed            Karo Ag into undermining areas and wound, cover with border gauze.  Waiting for NPWT delivery today or Monday.  Patient instructed to bring to appointment next week.  Continue offloading as much as possible.  Patient will change outside dressing until NPWT applied.

## 2024-03-04 ENCOUNTER — TELEPHONE (OUTPATIENT)
Dept: WOUND CARE | Facility: HOSPITAL | Age: 74
End: 2024-03-04

## 2024-03-04 NOTE — TELEPHONE ENCOUNTER
Returning patient's call, pt received wound vac supplies - asking what he needs to bring. Informed him to bring in black \"brief case\" that has the device, 1 canister, and 1 black foam packet. He verbalized understanding. Confirmed appointment for tomorrow.

## 2024-03-05 ENCOUNTER — OFFICE VISIT (OUTPATIENT)
Dept: WOUND CARE | Facility: HOSPITAL | Age: 74
End: 2024-03-05
Attending: NURSE PRACTITIONER
Payer: MEDICARE

## 2024-03-05 VITALS
DIASTOLIC BLOOD PRESSURE: 80 MMHG | SYSTOLIC BLOOD PRESSURE: 122 MMHG | TEMPERATURE: 97 F | RESPIRATION RATE: 16 BRPM | HEART RATE: 77 BPM

## 2024-03-05 DIAGNOSIS — T81.31XD DISRUPTION OF EXTERNAL OPERATION (SURGICAL) WOUND, NOT ELSEWHERE CLASSIFIED, SUBSEQUENT ENCOUNTER: Primary | ICD-10-CM

## 2024-03-05 LAB — GLUCOSE BLD-MCNC: 137 MG/DL (ref 70–99)

## 2024-03-05 PROCEDURE — 97605 NEG PRS WND THER DME<=50SQCM: CPT

## 2024-03-05 PROCEDURE — 82962 GLUCOSE BLOOD TEST: CPT

## 2024-03-05 NOTE — PROGRESS NOTES
Chief Complaint   Patient presents with    Wound Care     Patients is here for a nurse visit. Patients denies any concern            Current Outpatient Medications:     HYDROmorphone 4 MG Oral Tab, Take 1-2 tablets (4-8 mg total) by mouth every 3 (three) hours as needed (cancer related pain.)., Disp: 100 tablet, Rfl: 0    clopidogrel 75 MG Oral Tab, Take 1 tablet (75 mg total) by mouth daily. OK to resume medication tomorrow 1/31/2024, Disp: , Rfl:     ondansetron 4 MG Oral Tablet Dispersible, Take 1 tablet (4 mg total) by mouth every 4 (four) hours as needed for Nausea. (Patient not taking: Reported on 2/22/2024), Disp: 20 tablet, Rfl: 0    HYDROcodone-acetaminophen 5-325 MG Oral Tab, Take 1 tablet by mouth every 6 (six) hours as needed for Pain. (Patient not taking: Reported on 2/22/2024), Disp: 20 tablet, Rfl: 0    levothyroxine 100 MCG Oral Tab, Take 1 tablet (100 mcg total) by mouth before breakfast. Take on an empty stomach, Disp: 30 tablet, Rfl: 1    escitalopram 10 MG Oral Tab, Take 1 tablet (10 mg total) by mouth daily., Disp: 90 tablet, Rfl: 1    QINLOCK 50 MG Oral Tab, Take 3 tablets by mouth daily. Total dose 150 mg daily (Patient taking differently: Take 3 tablets by mouth nightly. Total dose 150 mg daily), Disp: 90 tablet, Rfl: 11    aspirin 81 MG Oral Tab EC, Take 1 tablet (81 mg total) by mouth daily., Disp: , Rfl:     diphenhydrAMINE (BENADRYL ALLERGY) 25 MG Oral Cap, Please take 1 hour prior to scheduled scan. (Patient not taking: Reported on 2/22/2024), Disp: 2 capsule, Rfl: 0    predniSONE 50 MG Oral Tab, Take 13 hr, 7 hr, and 1 hr prior to CT scan., Disp: 3 tablet, Rfl: 0    atorvastatin 40 MG Oral Tab, Take 1 tablet (40 mg total) by mouth nightly., Disp: , Rfl:     Allergies   Allergen Reactions    Radiology Contrast Iodinated Dyes HIVES, RESPIRATORY FAILURE and OTHER (SEE COMMENTS)     Originally with \"Barium enema for lower GI\" 50 years ago. He states he developed hives and went into  respiratory arrest. Pt has since had CT iodinated contrast with 13 hr pre-meds and no break-through reactions, HUMBERTO Mera, Radiology RN.           HISTORY:     Past medical, surgical, family and social history updated where appropriate.    PHYSICAL EXAM:   /80   Pulse 77   Temp 97.2 °F (36.2 °C)   Resp 16        Vital signs reviewed.      Calf                      Ankle                            Wound 02/22/24 #1- Right buttocks Buttocks Right (Active)   Date First Assessed/Time First Assessed: 02/22/24 0943    Wound Number (Wound Clinic Only): #1- Right buttocks  Primary Wound Type: Old surgical  Location: Buttocks  Wound Location Orientation: Right      Assessments 2/22/2024  9:44 AM 3/5/2024 11:02 AM   Wound Image       Drainage Amount Small Moderate   Drainage Description Serosanguineous Serous;Yellow   Treatments -- Wound Vac - Neg Pressure   Wound Vac Brand -- KCI   Wound Length (cm) 4.1 cm 3.1 cm   Wound Width (cm) 0.5 cm 0.5 cm   Wound Surface Area (cm^2) 2.05 cm^2 1.55 cm^2   Wound Depth (cm) 1 cm 1.2 cm   Wound Volume (cm^3) 2.05 cm^3 1.86 cm^3   Wound Healing % -- 9   Margins Well-defined edges;Epibole (Rolled edges) Well-defined edges   Non-staged Wound Description Full thickness Full thickness   Bibiana-wound Assessment -- Clean   Wound Granulation Tissue Pink;Spongy Firm;Pink   Wound Bed Granulation (%) 60 % 80 %   Wound Bed Slough (%) 40 % 20 %   Wound Odor None None   Tunneling? No --   Undermining? Yes --   Undermine 1 Start Position -- 7   Undermine 1 End Position -- 10   Sinus Tracts? No --       No associated orders.       Negative Pressure Wound Therapy Buttocks Right (Active)   Placement Date/Time: 03/05/24 1145   Location: Buttocks  Wound Location Orientation: Right      Assessments 3/5/2024 11:02 AM   Wound photographed/measured Yes   Machine Status (On) Yes   Site Assessment Clean   Bibiana-wound Assessment Clean   Unit Type KCI   Dressing Type Black foam   Number of Foam Pieces Used  2   Cycle Continuous   Target Pressure (mmHg) 125   Canister Changed Yes       No associated orders.          ASSESSMENT AND PLAN:        Risks, benefits, and alternatives of current treatment plan discussed in detail.  Questions and concerns addressed. Red flags to RTC or ED reviewed.  Patient (or parent) agrees to plan.      No follow-ups on file.  Weekly Wound Education Note    Teaching Provided To: Patient  Training Topics: Dressing;Cleasing and general instructions;Discharge instructions;Negative presssure therapy  Training Method: Explain/Verbal;Written  Training Response: Patient responds and understands        Notes: Wound improving. Will apply NPWT for first time. Vashe soak before dressing. Continue lu to wound bed and apply NPWT at 125mmHg continuous, bridged to left hip. 2 black foam pieces used.               Madelaine JEFFERS RN   3/5/2024  11:46 AM

## 2024-03-07 ENCOUNTER — TELEPHONE (OUTPATIENT)
Dept: SURGERY | Facility: CLINIC | Age: 74
End: 2024-03-07

## 2024-03-07 NOTE — TELEPHONE ENCOUNTER
Called patient to discuss recent CT scan. Informed that the lesion we attempted to resect during surgery is still present by CT scan, although smaller in size. He expressed understanding. Informed we could go back for surgery to try and resect area again or continue with wound care to get back on Quinlock which helps for his pain. He expressed he does not want to go back to surgery and would prefer to heal and get back on his medication. Informed patient to keep us updated with how area is healing with wound care.     ALE Phillips

## 2024-03-08 ENCOUNTER — OFFICE VISIT (OUTPATIENT)
Dept: WOUND CARE | Facility: HOSPITAL | Age: 74
End: 2024-03-08
Attending: NURSE PRACTITIONER
Payer: MEDICARE

## 2024-03-08 VITALS
RESPIRATION RATE: 16 BRPM | SYSTOLIC BLOOD PRESSURE: 144 MMHG | HEART RATE: 87 BPM | DIASTOLIC BLOOD PRESSURE: 84 MMHG | TEMPERATURE: 98 F

## 2024-03-08 DIAGNOSIS — T81.31XD DISRUPTION OF EXTERNAL OPERATION (SURGICAL) WOUND, NOT ELSEWHERE CLASSIFIED, SUBSEQUENT ENCOUNTER: Primary | ICD-10-CM

## 2024-03-08 PROCEDURE — 97605 NEG PRS WND THER DME<=50SQCM: CPT

## 2024-03-08 NOTE — PROGRESS NOTES
Chepe Hernández is an 73 year old male.    Chief Complaint   Patient presents with    Wound Care     Patients is here for a nurse visit. Patients stated no issue with the wound vac       /84   Pulse 87   Temp 97.6 °F (36.4 °C)   Resp 16     Wound 02/22/24 #1- Right buttocks Buttocks Right (Active)   Date First Assessed/Time First Assessed: 02/22/24 0943    Wound Number (Wound Clinic Only): #1- Right buttocks  Primary Wound Type: Old surgical  Location: Buttocks  Wound Location Orientation: Right      Assessments 2/22/2024  9:44 AM 3/8/2024 11:12 AM   Wound Image       Drainage Amount Small Scant   Drainage Description Serosanguineous Serous;Yellow   Treatments -- Wound Vac - Neg Pressure   Wound Length (cm) 4.1 cm 2.4 cm   Wound Width (cm) 0.5 cm 0.5 cm   Wound Surface Area (cm^2) 2.05 cm^2 1.2 cm^2   Wound Depth (cm) 1 cm 1 cm   Wound Volume (cm^3) 2.05 cm^3 1.2 cm^3   Wound Healing % -- 41   Margins Well-defined edges;Epibole (Rolled edges) Epibole (Rolled edges)   Non-staged Wound Description Full thickness Full thickness   Bibiana-wound Assessment -- Clean   Wound Granulation Tissue Pink;Spongy Pink;Firm   Wound Bed Granulation (%) 60 % 70 %   Wound Bed Slough (%) 40 % 30 %   Wound Odor None None   Tunneling? No --   Undermining? Yes --   Sinus Tracts? No --       No associated orders.       Negative Pressure Wound Therapy Buttocks Right (Active)   Placement Date/Time: 03/05/24 1145   Location: Buttocks  Wound Location Orientation: Right      Assessments 3/5/2024 11:02 AM 3/8/2024 12:26 PM   Wound photographed/measured Yes Yes   Machine Status (On) Yes Yes   Site Assessment Clean --   Bibiana-wound Assessment Clean Clean;Dry   Unit Type KCI KCI   Dressing Type Black foam Black foam   Number of Foam Pieces Used 2 2   Cycle Continuous Continuous   Target Pressure (mmHg) 125 125   Drainage Description -- Yellow;Serous   Canister Changed Yes Yes       No associated orders.       Weekly Wound Education  Note    Teaching Provided To: Patient  Training Topics: Discharge instructions;Dressing;Off-loading;Negative presssure therapy  Training Method: Explain/Verbal  Training Response: Patient responds and understands         Patient is here for a nurse visit.  Wound length improved.  Still note one area of depth, filled with Karo Ag.  NPWT @125mmHg, bridged over left hip as requested.            No follow-ups on file.    Vikki PARNELL RN

## 2024-03-11 RX ORDER — LEVOTHYROXINE SODIUM 0.1 MG/1
100 TABLET ORAL
Qty: 30 TABLET | Refills: 1 | Status: SHIPPED | OUTPATIENT
Start: 2024-03-11 | End: 2024-05-20

## 2024-03-12 ENCOUNTER — OFFICE VISIT (OUTPATIENT)
Dept: WOUND CARE | Facility: HOSPITAL | Age: 74
End: 2024-03-12
Attending: NURSE PRACTITIONER
Payer: MEDICARE

## 2024-03-12 VITALS
DIASTOLIC BLOOD PRESSURE: 75 MMHG | HEART RATE: 84 BPM | TEMPERATURE: 98 F | RESPIRATION RATE: 16 BRPM | SYSTOLIC BLOOD PRESSURE: 114 MMHG

## 2024-03-12 DIAGNOSIS — C49.A4 GIST (GASTROINTESTINAL STROMA TUMOR), MALIGNANT, COLON (HCC): ICD-10-CM

## 2024-03-12 DIAGNOSIS — T81.31XD DISRUPTION OF EXTERNAL OPERATION (SURGICAL) WOUND, NOT ELSEWHERE CLASSIFIED, SUBSEQUENT ENCOUNTER: Primary | ICD-10-CM

## 2024-03-12 DIAGNOSIS — E11.628 TYPE 2 DIABETES MELLITUS WITH OTHER SKIN COMPLICATIONS (HCC): ICD-10-CM

## 2024-03-12 PROCEDURE — 15271 SKIN SUB GRAFT TRNK/ARM/LEG: CPT | Performed by: NURSE PRACTITIONER

## 2024-03-12 NOTE — PROGRESS NOTES
.Weekly Wound Education Note    Teaching Provided To: Patient  Training Topics: Dressing;Cleasing and general instructions;Discharge instructions  Training Method: Explain/Verbal;Written  Training Response: Patient responds and understands        Notes: Wound improving. Kerecis #1 applied, fixated wtih sorbact, dressed with NPWT bridged to right hip, set to 150mmHg continuous.

## 2024-03-12 NOTE — PROGRESS NOTES
CHIEF COMPLAINT:     Chief Complaint   Patient presents with    Wound Recheck     Pt here for follow up arrives with NPWT in place     HPI:   Information obtained from Patient and chart  2-22-24 INITIAL:  Patient is a 72 yo male with pmhx of  dm (8-23-23 A1c 7.1), hl, bph, htn, afib, gastrointestinal stromal tumor (GIST) in 2014.  In 2017 he underwent end colosotomy and urostomy. 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  In 2021 he noted a new mass in isciorectal fossa and perineal pain. He was maintained on chemo.  January 2024 a right gluteal cleft lesion was causing pain, he elected to undergo surgery which was on 1-30-24 where he had expoloration of the perineum and resection of subq fat with wire localization.  The pathology was negative for malignancy.  He followed up with dr. alford office on 2-5 (mayelin mcintosh) and it was noted that there was wound dehiscence possibly secondary to VEGF inhibitor chemo and patient referred to wound clinic.  He has been dressing the area with gauze.  There is undermining noted.  We discussed wound vac, offloading, and nutritional supplementation. He is really wanting to restart his chemo as he is not wanting to remain on the dilaudid for pain control (that he needs when he is not on chemo.). I will touch base with mau mccartney and mayelin mcintosh regarding treatment plan.    3-1-24 patient returns, I touched base with onc and pcp.  Per onc that if his vegf inibitor would be restarted there is no chance he can heal. Primary recommend he see [facility] to get a glucometer. Patient states that the referral was entered by dr. Bernal. I asked him to please make that appointment because it may be a \"wait\" to get in.   Patient did not receive the wound vac he has to be home to sign for it he states that they called this am that  it was on it's way.  Will have him come next Tuesday and Friday for placemetn.  He states he is using the ehob cushion and is drinking owen.  Wound is about the same. No acute s/s of infection.    3-12-24 patient returns.  Patient did have a CT scan which unfourtunately the lesion that was attempted to remove remains present in the area although smaller in size.  Patient has chosen not to return to surgery for further resection and has chosen to continue with wound care to get the area closed so he can get back on his meds.  Npwt was started 1 week ago today, and he is tolerating well. Keracis and epifix/cord are covered. The undermined area remains, keracis placed into this area and continue wound vac. Wound is smaller, granulation is healthy, no acute s/s of infection. Patient continues to express frustration regarding not being on his \"cancer med\" and needing to take dilaudid for pain.    MEDICATIONS:     Current Outpatient Medications:     levothyroxine 100 MCG Oral Tab, Take 1 tablet (100 mcg total) by mouth before breakfast. TAKE ON EMPTY STOMACH, Disp: 30 tablet, Rfl: 1    HYDROmorphone 4 MG Oral Tab, Take 1-2 tablets (4-8 mg total) by mouth every 3 (three) hours as needed (cancer related pain.)., Disp: 100 tablet, Rfl: 0    clopidogrel 75 MG Oral Tab, Take 1 tablet (75 mg total) by mouth daily. OK to resume medication tomorrow 1/31/2024, Disp: , Rfl:     ondansetron 4 MG Oral Tablet Dispersible, Take 1 tablet (4 mg total) by mouth every 4 (four) hours as needed for Nausea. (Patient not taking: Reported on 2/22/2024), Disp: 20 tablet, Rfl: 0    HYDROcodone-acetaminophen 5-325 MG Oral Tab, Take 1 tablet by mouth every 6 (six) hours as needed for Pain. (Patient not taking: Reported on 2/22/2024), Disp: 20 tablet, Rfl: 0    escitalopram 10 MG Oral Tab, Take 1 tablet (10 mg total) by mouth daily., Disp: 90 tablet, Rfl: 1    QINLOCK 50 MG Oral Tab, Take 3 tablets by mouth daily. Total dose 150 mg daily (Patient  taking differently: Take 3 tablets by mouth nightly. Total dose 150 mg daily), Disp: 90 tablet, Rfl: 11    aspirin 81 MG Oral Tab EC, Take 1 tablet (81 mg total) by mouth daily., Disp: , Rfl:     diphenhydrAMINE (BENADRYL ALLERGY) 25 MG Oral Cap, Please take 1 hour prior to scheduled scan. (Patient not taking: Reported on 2/22/2024), Disp: 2 capsule, Rfl: 0    predniSONE 50 MG Oral Tab, Take 13 hr, 7 hr, and 1 hr prior to CT scan., Disp: 3 tablet, Rfl: 0    atorvastatin 40 MG Oral Tab, Take 1 tablet (40 mg total) by mouth nightly., Disp: , Rfl:   ALLERGIES:     Allergies   Allergen Reactions    Radiology Contrast Iodinated Dyes HIVES, RESPIRATORY FAILURE and OTHER (SEE COMMENTS)     Originally with \"Barium enema for lower GI\" 50 years ago. He states he developed hives and went into respiratory arrest. Pt has since had CT iodinated contrast with 13 hr pre-meds and no break-through reactions, HUMBERTO Mera, Radiology RN.       REVIEW OF SYSTEMS:   This information was obtained from the patient/family and chart.    See HPI for pertinent positives, otherwise 10 pt ROS negative.    HISTORY:   Past medical, surgical, family and social history updated where appropriate.      PHYSICAL EXAM:     Vitals:    03/12/24 1519   BP: 114/75   Pulse: 84   Resp: 16   Temp: 97.6 °F (36.4 °C)       Estimated body mass index is 28.37 kg/m² as calculated from the following:    Height as of 2/22/24: 73\".    Weight as of 2/22/24: 215 lb (97.5 kg).   POC Glucose   Date Value Ref Range Status   03/05/2024 137 (H) 70 - 99 mg/dL Final   03/01/2024 183 (H) 70 - 99 mg/dL Final   02/22/2024 297 (H) 70 - 99 mg/dL Final       Vital signs reviewed.Appears stated age, well groomed.    Constitutional:  Bp wnl for patient. Pulse Regular and wnl for patient. Respirations easy and unlabored. Temperature wnl. Weight normal for height. Appearance neat and clean. Appears in no acute distress. Well nourished and well developed.    Musculoskeletal:  Gait and  station stable   Integumentary:  refer to wound characteristics and images   Psychiatric:  Judgment and insight intact. Alert and oriented times 3. No evidence of depression, anxiety, or agitation. Calm, cooperative, and communicative. Appropriate interactions and affect.  DIAGNOSTICS:     Lab Results   Component Value Date    BUN 24 (H) 12/28/2023    CREATSERUM 1.28 12/28/2023    ALB 4.0 12/28/2023    TP 7.6 12/28/2023    A1C 7.1 (H) 08/23/2023 1-30-24 pathology-perineal  Final Diagnosis:   Subcutaneous fat:  -Mature adipose tissue.  -Small portion of unremarkable skin.  -Negative for malignancy.     WOUND ASSESSMENT:     Wound 02/22/24 #1- Right buttocks Buttocks Right (Active)   Date First Assessed/Time First Assessed: 02/22/24 0943    Wound Number (Wound Clinic Only): #1- Right buttocks  Primary Wound Type: Old surgical  Location: Buttocks  Wound Location Orientation: Right      Assessments 2/22/2024  9:44 AM 3/12/2024  3:21 PM   Wound Image       Drainage Amount Small Scant   Drainage Description Serosanguineous Sanguineous   Wound Length (cm) 4.1 cm 2 cm   Wound Width (cm) 0.5 cm 0.3 cm   Wound Surface Area (cm^2) 2.05 cm^2 0.6 cm^2   Wound Depth (cm) 1 cm 0.5 cm   Wound Volume (cm^3) 2.05 cm^3 0.3 cm^3   Wound Healing % -- 85   Margins Well-defined edges;Epibole (Rolled edges) Epibole (Rolled edges)   Non-staged Wound Description Full thickness Full thickness   Bibiana-wound Assessment -- Clean   Wound Granulation Tissue Pink;Spongy Pink;Firm;Red   Wound Bed Granulation (%) 60 % 100 %   Wound Bed Slough (%) 40 % --   Wound Odor None None   Tunneling? No No   Undermining? Yes Yes   Number of Undermines -- 1   Undermine 1 Start Position -- 6   Undermine 1 End Position -- 12   Sinus Tracts? No No       Active Orders   Date Order Priority Status Authorizing Provider   03/12/24 1602 Cellular tissue product application Old surgical Right Buttocks Routine Active Jaylin Sawant APRN       Negative Pressure Wound  Therapy Buttocks Right (Active)   Placement Date/Time: 03/05/24 1145   Location: Buttocks  Wound Location Orientation: Right      Assessments 3/5/2024 11:02 AM 3/8/2024 12:26 PM   Wound photographed/measured Yes Yes   Machine Status (On) Yes Yes   Site Assessment Clean --   Bibiana-wound Assessment Clean Clean;Dry   Unit Type KCI KCI   Dressing Type Black foam Black foam   Number of Foam Pieces Used 2 2   Cycle Continuous Continuous   Target Pressure (mmHg) 125 125   Drainage Description -- Yellow;Serous   Canister Changed Yes Yes       No associated orders.      PROCEDURE:      This procedure is medically necessary to nourish the wound bed with a skin substitute containing fat, protein, elastin, glycans and other natural skin elements.  The graft recruits the body's own cells and is ultimately converted into living tissue to assist in granulation production and more rapid healing.  See rn px note    ASSESSMENT AND PLAN:    1. Disruption of external operation (surgical) wound, not elsewhere classified, subsequent encounter    2. GIST (gastrointestinal stroma tumor), malignant, colon (HCC)    3. Type 2 diabetes mellitus with other skin complications (HCC)        Risks, benefits, and alternatives of current treatment plan discussed in detail.  Questions and concerns addressed. Red flags to RTC or ED reviewed.  Patient (or parent) agrees to plan.      NOTE TO PATIENT: The 21st Century Cures Act makes clinical notes like these available to patients in the interest of transparency. Clinical notes are medical documents used by physicians and care providers to communicate with each other. These documents include medical language and terminology, abbreviations, and treatment information that may sound technical and at times possibly unfamiliar. In addition, at times, the verbiage may appear blunt or direct. These documents are one tool providers use to communicate relevant information and clinical opinions of the care  providers in a way that allows common understanding of the clinical context.   I spent 40 minutes with the patient. This time included:    preparing to see the patient (eg, review notes and recent diagnostics),  seeing the patient, obtaining and/or reviewing separately obtained history, performing a medically appropriate examination and/or evaluation, counseling and educating the patient, documenting in the record, I personally applied the wound vac.  Bill keracis placement only  DISCHARGE:      Patient Instructions   Please return:  Friday for RN visit for wound assessment, and if applicable application of NPWT (See below orders)-do not probe undermining-keracis placed 3-12-24    1 week with Jaylin  Patient discharge and wound care instructions  Luciano Hernández  3/12/2024        Today in clinic: micronized keracis to wound bed>sorbact contact layer    Negative Pressure Wound Therapy:  Location:right buttock  Frequency: twice weekly  Remove vac sponges and drape, noting and documenting the number of sponges removed.  Moisten gauze with Dakins or other hypochlorous cleanser and place into wound bed for 5-10 minutes while supplies are prepped.  Apply skin prep to periwound. Always label dressing with number of sponges used in the wound.   Sorbact over wound and periwound   foam OVER the wound and undermined areas on sorbact or drape protected skin  Bridge to left hip  Apply Negative Pressure Wound Therapy with setting of 150 mmHg continuous.     REMOVE YOUR VAC DRESSING AND APPLY A NORMAL SALINE MOISTENED GAUZE, DRY GAUZE AND TAPE IF:  For any reason the VAC cannot be applied.  Your VAC alarms and you are unable to trouble shoot to correct the issue and the VAC has been off for 2 or more hours.  If you experience these or any other problems with the VAC, call your Home Health Nurse, the Wound Care Center or the VAC company for assistance.  Offloading:  Off-loading is an important part of your wound treatment  program. It is a part that only you can assure is accomplished. If you have any concerns about methods to off-load your wound, or feel that you might have trouble following the off-loading plan prescribed for you, talk to your doctor so that alternatives can be discussed that will work in your situation.    EHOB waffle cushion can be purchased online or through a pharmacy listed in your welcome folder. Please use at all times when sitting, even when transporting in the car if possible.     Nutrition and blood sugar control:  Focus on the following:  Protein: Meats, beans, eggs, milk and yogurt particularly Greek yogurt), tofu, soy nuts, soy protein products (Follow the protein handout in your welcome folder)  Vitamin C: Citrus fruits and juices, strawberries, tomatoes, tomato juice, peppers, baked potatoes, spinach, broccoli, cauliflower, [place] sprouts, cabbage  Vitamin A: Dark green, leafy vegetables, orange or yellow vegetables, cantaloupe, fortified dairy products, liver, fortified cereals  Zinc: Fortified cereals, red meats, seafood  Consider supplementing with Franky by [employer]. It can be purchased on amazon, [employer] website, or local pharmacy may be able to order it for you.  (These are essential branch chain amino acids that help with tissue building and wound healing).   When your blood sugar is consistently elevated greater than 180 your body can't heal or fight infection.     Concerns:  Signs of infection may include the following:  Increase in redness  Red \"streaks\" from wound  Increase in swelling  Fever  Unusual odor  Change in the amount of wound drainage     Should you experience any significant changes in your wound(s) or have any questions regarding your home care instructions please contact the wound center [facility] @ [phone] If after regular business hours, please call your family doctor or local emergency room. The treatment plan has been discussed at length between you and your  provider. Follow all instructions carefully, it is very important. If you do not follow all instructions you are at risk of your wound not healing, infection, possible loss of limb and even loss of life.       Jaylin Sawant FNP-C, CWCN-AP, CFCN, CSWS, WCC, DWC  3/12/2024

## 2024-03-12 NOTE — PATIENT INSTRUCTIONS
Please return:  Friday for RN visit for wound assessment, and if applicable application of NPWT (See below orders)-do not probe undermining-keracis placed 3-12-24    1 week with Jaylin  Patient discharge and wound care instructions  Luciano Hernández  3/12/2024        Today in clinic: micronized keracis to wound bed>sorbact contact layer    Negative Pressure Wound Therapy:  Location:right buttock  Frequency: twice weekly  Remove vac sponges and drape, noting and documenting the number of sponges removed.  Moisten gauze with Dakins or other hypochlorous cleanser and place into wound bed for 5-10 minutes while supplies are prepped.  Apply skin prep to periwound. Always label dressing with number of sponges used in the wound.   Sorbact over wound and periwound   foam OVER the wound and undermined areas on sorbact or drape protected skin  Bridge to left hip  Apply Negative Pressure Wound Therapy with setting of 150 mmHg continuous.     REMOVE YOUR VAC DRESSING AND APPLY A NORMAL SALINE MOISTENED GAUZE, DRY GAUZE AND TAPE IF:  For any reason the VAC cannot be applied.  Your VAC alarms and you are unable to trouble shoot to correct the issue and the VAC has been off for 2 or more hours.  If you experience these or any other problems with the VAC, call your Home Health Nurse, the Wound Care Center or the VAC company for assistance.  Offloading:  Off-loading is an important part of your wound treatment program. It is a part that only you can assure is accomplished. If you have any concerns about methods to off-load your wound, or feel that you might have trouble following the off-loading plan prescribed for you, talk to your doctor so that alternatives can be discussed that will work in your situation.    EHOB waffle cushion can be purchased online or through a pharmacy listed in your welcome folder. Please use at all times when sitting, even when transporting in the car if possible.     Nutrition and blood sugar  control:  Focus on the following:  Protein: Meats, beans, eggs, milk and yogurt particularly Greek yogurt), tofu, soy nuts, soy protein products (Follow the protein handout in your welcome folder)  Vitamin C: Citrus fruits and juices, strawberries, tomatoes, tomato juice, peppers, baked potatoes, spinach, broccoli, cauliflower, [place] sprouts, cabbage  Vitamin A: Dark green, leafy vegetables, orange or yellow vegetables, cantaloupe, fortified dairy products, liver, fortified cereals  Zinc: Fortified cereals, red meats, seafood  Consider supplementing with Franky by [employer]. It can be purchased on amazon, [employer] website, or local pharmacy may be able to order it for you.  (These are essential branch chain amino acids that help with tissue building and wound healing).   When your blood sugar is consistently elevated greater than 180 your body can't heal or fight infection.     Concerns:  Signs of infection may include the following:  Increase in redness  Red \"streaks\" from wound  Increase in swelling  Fever  Unusual odor  Change in the amount of wound drainage     Should you experience any significant changes in your wound(s) or have any questions regarding your home care instructions please contact the wound center [facility] @ [phone] If after regular business hours, please call your family doctor or local emergency room. The treatment plan has been discussed at length between you and your provider. Follow all instructions carefully, it is very important. If you do not follow all instructions you are at risk of your wound not healing, infection, possible loss of limb and even loss of life.

## 2024-03-12 NOTE — PROGRESS NOTES
Patient ID: Chepe Hernández is a 73 year old male.    Cellular tissue product application Old surgical Right Buttocks    Date/Time: 3/12/2024 4:02 PM    Performed by: Jaylin Sawant APRN  Authorized by: Jaylin Sawant APRN  Associated wounds:   Wound 02/22/24 #1- Right buttocks Buttocks Right  Consent:     Consent obtained:  Verbal    Consent given by:  Patient    Alternatives discussed:  Alternative treatment  Procedure details:     Location:  trunk/arms/legs    Product applied:  Kerecis omega3    Product lot #:  [id number]    Product expiration:  3/1/2026    Amount used (cm^2):  4    Amount wasted (cm^2):  0    Secured/Fixated: Yes      Secured/Fixated with:  Sorbact  Post-procedure details:     Patient tolerance of procedure:  Tolerated well, no immediate complications  Comments:      Kerecis #1 applied, fixated with sorbact, dressed with NPWT bridged to right hip set at 150mmHg continuous.

## 2024-03-13 ENCOUNTER — OFFICE VISIT (OUTPATIENT)
Dept: WOUND CARE | Facility: HOSPITAL | Age: 74
End: 2024-03-13
Attending: NURSE PRACTITIONER
Payer: MEDICARE

## 2024-03-13 DIAGNOSIS — T81.31XD DISRUPTION OF EXTERNAL OPERATION (SURGICAL) WOUND, NOT ELSEWHERE CLASSIFIED, SUBSEQUENT ENCOUNTER: Primary | ICD-10-CM

## 2024-03-14 NOTE — PROGRESS NOTES
CHIEF COMPLAINT:     Chief Complaint   Patient presents with    Wound Care     Pt here for follow up wound care visit to right buttocks wound. Pt denies any concerns or issues, pt arrived to visit with wound vac on and intact.      HPI:   Information obtained from Patient and chart  2-22-24 INITIAL:  Patient is a 74 yo male with pmhx of  dm (8-23-23 A1c 7.1), hl, bph, htn, afib, gastrointestinal stromal tumor (GIST) in 2014.  In 2017 he underwent end colosotomy and urostomy. 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  In 2021 he noted a new mass in isciorectal fossa and perineal pain. He was maintained on chemo.  January 2024 a right gluteal cleft lesion was causing pain, he elected to undergo surgery which was on 1-30-24 where he had expoloration of the perineum and resection of subq fat with wire localization.  The pathology was negative for malignancy.  He followed up with dr. alford office on 2-5 (mayelin mcintosh) and it was noted that there was wound dehiscence possibly secondary to VEGF inhibitor chemo and patient referred to wound clinic.  He has been dressing the area with gauze.  There is undermining noted.  We discussed wound vac, offloading, and nutritional supplementation. He is really wanting to restart his chemo as he is not wanting to remain on the dilaudid for pain control (that he needs when he is not on chemo.). I will touch base with dr white, mau and mayelin mcintosh regarding treatment plan.    3-1-24 patient returns, I touched base with onc and pcp.  Per onc that if his vegf inibitor would be restarted there is no chance he can heal. Primary recommend he see [facility] to get a glucometer. Patient states that the referral was entered by dr. Bernal. I asked him to please make that appointment because it may be a \"wait\" to get in.   Patient did not  receive the wound vac he has to be home to sign for it he states that they called this am that it was on it's way.  Will have him come next Tuesday and Friday for placemetn.  He states he is using the ehob cushion and is drinking owen.  Wound is about the same. No acute s/s of infection.    3-12-24 patient returns.  Patient did have a CT scan which unfourtunately the lesion that was attempted to remove remains present in the area although smaller in size.  Patient has chosen not to return to surgery for further resection and has chosen to continue with wound care to get the area closed so he can get back on his meds.  Npwt was started 1 week ago today, and he is tolerating well. Keracis and epifix/cord are covered. The undermined area remains, keracis placed into this area and continue wound vac. Wound is smaller, granulation is healthy, no acute s/s of infection. Patient continues to express frustration regarding not being on his \"cancer med\" and needing to take dilaudid for pain.    3-15-24 patient returns. I placed keracis to the undermining earlier this week. Patient is hoping to not have to continue with the vac. Wound is improved, we did not aggressively probe the undermining. Will dress with silver foam, hold the wound vac. No acute s/s of infection    MEDICATIONS:     Current Outpatient Medications:     levothyroxine 100 MCG Oral Tab, Take 1 tablet (100 mcg total) by mouth before breakfast. TAKE ON EMPTY STOMACH, Disp: 30 tablet, Rfl: 1    HYDROmorphone 4 MG Oral Tab, Take 1-2 tablets (4-8 mg total) by mouth every 3 (three) hours as needed (cancer related pain.)., Disp: 100 tablet, Rfl: 0    clopidogrel 75 MG Oral Tab, Take 1 tablet (75 mg total) by mouth daily. OK to resume medication tomorrow 1/31/2024, Disp: , Rfl:     ondansetron 4 MG Oral Tablet Dispersible, Take 1 tablet (4 mg total) by mouth every 4 (four) hours as needed for Nausea. (Patient not taking: Reported on 2/22/2024), Disp: 20 tablet, Rfl:  0    HYDROcodone-acetaminophen 5-325 MG Oral Tab, Take 1 tablet by mouth every 6 (six) hours as needed for Pain. (Patient not taking: Reported on 2/22/2024), Disp: 20 tablet, Rfl: 0    escitalopram 10 MG Oral Tab, Take 1 tablet (10 mg total) by mouth daily., Disp: 90 tablet, Rfl: 1    QINLOCK 50 MG Oral Tab, Take 3 tablets by mouth daily. Total dose 150 mg daily (Patient taking differently: Take 3 tablets by mouth nightly. Total dose 150 mg daily), Disp: 90 tablet, Rfl: 11    aspirin 81 MG Oral Tab EC, Take 1 tablet (81 mg total) by mouth daily., Disp: , Rfl:     diphenhydrAMINE (BENADRYL ALLERGY) 25 MG Oral Cap, Please take 1 hour prior to scheduled scan. (Patient not taking: Reported on 2/22/2024), Disp: 2 capsule, Rfl: 0    predniSONE 50 MG Oral Tab, Take 13 hr, 7 hr, and 1 hr prior to CT scan., Disp: 3 tablet, Rfl: 0    atorvastatin 40 MG Oral Tab, Take 1 tablet (40 mg total) by mouth nightly., Disp: , Rfl:   ALLERGIES:     Allergies   Allergen Reactions    Radiology Contrast Iodinated Dyes HIVES, RESPIRATORY FAILURE and OTHER (SEE COMMENTS)     Originally with \"Barium enema for lower GI\" 50 years ago. He states he developed hives and went into respiratory arrest. Pt has since had CT iodinated contrast with 13 hr pre-meds and no break-through reactions, HUMBERTO Mera, Radiology RN.       REVIEW OF SYSTEMS:   This information was obtained from the patient/family and chart.    See HPI for pertinent positives, otherwise 10 pt ROS negative.    HISTORY:   Past medical, surgical, family and social history updated where appropriate.      PHYSICAL EXAM:     Vitals:    03/15/24 0700   BP: 129/81   Pulse: 90   Resp: 16   Temp: 97.8 °F (36.6 °C)         Estimated body mass index is 28.37 kg/m² as calculated from the following:    Height as of 2/22/24: 73\".    Weight as of 2/22/24: 215 lb (97.5 kg).   POC Glucose   Date Value Ref Range Status   03/05/2024 137 (H) 70 - 99 mg/dL Final   03/01/2024 183 (H) 70 - 99 mg/dL Final    02/22/2024 297 (H) 70 - 99 mg/dL Final       Vital signs reviewed.Appears stated age, well groomed.    Constitutional:  Bp wnl for patient. Pulse Regular and wnl for patient. Respirations easy and unlabored. Temperature wnl. Weight normal for height. Appearance neat and clean. Appears in no acute distress. Well nourished and well developed.    Musculoskeletal:  Gait and station stable   Integumentary:  refer to wound characteristics and images   Psychiatric:  Judgment and insight intact. Alert and oriented times 3. No evidence of depression, anxiety, or agitation. Calm, cooperative, and communicative. Appropriate interactions and affect.  DIAGNOSTICS:     Lab Results   Component Value Date    BUN 24 (H) 12/28/2023    CREATSERUM 1.28 12/28/2023    ALB 4.0 12/28/2023    TP 7.6 12/28/2023    A1C 7.1 (H) 08/23/2023 1-30-24 pathology-perineal  Final Diagnosis:   Subcutaneous fat:  -Mature adipose tissue.  -Small portion of unremarkable skin.  -Negative for malignancy.     WOUND ASSESSMENT:     Wound 02/22/24 #1- Right buttocks Buttocks Right (Active)   Date First Assessed/Time First Assessed: 02/22/24 0943    Wound Number (Wound Clinic Only): #1- Right buttocks  Primary Wound Type: Old surgical  Location: Buttocks  Wound Location Orientation: Right      Assessments 2/22/2024  9:44 AM 3/15/2024 10:50 AM   Wound Image       Drainage Amount Small None   Drainage Description Serosanguineous --   Wound Length (cm) 4.1 cm 1.8 cm   Wound Width (cm) 0.5 cm 0.2 cm   Wound Surface Area (cm^2) 2.05 cm^2 0.36 cm^2   Wound Depth (cm) 1 cm 0.3 cm   Wound Volume (cm^3) 2.05 cm^3 0.108 cm^3   Wound Healing % -- 95   Margins Well-defined edges;Epibole (Rolled edges) Epibole (Rolled edges)   Non-staged Wound Description Full thickness Full thickness   Bibiana-wound Assessment -- Edema   Wound Granulation Tissue Pink;Spongy Pink;Firm   Wound Bed Granulation (%) 60 % 100 %   Wound Bed Slough (%) 40 % --   Wound Odor None None    Tunneling? No Yes   Number of Tunnels -- 1   Tunnel 1 Location -- 12   Tunnel 1 Depth -- 0.3   Undermining? Yes --   Sinus Tracts? No --       Inactive Orders   Date Order Priority Status Authorizing Provider   03/12/24 8388 Cellular tissue product application Old surgical Right Buttocks Routine Completed Jaylin Sawant APRN       Negative Pressure Wound Therapy Buttocks Right (Active)   Placement Date/Time: 03/05/24 1144   Location: Buttocks  Wound Location Orientation: Right      Assessments 3/5/2024 11:02 AM 3/12/2024  3:21 PM   Wound photographed/measured Yes Yes   Machine Status (On) Yes Yes   Site Assessment Clean --   Bibiana-wound Assessment Clean Clean;Dry   Unit Type KCI KCI   Dressing Type Black foam Black foam   Number of Foam Pieces Used 2 --   Cycle Continuous Continuous   Target Pressure (mmHg) 125 150   Drainage Description -- Yellow;Serous   Canister Changed Yes No       No associated orders.          ASSESSMENT AND PLAN:    1. Disruption of external operation (surgical) wound, not elsewhere classified, subsequent encounter    2. GIST (gastrointestinal stroma tumor), malignant, colon (HCC)    3. Type 2 diabetes mellitus with other skin complications (HCC)          Risks, benefits, and alternatives of current treatment plan discussed in detail.  Questions and concerns addressed. Red flags to RTC or ED reviewed.  Patient (or parent) agrees to plan.      NOTE TO PATIENT: The 21st Century Cures Act makes clinical notes like these available to patients in the interest of transparency. Clinical notes are medical documents used by physicians and care providers to communicate with each other. These documents include medical language and terminology, abbreviations, and treatment information that may sound technical and at times possibly unfamiliar. In addition, at times, the verbiage may appear blunt or direct. These documents are one tool providers use to communicate relevant information and clinical  opinions of the care providers in a way that allows common understanding of the clinical context.   I spent 20 minutes with the patient. This time included:    preparing to see the patient (eg, review notes and recent diagnostics),  seeing the patient, obtaining and/or reviewing separately obtained history, performing a medically appropriate examination and/or evaluation, counseling and educating the patient, documenting in the record,  DISCHARGE:      Patient Instructions   Please return:  Tuesday with Jaylin as previously scheduled    HOLD WOUND VAC    Patient discharge and wound care instructions  Luciano Hernández  3/15/2024          Changing your dressing:            leave in place x 1 week    Wash your hands with soap and water.  Ensure that the old dressing is removed completely. Place it in a plastic bag and throw it in the trash.  Cleanse the wound with hypochlorous wound cleanser (ie. Anasept, vashe, pure and clean).  It's ok to “scrub” your wound with the gauze, small amount of bleeding with cleansing is normal and ok.  Apply the following dressings:  silver foam>silicone cover     Offloading:  Off-loading is an important part of your wound treatment program. It is a part that only you can assure is accomplished. If you have any concerns about methods to off-load your wound, or feel that you might have trouble following the off-loading plan prescribed for you, talk to your doctor so that alternatives can be discussed that will work in your situation.    EHOB waffle cushion can be purchased online or through a pharmacy listed in your welcome folder. Please use at all times when sitting, even when transporting in the car if possible.     Nutrition and blood sugar control:  Focus on the following:  Protein: Meats, beans, eggs, milk and yogurt particularly Greek yogurt), tofu, soy nuts, soy protein products (Follow the protein handout in your welcome folder)  Vitamin C: Citrus fruits and juices,  strawberries, tomatoes, tomato juice, peppers, baked potatoes, spinach, broccoli, cauliflower, [place] sprouts, cabbage  Vitamin A: Dark green, leafy vegetables, orange or yellow vegetables, cantaloupe, fortified dairy products, liver, fortified cereals  Zinc: Fortified cereals, red meats, seafood  Consider supplementing with Franky by [employer]. It can be purchased on amazon, [employer] website, or local pharmacy may be able to order it for you.  (These are essential branch chain amino acids that help with tissue building and wound healing).   When your blood sugar is consistently elevated greater than 180 your body can't heal or fight infection.     Concerns:  Signs of infection may include the following:  Increase in redness  Red \"streaks\" from wound  Increase in swelling  Fever  Unusual odor  Change in the amount of wound drainage     Should you experience any significant changes in your wound(s) or have any questions regarding your home care instructions please contact the [facility] @ [phone] If after regular business hours, please call your family doctor or local emergency room. The treatment plan has been discussed at length between you and your provider. Follow all instructions carefully, it is very important. If you do not follow all instructions you are at risk of your wound not healing, infection, possible loss of limb and even loss of life.       Jaylin Sawant FNP-C, CWCN-AP, CFCN, CSWS, WCC, DWC  3/15/2024

## 2024-03-15 ENCOUNTER — APPOINTMENT (OUTPATIENT)
Dept: WOUND CARE | Facility: HOSPITAL | Age: 74
End: 2024-03-15
Attending: NURSE PRACTITIONER
Payer: MEDICARE

## 2024-03-15 VITALS
DIASTOLIC BLOOD PRESSURE: 81 MMHG | RESPIRATION RATE: 16 BRPM | SYSTOLIC BLOOD PRESSURE: 129 MMHG | HEART RATE: 90 BPM | TEMPERATURE: 98 F

## 2024-03-15 DIAGNOSIS — C49.A4 GIST (GASTROINTESTINAL STROMA TUMOR), MALIGNANT, COLON (HCC): ICD-10-CM

## 2024-03-15 DIAGNOSIS — E03.4 HYPOTHYROIDISM DUE TO ACQUIRED ATROPHY OF THYROID: ICD-10-CM

## 2024-03-15 DIAGNOSIS — T81.31XD DISRUPTION OF EXTERNAL OPERATION (SURGICAL) WOUND, NOT ELSEWHERE CLASSIFIED, SUBSEQUENT ENCOUNTER: Primary | ICD-10-CM

## 2024-03-15 DIAGNOSIS — E11.628 TYPE 2 DIABETES MELLITUS WITH OTHER SKIN COMPLICATIONS (HCC): ICD-10-CM

## 2024-03-15 PROCEDURE — 99213 OFFICE O/P EST LOW 20 MIN: CPT | Performed by: NURSE PRACTITIONER

## 2024-03-15 NOTE — PATIENT INSTRUCTIONS
Please return:  Tuesday with Jaylin as previously scheduled    HOLD WOUND VAC    Patient discharge and wound care instructions  Luciano Calderón Edgar  3/15/2024          Changing your dressing:            leave in place x 1 week    Wash your hands with soap and water.  Ensure that the old dressing is removed completely. Place it in a plastic bag and throw it in the trash.  Cleanse the wound with hypochlorous wound cleanser (ie. Anasept, vashe, pure and clean).  It's ok to “scrub” your wound with the gauze, small amount of bleeding with cleansing is normal and ok.  Apply the following dressings:  silver foam>silicone cover     Offloading:  Off-loading is an important part of your wound treatment program. It is a part that only you can assure is accomplished. If you have any concerns about methods to off-load your wound, or feel that you might have trouble following the off-loading plan prescribed for you, talk to your doctor so that alternatives can be discussed that will work in your situation.    EHOB waffle cushion can be purchased online or through a pharmacy listed in your welcome folder. Please use at all times when sitting, even when transporting in the car if possible.     Nutrition and blood sugar control:  Focus on the following:  Protein: Meats, beans, eggs, milk and yogurt particularly Greek yogurt), tofu, soy nuts, soy protein products (Follow the protein handout in your welcome folder)  Vitamin C: Citrus fruits and juices, strawberries, tomatoes, tomato juice, peppers, baked potatoes, spinach, broccoli, cauliflower, [place] sprouts, cabbage  Vitamin A: Dark green, leafy vegetables, orange or yellow vegetables, cantaloupe, fortified dairy products, liver, fortified cereals  Zinc: Fortified cereals, red meats, seafood  Consider supplementing with Franky by [employer]. It can be purchased on amazon, [employer] website, or local pharmacy may be able to order it for you.  (These are essential branch chain amino  acids that help with tissue building and wound healing).   When your blood sugar is consistently elevated greater than 180 your body can't heal or fight infection.     Concerns:  Signs of infection may include the following:  Increase in redness  Red \"streaks\" from wound  Increase in swelling  Fever  Unusual odor  Change in the amount of wound drainage     Should you experience any significant changes in your wound(s) or have any questions regarding your home care instructions please contact the [facility] @ [phone] If after regular business hours, please call your family doctor or local emergency room. The treatment plan has been discussed at length between you and your provider. Follow all instructions carefully, it is very important. If you do not follow all instructions you are at risk of your wound not healing, infection, possible loss of limb and even loss of life.

## 2024-03-15 NOTE — PROGRESS NOTES
.Weekly Wound Education Note    Teaching Provided To: Patient  Training Topics: Dressing;Cleasing and general instructions;Discharge instructions  Training Method: Explain/Verbal;Written  Training Response: Patient responds and understands        Notes: Wound stable. NPWT on hold for one week. Dressing changed to silicone tape (to pull wound together), covered with silver foam. Pt to leave in place for one week.
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
## 2024-03-18 RX ORDER — HYDROMORPHONE HYDROCHLORIDE 4 MG/1
TABLET ORAL
Qty: 100 TABLET | Refills: 0 | Status: SHIPPED | OUTPATIENT
Start: 2024-03-18 | End: 2024-04-15

## 2024-03-19 ENCOUNTER — APPOINTMENT (OUTPATIENT)
Dept: WOUND CARE | Facility: HOSPITAL | Age: 74
End: 2024-03-19
Attending: NURSE PRACTITIONER
Payer: MEDICARE

## 2024-03-20 ENCOUNTER — OFFICE VISIT (OUTPATIENT)
Dept: HEMATOLOGY/ONCOLOGY | Age: 74
End: 2024-03-20
Attending: SPECIALIST
Payer: MEDICARE

## 2024-03-20 VITALS
OXYGEN SATURATION: 100 % | HEIGHT: 72.01 IN | HEART RATE: 80 BPM | SYSTOLIC BLOOD PRESSURE: 127 MMHG | RESPIRATION RATE: 18 BRPM | TEMPERATURE: 97 F | WEIGHT: 210 LBS | BODY MASS INDEX: 28.44 KG/M2 | DIASTOLIC BLOOD PRESSURE: 73 MMHG

## 2024-03-20 DIAGNOSIS — E03.2 HYPOTHYROIDISM DUE TO DRUGS: ICD-10-CM

## 2024-03-20 DIAGNOSIS — C78.7 SECONDARY LIVER CANCER (HCC): ICD-10-CM

## 2024-03-20 DIAGNOSIS — E03.4 HYPOTHYROIDISM DUE TO ACQUIRED ATROPHY OF THYROID: Primary | ICD-10-CM

## 2024-03-20 DIAGNOSIS — R19.00 PELVIC MASS: ICD-10-CM

## 2024-03-20 DIAGNOSIS — G89.3 PAIN, NEOPLASM-RELATED: ICD-10-CM

## 2024-03-20 DIAGNOSIS — C49.A4 GIST (GASTROINTESTINAL STROMA TUMOR), MALIGNANT, COLON (HCC): ICD-10-CM

## 2024-03-20 LAB
ALBUMIN SERPL-MCNC: 3.6 G/DL (ref 3.4–5)
ALBUMIN/GLOB SERPL: 1 {RATIO} (ref 1–2)
ALP LIVER SERPL-CCNC: 105 U/L
ALT SERPL-CCNC: 18 U/L
ANION GAP SERPL CALC-SCNC: 3 MMOL/L (ref 0–18)
AST SERPL-CCNC: 17 U/L (ref 15–37)
BASOPHILS # BLD AUTO: 0.03 X10(3) UL (ref 0–0.2)
BASOPHILS NFR BLD AUTO: 0.4 %
BILIRUB SERPL-MCNC: 0.4 MG/DL (ref 0.1–2)
BUN BLD-MCNC: 13 MG/DL (ref 9–23)
CALCIUM BLD-MCNC: 9.4 MG/DL (ref 8.5–10.1)
CHLORIDE SERPL-SCNC: 112 MMOL/L (ref 98–112)
CO2 SERPL-SCNC: 26 MMOL/L (ref 21–32)
CREAT BLD-MCNC: 0.92 MG/DL
EGFRCR SERPLBLD CKD-EPI 2021: 88 ML/MIN/1.73M2 (ref 60–?)
EOSINOPHIL # BLD AUTO: 0.31 X10(3) UL (ref 0–0.7)
EOSINOPHIL NFR BLD AUTO: 3.8 %
ERYTHROCYTE [DISTWIDTH] IN BLOOD BY AUTOMATED COUNT: 15.6 %
FASTING STATUS PATIENT QL REPORTED: NO
GLOBULIN PLAS-MCNC: 3.5 G/DL (ref 2.8–4.4)
GLUCOSE BLD-MCNC: 113 MG/DL (ref 70–99)
HCT VFR BLD AUTO: 42 %
HGB BLD-MCNC: 13.5 G/DL
IMM GRANULOCYTES # BLD AUTO: 0.03 X10(3) UL (ref 0–1)
IMM GRANULOCYTES NFR BLD: 0.4 %
LYMPHOCYTES # BLD AUTO: 1.98 X10(3) UL (ref 1–4)
LYMPHOCYTES NFR BLD AUTO: 24.4 %
MCH RBC QN AUTO: 28.7 PG (ref 26–34)
MCHC RBC AUTO-ENTMCNC: 32.1 G/DL (ref 31–37)
MCV RBC AUTO: 89.4 FL
MONOCYTES # BLD AUTO: 0.6 X10(3) UL (ref 0.1–1)
MONOCYTES NFR BLD AUTO: 7.4 %
NEUTROPHILS # BLD AUTO: 5.17 X10 (3) UL (ref 1.5–7.7)
NEUTROPHILS # BLD AUTO: 5.17 X10(3) UL (ref 1.5–7.7)
NEUTROPHILS NFR BLD AUTO: 63.6 %
OSMOLALITY SERPL CALC.SUM OF ELEC: 293 MOSM/KG (ref 275–295)
PLATELET # BLD AUTO: 342 10(3)UL (ref 150–450)
POTASSIUM SERPL-SCNC: 4.3 MMOL/L (ref 3.5–5.1)
PROT SERPL-MCNC: 7.1 G/DL (ref 6.4–8.2)
RBC # BLD AUTO: 4.7 X10(6)UL
SODIUM SERPL-SCNC: 141 MMOL/L (ref 136–145)
T4 FREE SERPL-MCNC: 1.2 NG/DL (ref 0.8–1.7)
TSI SER-ACNC: 8.88 MIU/ML (ref 0.36–3.74)
WBC # BLD AUTO: 8.1 X10(3) UL (ref 4–11)

## 2024-03-20 PROCEDURE — 99215 OFFICE O/P EST HI 40 MIN: CPT | Performed by: SPECIALIST

## 2024-03-20 NOTE — PROGRESS NOTES
Here for MD fu visit for GIST  Continues on Qinlock nightly   Appetite and energy level fair  Had CT scan done last month  Education Record     Learner:  Patient/dgtr     Disease / Diagnosis: GIST     Barriers / Limitations:  none                Comments:     Method:  Discussion                Comments:     General Topics:  Plan of care reviewed                Comments:     Outcome:  Shows understanding                Comments:

## 2024-03-21 ENCOUNTER — TELEPHONE (OUTPATIENT)
Dept: HEMATOLOGY/ONCOLOGY | Age: 74
End: 2024-03-21

## 2024-03-21 ENCOUNTER — TELEPHONE (OUTPATIENT)
Dept: HEMATOLOGY/ONCOLOGY | Facility: HOSPITAL | Age: 74
End: 2024-03-21

## 2024-03-21 ENCOUNTER — DOCUMENTATION ONLY (OUTPATIENT)
Dept: HEMATOLOGY/ONCOLOGY | Facility: HOSPITAL | Age: 74
End: 2024-03-21

## 2024-03-21 RX ORDER — IMATINIB MESYLATE 400 MG/1
800 TABLET, FILM COATED ORAL DAILY
Qty: 60 TABLET | Refills: 1 | Status: SHIPPED | OUTPATIENT
Start: 2024-03-21

## 2024-03-21 NOTE — TELEPHONE ENCOUNTER
[facility] Patient Assistance: Chantel [phone] We would like to know if the medication Qinlock is still on hold ? Would like a call back. Thanks Fina

## 2024-03-21 NOTE — TELEPHONE ENCOUNTER
Spoke with patient, he confirmed he signed up with [facility] pharmacy. Will forward Gleevec prescription to them. Patient will contact the office once he gets the medication delivered to his home.

## 2024-03-21 NOTE — TELEPHONE ENCOUNTER
Spoke with Chantel at [facility] Patient assistance. Per Dr Tapia, patient to continue to get Qinlock delivered to him. Informed patient to continue to set up delivery. Patient verbalized understanding.

## 2024-03-21 NOTE — PROGRESS NOTES
CHIEF COMPLAINT:     Chief Complaint   Patient presents with    Wound Care     Follow up for buttock wound. Pt stated that the dressing fell off on Sunday and he replaced the bandaid on Sunday, Tuesday and Thursday.      HPI:   Information obtained from Patient and chart  2-22-24 INITIAL:  Patient is a 74 yo male with pmhx of  dm (8-23-23 A1c 7.1), hl, bph, htn, afib, gastrointestinal stromal tumor (GIST) in 2014.  In 2017 he underwent end colosotomy and urostomy. 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  In 2021 he noted a new mass in isciorectal fossa and perineal pain. He was maintained on chemo.  January 2024 a right gluteal cleft lesion was causing pain, he elected to undergo surgery which was on 1-30-24 where he had expoloration of the perineum and resection of subq fat with wire localization.  The pathology was negative for malignancy.  He followed up with dr. alford office on 2-5 (mayelin mcintosh) and it was noted that there was wound dehiscence possibly secondary to VEGF inhibitor chemo and patient referred to wound clinic.  He has been dressing the area with gauze.  There is undermining noted.  We discussed wound vac, offloading, and nutritional supplementation. He is really wanting to restart his chemo as he is not wanting to remain on the dilaudid for pain control (that he needs when he is not on chemo.). I will touch base with dr white, mau and mayelin mcintosh regarding treatment plan.    3-1-24 patient returns, I touched base with onc and pcp.  Per onc that if his vegf inibitor would be restarted there is no chance he can heal. Primary recommend he see [facility] to get a glucometer. Patient states that the referral was entered by dr. Bernal. I asked him to please make that appointment because it may be a \"wait\" to get in.   Patient did not receive  the wound vac he has to be home to sign for it he states that they called this am that it was on it's way.  Will have him come next Tuesday and Friday for placemetn.  He states he is using the ehob cushion and is drinking owen.  Wound is about the same. No acute s/s of infection.    3-12-24 patient returns.  Patient did have a CT scan which unfourtunately the lesion that was attempted to remove remains present in the area although smaller in size.  Patient has chosen not to return to surgery for further resection and has chosen to continue with wound care to get the area closed so he can get back on his meds.  Npwt was started 1 week ago today, and he is tolerating well. Keracis and epifix/cord are covered. The undermined area remains, keracis placed into this area and continue wound vac. Wound is smaller, granulation is healthy, no acute s/s of infection. Patient continues to express frustration regarding not being on his \"cancer med\" and needing to take dilaudid for pain.    3-15-24 patient returns. I placed keracis to the undermining earlier this week. Patient is hoping to not have to continue with the vac. Wound is improved, we did not aggressively probe the undermining. Will dress with silver foam, hold the wound vac. No acute s/s of infection    3-22-24 patient returns, we saw him 1 week ago after placing keracis into the undermining and dressing with silver foam, holding the wound vac.  He followed up with dr white earlier this week and it was noted \"CT shows some progression of metastatic disease in liver. This is because patient has been off repretinib. With persistent open wound, I am reluctant to have him restart as the drug targets VEGFR and can result in a worsening of his surgical wound. I recommend, in the interim, he start imatinib 400-800 mg with the hopes that it slows disease progression. Imatinib does not target VEGF. He is in agreement.\"   He continues with the Ehob cushion.  He is almost  done with his owen, I recommended he decrease to 1 a day as I suspect he will be fully resolved by next week.  He will heal with a linear \"divot\" as the epithelial is advancing down into the wound bed, but patient is ok with that.  There is not undermining.  Wound vac dc'd.  Will utilize triad paste. No s/s of infection.    MEDICATIONS:     Current Outpatient Medications:     imatinib 400 MG Oral Tab, Take 2 tablets (800 mg total) by mouth daily., Disp: 60 tablet, Rfl: 1    HYDROmorphone 4 MG Oral Tab, Take 1-2 tablets (4-8 mg total) by mouth every 3 (three) hours as needed (cancer related pain.)., Disp: 100 tablet, Rfl: 0    levothyroxine 100 MCG Oral Tab, Take 1 tablet (100 mcg total) by mouth before breakfast. TAKE ON EMPTY STOMACH, Disp: 30 tablet, Rfl: 1    clopidogrel 75 MG Oral Tab, Take 1 tablet (75 mg total) by mouth daily. OK to resume medication tomorrow 1/31/2024, Disp: , Rfl:     ondansetron 4 MG Oral Tablet Dispersible, Take 1 tablet (4 mg total) by mouth every 4 (four) hours as needed for Nausea. (Patient not taking: Reported on 3/20/2024), Disp: 20 tablet, Rfl: 0    HYDROcodone-acetaminophen 5-325 MG Oral Tab, Take 1 tablet by mouth every 6 (six) hours as needed for Pain. (Patient not taking: Reported on 2/22/2024), Disp: 20 tablet, Rfl: 0    escitalopram 10 MG Oral Tab, Take 1 tablet (10 mg total) by mouth daily., Disp: 90 tablet, Rfl: 1    QINLOCK 50 MG Oral Tab, Take 3 tablets by mouth daily. Total dose 150 mg daily (Patient taking differently: Take 3 tablets by mouth nightly. Total dose 150 mg daily), Disp: 90 tablet, Rfl: 11    aspirin 81 MG Oral Tab EC, Take 1 tablet (81 mg total) by mouth daily., Disp: , Rfl:     diphenhydrAMINE (BENADRYL ALLERGY) 25 MG Oral Cap, Please take 1 hour prior to scheduled scan. (Patient not taking: Reported on 2/22/2024), Disp: 2 capsule, Rfl: 0    predniSONE 50 MG Oral Tab, Take 13 hr, 7 hr, and 1 hr prior to CT scan. (Patient not taking: Reported on 3/20/2024),  Disp: 3 tablet, Rfl: 0    atorvastatin 40 MG Oral Tab, Take 1 tablet (40 mg total) by mouth nightly., Disp: , Rfl:   ALLERGIES:     Allergies   Allergen Reactions    Radiology Contrast Iodinated Dyes HIVES, RESPIRATORY FAILURE and OTHER (SEE COMMENTS)     Originally with \"Barium enema for lower GI\" 50 years ago. He states he developed hives and went into respiratory arrest. Pt has since had CT iodinated contrast with 13 hr pre-meds and no break-through reactions, HUMBERTO Mera, Radiology RN.       REVIEW OF SYSTEMS:   This information was obtained from the patient/family and chart.    See HPI for pertinent positives, otherwise 10 pt ROS negative.    HISTORY:   Past medical, surgical, family and social history updated where appropriate.      PHYSICAL EXAM:     Vitals:    03/22/24 1047   BP: 118/63   Pulse: 50   Resp: 16   Temp: 97.8 °F (36.6 °C)     Estimated body mass index is 28.47 kg/m² as calculated from the following:    Height as of 3/20/24: 72.01\".    Weight as of 3/20/24: 210 lb (95.3 kg).   POC Glucose   Date Value Ref Range Status   03/05/2024 137 (H) 70 - 99 mg/dL Final   03/01/2024 183 (H) 70 - 99 mg/dL Final   02/22/2024 297 (H) 70 - 99 mg/dL Final       Vital signs reviewed.Appears stated age, well groomed.    Constitutional:  Bp wnl for patient. Pulse Regular and wnl for patient. Respirations easy and unlabored. Temperature wnl. Weight normal for height. Appearance neat and clean. Appears in no acute distress. Well nourished and well developed.    Musculoskeletal:  Gait and station stable   Integumentary:  refer to wound characteristics and images   Psychiatric:  Judgment and insight intact. Alert and oriented times 3. No evidence of depression, anxiety, or agitation. Calm, cooperative, and communicative. Appropriate interactions and affect.  DIAGNOSTICS:     Lab Results   Component Value Date    BUN 13 03/20/2024    CREATSERUM 0.92 03/20/2024    ALB 3.6 03/20/2024    TP 7.1 03/20/2024    A1C 7.1 (H)  08/23/2023 1-30-24 pathology-perineal  Final Diagnosis:   Subcutaneous fat:  -Mature adipose tissue.  -Small portion of unremarkable skin.  -Negative for malignancy.     WOUND ASSESSMENT:     Wound 02/22/24 #1- Right buttocks Buttocks Right (Active)   Date First Assessed/Time First Assessed: 02/22/24 0943    Wound Number (Wound Clinic Only): #1- Right buttocks  Primary Wound Type: Old surgical  Location: Buttocks  Wound Location Orientation: Right      Assessments 2/22/2024  9:44 AM 3/22/2024 10:48 AM   Wound Image       Drainage Amount Small Scant   Drainage Description Serosanguineous Serous;Yellow   Wound Length (cm) 4.1 cm 1.9 cm   Wound Width (cm) 0.5 cm 0.3 cm   Wound Surface Area (cm^2) 2.05 cm^2 0.57 cm^2   Wound Depth (cm) 1 cm 0.4 cm   Wound Volume (cm^3) 2.05 cm^3 0.228 cm^3   Wound Healing % -- 89   Margins Well-defined edges;Epibole (Rolled edges) Epibole (Rolled edges)   Non-staged Wound Description Full thickness Full thickness   Bibiana-wound Assessment -- Edema   Wound Granulation Tissue Pink;Spongy Pink;Firm   Wound Bed Granulation (%) 60 % 50 %   Wound Bed Epithelium (%) -- 50 %   Wound Bed Slough (%) 40 % --   Wound Odor None None   Tunneling? No --   Undermining? Yes --   Sinus Tracts? No --       Inactive Orders   Date Order Priority Status Authorizing Provider   03/12/24 1602 Cellular tissue product application Old surgical Right Buttocks Routine Completed Jaylin Sawant APRN       Negative Pressure Wound Therapy Buttocks Right (Active)   Placement Date/Time: 03/05/24 1145   Location: Buttocks  Wound Location Orientation: Right      Assessments 3/5/2024 11:02 AM 3/12/2024  3:21 PM   Wound photographed/measured Yes Yes   Machine Status (On) Yes Yes   Site Assessment Clean --   Bibiana-wound Assessment Clean Clean;Dry   Unit Type KCI KCI   Dressing Type Black foam Black foam   Number of Foam Pieces Used 2 --   Cycle Continuous Continuous   Target Pressure (mmHg) 125 150   Drainage Description --  Yellow;Serous   Canister Changed Yes No       No associated orders.          ASSESSMENT AND PLAN:    1. Disruption of external operation (surgical) wound, not elsewhere classified, subsequent encounter    2. GIST (gastrointestinal stroma tumor), malignant, colon (HCC)    3. Type 2 diabetes mellitus with other skin complications (HCC)            Risks, benefits, and alternatives of current treatment plan discussed in detail.  Questions and concerns addressed. Red flags to RTC or ED reviewed.  Patient (or parent) agrees to plan.      NOTE TO PATIENT: The 21st Century Cures Act makes clinical notes like these available to patients in the interest of transparency. Clinical notes are medical documents used by physicians and care providers to communicate with each other. These documents include medical language and terminology, abbreviations, and treatment information that may sound technical and at times possibly unfamiliar. In addition, at times, the verbiage may appear blunt or direct. These documents are one tool providers use to communicate relevant information and clinical opinions of the care providers in a way that allows common understanding of the clinical context.   I spent 25 minutes with the patient. This time included:    preparing to see the patient (eg, review notes and recent diagnostics),  seeing the patient, obtaining and/or reviewing separately obtained history, performing a medically appropriate examination and/or evaluation, counseling and educating the patient, documenting in the record,  DISCHARGE:      Patient Instructions   Please return:  1 week    discontinue WOUND VAC-goal met    Patient discharge and wound care instructions  Luciano Hernández  3/22/2024         You may shower and cleanse area with mild soap and water, rinse wound with hypochlorus solution or wound wash, dab dry with gauze and apply COLOPLAST TRIAD HYDROPHILLIC PASTE    Offloading:  Off-loading is an important part of your  wound treatment program. It is a part that only you can assure is accomplished. If you have any concerns about methods to off-load your wound, or feel that you might have trouble following the off-loading plan prescribed for you, talk to your doctor so that alternatives can be discussed that will work in your situation.    EHOB waffle cushion can be purchased online or through a pharmacy listed in your welcome folder. Please use at all times when sitting, even when transporting in the car if possible.     Nutrition and blood sugar control:  Focus on the following:  Protein: Meats, beans, eggs, milk and yogurt particularly Greek yogurt), tofu, soy nuts, soy protein products (Follow the protein handout in your welcome folder)  Vitamin C: Citrus fruits and juices, strawberries, tomatoes, tomato juice, peppers, baked potatoes, spinach, broccoli, cauliflower, [place] sprouts, cabbage  Vitamin A: Dark green, leafy vegetables, orange or yellow vegetables, cantaloupe, fortified dairy products, liver, fortified cereals  Zinc: Fortified cereals, red meats, seafood  Consider supplementing with Franky by [employer]. It can be purchased on amazon, [employer] website, or local pharmacy may be able to order it for you.  (These are essential branch chain amino acids that help with tissue building and wound healing).   When your blood sugar is consistently elevated greater than 180 your body can't heal or fight infection.     Concerns:  Signs of infection may include the following:  Increase in redness  Red \"streaks\" from wound  Increase in swelling  Fever  Unusual odor  Change in the amount of wound drainage     Should you experience any significant changes in your wound(s) or have any questions regarding your home care instructions please contact the wound center [facility] @ [phone] If after regular business hours, please call your family doctor or local emergency room. The treatment plan has been discussed at length  between you and your provider. Follow all instructions carefully, it is very important. If you do not follow all instructions you are at risk of your wound not healing, infection, possible loss of limb and even loss of life.       Jaylin Sawant FNP-C, CWCN-AP, CFCN, CSWS, WCC, DWC  3/22/2024

## 2024-03-22 ENCOUNTER — APPOINTMENT (OUTPATIENT)
Dept: WOUND CARE | Facility: HOSPITAL | Age: 74
End: 2024-03-22
Attending: NURSE PRACTITIONER
Payer: MEDICARE

## 2024-03-22 VITALS
TEMPERATURE: 98 F | HEART RATE: 50 BPM | SYSTOLIC BLOOD PRESSURE: 118 MMHG | RESPIRATION RATE: 16 BRPM | DIASTOLIC BLOOD PRESSURE: 63 MMHG

## 2024-03-22 DIAGNOSIS — T81.31XD DISRUPTION OF EXTERNAL OPERATION (SURGICAL) WOUND, NOT ELSEWHERE CLASSIFIED, SUBSEQUENT ENCOUNTER: Primary | ICD-10-CM

## 2024-03-22 DIAGNOSIS — E11.628 TYPE 2 DIABETES MELLITUS WITH OTHER SKIN COMPLICATIONS (HCC): ICD-10-CM

## 2024-03-22 DIAGNOSIS — C49.A4 GIST (GASTROINTESTINAL STROMA TUMOR), MALIGNANT, COLON (HCC): ICD-10-CM

## 2024-03-22 PROCEDURE — 99213 OFFICE O/P EST LOW 20 MIN: CPT | Performed by: NURSE PRACTITIONER

## 2024-03-22 NOTE — PROGRESS NOTES
Weekly Wound Education Note    Teaching Provided To: Patient  Training Topics: Discharge instructions;Dressing;Off-loading;Cleasing and general instructions;Negative presssure therapy  Training Method: Explain/Verbal;Written  Training Response: Patient responds and understands            NPWT discontinued this visit, patient will send back today.  Coloplast TRIAD paste to wound daily, patient may cover or leave uncovered.  Patient may shower.

## 2024-03-22 NOTE — PATIENT INSTRUCTIONS
Please return:  1 week    discontinue WOUND VAC-goal met    Patient discharge and wound care instructions  Luciano Hernández  3/22/2024         You may shower and cleanse area with mild soap and water, rinse wound with hypochlorus solution or wound wash, dab dry with gauze and apply COLOPLAST TRIAD HYDROPHILLIC PASTE    Offloading:  Off-loading is an important part of your wound treatment program. It is a part that only you can assure is accomplished. If you have any concerns about methods to off-load your wound, or feel that you might have trouble following the off-loading plan prescribed for you, talk to your doctor so that alternatives can be discussed that will work in your situation.    EHOB waffle cushion can be purchased online or through a pharmacy listed in your welcome folder. Please use at all times when sitting, even when transporting in the car if possible.     Nutrition and blood sugar control:  Focus on the following:  Protein: Meats, beans, eggs, milk and yogurt particularly Greek yogurt), tofu, soy nuts, soy protein products (Follow the protein handout in your welcome folder)  Vitamin C: Citrus fruits and juices, strawberries, tomatoes, tomato juice, peppers, baked potatoes, spinach, broccoli, cauliflower, [place] sprouts, cabbage  Vitamin A: Dark green, leafy vegetables, orange or yellow vegetables, cantaloupe, fortified dairy products, liver, fortified cereals  Zinc: Fortified cereals, red meats, seafood  Consider supplementing with Franky by [employer]. It can be purchased on amazon, [employer] website, or local pharmacy may be able to order it for you.  (These are essential branch chain amino acids that help with tissue building and wound healing).   When your blood sugar is consistently elevated greater than 180 your body can't heal or fight infection.     Concerns:  Signs of infection may include the following:  Increase in redness  Red \"streaks\" from wound  Increase in swelling  Fever   Unusual odor  Change in the amount of wound drainage     Should you experience any significant changes in your wound(s) or have any questions regarding your home care instructions please contact the wound center [facility] @ [phone] If after regular business hours, please call your family doctor or local emergency room. The treatment plan has been discussed at length between you and your provider. Follow all instructions carefully, it is very important. If you do not follow all instructions you are at risk of your wound not healing, infection, possible loss of limb and even loss of life.

## 2024-03-26 ENCOUNTER — APPOINTMENT (OUTPATIENT)
Dept: WOUND CARE | Facility: HOSPITAL | Age: 74
End: 2024-03-26
Attending: NURSE PRACTITIONER
Payer: MEDICARE

## 2024-03-26 ENCOUNTER — PATIENT MESSAGE (OUTPATIENT)
Dept: HEMATOLOGY/ONCOLOGY | Age: 74
End: 2024-03-26

## 2024-03-28 NOTE — PROGRESS NOTES
CHIEF COMPLAINT:     Chief Complaint   Patient presents with    Wound Care     Patient is here for a wound care follow up. He denies any pain or new wound concerns.     HPI:   Information obtained from Patient and chart  2-22-24 INITIAL:  Patient is a 72 yo male with pmhx of  dm (8-23-23 A1c 7.1), hl, bph, htn, afib, gastrointestinal stromal tumor (GIST) in 2014.  In 2017 he underwent end colosotomy and urostomy. 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  In 2021 he noted a new mass in isciorectal fossa and perineal pain. He was maintained on chemo.  January 2024 a right gluteal cleft lesion was causing pain, he elected to undergo surgery which was on 1-30-24 where he had expoloration of the perineum and resection of subq fat with wire localization.  The pathology was negative for malignancy.  He followed up with dr. alford office on 2-5 (mayelin mcintosh) and it was noted that there was wound dehiscence possibly secondary to VEGF inhibitor chemo and patient referred to wound clinic.  He has been dressing the area with gauze.  There is undermining noted.  We discussed wound vac, offloading, and nutritional supplementation. He is really wanting to restart his chemo as he is not wanting to remain on the dilaudid for pain control (that he needs when he is not on chemo.). I will touch base with mau mccartney and mayelin mcintosh regarding treatment plan.    3-1-24 patient returns, I touched base with onc and pcp.  Per onc that if his vegf inibitor would be restarted there is no chance he can heal. Primary recommend he see emg dm clinic to get a glucometer. Patient states that the referral was entered by dr. Bernal. I asked him to please make that appointment because it may be a \"wait\" to get in.   Patient did not receive the wound vac he has to be home to sign for it he  states that they called this am that it was on it's way.  Will have him come next Tuesday and Friday for placemetn.  He states he is using the ehob cushion and is drinking owen.  Wound is about the same. No acute s/s of infection.    3-12-24 patient returns.  Patient did have a CT scan which unfourtunately the lesion that was attempted to remove remains present in the area although smaller in size.  Patient has chosen not to return to surgery for further resection and has chosen to continue with wound care to get the area closed so he can get back on his meds.  Npwt was started 1 week ago today, and he is tolerating well. Keracis and epifix/cord are covered. The undermined area remains, keracis placed into this area and continue wound vac. Wound is smaller, granulation is healthy, no acute s/s of infection. Patient continues to express frustration regarding not being on his \"cancer med\" and needing to take dilaudid for pain.    3-15-24 patient returns. I placed keracis to the undermining earlier this week. Patient is hoping to not have to continue with the vac. Wound is improved, we did not aggressively probe the undermining. Will dress with silver foam, hold the wound vac. No acute s/s of infection    3-22-24 patient returns, we saw him 1 week ago after placing keracis into the undermining and dressing with silver foam, holding the wound vac.  He followed up with dr white earlier this week and it was noted \"CT shows some progression of metastatic disease in liver. This is because patient has been off repretinib. With persistent open wound, I am reluctant to have him restart as the drug targets VEGFR and can result in a worsening of his surgical wound. I recommend, in the interim, he start imatinib 400-800 mg with the hopes that it slows disease progression. Imatinib does not target VEGF. He is in agreement.\"   He continues with the Ehob cushion.  He is almost done with his owen, I recommended he decrease to 1 a  day as I suspect he will be fully resolved by next week.  He will heal with a linear \"divot\" as the epithelial is advancing down into the wound bed, but patient is ok with that.  There is not undermining.  Wound vac dc'd.  Will utilize triad paste. No s/s of infection.    3-29-24 patient returns. He has been utilizing triad paste and continued with ehob cushion.  Area is resolved, discussed utilizing paste for protection for the next week, continue to utilize ehob cushion.  keeping clean and dry.  Discharge from clinic and follow-up as needed.  I will update dr. Tapia.    MEDICATIONS:     Current Outpatient Medications:     imatinib 400 MG Oral Tab, Take 2 tablets (800 mg total) by mouth daily., Disp: 60 tablet, Rfl: 1    HYDROmorphone 4 MG Oral Tab, Take 1-2 tablets (4-8 mg total) by mouth every 3 (three) hours as needed (cancer related pain.)., Disp: 100 tablet, Rfl: 0    levothyroxine 100 MCG Oral Tab, Take 1 tablet (100 mcg total) by mouth before breakfast. TAKE ON EMPTY STOMACH, Disp: 30 tablet, Rfl: 1    clopidogrel 75 MG Oral Tab, Take 1 tablet (75 mg total) by mouth daily. OK to resume medication tomorrow 1/31/2024, Disp: , Rfl:     ondansetron 4 MG Oral Tablet Dispersible, Take 1 tablet (4 mg total) by mouth every 4 (four) hours as needed for Nausea. (Patient not taking: Reported on 3/20/2024), Disp: 20 tablet, Rfl: 0    HYDROcodone-acetaminophen 5-325 MG Oral Tab, Take 1 tablet by mouth every 6 (six) hours as needed for Pain. (Patient not taking: Reported on 2/22/2024), Disp: 20 tablet, Rfl: 0    escitalopram 10 MG Oral Tab, Take 1 tablet (10 mg total) by mouth daily., Disp: 90 tablet, Rfl: 1    QINLOCK 50 MG Oral Tab, Take 3 tablets by mouth daily. Total dose 150 mg daily (Patient taking differently: Take 3 tablets by mouth nightly. Total dose 150 mg daily), Disp: 90 tablet, Rfl: 11    aspirin 81 MG Oral Tab EC, Take 1 tablet (81 mg total) by mouth daily., Disp: , Rfl:     diphenhydrAMINE (BENADRYL  ALLERGY) 25 MG Oral Cap, Please take 1 hour prior to scheduled scan. (Patient not taking: Reported on 2/22/2024), Disp: 2 capsule, Rfl: 0    predniSONE 50 MG Oral Tab, Take 13 hr, 7 hr, and 1 hr prior to CT scan. (Patient not taking: Reported on 3/20/2024), Disp: 3 tablet, Rfl: 0    atorvastatin 40 MG Oral Tab, Take 1 tablet (40 mg total) by mouth nightly., Disp: , Rfl:   ALLERGIES:     Allergies   Allergen Reactions    Radiology Contrast Iodinated Dyes HIVES, RESPIRATORY FAILURE and OTHER (SEE COMMENTS)     Originally with \"Barium enema for lower GI\" 50 years ago. He states he developed hives and went into respiratory arrest. Pt has since had CT iodinated contrast with 13 hr pre-meds and no break-through reactions, HUMBERTO Mera, Radiology RN.       REVIEW OF SYSTEMS:   This information was obtained from the patient/family and chart.    See HPI for pertinent positives, otherwise 10 pt ROS negative.    HISTORY:   Past medical, surgical, family and social history updated where appropriate.      PHYSICAL EXAM:     Vitals:    03/29/24 1054   BP: 109/69   Pulse: 73   Resp: 16   Temp: 97.3 °F (36.3 °C)       Estimated body mass index is 28.47 kg/m² as calculated from the following:    Height as of 3/20/24: 72.01\".    Weight as of 3/20/24: 210 lb (95.3 kg).   POC Glucose   Date Value Ref Range Status   03/05/2024 137 (H) 70 - 99 mg/dL Final   03/01/2024 183 (H) 70 - 99 mg/dL Final   02/22/2024 297 (H) 70 - 99 mg/dL Final       Vital signs reviewed.Appears stated age, well groomed.    Constitutional:  Bp wnl for patient. Pulse Regular and wnl for patient. Respirations easy and unlabored. Temperature wnl. Weight normal for height. Appearance neat and clean. Appears in no acute distress. Well nourished and well developed.    Musculoskeletal:  Gait and station stable   Integumentary:  refer to wound characteristics and images   Psychiatric:  Judgment and insight intact. Alert and oriented times 3. No evidence of depression,  anxiety, or agitation. Calm, cooperative, and communicative. Appropriate interactions and affect.  DIAGNOSTICS:     Lab Results   Component Value Date    BUN 13 03/20/2024    CREATSERUM 0.92 03/20/2024    ALB 3.6 03/20/2024    TP 7.1 03/20/2024    A1C 7.1 (H) 08/23/2023 1-30-24 pathology-perineal  Final Diagnosis:   Subcutaneous fat:  -Mature adipose tissue.  -Small portion of unremarkable skin.  -Negative for malignancy.     WOUND ASSESSMENT:     Wound 02/22/24 #1- Right buttocks Buttocks Right (Active)   Date First Assessed/Time First Assessed: 02/22/24 0943    Wound Number (Wound Clinic Only): #1- Right buttocks  Primary Wound Type: Old surgical  Location: Buttocks  Wound Location Orientation: Right      Assessments 2/22/2024  9:44 AM 3/29/2024 10:56 AM   Wound Image       Drainage Amount Small None   Drainage Description Serosanguineous --   Wound Length (cm) 4.1 cm 0.1 cm   Wound Width (cm) 0.5 cm 0.1 cm   Wound Surface Area (cm^2) 2.05 cm^2 0.01 cm^2   Wound Depth (cm) 1 cm 0 cm   Wound Volume (cm^3) 2.05 cm^3 0 cm^3   Wound Healing % -- 100   Margins Well-defined edges;Epibole (Rolled edges) Well-defined edges   Non-staged Wound Description Full thickness Full thickness   Bibiana-wound Assessment -- Clean   Wound Granulation Tissue Pink;Spongy Firm;Pink   Wound Bed Granulation (%) 60 % 100 %   Wound Bed Slough (%) 40 % --   Wound Odor None None   Tunneling? No No   Undermining? Yes No   Sinus Tracts? No No       Inactive Orders   Date Order Priority Status Authorizing Provider   03/12/24 1602 Cellular tissue product application Old surgical Right Buttocks Routine Completed Jaylin Sawant APRN          ASSESSMENT AND PLAN:    1. Disruption of external operation (surgical) wound, not elsewhere classified, subsequent encounter    2. GIST (gastrointestinal stroma tumor), malignant, colon (HCC)    3. Type 2 diabetes mellitus with other skin complications (HCC)      Risks, benefits, and alternatives of current  treatment plan discussed in detail.  Questions and concerns addressed. Red flags to RTC or ED reviewed.  Patient (or parent) agrees to plan.      NOTE TO PATIENT: The 21st Century Cures Act makes clinical notes like these available to patients in the interest of transparency. Clinical notes are medical documents used by physicians and care providers to communicate with each other. These documents include medical language and terminology, abbreviations, and treatment information that may sound technical and at times possibly unfamiliar. In addition, at times, the verbiage may appear blunt or direct. These documents are one tool providers use to communicate relevant information and clinical opinions of the care providers in a way that allows common understanding of the clinical context.   I spent 20 minutes with the patient. This time included:    preparing to see the patient (eg, review notes and recent diagnostics),  seeing the patient, obtaining and/or reviewing separately obtained history, performing a medically appropriate examination and/or evaluation, counseling and educating the patient, documenting in the record,  DISCHARGE:      Patient Instructions   Patient discharge and wound care instructions  Luciano Hernández  3/29/2024     Discharge from clinic, follow-up as needed.    Continue paste for another week or two    Continue using ehob/waffle cushion   Jaylin Sawant FNP-C, CWCN-AP, CFCN, CSWS, WCC, DWC  3/29/2024

## 2024-03-29 ENCOUNTER — APPOINTMENT (OUTPATIENT)
Dept: WOUND CARE | Facility: HOSPITAL | Age: 74
End: 2024-03-29
Attending: NURSE PRACTITIONER
Payer: MEDICARE

## 2024-03-29 VITALS
TEMPERATURE: 97 F | SYSTOLIC BLOOD PRESSURE: 109 MMHG | DIASTOLIC BLOOD PRESSURE: 69 MMHG | RESPIRATION RATE: 16 BRPM | HEART RATE: 73 BPM

## 2024-03-29 DIAGNOSIS — T81.31XD DISRUPTION OF EXTERNAL OPERATION (SURGICAL) WOUND, NOT ELSEWHERE CLASSIFIED, SUBSEQUENT ENCOUNTER: Primary | ICD-10-CM

## 2024-03-29 DIAGNOSIS — E11.628 TYPE 2 DIABETES MELLITUS WITH OTHER SKIN COMPLICATIONS (HCC): ICD-10-CM

## 2024-03-29 DIAGNOSIS — C49.A4 GIST (GASTROINTESTINAL STROMA TUMOR), MALIGNANT, COLON (HCC): ICD-10-CM

## 2024-03-29 PROCEDURE — 99213 OFFICE O/P EST LOW 20 MIN: CPT | Performed by: NURSE PRACTITIONER

## 2024-03-29 NOTE — PATIENT INSTRUCTIONS
Patient discharge and wound care instructions  Luciano Hernández  3/29/2024     Discharge from clinic, follow-up as needed.    Continue paste for another week or two    Continue using ehob/waffle cushion

## 2024-03-29 NOTE — PROGRESS NOTES
.Weekly Wound Education Note    Teaching Provided To: Patient  Training Topics: Dressing;Discharge instructions;Cleasing and general instructions;Off-loading  Training Method: Explain/Verbal;Written  Training Response: Patient responds and understands            Wound is healed.  Patient to continue applying [employer] TRIAD paste daily for the next week and offload as much as possible.  Discharged from clinic.

## 2024-04-05 ENCOUNTER — APPOINTMENT (OUTPATIENT)
Dept: WOUND CARE | Facility: HOSPITAL | Age: 74
End: 2024-04-05
Attending: NURSE PRACTITIONER
Payer: MEDICARE

## 2024-04-12 ENCOUNTER — APPOINTMENT (OUTPATIENT)
Dept: WOUND CARE | Facility: HOSPITAL | Age: 74
End: 2024-04-12
Attending: NURSE PRACTITIONER
Payer: MEDICARE

## 2024-04-15 ENCOUNTER — ANESTHESIA EVENT (OUTPATIENT)
Dept: ENDOSCOPY | Facility: HOSPITAL | Age: 74
End: 2024-04-15
Payer: MEDICARE

## 2024-04-15 ENCOUNTER — HOSPITAL ENCOUNTER (INPATIENT)
Facility: HOSPITAL | Age: 74
LOS: 4 days | Discharge: HOME | End: 2024-04-19
Attending: EMERGENCY MEDICINE | Admitting: HOSPITALIST
Payer: MEDICARE

## 2024-04-15 ENCOUNTER — OFFICE VISIT (OUTPATIENT)
Dept: HEMATOLOGY/ONCOLOGY | Facility: HOSPITAL | Age: 74
End: 2024-04-15
Attending: SPECIALIST
Payer: MEDICARE

## 2024-04-15 VITALS
RESPIRATION RATE: 18 BRPM | WEIGHT: 212 LBS | DIASTOLIC BLOOD PRESSURE: 56 MMHG | SYSTOLIC BLOOD PRESSURE: 98 MMHG | BODY MASS INDEX: 28.71 KG/M2 | TEMPERATURE: 98 F | HEART RATE: 82 BPM | HEIGHT: 72.01 IN | OXYGEN SATURATION: 100 %

## 2024-04-15 DIAGNOSIS — C49.A5 MALIGNANT GASTROINTESTINAL STROMAL TUMOR (GIST) OF RECTUM (HCC): ICD-10-CM

## 2024-04-15 DIAGNOSIS — E03.4 HYPOTHYROIDISM DUE TO ACQUIRED ATROPHY OF THYROID: Primary | ICD-10-CM

## 2024-04-15 DIAGNOSIS — I70.222 ATHEROSCLEROSIS OF NATIVE ARTERIES OF EXTREMITIES WITH REST PAIN, LEFT LEG (HCC): ICD-10-CM

## 2024-04-15 DIAGNOSIS — C49.A4 GIST (GASTROINTESTINAL STROMA TUMOR), MALIGNANT, COLON (HCC): ICD-10-CM

## 2024-04-15 DIAGNOSIS — C79.89 SECONDARY MALIGNANCY OF SOFT TISSUES OF PERINEUM (HCC): ICD-10-CM

## 2024-04-15 DIAGNOSIS — D64.9 NORMOCYTIC ANEMIA: ICD-10-CM

## 2024-04-15 DIAGNOSIS — K92.2 ACUTE GI BLEEDING: Primary | ICD-10-CM

## 2024-04-15 DIAGNOSIS — K92.1 GASTROINTESTINAL HEMORRHAGE WITH MELENA: ICD-10-CM

## 2024-04-15 PROBLEM — R73.9 HYPERGLYCEMIA: Status: ACTIVE | Noted: 2024-04-15

## 2024-04-15 LAB
ALBUMIN SERPL-MCNC: 2.9 G/DL (ref 3.4–5)
ALBUMIN SERPL-MCNC: 2.9 G/DL (ref 3.4–5)
ALBUMIN/GLOB SERPL: 1 {RATIO} (ref 1–2)
ALBUMIN/GLOB SERPL: 1 {RATIO} (ref 1–2)
ALP LIVER SERPL-CCNC: 87 U/L
ALP LIVER SERPL-CCNC: 87 U/L
ALT SERPL-CCNC: 19 U/L
ALT SERPL-CCNC: 20 U/L
ANION GAP SERPL CALC-SCNC: 6 MMOL/L (ref 0–18)
ANION GAP SERPL CALC-SCNC: 6 MMOL/L (ref 0–18)
ANTIBODY SCREEN: NEGATIVE
AST SERPL-CCNC: 25 U/L (ref 15–37)
AST SERPL-CCNC: 29 U/L (ref 15–37)
ATRIAL RATE: 74 BPM
BASOPHILS # BLD AUTO: 0.02 X10(3) UL (ref 0–0.2)
BASOPHILS # BLD AUTO: 0.03 X10(3) UL (ref 0–0.2)
BASOPHILS NFR BLD AUTO: 0.2 %
BASOPHILS NFR BLD AUTO: 0.4 %
BILIRUB SERPL-MCNC: 0.3 MG/DL (ref 0.1–2)
BILIRUB SERPL-MCNC: 0.4 MG/DL (ref 0.1–2)
BUN BLD-MCNC: 20 MG/DL (ref 9–23)
BUN BLD-MCNC: 20 MG/DL (ref 9–23)
CALCIUM BLD-MCNC: 8.1 MG/DL (ref 8.5–10.1)
CALCIUM BLD-MCNC: 8.6 MG/DL (ref 8.5–10.1)
CHLORIDE SERPL-SCNC: 110 MMOL/L (ref 98–112)
CHLORIDE SERPL-SCNC: 110 MMOL/L (ref 98–112)
CO2 SERPL-SCNC: 23 MMOL/L (ref 21–32)
CO2 SERPL-SCNC: 24 MMOL/L (ref 21–32)
CREAT BLD-MCNC: 1.09 MG/DL
CREAT BLD-MCNC: 1.14 MG/DL
DEPRECATED HBV CORE AB SER IA-ACNC: 30.6 NG/ML
EGFRCR SERPLBLD CKD-EPI 2021: 68 ML/MIN/1.73M2 (ref 60–?)
EGFRCR SERPLBLD CKD-EPI 2021: 72 ML/MIN/1.73M2 (ref 60–?)
EOSINOPHIL # BLD AUTO: 0.12 X10(3) UL (ref 0–0.7)
EOSINOPHIL # BLD AUTO: 0.22 X10(3) UL (ref 0–0.7)
EOSINOPHIL NFR BLD AUTO: 1.5 %
EOSINOPHIL NFR BLD AUTO: 2.5 %
ERYTHROCYTE [DISTWIDTH] IN BLOOD BY AUTOMATED COUNT: 16.1 %
ERYTHROCYTE [DISTWIDTH] IN BLOOD BY AUTOMATED COUNT: 16.1 %
EST. AVERAGE GLUCOSE BLD GHB EST-MCNC: 134 MG/DL (ref 68–126)
GLOBULIN PLAS-MCNC: 3 G/DL (ref 2.8–4.4)
GLOBULIN PLAS-MCNC: 3 G/DL (ref 2.8–4.4)
GLUCOSE BLD-MCNC: 132 MG/DL (ref 70–99)
GLUCOSE BLD-MCNC: 166 MG/DL (ref 70–99)
GLUCOSE BLD-MCNC: 167 MG/DL (ref 70–99)
GLUCOSE BLD-MCNC: 198 MG/DL (ref 70–99)
HBA1C MFR BLD: 6.3 % (ref ?–5.7)
HCT VFR BLD AUTO: 24.4 %
HCT VFR BLD AUTO: 24.5 %
HGB BLD-MCNC: 7.6 G/DL
HGB BLD-MCNC: 7.7 G/DL
HGB BLD-MCNC: 7.9 G/DL
IMM GRANULOCYTES # BLD AUTO: 0.02 X10(3) UL (ref 0–1)
IMM GRANULOCYTES # BLD AUTO: 0.03 X10(3) UL (ref 0–1)
IMM GRANULOCYTES NFR BLD: 0.2 %
IMM GRANULOCYTES NFR BLD: 0.4 %
IRON SATN MFR SERPL: 10 %
IRON SERPL-MCNC: 28 UG/DL
LYMPHOCYTES # BLD AUTO: 1.12 X10(3) UL (ref 1–4)
LYMPHOCYTES # BLD AUTO: 1.77 X10(3) UL (ref 1–4)
LYMPHOCYTES NFR BLD AUTO: 14.2 %
LYMPHOCYTES NFR BLD AUTO: 19.8 %
MCH RBC QN AUTO: 28.7 PG (ref 26–34)
MCH RBC QN AUTO: 28.8 PG (ref 26–34)
MCHC RBC AUTO-ENTMCNC: 31.4 G/DL (ref 31–37)
MCHC RBC AUTO-ENTMCNC: 32.4 G/DL (ref 31–37)
MCV RBC AUTO: 89.1 FL
MCV RBC AUTO: 91.4 FL
MONOCYTES # BLD AUTO: 0.6 X10(3) UL (ref 0.1–1)
MONOCYTES # BLD AUTO: 0.74 X10(3) UL (ref 0.1–1)
MONOCYTES NFR BLD AUTO: 7.6 %
MONOCYTES NFR BLD AUTO: 8.3 %
NEUTROPHILS # BLD AUTO: 6 X10 (3) UL (ref 1.5–7.7)
NEUTROPHILS # BLD AUTO: 6 X10(3) UL (ref 1.5–7.7)
NEUTROPHILS # BLD AUTO: 6.16 X10 (3) UL (ref 1.5–7.7)
NEUTROPHILS # BLD AUTO: 6.16 X10(3) UL (ref 1.5–7.7)
NEUTROPHILS NFR BLD AUTO: 69 %
NEUTROPHILS NFR BLD AUTO: 75.9 %
OSMOLALITY SERPL CALC.SUM OF ELEC: 294 MOSM/KG (ref 275–295)
OSMOLALITY SERPL CALC.SUM OF ELEC: 298 MOSM/KG (ref 275–295)
P AXIS: 52 DEGREES
P-R INTERVAL: 192 MS
PLATELET # BLD AUTO: 296 10(3)UL (ref 150–450)
PLATELET # BLD AUTO: 309 10(3)UL (ref 150–450)
POTASSIUM SERPL-SCNC: 3.6 MMOL/L (ref 3.5–5.1)
POTASSIUM SERPL-SCNC: 3.9 MMOL/L (ref 3.5–5.1)
PROT SERPL-MCNC: 5.9 G/DL (ref 6.4–8.2)
PROT SERPL-MCNC: 5.9 G/DL (ref 6.4–8.2)
Q-T INTERVAL: 448 MS
QRS DURATION: 126 MS
QTC CALCULATION (BEZET): 497 MS
R AXIS: -59 DEGREES
RBC # BLD AUTO: 2.68 X10(6)UL
RBC # BLD AUTO: 2.74 X10(6)UL
RH BLOOD TYPE: POSITIVE
SODIUM SERPL-SCNC: 139 MMOL/L (ref 136–145)
SODIUM SERPL-SCNC: 140 MMOL/L (ref 136–145)
T AXIS: 47 DEGREES
T4 FREE SERPL-MCNC: 1 NG/DL (ref 0.8–1.7)
TIBC SERPL-MCNC: 279 UG/DL (ref 240–450)
TRANSFERRIN SERPL-MCNC: 187 MG/DL (ref 200–360)
TSI SER-ACNC: 42.2 MIU/ML (ref 0.36–3.74)
VENTRICULAR RATE: 74 BPM
WBC # BLD AUTO: 7.9 X10(3) UL (ref 4–11)
WBC # BLD AUTO: 8.9 X10(3) UL (ref 4–11)

## 2024-04-15 PROCEDURE — 30233N1 TRANSFUSION OF NONAUTOLOGOUS RED BLOOD CELLS INTO PERIPHERAL VEIN, PERCUTANEOUS APPROACH: ICD-10-PCS | Performed by: HOSPITALIST

## 2024-04-15 PROCEDURE — G2211 COMPLEX E/M VISIT ADD ON: HCPCS | Performed by: SPECIALIST

## 2024-04-15 PROCEDURE — 99215 OFFICE O/P EST HI 40 MIN: CPT | Performed by: SPECIALIST

## 2024-04-15 PROCEDURE — 99223 1ST HOSP IP/OBS HIGH 75: CPT | Performed by: HOSPITALIST

## 2024-04-15 RX ORDER — DEXTROSE MONOHYDRATE 25 G/50ML
50 INJECTION, SOLUTION INTRAVENOUS
Status: DISCONTINUED | OUTPATIENT
Start: 2024-04-15 | End: 2024-04-19

## 2024-04-15 RX ORDER — NICOTINE POLACRILEX 4 MG
30 LOZENGE BUCCAL
Status: DISCONTINUED | OUTPATIENT
Start: 2024-04-15 | End: 2024-04-19

## 2024-04-15 RX ORDER — HYDROMORPHONE HYDROCHLORIDE 1 MG/ML
2 INJECTION, SOLUTION INTRAMUSCULAR; INTRAVENOUS; SUBCUTANEOUS EVERY 2 HOUR PRN
Status: DISCONTINUED | OUTPATIENT
Start: 2024-04-15 | End: 2024-04-15

## 2024-04-15 RX ORDER — HYDROMORPHONE HYDROCHLORIDE 1 MG/ML
4 INJECTION, SOLUTION INTRAMUSCULAR; INTRAVENOUS; SUBCUTANEOUS EVERY 2 HOUR PRN
Status: DISCONTINUED | OUTPATIENT
Start: 2024-04-15 | End: 2024-04-15

## 2024-04-15 RX ORDER — HYDROMORPHONE HYDROCHLORIDE 2 MG/1
4 TABLET ORAL
Status: DISCONTINUED | OUTPATIENT
Start: 2024-04-15 | End: 2024-04-19

## 2024-04-15 RX ORDER — HYDROMORPHONE HYDROCHLORIDE 1 MG/ML
4 INJECTION, SOLUTION INTRAMUSCULAR; INTRAVENOUS; SUBCUTANEOUS EVERY 2 HOUR PRN
Status: DISCONTINUED | OUTPATIENT
Start: 2024-04-15 | End: 2024-04-19

## 2024-04-15 RX ORDER — SODIUM CHLORIDE 9 MG/ML
INJECTION, SOLUTION INTRAVENOUS CONTINUOUS
Status: DISCONTINUED | OUTPATIENT
Start: 2024-04-15 | End: 2024-04-15

## 2024-04-15 RX ORDER — SODIUM CHLORIDE 9 MG/ML
INJECTION, SOLUTION INTRAVENOUS CONTINUOUS
Status: DISCONTINUED | OUTPATIENT
Start: 2024-04-15 | End: 2024-04-19

## 2024-04-15 RX ORDER — LEVOTHYROXINE SODIUM 0.1 MG/1
100 TABLET ORAL
Status: DISCONTINUED | OUTPATIENT
Start: 2024-04-16 | End: 2024-04-19

## 2024-04-15 RX ORDER — HYDROMORPHONE HYDROCHLORIDE 1 MG/ML
2 INJECTION, SOLUTION INTRAMUSCULAR; INTRAVENOUS; SUBCUTANEOUS EVERY 2 HOUR PRN
Status: DISCONTINUED | OUTPATIENT
Start: 2024-04-15 | End: 2024-04-19

## 2024-04-15 RX ORDER — SODIUM CHLORIDE 9 MG/ML
INJECTION, SOLUTION INTRAVENOUS CONTINUOUS
Status: ACTIVE | OUTPATIENT
Start: 2024-04-15 | End: 2024-04-15

## 2024-04-15 RX ORDER — NICOTINE POLACRILEX 4 MG
15 LOZENGE BUCCAL
Status: DISCONTINUED | OUTPATIENT
Start: 2024-04-15 | End: 2024-04-19

## 2024-04-15 RX ORDER — HYDROMORPHONE HYDROCHLORIDE 2 MG/1
TABLET ORAL
Status: DISCONTINUED | OUTPATIENT
Start: 2024-04-15 | End: 2024-04-15

## 2024-04-15 RX ORDER — HYDROMORPHONE HYDROCHLORIDE 4 MG/1
TABLET ORAL
Qty: 100 TABLET | Refills: 0 | Status: ON HOLD | OUTPATIENT
Start: 2024-04-15

## 2024-04-15 RX ORDER — ENEMA 19; 7 G/133ML; G/133ML
2 ENEMA RECTAL
Status: COMPLETED | OUTPATIENT
Start: 2024-04-16 | End: 2024-04-16

## 2024-04-15 RX ORDER — ESCITALOPRAM OXALATE 10 MG/1
10 TABLET ORAL DAILY
Status: DISCONTINUED | OUTPATIENT
Start: 2024-04-15 | End: 2024-04-19

## 2024-04-15 RX ORDER — ONDANSETRON 2 MG/ML
4 INJECTION INTRAMUSCULAR; INTRAVENOUS EVERY 6 HOURS PRN
Status: DISCONTINUED | OUTPATIENT
Start: 2024-04-15 | End: 2024-04-19

## 2024-04-15 RX ORDER — METOCLOPRAMIDE HYDROCHLORIDE 5 MG/ML
10 INJECTION INTRAMUSCULAR; INTRAVENOUS EVERY 8 HOURS PRN
Status: DISCONTINUED | OUTPATIENT
Start: 2024-04-15 | End: 2024-04-19

## 2024-04-15 RX ORDER — ATORVASTATIN CALCIUM 40 MG/1
40 TABLET, FILM COATED ORAL NIGHTLY
Status: DISCONTINUED | OUTPATIENT
Start: 2024-04-15 | End: 2024-04-19

## 2024-04-15 NOTE — CONSULTS
[facility]                       Gastroenterology Consultation-[facility]    Luciano Hernández Patient Status:  Emergency    10/23/1950 MRN [id number]   Location [facility] EMERGENCY DEPARTMENT Attending Maged Lam MD   Hosp Day # 0 PCP Gavin Bernal MD     Reason for consultation: Anemia, black stools, hx of GIST  HPI: This is a 73-year-old male with an extensive PMHx that includes CAD s/p PCI with stenting (Plavix--last dose ), A-fib, dyslipidemia, DM, and metastatic GIST s/p abdominal peritoneal resection which included a total cystectomy, prostatectomy, end colostomy, and urostomy (10/2017) who was found to have recurrent progressive disease who underwent attempted resection of a growing mass 2024 (Dr Daniels) however no evidence of tumor noted and recovery has been c/b poor wound healing with pt not receiving chemo since prior to surgery.   Patient presented to ER today with dizziness, fatigue in the setting of dark ostomy output.  Patient reports symptom onset over the last 4-5 days and during this time has also noted dark black formed stool via ostomy.  No increase in volume and patient denies diarrhea and hematochezia.  No abdominal pain and he reports ongoing chronic rectal pain.  No nausea, vomiting, heartburn, dysphagia, or odynophagia.  Patient is on a daily baby aspirin and denies additional NSAID use.  No recent EGD and patient reports no colonoscopy since his surgery in .  No new abdominal imaging; labs Hgb 7.7 from 13.5 last month, normal platelets, normal BUN/creatinine.  Patient remains hemodynamically stable and denies chest pain or dyspnea at rest  PMHx:   Past Medical History:    Anxiety state    Arrhythmia    atrial fibrillation intermittently    Back problem    back surgery 20 years ago    BPH (benign prostatic hyperplasia)    Cancer (HCC)    GIST    Carcinoma of gastrointestinal tract (HCC)    Coronary atherosclerosis    Depression    Diabetes  (HCC)    per pcp not 11/28/22 diet controlled dm- pt denies any hx of dm    Disorder of thyroid    Esophageal reflux    Hearing impairment    Does not wear hearing aids    Heart attack (HCC)    High blood pressure    High cholesterol    Hx of motion sickness    > 30 yrs ago while on a boat    Hypothyroidism    Other and unspecified hyperlipidemia    Peripheral vascular disease (HCC)    Thyroid disease    Visual impairment    glasses                PSHx:   Past Surgical History:   Procedure Laterality Date    Back surgery  1998    Cath drug eluting stent  09/09/2022    LAD    Colonoscopy  2006    Colostomy  10/03/2017    Cystoscopy,insert ureteral stent      Hernia surgery Left 2006    Hernia surgery Left 1975    Hernia surgery Left 12/26/2019    Laparoscopy, surgical prostatectomy, retropubic radical, w/nerve sparing      Other surgical history  12/03/2015    fracture radiius and ulna  Right arm -     Other surgical history  10/03/2017    Pelvic exenteration to include 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 Resection subcutaneous fat with wire localization.    Part removal colon w end colostomy       Medications:    [COMPLETED] sodium chloride 0.9 % IV bolus 500 mL  500 mL Intravenous Once    Followed by    sodium chloride 0.9% infusion   Intravenous Continuous    [COMPLETED] pantoprazole (Protonix) 80 mg in sodium chloride 0.9% 100 mL IV bolus  80 mg Intravenous Once     Allergies:   Allergies   Allergen Reactions    Radiology Contrast Iodinated Dyes HIVES, RESPIRATORY FAILURE and OTHER (SEE COMMENTS)     Originally with \"Barium enema for lower GI\" 50 years ago. He states he developed hives and went into respiratory arrest. Pt has since had CT  iodinated contrast with 13 hr pre-meds and no break-through reactions, HUMBERTO Mera, Radiology RN.      Social HX:   Social History     Socioeconomic History    Marital status:     Number of children: 3   Occupational History    Occupation: RETIRED TEACHER   Tobacco Use    Smoking status: Former     Current packs/day: 0.00     Average packs/day: 1 pack/day for 45.0 years (45.0 ttl pk-yrs)     Types: Cigarettes     Start date: 1971     Quit date: 2016     Years since quittin.5    Smokeless tobacco: Never   Vaping Use    Vaping status: Never Used   Substance and Sexual Activity    Alcohol use: Not Currently    Drug use: No    Sexual activity: Not Currently   Other Topics Concern    Caffeine Concern Yes     Comment: coffee three times a day     Exercise No    Seat Belt No    Special Diet No    Stress Concern No    Weight Concern Yes   Social History Narrative    , lives alone    Has 2 daughters and 1 son - living close by      FamHx: The patient has no family history of colon cancer or other gastrointestinal malignancies;  No family history of ulcer disease, or inflammatory bowel disease  ROS:  In addition to the pertinent positives described above:            Infectious Disease:  No chronic infections or recent fevers prior to the acute illness            Cardiovascular: + CAD s/p PCI with stenting (most recent 2022; Plavix), A-fib, dyslipidemia             Respiratory: No shortness of breath, asthma, copd, recurrent pneumonia            Hematologic: The patient reports no easy bruising, frequent gum bleeding or nose bleeding;  + anemia            Dermatologic: The patient reports no recent rashes or chronic skin disorders            Rheumatologic: The patient reports no history of chronic arthritis, myalgias, arthralgias            Genitourinary:  + BPH           Psychiatric: + depression, anxiety           Oncologic: + GIST           ENT: + hearing loss           Neurologic: The  patient reports no history of seizure, stroke, or frequent headaches  PE: /66   Pulse 52   Temp 97.5 °F (36.4 °C) (Temporal)   Resp 15   Ht 6' 1\" (1.854 m)   Wt 212 lb (96.2 kg)   SpO2 100%   BMI 27.97 kg/m²   Gen: AAO x 3, able to speak in complete sentences  HENT: EOMI, PERRL, oropharynx is clear with moist mucosal membranes  Eyes: Sclerae are anicteric  Neck:  Supple without nuchal rigidity  CV: Regular rate and rhythm, with normal S1 and S2  Resp: Clear to auscultation bilaterally without wheezes; rubs, rhonchi, or rales  Abdomen: Soft, non-tender, non-distended with the presence of bowel sounds; No hepatosplenomegaly; no rebound or guarding; No ascites is clinically apparent; no tympany to percussion; urostomy with clear yellow output and colostomy with scant black drainage  Ext: No peripheral edema or cyanosis  Skin: Warm and dry  Psychiatric: Appropriate mood and congruent affect without obvious depression or anxiety  Labs:   Lab Results   Component Value Date    WBC 7.9 04/15/2024    HGB 7.7 04/15/2024    HCT 24.5 04/15/2024    .0 04/15/2024    CREATSERUM 1.09 04/15/2024    BUN 20 04/15/2024     04/15/2024    K 3.6 04/15/2024     04/15/2024    CO2 23.0 04/15/2024     04/15/2024    CA 8.6 04/15/2024    ALB 2.9 04/15/2024    ALKPHO 87 04/15/2024    BILT 0.3 04/15/2024    AST 25 04/15/2024    ALT 19 04/15/2024     Recent Labs   Lab 04/15/24  0911 04/15/24  1020   * 167*   BUN 20 20   CREATSERUM 1.14 1.09   CA 8.1* 8.6    139   K 3.9 3.6    110   CO2 24.0 23.0     Recent Labs   Lab 04/15/24  1020   RBC 2.68*   HGB 7.7*   HCT 24.5*   MCV 91.4   MCH 28.7   MCHC 31.4   RDW 16.1   NEPRELIM 6.00   WBC 7.9   .0       Recent Labs   Lab 04/15/24  0911 04/15/24  1020   ALT 20 19   AST 29 25     Impression: 73-year-old male with an extensive Hx that includes CAD s/p PCI with stenting (Plavix--last dose 4/14), A-fib, dyslipidemia, DM, and metastatic GIST s/p  abdominal peritoneal resection which included a total cystectomy, prostatectomy, end colostomy, and urostomy (10/2017) who was found to have recurrent progressive disease s/p attempted resection of a growing mass 1/2024 (Dr Daniels) however no evidence of tumor noted and recovery has been c/b poor wound healing with pt not receiving chemo since prior to surgery. Pt now admitted with dizziness and black stools found to have Hgb 7.7 from 13.5 last month, normal platelets, normal BUN/creatinine.  Will plan for EGD and colonoscopy via colostomy in a.m. to assess for sources of bleeding including ulcerations, AVMs, polyps, IBD, and neoplasm.  The risks, benefits, alternatives of the procedure including the risks of anesthesia, bleeding, perforation, missed lesions, need for surgery, and infection were discussed with the patient. He expressed understanding of the risks and was agreeable to proceed.    Recommendations:     Plan for EGD and colonoscopy via colostomy under MAC in a.m. with Dr. Llamas  Clear liquid diet today; NPO after midnight  Protonix 40 mg IV BID   Enema x 2 via colostomy to be completed prior to transfer to endoscopy lab  Please hold Plavix if risk remains acceptable  Continue to transfuse PRBC as needed to maintain Hgb >8  CBC, BMP, Mg in AM correcting electrolytes per hospitalist recommendations and transfusing PRBC as needed to goal hemoglobin 8    Thank you for the consultation, we will follow the patient with you.  Attending addendum (Dr. RIO Llamas) to follow later today and provide formal, final recommendations at that time   HUMPHREY Butler  12:03 PM  4/15/2024  [facility]  [phone]    GI attending addendum:    I have personally seen and examined this patient and agree with above nurse practitioner's assessment and recommendations.     Briefly, this is a 73-year-old male with a past medical history of CAD on Plavix, metastatic GIST status post abdominal peritoneal  resection and end colostomy presenting for dark stool and anemia.  Patient found to have a hemoglobin of 7.7 from 13.5 last month along with dark stool seen in the ostomy bag.  Denies any hematemesis.      Assessment and Plan:  -Will plan on endoscopy and possible flex sig for further evaluation  -Clear liquid diet; enema x 2 in a.m.  -PPI twice daily; serial hemoglobin; transfuse to goal hemoglobin 7  -Continue to hold Plavix if the risk is acceptable  -NPO at MN     Quentin Llamas MD, 04/15/24, 5:11 PM  [facility]  [phone]

## 2024-04-15 NOTE — ED PROVIDER NOTES
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  Patient says he had dark stools through the colostomy for the last 1 to 2 weeks.  Become dizzy and lightheaded and weak.  He appears pale.  Takes aspirin and Plavix.  No other specific complaints.  Denies abdominal pain.  Denies fevers.  Only at bedside.  No other specific complaints.    Objective:   Past Medical History:    Anxiety state    Arrhythmia    atrial fibrillation intermittently    Back problem    back surgery 20 years ago    BPH (benign prostatic hyperplasia)    Cancer (HCC)    GIST    Carcinoma of gastrointestinal tract (HCC)    Coronary atherosclerosis    Depression    Diabetes (HCC)    per pcp not 11/28/22 diet controlled dm- pt denies any hx of dm    Disorder of thyroid    Esophageal reflux    Hearing impairment    Does not wear hearing aids    Heart attack (HCC)    High blood pressure    High cholesterol    Hx of motion sickness    > 30 yrs ago while on a boat    Hypothyroidism    Other and unspecified hyperlipidemia    Peripheral vascular disease (HCC)    Thyroid disease    Visual impairment    glasses              Past Surgical History:   Procedure Laterality Date    Back surgery  1998    Cath drug eluting stent  09/09/2022    LAD    Colonoscopy  2006    Colostomy  10/03/2017    Cystoscopy,insert ureteral stent      Hernia surgery Left 2006    Hernia surgery Left 1975    Hernia surgery Left 12/26/2019    Laparoscopy, surgical prostatectomy, retropubic radical, w/nerve sparing      Other surgical history  12/03/2015    fracture radiius and ulna  Right arm -     Other surgical history  10/03/2017    Pelvic exenteration to include en-bloc resection of  pelvic gastrointestinal stromal tumor with abdominoperineal resection, total cystectomy, prostatectomy, end colostomy, and urostomy    Other surgical history  2019    DIAGNOSTIC LAPAROSCOPY, ROBOTIC LYSIS OF ADHESION    Other surgical history  2024    Exploration perineum. Resection subcutaneous fat with wire localization.    Part removal colon w end colostomy                  Social History     Socioeconomic History    Marital status:     Number of children: 3   Occupational History    Occupation: RETIRED TEACHER   Tobacco Use    Smoking status: Former     Current packs/day: 0.00     Average packs/day: 1 pack/day for 45.0 years (45.0 ttl pk-yrs)     Types: Cigarettes     Start date: 1971     Quit date: 2016     Years since quittin.5    Smokeless tobacco: Never   Vaping Use    Vaping status: Never Used   Substance and Sexual Activity    Alcohol use: Not Currently    Drug use: No    Sexual activity: Not Currently   Other Topics Concern    Caffeine Concern Yes     Comment: coffee three times a day     Exercise No    Seat Belt No    Special Diet No    Stress Concern No    Weight Concern Yes   Social History Narrative    , lives alone    Has 2 daughters and 1 son - living close by     Social Determinants of Health     Food Insecurity: No Food Insecurity (4/15/2024)    Food Insecurity     Food Insecurity: Never true   Transportation Needs: No Transportation Needs (4/15/2024)    Transportation Needs     Lack of Transportation: No   Housing Stability: Low Risk  (4/15/2024)    Housing Stability     Housing Instability: No              Review of Systems    Positive for stated complaint: GI bleed  Other systems are as noted in HPI.  Constitutional and vital signs reviewed.      All other systems reviewed and negative except as noted above.    Physical Exam     ED Triage Vitals   BP 04/15/24 1010 144/63   Pulse 04/15/24 1009 92   Resp 04/15/24 1009 12   Temp 04/15/24 1036 97.5 °F (36.4  °C)   Temp src 04/15/24 1036 Temporal   SpO2 04/15/24 1010 100 %   O2 Device 04/15/24 1009 None (Room air)       Current:/52 (BP Location: Left arm)   Pulse 53   Temp 97.7 °F (36.5 °C) (Oral)   Resp 16   Ht 185.4 cm (6' 1\")   Wt 96.2 kg   SpO2 100%   BMI 27.97 kg/m²         Physical Exam    General: Patient is resting comfortably in no acute distress  HEENT: Normal cephalic atraumatic.  Nonicteric sclera.  Moist mucous membranes.  No meningismus.  No adenopathy  Lungs: No tachypnea. Cardiac: No tachycardia.  Abdomen: Soft and nontender throughout.  No rebound or guarding  Colostomy bag: Dark stool.  Guaiac positive  Extremities: No clubbing/cyanosis/edema.  Skin: No rashes, no pallor  Neuro: Awake oriented ×3.  Nonfocal.  Good strength throughout    ED Course     Labs Reviewed   COMP METABOLIC PANEL (14) - Abnormal; Notable for the following components:       Result Value    Glucose 167 (*)     Total Protein 5.9 (*)     Albumin 2.9 (*)     All other components within normal limits   CBC W/ DIFFERENTIAL - Abnormal; Notable for the following components:    RBC 2.68 (*)     HGB 7.7 (*)     HCT 24.5 (*)     All other components within normal limits   CBC WITH DIFFERENTIAL WITH PLATELET    Narrative:     The following orders were created for panel order CBC With Differential With Platelet.  Procedure                               Abnormality         Status                     ---------                               -----------         ------                     CBC W/ DIFFERENTIAL[[id number]]          Abnormal            Final result                 Please view results for these tests on the individual orders.   HEMOGLOBIN   TYPE AND SCREEN    Narrative:     The following orders were created for panel order Type and screen.  Procedure                               Abnormality         Status                     ---------                               -----------         ------                     ABORH (Blood  Type)[[id number]]                               Final result               Antibody Screen[[id number]]                                  Final result                 Please view results for these tests on the individual orders.   ABORH (BLOOD TYPE)   ANTIBODY SCREEN   PREPARE RBC   RAINBOW DRAW LAVENDER   RAINBOW DRAW LIGHT GREEN   RAINBOW DRAW BLUE     EKG    Rate, intervals and axes as noted on EKG Report.  Rate: 74  Rhythm: Sinus Rhythm  Reading: Sinus rhythm with PVCs.  Left axis deviation.  Right bundle branch block.  Nonspecific ST-T wave changes.  Otherwise, agree with EKG report                 Chemistry reviewed.  BUN/creatinine ratio normal.  Albumin 2.9.  Hemoglobin however 7.7.         MDM      Patient is a pleasant 73-year-old gentleman with a history of a GIST tumor with colostomy presents with black stools and newfound anemia.  Hemoglobin today at the office was 7.9.  7.7 today.  Prior to that, on 3/20 the hemoglobin was 13.5.  Was given IV Protonix.  Case discussed with GI as well as the hospitalist.  Patient's hemodynamically stable here.  Transfusion was started prior to transfer to the floor.  Was admitted to a monitored bed.  Will likely need endoscopy, possible colonoscopy.  Transfuse to keep hemoglobin above 7.  Patient symptomatic here with active bleeding.                                   Medical Decision Making      Disposition and Plan     Clinical Impression:  1. Acute GI bleeding    2. GIST (gastrointestinal stroma tumor), malignant, colon (HCC)        Disposition:  There is no disposition on file for this visit.  There is no disposition time on file for this visit.    Follow-up:  No follow-up provider specified.        Medications Prescribed:  Current Discharge Medication List

## 2024-04-15 NOTE — ANESTHESIA PREPROCEDURE EVALUATION
PRE-OP EVALUATION    Patient Name: Luciano Hernández    Admit Diagnosis: Acute GI bleeding [K92.2]    Pre-op Diagnosis: INPATIENT    ESOPHAGOGASTRODUODENOSCOPY (EGD), FLEXIBLE SIGMOIDOSCOPY    Anesthesia Procedure: ESOPHAGOGASTRODUODENOSCOPY (EGD), FLEXIBLE SIGMOIDOSCOPY  FLEXIBLE SIGMOIDOSCOPY    Surgeons and Role:     * Quentin Llamas MD - Primary    Pre-op vitals reviewed.  Temp: 97.9 °F (36.6 °C)  Pulse: 52  Resp: 20  BP: 104/53  SpO2: 100 %  Body mass index is 27.97 kg/m².    Current medications reviewed.  Hospital Medications:  • [COMPLETED] sodium chloride 0.9 % IV bolus 500 mL  500 mL Intravenous Once   • [COMPLETED] pantoprazole (Protonix) 80 mg in sodium chloride 0.9% 100 mL IV bolus  80 mg Intravenous Once   • [] sodium chloride 0.9% infusion   Intravenous Continuous   • pantoprazole (Protonix) 40 mg in sodium chloride 0.9% PF 10 mL IV push  40 mg Intravenous Q12H   • atorvastatin (Lipitor) tab 40 mg  40 mg Oral Nightly   • escitalopram (Lexapro) tab 10 mg  10 mg Oral Daily   • HYDROmorphone (Dilaudid) tab 4-8 mg  4-8 mg Oral Q3H PRN   • [START ON 2024] levothyroxine (Synthroid) tab 100 mcg  100 mcg Oral Before breakfast   • glucose (Dex4) 15 GM/59ML oral liquid 15 g  15 g Oral Q15 Min PRN    Or   • glucose (Glutose) 40% oral gel 15 g  15 g Oral Q15 Min PRN    Or   • glucose-vitamin C (Dex-4) chewable tab 4 tablet  4 tablet Oral Q15 Min PRN    Or   • dextrose 50% injection 50 mL  50 mL Intravenous Q15 Min PRN    Or   • glucose (Dex4) 15 GM/59ML oral liquid 30 g  30 g Oral Q15 Min PRN    Or   • glucose (Glutose) 40% oral gel 30 g  30 g Oral Q15 Min PRN    Or   • glucose-vitamin C (Dex-4) chewable tab 8 tablet  8 tablet Oral Q15 Min PRN   • sodium chloride 0.9% infusion   Intravenous Continuous   • ondansetron (Zofran) 4 MG/2ML injection 4 mg  4 mg Intravenous Q6H PRN   • metoclopramide (Reglan) 5 mg/mL injection 10 mg  10 mg Intravenous Q8H PRN   • insulin aspart (NovoLOG) 100 Units/mL  FlexPen 1-10 Units  1-10 Units Subcutaneous TID AC and HS       Outpatient Medications:     Medications Prior to Admission   Medication Sig Dispense Refill Last Dose   • HYDROmorphone 4 MG Oral Tab Take 1-2 tablets (4-8 mg total) by mouth every 3 (three) hours as needed (cancer related pain.). 100 tablet 0 4/15/2024 at 0800   • levothyroxine 100 MCG Oral Tab Take 1 tablet (100 mcg total) by mouth before breakfast. TAKE ON EMPTY STOMACH 30 tablet 1 4/15/2024 at 0600   • escitalopram 10 MG Oral Tab Take 1 tablet (10 mg total) by mouth daily. 90 tablet 1 4/14/2024 at 1400   • atorvastatin 40 MG Oral Tab Take 1 tablet (40 mg total) by mouth nightly.   4/14/2024 at 2200       Allergies: Radiology contrast iodinated dyes      Anesthesia Evaluation    Patient summary reviewed.    Anesthetic Complications  (-) history of anesthetic complications         GI/Hepatic/Renal      (+) GERD                           Cardiovascular      ECG reviewed.            (+) hypertension   (+) hyperlipidemia  (+) CAD  (+) past MI           (+) dysrhythmias and atrial fibrillation                  Endo/Other      (+) diabetes  type 2, using insulin  (+) hypothyroidism    (+) anemia                   Pulmonary  Comment: 45 pack year smoker                         Neuro/Psych      (+) depression  (+) anxiety                            Past Surgical History:   Procedure Laterality Date   • Back surgery  1998   • Cath drug eluting stent  09/09/2022    LAD   • Colonoscopy  2006   • Colostomy  10/03/2017   • Cystoscopy,insert ureteral stent     • Hernia surgery Left 2006   • Hernia surgery Left 1975   • Hernia surgery Left 12/26/2019   • Laparoscopy, surgical prostatectomy, retropubic radical, w/nerve sparing     • Other surgical history  12/03/2015    fracture radiius and ulna  Right arm -    • Other surgical history  10/03/2017    Pelvic exenteration to include en-bloc resection of pelvic gastrointestinal stromal tumor with abdominoperineal  resection, total cystectomy, prostatectomy, end colostomy, and urostomy   • Other surgical history  2019    DIAGNOSTIC LAPAROSCOPY, ROBOTIC LYSIS OF ADHESION   • Other surgical history  2024    Exploration perineum. Resection subcutaneous fat with wire localization.   • Part removal colon w end colostomy       Social History     Socioeconomic History   • Marital status:    • Number of children: 3   Occupational History   • Occupation: RETIRED TEACHER   Tobacco Use   • Smoking status: Former     Current packs/day: 0.00     Average packs/day: 1 pack/day for 45.0 years (45.0 ttl pk-yrs)     Types: Cigarettes     Start date: 1971     Quit date: 2016     Years since quittin.5   • Smokeless tobacco: Never   Vaping Use   • Vaping status: Never Used   Substance and Sexual Activity   • Alcohol use: Not Currently   • Drug use: No   • Sexual activity: Not Currently   Other Topics Concern   • Caffeine Concern Yes     Comment: coffee three times a day    • Exercise No   • Seat Belt No   • Special Diet No   • Stress Concern No   • Weight Concern Yes     History   Drug Use No     Available pre-op labs reviewed.  Lab Results   Component Value Date    WBC 7.9 04/15/2024    RBC 2.68 (L) 04/15/2024    HGB 7.7 (L) 04/15/2024    HCT 24.5 (L) 04/15/2024    MCV 91.4 04/15/2024    MCH 28.7 04/15/2024    MCHC 31.4 04/15/2024    RDW 16.1 04/15/2024    .0 04/15/2024     Lab Results   Component Value Date     04/15/2024    K 3.6 04/15/2024     04/15/2024    CO2 23.0 04/15/2024    BUN 20 04/15/2024    CREATSERUM 1.09 04/15/2024     (H) 04/15/2024    CA 8.6 04/15/2024            Airway      Mallampati: II  Mouth opening: 3 FB  TM distance: > 6 cm  Neck ROM: full Cardiovascular    Cardiovascular exam normal.  Rhythm: regular  Rate: normal  (-) murmur   Dental    Dentition appears grossly intact         Pulmonary    Pulmonary exam normal.  Breath sounds clear to auscultation  bilaterally.               Other findings          ASA: 3   Plan: MAC  NPO status verified and patient meets guidelines.        Comment: MAC discussed.  All questions answered.  Patient expressed understanding and agrees to proceed.    Plan/risks discussed with: patient              Present on Admission:  • Anemia  • Hyperglycemia

## 2024-04-15 NOTE — ED QUICK NOTES
Orders for admission, patient is aware of plan and ready to go upstairs. Any questions, please call ED RN ROSEMARY at extension [phone].     Patient Covid vaccination status: Fully vaccinated     COVID Test Ordered in ED: None    COVID Suspicion at Admission: N/A    Running Infusions:    sodium chloride 125 mL/hr at 04/15/24 1110        Mental Status/LOC at time of transport: A/OX4    Other pertinent information:   CIWA score: N/A   NIH score:  N/A      1UPRBC ORDERED IN ER. WILL BEGIN ONCE BLOOD BANK SENDS   Pre-existing wound to rectal area

## 2024-04-15 NOTE — H&P
[facility]     Luciano Hernández Patient Status:  Inpatient    10/23/1950 MRN [id number]   Location [facility] Attending Adolfo Alamo MD   Hosp Day # 0 PCP Gavin Bernal MD     Chief Complaint: GIB    Subjective:    History of Present Illness:     Luciano Hernández is a 73 year old male with medical history of BPH, GIST, A.fib, DM type II, diet controlled, CAD and hypothyroidism who comes to the ER with complaints of dizziness.  He was seen at his oncologist's office today for follow up of his metastatic gastrointestinal stromal tumor and reported dizziness and dark black stools in his ostomy.  He started to notice that he was having dark stool in his ostomy for the past 4 days and it has been associated with dizziness .  He is on chronic plavix and ASA.  He was brought to the ER from his oncologist's office and he was noted to have blood loss anemia .  He was noted to have acute anemia with a hemoglobin of 7.9 which is down from his baseline of 13.5    History/Other:    Past Medical History:  Past Medical History:    Anxiety state    Arrhythmia    atrial fibrillation intermittently    Back problem    back surgery 20 years ago    BPH (benign prostatic hyperplasia)    Cancer (HCC)    GIST    Carcinoma of gastrointestinal tract (HCC)    Coronary atherosclerosis    Depression    Diabetes (HCC)    per pcp not 22 diet controlled dm- pt denies any hx of dm    Disorder of thyroid    Esophageal reflux    Hearing impairment    Does not wear hearing aids    Heart attack (HCC)    High blood pressure    High cholesterol    Hx of motion sickness    > 30 yrs ago while on a boat    Hypothyroidism    Other and unspecified hyperlipidemia    Peripheral vascular disease (HCC)    Thyroid disease    Visual impairment    glasses     Past Surgical History:   Past Surgical History:   Procedure Laterality Date    Back surgery      Cath drug eluting stent  2022    LAD     Colonoscopy  2006    Colostomy  10/03/2017    Cystoscopy,insert ureteral stent      Hernia surgery Left 2006    Hernia surgery Left 1975    Hernia surgery Left 12/26/2019    Laparoscopy, surgical prostatectomy, retropubic radical, w/nerve sparing      Other surgical history  12/03/2015    fracture radiius and ulna  Right arm -     Other surgical history  10/03/2017    Pelvic exenteration to include 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 Resection subcutaneous fat with wire localization.    Part removal colon w end colostomy        Family History:   Family History   Problem Relation Age of Onset    Alcohol and Other Disorders Associated Father     Cancer Mother         Breast    Other (Other) Sister         parkinsons    Heart Disorder Brother      Social History:    reports that he quit smoking about 7 years ago. His smoking use included cigarettes. He started smoking about 52 years ago. He has a 45 pack-year smoking history. He has never used smokeless tobacco. He reports that he does not currently use alcohol. He reports that he does not use drugs.     Allergies:   Allergies   Allergen Reactions    Radiology Contrast Iodinated Dyes HIVES, RESPIRATORY FAILURE and OTHER (SEE COMMENTS)     Originally with \"Barium enema for lower GI\" 50 years ago. He states he developed hives and went into respiratory arrest. Pt has since had CT iodinated contrast with 13 hr pre-meds and no break-through reactions, HUMBERTO Mera, Radiology RN.        Medications:    No current facility-administered medications on file prior to encounter.     Current Outpatient Medications on File Prior to Encounter   Medication Sig Dispense Refill    HYDROmorphone 4 MG Oral Tab Take 1-2  tablets (4-8 mg total) by mouth every 3 (three) hours as needed (cancer related pain.). 100 tablet 0    levothyroxine 100 MCG Oral Tab Take 1 tablet (100 mcg total) by mouth before breakfast. TAKE ON EMPTY STOMACH 30 tablet 1    clopidogrel 75 MG Oral Tab Take 1 tablet (75 mg total) by mouth daily. OK to resume medication tomorrow 1/31/2024      escitalopram 10 MG Oral Tab Take 1 tablet (10 mg total) by mouth daily. 90 tablet 1    aspirin 81 MG Oral Tab EC Take 1 tablet (81 mg total) by mouth daily.      atorvastatin 40 MG Oral Tab Take 1 tablet (40 mg total) by mouth nightly.         Review of Systems:   A comprehensive review of systems was completed.    Pertinent positives and negatives noted in the HPI.    Objective:   Physical Exam:    /53   Pulse 52   Temp 97.9 °F (36.6 °C) (Oral)   Resp 20   Ht 6' 1\" (1.854 m)   Wt 212 lb (96.2 kg)   SpO2 100%   BMI 27.97 kg/m²   General: No acute distress, Alert  Respiratory: No rhonchi, no wheezes  Cardiovascular: S1, S2. Regular rate and rhythm  Abdomen: Soft,ostomy with dark stools, urostomy with clear urine  Neuro: No new focal deficits  Extremities: No edema      Results:    Labs:      Labs Last 24 Hours:    Recent Labs   Lab 04/15/24  0911 04/15/24  1020   RBC 2.74* 2.68*   HGB 7.9* 7.7*   HCT 24.4* 24.5*   MCV 89.1 91.4   MCH 28.8 28.7   MCHC 32.4 31.4   RDW 16.1 16.1   NEPRELIM 6.16 6.00   WBC 8.9 7.9   .0 296.0       Recent Labs   Lab 04/15/24  0911 04/15/24  1020   * 167*   BUN 20 20   CREATSERUM 1.14 1.09   EGFRCR 68 72   CA 8.1* 8.6   ALB 2.9* 2.9*    139   K 3.9 3.6    110   CO2 24.0 23.0   ALKPHO 87 87   AST 29 25   ALT 20 19   BILT 0.4 0.3   TP 5.9* 5.9*       Lab Results   Component Value Date    INR 1.07 01/30/2024    INR 1.01 10/02/2022    INR 1.28 (H) 02/17/2021       No results for input(s): \"TROP\", \"TROPHS\", \"CK\" in the last 168 hours.    No results for input(s): \"TROP\", \"PBNP\" in the last 168 hours.    No results  for input(s): \"PCT\" in the last 168 hours.    Imaging: Imaging data reviewed in Epic.    Assessment & Plan:      #Acute blood loss anemia  -due to GIB  -transfused 1 unit  -check post transfusion hemoglobin  -GI on consult  -plan for EGD in am   -PPI    # Metastatic GIST  -oncology on consult    # Hypothyroidism  -resume PTA levothyroxine    # CAD  -hold Plavix and ASA due to GIB    # Chronic Pain   -resume pain meds    Plan of care discussed with patient    Jocelin May MD    Supplementary Documentation:     The 21st Century Cures Act makes medical notes like these available to patients in the interest of transparency. Please be advised this is a medical document. Medical documents are intended to carry relevant information, facts as evident, and the clinical opinion of the practitioner. The medical note is intended as peer to peer communication and may appear blunt or direct. It is written in medical language and may contain abbreviations or verbiage that are unfamiliar.               **Certification      PHYSICIAN Certification of Need for Inpatient Hospitalization - Initial Certification    Patient will require inpatient services that will reasonably be expected to span two midnight's based on the clinical documentation in H+P.   Based on patients current state of illness, I anticipate that, after discharge, patient will require TBD.
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
## 2024-04-15 NOTE — PLAN OF CARE
NURSING ADMISSION NOTE      Patient admitted via Cart  Oriented to room.  Safety precautions initiated.  Bed in low position.  Call light in reach.    Pt received alert and oriented x4, denies pain. Urostomy and colostomy noted. Blood infusing upon arrival, denies s/s of reaction. VSS, afebrile. Navigator completed. All questions answered.

## 2024-04-15 NOTE — PROGRESS NOTES
Patient is here for MD follow up. Patient restarted Gleevec on March 28th. Since he increased his dose to 2 tabs on (4/4) patient has been dizzy and weak. Buttock wound is closed but patient continues to have chronic pain. He is on Hydromorphone 8 mg with some relief. Appetite varies. Patient has also been experiencing melena.     Education Record    Learner:  Patient and Family Member    Disease / Diagnosis: GIST     Barriers / Limitations:  None   Comments:    Method:  Discussion   Comments:    General Topics:  Plan of care reviewed   Comments:    Outcome:  Shows understanding   Comments:

## 2024-04-15 NOTE — PROGRESS NOTES
Patient sent to the ER due to hypotension, melena, hemoglobin 7.9. Report called to triage RN.

## 2024-04-16 ENCOUNTER — ANESTHESIA EVENT (OUTPATIENT)
Dept: ENDOSCOPY | Facility: HOSPITAL | Age: 74
End: 2024-04-16
Payer: MEDICARE

## 2024-04-16 ENCOUNTER — ANESTHESIA (OUTPATIENT)
Dept: ENDOSCOPY | Facility: HOSPITAL | Age: 74
End: 2024-04-16
Payer: MEDICARE

## 2024-04-16 PROBLEM — C49.A5: Status: ACTIVE | Noted: 2024-04-16

## 2024-04-16 PROBLEM — E03.9 ACQUIRED HYPOTHYROIDISM: Status: ACTIVE | Noted: 2024-04-16

## 2024-04-16 LAB
ANION GAP SERPL CALC-SCNC: 5 MMOL/L (ref 0–18)
BASOPHILS # BLD AUTO: 0.02 X10(3) UL (ref 0–0.2)
BASOPHILS NFR BLD AUTO: 0.3 %
BLOOD TYPE BARCODE: 5100
BUN BLD-MCNC: 12 MG/DL (ref 9–23)
CALCIUM BLD-MCNC: 8 MG/DL (ref 8.5–10.1)
CHLORIDE SERPL-SCNC: 115 MMOL/L (ref 98–112)
CO2 SERPL-SCNC: 23 MMOL/L (ref 21–32)
CREAT BLD-MCNC: 0.82 MG/DL
EGFRCR SERPLBLD CKD-EPI 2021: 93 ML/MIN/1.73M2 (ref 60–?)
EOSINOPHIL # BLD AUTO: 0.18 X10(3) UL (ref 0–0.7)
EOSINOPHIL NFR BLD AUTO: 3.1 %
ERYTHROCYTE [DISTWIDTH] IN BLOOD BY AUTOMATED COUNT: 17.1 %
GLUCOSE BLD-MCNC: 137 MG/DL (ref 70–99)
GLUCOSE BLD-MCNC: 169 MG/DL (ref 70–99)
GLUCOSE BLD-MCNC: 174 MG/DL (ref 70–99)
GLUCOSE BLD-MCNC: 179 MG/DL (ref 70–99)
GLUCOSE BLD-MCNC: 181 MG/DL (ref 70–99)
HCT VFR BLD AUTO: 23.5 %
HGB BLD-MCNC: 7.4 G/DL
HGB BLD-MCNC: 7.8 G/DL
IMM GRANULOCYTES # BLD AUTO: 0.01 X10(3) UL (ref 0–1)
IMM GRANULOCYTES NFR BLD: 0.2 %
LYMPHOCYTES # BLD AUTO: 0.95 X10(3) UL (ref 1–4)
LYMPHOCYTES NFR BLD AUTO: 16.4 %
MAGNESIUM SERPL-MCNC: 2.1 MG/DL (ref 1.6–2.6)
MCH RBC QN AUTO: 28.8 PG (ref 26–34)
MCHC RBC AUTO-ENTMCNC: 33.2 G/DL (ref 31–37)
MCV RBC AUTO: 86.7 FL
MONOCYTES # BLD AUTO: 0.53 X10(3) UL (ref 0.1–1)
MONOCYTES NFR BLD AUTO: 9.2 %
NEUTROPHILS # BLD AUTO: 4.1 X10 (3) UL (ref 1.5–7.7)
NEUTROPHILS # BLD AUTO: 4.1 X10(3) UL (ref 1.5–7.7)
NEUTROPHILS NFR BLD AUTO: 70.8 %
OSMOLALITY SERPL CALC.SUM OF ELEC: 298 MOSM/KG (ref 275–295)
PLATELET # BLD AUTO: 214 10(3)UL (ref 150–450)
POTASSIUM SERPL-SCNC: 3.4 MMOL/L (ref 3.5–5.1)
POTASSIUM SERPL-SCNC: 3.9 MMOL/L (ref 3.5–5.1)
RBC # BLD AUTO: 2.71 X10(6)UL
SODIUM SERPL-SCNC: 143 MMOL/L (ref 136–145)
UNIT VOLUME: 350 ML
WBC # BLD AUTO: 5.8 X10(3) UL (ref 4–11)

## 2024-04-16 PROCEDURE — 0DB68ZX EXCISION OF STOMACH, VIA NATURAL OR ARTIFICIAL OPENING ENDOSCOPIC, DIAGNOSTIC: ICD-10-PCS | Performed by: INTERNAL MEDICINE

## 2024-04-16 PROCEDURE — 0DB48ZX EXCISION OF ESOPHAGOGASTRIC JUNCTION, VIA NATURAL OR ARTIFICIAL OPENING ENDOSCOPIC, DIAGNOSTIC: ICD-10-PCS | Performed by: INTERNAL MEDICINE

## 2024-04-16 PROCEDURE — 0DJD8ZZ INSPECTION OF LOWER INTESTINAL TRACT, VIA NATURAL OR ARTIFICIAL OPENING ENDOSCOPIC: ICD-10-PCS | Performed by: INTERNAL MEDICINE

## 2024-04-16 PROCEDURE — 99223 1ST HOSP IP/OBS HIGH 75: CPT | Performed by: SPECIALIST

## 2024-04-16 PROCEDURE — 99232 SBSQ HOSP IP/OBS MODERATE 35: CPT | Performed by: HOSPITALIST

## 2024-04-16 DEVICE — REPLAY HEMOSTASIS CLIP, 11MM SPAN
Type: IMPLANTABLE DEVICE | Site: ESOPHAGUS | Status: FUNCTIONAL
Brand: REPLAY

## 2024-04-16 RX ORDER — LIDOCAINE HYDROCHLORIDE 10 MG/ML
INJECTION, SOLUTION EPIDURAL; INFILTRATION; INTRACAUDAL; PERINEURAL AS NEEDED
Status: DISCONTINUED | OUTPATIENT
Start: 2024-04-16 | End: 2024-04-16 | Stop reason: SURG

## 2024-04-16 RX ORDER — PHENYLEPHRINE HCL 10 MG/ML
VIAL (ML) INJECTION AS NEEDED
Status: DISCONTINUED | OUTPATIENT
Start: 2024-04-16 | End: 2024-04-16 | Stop reason: SURG

## 2024-04-16 RX ORDER — FOLIC ACID 1 MG/1
1 TABLET ORAL DAILY
Status: DISCONTINUED | OUTPATIENT
Start: 2024-04-16 | End: 2024-04-19

## 2024-04-16 RX ORDER — NALOXONE HYDROCHLORIDE 0.4 MG/ML
0.08 INJECTION, SOLUTION INTRAMUSCULAR; INTRAVENOUS; SUBCUTANEOUS ONCE AS NEEDED
Status: DISCONTINUED | OUTPATIENT
Start: 2024-04-16 | End: 2024-04-16 | Stop reason: HOSPADM

## 2024-04-16 RX ORDER — SODIUM CHLORIDE, SODIUM LACTATE, POTASSIUM CHLORIDE, CALCIUM CHLORIDE 600; 310; 30; 20 MG/100ML; MG/100ML; MG/100ML; MG/100ML
INJECTION, SOLUTION INTRAVENOUS CONTINUOUS
Status: DISCONTINUED | OUTPATIENT
Start: 2024-04-16 | End: 2024-04-19

## 2024-04-16 RX ORDER — POTASSIUM CHLORIDE 20 MEQ/1
40 TABLET, EXTENDED RELEASE ORAL EVERY 4 HOURS
Status: COMPLETED | OUTPATIENT
Start: 2024-04-16 | End: 2024-04-16

## 2024-04-16 RX ADMIN — PHENYLEPHRINE HCL 100 MCG: 10 MG/ML VIAL (ML) INJECTION at 10:12:00

## 2024-04-16 RX ADMIN — SODIUM CHLORIDE, SODIUM LACTATE, POTASSIUM CHLORIDE, CALCIUM CHLORIDE: 600; 310; 30; 20 INJECTION, SOLUTION INTRAVENOUS at 10:35:00

## 2024-04-16 RX ADMIN — SODIUM CHLORIDE, SODIUM LACTATE, POTASSIUM CHLORIDE, CALCIUM CHLORIDE: 600; 310; 30; 20 INJECTION, SOLUTION INTRAVENOUS at 09:52:00

## 2024-04-16 RX ADMIN — LIDOCAINE HYDROCHLORIDE 25 MG: 10 INJECTION, SOLUTION EPIDURAL; INFILTRATION; INTRACAUDAL; PERINEURAL at 09:55:00

## 2024-04-16 NOTE — PLAN OF CARE
A/o x4. RA/. Tele. NPO at [facility] for EGD. Urostomy and colostomy intact. Pt reports no output this evening. Pain managed with po pain medication. IVF infusing. Up independent. POC updated with pt. All safety measures in place. Call light within reach instructed pt to call for help or assistance.

## 2024-04-16 NOTE — PROGRESS NOTES
[facility]   part of [facility]     Hospitalist Progress Note     Luciano Hernández Patient Status:  Inpatient    10/23/1950 MRN [id number]   Location [facility] 4NW-A Attending Jocelin May MD   Hosp Day # 1 PCP Gavin Bernal MD     Chief Complaint: GIB    Subjective:     Patient denies abdominal pain, nausea, headaches or dizziness    Objective:    Review of Systems:   A comprehensive review of systems was completed; pertinent positive and negatives stated in subjective.    Vital signs:  Temp:  [97.8 °F (36.6 °C)-98.4 °F (36.9 °C)] 98.2 °F (36.8 °C)  Pulse:  [52-81] 81  Resp:  [14-29] 19  BP: ()/(48-73) 135/72  SpO2:  [93 %-100 %] 100 %    Physical Exam:    General: No acute distress  Respiratory: No wheezes, no rhonchi  Cardiovascular: S1, S2, regular rate and rhythm  Abdomen: Soft, Non-tender, non-distended, positive bowel sounds  Neuro: No new focal deficits.   Extremities: No edema      Diagnostic Data:    Labs:  Recent Labs   Lab 04/15/24  0911 04/15/24  1020 04/15/24  1607 04/15/24  2352 24  0635   WBC 8.9 7.9  --   --  5.8   HGB 7.9* 7.7* 7.6* 7.4* 7.8*  7.8*   MCV 89.1 91.4  --   --  86.7   .0 296.0  --   --  214.0       Recent Labs   Lab 04/15/24  0911 04/15/24  1020 24  0635   * 167* 137*   BUN 20 20 12   CREATSERUM 1.14 1.09 0.82   CA 8.1* 8.6 8.0*   ALB 2.9* 2.9*  --     139 143   K 3.9 3.6 3.4*    110 115*   CO2 24.0 23.0 23.0   ALKPHO 87 87  --    AST 29 25  --    ALT 20 19  --    BILT 0.4 0.3  --    TP 5.9* 5.9*  --        Estimated Creatinine Clearance: 90.7 mL/min (based on SCr of 0.82 mg/dL).    No results for input(s): \"TROP\", \"TROPHS\", \"CK\" in the last 168 hours.    No results for input(s): \"PTP\", \"INR\" in the last 168 hours.               Microbiology    No results found for this visit on 04/15/24.      Imaging: Reviewed in Epic.    Medications:    folic acid  1 mg Oral Daily    pantoprazole  40 mg Intravenous Q12H     atorvastatin  40 mg Oral Nightly    escitalopram  10 mg Oral Daily    levothyroxine  100 mcg Oral Before breakfast    insulin aspart  1-10 Units Subcutaneous TID AC and HS       Assessment & Plan:      #Acute GIB  #Acute blood loss anemia  -s/p EGD and flex sig today  -sp 1 unit PRBCs  -GI on consult  -plan for MR enterography in am     # Metastatic GIST  # Hypothyroidism  -Levothyroxine    # CAD  -ASA and Plavix on hold due to above    Jocelin May MD    Supplementary Documentation:     Quality:  DVT Mechanical Prophylaxis:   SCDs,    DVT Pharmacologic Prophylaxis   Medication   None                Code Status: DNAR/Selective Treatment  Quigley: No urinary catheter in place  Quigley Duration (in days):   Central line:    TUAN:     Discharge is dependent on: progress  At this point Mr. Hernández is expected to be discharge to: home    The 21st Century Cures Act makes medical notes like these available to patients in the interest of transparency. Please be advised this is a medical document. Medical documents are intended to carry relevant information, facts as evident, and the clinical opinion of the practitioner. The medical note is intended as peer to peer communication and may appear blunt or direct. It is written in medical language and may contain abbreviations or verbiage that are unfamiliar.

## 2024-04-16 NOTE — OPERATIVE REPORT
Operative Report-Esophagogastroduodenoscopy      PREOPERATIVE DIAGNOSIS/INDICATION:  Anemia, melena    POSTOPERTATIVE DIAGNOSIS:     PROCEDURE PERFORMED: EGD    INFORMED CONSENT: Once a brief history and physical examination was performed, the risks, benefits and alternatives to the procedure were discussed with the patient and/or family and informed consent was obtained.  The risks of sedation, perforation, missed lesions and need for surgery were all discussed.  Patient expressed understanding of the risks and agreed to proceed.      PROCEDURE DESCRIPTION:  The patient was then brought to the endoscopy suite where his/her pulse, pulse oximetry and blood pressure were monitored. He/she was placed in the left lateral decubitus position and deep sedation was administered. Once adequate sedation was achieved, a bite block was placed and a lubricated tip of an Olympus video upper endoscope was inserted through the oropharynx and gently manipulated through the esophagus into the stomach and the distal duodenum. Upon withdrawal of the endoscope, careful visualization of the mucosa was performed.     FINDINGS:    ESOPHAGUS: Nodular esophagitis seen at the GE junction.  Biopsies taken with cold forceps for histology.  1 clip applied to biopsy site for hemostasis.  STOMACH: 4 mm gastric polyp seen in the pylorus.  Biopsies taken with cold forceps for histology.  Otherwise normal stomach.  Sliding hiatal hernia seen on retroflexion.    DUODENUM: Normal to examined extent.    THERAPEUTICS: Biopsies were performed.    RECOMMENDATIONS:     Post EGD precautions, watch for bleeding, infection, perforation, adverse drug reactions     Follow biopsies.    Pantoprazole 40 mg qday      Quentin Llamas MD  4/16/2024  10:30 AM

## 2024-04-16 NOTE — ANESTHESIA PREPROCEDURE EVALUATION
PRE-OP EVALUATION    Patient Name: Luciano Hernández    Admit Diagnosis: Acute GI bleeding [K92.2]    Pre-op Diagnosis: INPATIENT    FLEXIBLE SIGMOIDOSCOPY    Anesthesia Procedure: FLEXIBLE SIGMOIDOSCOPY    Surgeons and Role:     * Quentin Llamas MD - Primary    Pre-op vitals reviewed.  Temp: 98.2 °F (36.8 °C)  Pulse: 81  Resp: 19  BP: 135/72  SpO2: 100 %  Body mass index is 27.97 kg/m².    Current medications reviewed.  Hospital Medications:   folic acid (Folvite) tab 1 mg  1 mg Oral Daily    [COMPLETED] iron sucrose (Venofer) 300 mg in sodium chloride 0.9% 250 mL IVPB  300 mg Intravenous Once    lactated ringers infusion   Intravenous Continuous    potassium chloride (K-Dur) tab 40 mEq  40 mEq Oral Q4H    [COMPLETED] sodium chloride 0.9 % IV bolus 500 mL  500 mL Intravenous Once    [COMPLETED] pantoprazole (Protonix) 80 mg in sodium chloride 0.9% 100 mL IV bolus  80 mg Intravenous Once    [] sodium chloride 0.9% infusion   Intravenous Continuous    pantoprazole (Protonix) 40 mg in sodium chloride 0.9% PF 10 mL IV push  40 mg Intravenous Q12H    atorvastatin (Lipitor) tab 40 mg  40 mg Oral Nightly    escitalopram (Lexapro) tab 10 mg  10 mg Oral Daily    levothyroxine (Synthroid) tab 100 mcg  100 mcg Oral Before breakfast    glucose (Dex4) 15 GM/59ML oral liquid 15 g  15 g Oral Q15 Min PRN    Or    glucose (Glutose) 40% oral gel 15 g  15 g Oral Q15 Min PRN    Or    glucose-vitamin C (Dex-4) chewable tab 4 tablet  4 tablet Oral Q15 Min PRN    Or    dextrose 50% injection 50 mL  50 mL Intravenous Q15 Min PRN    Or    glucose (Dex4) 15 GM/59ML oral liquid 30 g  30 g Oral Q15 Min PRN    Or    glucose (Glutose) 40% oral gel 30 g  30 g Oral Q15 Min PRN    Or    glucose-vitamin C (Dex-4) chewable tab 8 tablet  8 tablet Oral Q15 Min PRN    sodium chloride 0.9% infusion   Intravenous Continuous    ondansetron (Zofran) 4 MG/2ML injection 4 mg  4 mg Intravenous Q6H PRN    metoclopramide (Reglan) 5 mg/mL  injection 10 mg  10 mg Intravenous Q8H PRN    insulin aspart (NovoLOG) 100 Units/mL FlexPen 1-10 Units  1-10 Units Subcutaneous TID AC and HS    HYDROmorphone (Dilaudid) 1 MG/ML injection 2 mg  2 mg Intravenous Q2H PRN    Or    HYDROmorphone (Dilaudid) 1 MG/ML injection 4 mg  4 mg Intravenous Q2H PRN    [COMPLETED] fleet enema (Fleet) 7-19 GM/118ML rectal enema 266 mL  2 enema Rectal On Call to OR    HYDROmorphone (Dilaudid) tab 4 mg  4 mg Oral Q3H PRN       Outpatient Medications:     Medications Prior to Admission   Medication Sig Dispense Refill Last Dose    HYDROmorphone 4 MG Oral Tab Take 1-2 tablets (4-8 mg total) by mouth every 3 (three) hours as needed (cancer related pain.). 100 tablet 0 4/15/2024 at 0800    levothyroxine 100 MCG Oral Tab Take 1 tablet (100 mcg total) by mouth before breakfast. TAKE ON EMPTY STOMACH 30 tablet 1 4/15/2024 at 0600    escitalopram 10 MG Oral Tab Take 1 tablet (10 mg total) by mouth daily. 90 tablet 1 4/14/2024 at 1400    atorvastatin 40 MG Oral Tab Take 1 tablet (40 mg total) by mouth nightly.   4/14/2024 at 2200       Allergies: Radiology contrast iodinated dyes      Anesthesia Evaluation    Patient summary reviewed.    Anesthetic Complications           GI/Hepatic/Renal      (+) GERD                           Cardiovascular  Comment: EKG  Sinus rhythm with sinus arrhythmia with occasional Premature ventricular complexes   Left axis deviation   Right bundle branch block   Abnormal ECG   When compared with ECG of 28-DEC-2023 15:12,   Right bundle branch block has replaced Incomplete right bundle branch block   Confirmed by JEREMY SALAZAR ([id number]) on 4/15/2024 9:34:22 PM       ECG reviewed.            (+) hypertension   (+) hyperlipidemia  (+) CAD                                Endo/Other      (+) diabetes     (+) hypothyroidism    (+) anemia                   Pulmonary                           Neuro/Psych      (+) depression                                Past Surgical  History:   Procedure Laterality Date    Back surgery  1998    Cath drug eluting stent  2022    LAD    Colonoscopy  2006    Colostomy  10/03/2017    Cystoscopy,insert ureteral stent      Hernia surgery Left     Hernia surgery Left 1975    Hernia surgery Left 2019    Laparoscopy, surgical prostatectomy, retropubic radical, w/nerve sparing      Other surgical history  2015    fracture radiius and ulna  Right arm -     Other surgical history  10/03/2017    Pelvic exenteration to include en-bloc resection of pelvic gastrointestinal stromal tumor with abdominoperineal resection, total cystectomy, prostatectomy, end colostomy, and urostomy    Other surgical history  2019    DIAGNOSTIC LAPAROSCOPY, ROBOTIC LYSIS OF ADHESION    Other surgical history  2024    Exploration perineum. Resection subcutaneous fat with wire localization.    Part removal colon w end colostomy       Social History     Socioeconomic History    Marital status:     Number of children: 3   Occupational History    Occupation: RETIRED TEACHER   Tobacco Use    Smoking status: Former     Current packs/day: 0.00     Average packs/day: 1 pack/day for 45.0 years (45.0 ttl pk-yrs)     Types: Cigarettes     Start date: 1971     Quit date: 2016     Years since quittin.5    Smokeless tobacco: Never   Vaping Use    Vaping status: Never Used   Substance and Sexual Activity    Alcohol use: Not Currently    Drug use: No    Sexual activity: Not Currently   Other Topics Concern    Caffeine Concern Yes     Comment: coffee three times a day     Exercise No    Seat Belt No    Special Diet No    Stress Concern No    Weight Concern Yes     History   Drug Use No     Available pre-op labs reviewed.  Lab Results   Component Value Date    WBC 5.8 2024    RBC 2.71 (L) 2024    HGB 7.8 (L) 2024    HGB 7.8 (L) 2024    HCT 23.5 (L) 2024    MCV 86.7 2024    MCH 28.8 2024    MCHC 33.2  04/16/2024    RDW 17.1 04/16/2024    .0 04/16/2024     Lab Results   Component Value Date     04/16/2024    K 3.4 (L) 04/16/2024     (H) 04/16/2024    CO2 23.0 04/16/2024    BUN 12 04/16/2024    CREATSERUM 0.82 04/16/2024     (H) 04/16/2024    CA 8.0 (L) 04/16/2024             ASA: 3   Plan: MAC and general  NPO status verified and           Plan/risks discussed with: patient                Present on Admission:   Anemia   Hyperglycemia

## 2024-04-16 NOTE — ANESTHESIA POSTPROCEDURE EVALUATION
[facility]    Luciano Hernández Patient Status:  Inpatient   Age/Gender 73 year old male MRN [id number]   Location [facility] ENDOSCOPY PAIN [facility] Attending Jocelin May MD   Hosp Day # 1 PCP Gavin Bernal MD       Anesthesia Post-op Note    ESOPHAGOGASTRODUODENOSCOPY (EGD) WITH BIOPSY AND CLIP PLACEMENT X1, FLEXIBLE SIGMOIDOSCOPY    Procedure Summary       Date: 04/16/24 Room / Location:  ENDOSCOPY 04 /  ENDOSCOPY    Anesthesia Start: 0952 Anesthesia Stop: 1035    Procedures:       ESOPHAGOGASTRODUODENOSCOPY (EGD) WITH BIOPSY AND CLIP PLACEMENT X1, FLEXIBLE SIGMOIDOSCOPY      FLEXIBLE SIGMOIDOSCOPY Diagnosis: (GASTRIC POLYP, ESOPHAGITIS, HIATAL HERNIA)    Surgeons: Quentin Llamas MD Anesthesiologist: Emmanuel Pearson MD    Anesthesia Type: MAC ASA Status: 3            Anesthesia Type: MAC    Vitals Value Taken Time   /72 04/16/24 1050   Temp 98.2 °F (36.8 °C) 04/16/24 1032   Pulse 80 04/16/24 1053   Resp 14 04/16/24 1053   SpO2 100 % 04/16/24 1053   Vitals shown include unfiled device data.    Patient Location: Endoscopy    Anesthesia Type: MAC    Airway Patency: patent    Postop Pain Control: adequate    Mental Status: preanesthetic baseline    Nausea/Vomiting: none    Cardiopulmonary/Hydration status: stable euvolemic    Complications: no apparent anesthesia related complications    Postop vital signs: stable    Dental Exam: Unchanged from Preop    Patient to be discharged from PACU when criteria met.

## 2024-04-16 NOTE — PLAN OF CARE
Pt received A&Ox4. VSS. Afebrile. C/o pain on bottom, PRN dilaudid given per MAR. Flex sig and EGD completed. Tolerating clear liquid diet. Orders received for colonoscopy tomorrow. Pt requesting information from physician before proceeding with further procedures. Up ambulating in room. Call light within reach.

## 2024-04-17 ENCOUNTER — ANESTHESIA (OUTPATIENT)
Dept: ENDOSCOPY | Facility: HOSPITAL | Age: 74
End: 2024-04-17
Payer: MEDICARE

## 2024-04-17 LAB
ANION GAP SERPL CALC-SCNC: 7 MMOL/L (ref 0–18)
BASOPHILS # BLD AUTO: 0.02 X10(3) UL (ref 0–0.2)
BASOPHILS NFR BLD AUTO: 0.4 %
BLOOD TYPE BARCODE: 5100
BUN BLD-MCNC: 7 MG/DL (ref 9–23)
CALCIUM BLD-MCNC: 8.7 MG/DL (ref 8.5–10.1)
CHLORIDE SERPL-SCNC: 116 MMOL/L (ref 98–112)
CO2 SERPL-SCNC: 21 MMOL/L (ref 21–32)
CREAT BLD-MCNC: 0.79 MG/DL
EGFRCR SERPLBLD CKD-EPI 2021: 94 ML/MIN/1.73M2 (ref 60–?)
EOSINOPHIL # BLD AUTO: 0.17 X10(3) UL (ref 0–0.7)
EOSINOPHIL NFR BLD AUTO: 3.6 %
ERYTHROCYTE [DISTWIDTH] IN BLOOD BY AUTOMATED COUNT: 17.2 %
GLUCOSE BLD-MCNC: 119 MG/DL (ref 70–99)
GLUCOSE BLD-MCNC: 127 MG/DL (ref 70–99)
GLUCOSE BLD-MCNC: 134 MG/DL (ref 70–99)
GLUCOSE BLD-MCNC: 143 MG/DL (ref 70–99)
GLUCOSE BLD-MCNC: 156 MG/DL (ref 70–99)
HCT VFR BLD AUTO: 23.5 %
HGB BLD-MCNC: 7.6 G/DL
HGB BLD-MCNC: 7.8 G/DL
HGB BLD-MCNC: 7.9 G/DL
HGB RETIC QN AUTO: 33.9 PG (ref 28.2–36.6)
IMM GRANULOCYTES # BLD AUTO: 0.01 X10(3) UL (ref 0–1)
IMM GRANULOCYTES NFR BLD: 0.2 %
IMM RETICS NFR: 0.43 RATIO (ref 0.1–0.3)
LDH SERPL L TO P-CCNC: 217 U/L
LYMPHOCYTES # BLD AUTO: 0.96 X10(3) UL (ref 1–4)
LYMPHOCYTES NFR BLD AUTO: 20.2 %
MAGNESIUM SERPL-MCNC: 2.1 MG/DL (ref 1.6–2.6)
MCH RBC QN AUTO: 29.3 PG (ref 26–34)
MCHC RBC AUTO-ENTMCNC: 33.6 G/DL (ref 31–37)
MCV RBC AUTO: 87 FL
MONOCYTES # BLD AUTO: 0.49 X10(3) UL (ref 0.1–1)
MONOCYTES NFR BLD AUTO: 10.3 %
NEUTROPHILS # BLD AUTO: 3.1 X10 (3) UL (ref 1.5–7.7)
NEUTROPHILS # BLD AUTO: 3.1 X10(3) UL (ref 1.5–7.7)
NEUTROPHILS NFR BLD AUTO: 65.3 %
OSMOLALITY SERPL CALC.SUM OF ELEC: 298 MOSM/KG (ref 275–295)
PLATELET # BLD AUTO: 223 10(3)UL (ref 150–450)
POTASSIUM SERPL-SCNC: 3.6 MMOL/L (ref 3.5–5.1)
POTASSIUM SERPL-SCNC: 4.3 MMOL/L (ref 3.5–5.1)
RBC # BLD AUTO: 2.7 X10(6)UL
RETICS # AUTO: 97.7 X10(3) UL (ref 22.5–147.5)
RETICS/RBC NFR AUTO: 3.6 %
SODIUM SERPL-SCNC: 144 MMOL/L (ref 136–145)
UNIT VOLUME: 350 ML
WBC # BLD AUTO: 4.8 X10(3) UL (ref 4–11)

## 2024-04-17 PROCEDURE — 99233 SBSQ HOSP IP/OBS HIGH 50: CPT | Performed by: SPECIALIST

## 2024-04-17 PROCEDURE — 99232 SBSQ HOSP IP/OBS MODERATE 35: CPT | Performed by: HOSPITALIST

## 2024-04-17 RX ORDER — POTASSIUM CHLORIDE 20 MEQ/1
40 TABLET, EXTENDED RELEASE ORAL EVERY 4 HOURS
Status: COMPLETED | OUTPATIENT
Start: 2024-04-17 | End: 2024-04-17

## 2024-04-17 NOTE — PROGRESS NOTES
[facility]   part of [facility]     Hospitalist Progress Note     Luciano Hernández Patient Status:  Inpatient    10/23/1950 MRN [id number]   Location [facility] 4NW-A Attending Jocelin May MD   Hosp Day # 2 PCP Gavin Bernal MD     Chief Complaint: GIB    Subjective:     Patient seen and examined. Wants to eat. Denies abdominal pain/N/V.     Objective:    Review of Systems:   A comprehensive review of systems was completed; pertinent positive and negatives stated in subjective.    Vital signs:  Temp:  [97.7 °F (36.5 °C)-100.1 °F (37.8 °C)] 97.7 °F (36.5 °C)  Pulse:  [65-87] 80  Resp:  [16-18] 16  BP: ()/(37-70) 131/70  SpO2:  [97 %-100 %] 97 %    Physical Exam:    General: No acute distress  Respiratory: No wheezes, no rhonchi  Cardiovascular: S1, S2, regular rate and rhythm  Abdomen: Soft, Non-tender, non-distended, positive bowel sounds  Neuro: No new focal deficits.   Extremities: No edema      Diagnostic Data:    Labs:  Recent Labs   Lab 04/15/24  0911 04/15/24  1020 04/15/24  1607 04/15/24  2352 24  0635 24  1600 24  2350 24  0559   WBC 8.9 7.9  --   --  5.8  --   --  4.8   HGB 7.9* 7.7*   < > 7.4* 7.8*  7.8* 7.8* 7.6* 7.9*   MCV 89.1 91.4  --   --  86.7  --   --  87.0   .0 296.0  --   --  214.0  --   --  223.0    < > = values in this interval not displayed.       Recent Labs   Lab 04/15/24  0911 04/15/24  1020 24  0635 24  1919 24  0559   * 167* 137*  --  134*   BUN 20 20 12  --  7*   CREATSERUM 1.14 1.09 0.82  --  0.79   CA 8.1* 8.6 8.0*  --  8.7   ALB 2.9* 2.9*  --   --   --     139 143  --  144   K 3.9 3.6 3.4* 3.9 3.6    110 115*  --  116*   CO2 24.0 23.0 23.0  --  21.0   ALKPHO 87 87  --   --   --    AST 29 25  --   --   --    ALT 20 19  --   --   --    BILT 0.4 0.3  --   --   --    TP 5.9* 5.9*  --   --   --        Estimated Creatinine Clearance: 94.1 mL/min (based on SCr of 0.79 mg/dL).    No results for  input(s): \"TROP\", \"TROPHS\", \"CK\" in the last 168 hours.    No results for input(s): \"PTP\", \"INR\" in the last 168 hours.               Microbiology    No results found for this visit on 04/15/24.      Imaging: Reviewed in Epic.    Medications:    folic acid  1 mg Oral Daily    polyethylene glycol-electrolyte  4,000 mL Oral Once    pantoprazole  40 mg Intravenous Q12H    atorvastatin  40 mg Oral Nightly    escitalopram  10 mg Oral Daily    levothyroxine  100 mcg Oral Before breakfast    insulin aspart  1-10 Units Subcutaneous TID AC and HS       Assessment & Plan:      #Acute GIB  #Acute blood loss anemia  -s/p EGD 4/16 revealing esophagitis, no active bleeding  -Ileoscopy unable to be completed due to poor prep- plan to repeat today  -sp 1 unit PRBCs - hgb stable   -MRE pending   -GI following    # Metastatic GIST  -Onc following    # Hypothyroidism  -Levothyroxine    # CAD  -ASA and Plavix on hold pending completion of GI w/u    Juan Miguel Shepherd DO    Supplementary Documentation:     Quality:  DVT Mechanical Prophylaxis:   SCDs,    DVT Pharmacologic Prophylaxis   Medication   None                Code Status: DNAR/Selective Treatment  Quigley: No urinary catheter in place  Quigley Duration (in days):   Central line:    TUAN:     Discharge is dependent on: progress  At this point Mr. Hernández is expected to be discharge to: home    The 21st Century Cures Act makes medical notes like these available to patients in the interest of transparency. Please be advised this is a medical document. Medical documents are intended to carry relevant information, facts as evident, and the clinical opinion of the practitioner. The medical note is intended as peer to peer communication and may appear blunt or direct. It is written in medical language and may contain abbreviations or verbiage that are unfamiliar.
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
## 2024-04-17 NOTE — PLAN OF CARE
Pt a/o x4. VSS, remained afebrile. Meds given per MAR. Potassium replaced per electrolyte protocol. Colostomy and urostomy intact. NPO at midnight for colostomy and MRE tomorrow. IVF infusing. Fall precautions in place. Call light within reach.     Problem: GASTROINTESTINAL - ADULT  Goal: Maintains or returns to baseline bowel function  Description: INTERVENTIONS:  - Assess bowel function  - Maintain adequate hydration with IV or PO as ordered and tolerated  - Evaluate effectiveness of GI medications  - Encourage mobilization and activity  - Obtain nutritional consult as needed  - Establish a toileting routine/schedule  - Consider collaborating with pharmacy to review patient's medication profile  Outcome: Progressing     Problem: ANEMIA OF PREMATURITY  Goal: Hematocrit/Hemoblobin within normal range  Description: Interventions:  - Monitor CBC as ordered  - Administer Iron and nutrition supplements as ordered  - Administer epoetin farrukh as ordered  - Administer blood products as ordered  - Educate parent/family on condition and treatment plan  Outcome: Progressing

## 2024-04-17 NOTE — OPERATIVE REPORT
.                                                 Operative Report-Flexible sigmoidoscopy    PREOPERATIVE DIAGNOSIS/INDICATION: Anemia, dark stool    POSTOPERTATIVE DIAGNOSIS: Poor preparation    PROCEDURE PERFORMED: Flex Sigmoidoscopy    INFORMED CONSENT:  Once a brief history and physical examination was performed, the risks, benefits and alternatives to the procedure were discussed with the patient and/or family and informed consent was obtained. The risks of sedation, perforation, missed lesions and need for surgery were all discussed.  Patient expressed understanding of the risks and agreed to proceed.    PROCEDURE DESCRIPTION:The patient was then brought to the endoscopy suite where his pulse, pulse oximetry and blood pressure were monitored. He was placed in the left lateral decubitus position and deep sedation was administered. A lubricated tip of an Olympus video upper endoscope was then inserted through the stoma into the colon. The quality of the preparation was poor.      FINDINGS/THERAPEUTICS:    Colon: Extensive solid stool seen throughout the entire examined colon which was unable to be traversed by the scope.  The procedure was then aborted.    RECOMMENDATIONS:     Post colonoscopy precautions, watch for bleeding, infection, perforation, adverse drug reactions     Repeat procedure tomorrow a.m. after bowel prep    MR enterography    Clear liquid diet; GoLytely prep; n.p.o. at midnight    Serial hemoglobin; transfuse to goal hemoglobin greater than 7      Quentin Llamas MD  04/16/24  8:27 PM

## 2024-04-17 NOTE — PROGRESS NOTES
[facility] Gastroenterology Progress Note    CC: GIB, anemia   S: No dark stool at this time   O: /70 (BP Location: Right arm)   Pulse 80   Temp 97.7 °F (36.5 °C) (Oral)   Resp 16   Ht 6' 1\" (1.854 m)   Wt 212 lb (96.2 kg)   SpO2 97%   BMI 27.97 kg/m²     Gen: NAD  Abd: (+)BS, soft, non-tender, non-distended; no rebound or guarding; ostomy in place     Laboratory/Imaging Data:       Recent Labs   Lab 04/16/24  1147 04/16/24  1607 04/16/24  2153 04/17/24  0602 04/17/24  1140   PGLU 174* 179* 181* 143* 156*     No results for input(s): \"INR\" in the last 168 hours.      Recent Labs   Lab 04/15/24  0911 04/15/24  1020 04/15/24  1607 04/15/24  2352 04/16/24  0635 04/16/24  1600 04/16/24  2350 04/17/24  0559   WBC 8.9 7.9  --   --  5.8  --   --  4.8   HGB 7.9* 7.7*   < > 7.4* 7.8*  7.8* 7.8* 7.6* 7.9*   .0 296.0  --   --  214.0  --   --  223.0    < > = values in this interval not displayed.       Recent Labs   Lab 04/15/24  0911 04/15/24  1020 04/16/24  0635 04/16/24  1919 04/17/24  0559    139 143  --  144   K 3.9 3.6 3.4* 3.9 3.6    110 115*  --  116*   CO2 24.0 23.0 23.0  --  21.0   BUN 20 20 12  --  7*   CREATSERUM 1.14 1.09 0.82  --  0.79       Recent Labs   Lab 04/15/24  0911 04/15/24  1020   ALT 20 19   AST 29 25       Assessment/Plan:   73-year-old male with a past medical history of CAD on Plavix, metastatic GIST status post abdominal peritoneal resection and end colostomy presenting for dark stool and anemia.  EGD 4/16 with nodular esophagitis, gastric polyp. Stable hgb at this time. Flex 4/16 with poor prep; 4/17 unable to prep   - will look to obtain MRE  - Golytely prep; npo at [place] for flex sig  - serial hgb; transfuse to a goal hgb > 7  - heme onc following      Quentin Llamas MD, 04/17/24, 3:13 PM  [facility]

## 2024-04-17 NOTE — PLAN OF CARE
Patient is alert and oriented, on RA, independent to the bathroom. Colostomy and urostomy intact. No complaints of pain overnight. Patient NPO since midnight for possible colonoscopy. No acute events overnight. Safety precautions in place, call light within reach, plan of care ongoing.     Problem: GASTROINTESTINAL - ADULT  Goal: Maintains or returns to baseline bowel function  Description: INTERVENTIONS:  - Assess bowel function  - Maintain adequate hydration with IV or PO as ordered and tolerated  - Evaluate effectiveness of GI medications  - Encourage mobilization and activity  - Obtain nutritional consult as needed  - Establish a toileting routine/schedule  - Consider collaborating with pharmacy to review patient's medication profile  Outcome: Progressing     Problem: ANEMIA OF PREMATURITY  Goal: Hematocrit/Hemoblobin within normal range  Description: Interventions:  - Monitor CBC as ordered  - Administer Iron and nutrition supplements as ordered  - Administer epoetin farrukh as ordered  - Administer blood products as ordered  - Educate parent/family on condition and treatment plan  Outcome: Progressing

## 2024-04-18 LAB
ANION GAP SERPL CALC-SCNC: 3 MMOL/L (ref 0–18)
BASOPHILS # BLD AUTO: 0.02 X10(3) UL (ref 0–0.2)
BASOPHILS NFR BLD AUTO: 0.4 %
BUN BLD-MCNC: 8 MG/DL (ref 9–23)
CALCIUM BLD-MCNC: 8.4 MG/DL (ref 8.5–10.1)
CHLORIDE SERPL-SCNC: 112 MMOL/L (ref 98–112)
CO2 SERPL-SCNC: 25 MMOL/L (ref 21–32)
CREAT BLD-MCNC: 0.77 MG/DL
EGFRCR SERPLBLD CKD-EPI 2021: 95 ML/MIN/1.73M2 (ref 60–?)
EOSINOPHIL # BLD AUTO: 0.2 X10(3) UL (ref 0–0.7)
EOSINOPHIL NFR BLD AUTO: 4.5 %
ERYTHROCYTE [DISTWIDTH] IN BLOOD BY AUTOMATED COUNT: 16.9 %
GLUCOSE BLD-MCNC: 106 MG/DL (ref 70–99)
GLUCOSE BLD-MCNC: 130 MG/DL (ref 70–99)
GLUCOSE BLD-MCNC: 140 MG/DL (ref 70–99)
GLUCOSE BLD-MCNC: 142 MG/DL (ref 70–99)
GLUCOSE BLD-MCNC: 144 MG/DL (ref 70–99)
HCT VFR BLD AUTO: 25 %
HGB BLD-MCNC: 7.5 G/DL
HGB BLD-MCNC: 8.1 G/DL
HGB BLD-MCNC: 8.1 G/DL
HGB BLD-MCNC: 8.3 G/DL
IMM GRANULOCYTES # BLD AUTO: 0.01 X10(3) UL (ref 0–1)
IMM GRANULOCYTES NFR BLD: 0.2 %
LYMPHOCYTES # BLD AUTO: 1.04 X10(3) UL (ref 1–4)
LYMPHOCYTES NFR BLD AUTO: 23.2 %
MAGNESIUM SERPL-MCNC: 2.1 MG/DL (ref 1.6–2.6)
MCH RBC QN AUTO: 28.7 PG (ref 26–34)
MCHC RBC AUTO-ENTMCNC: 32.4 G/DL (ref 31–37)
MCV RBC AUTO: 88.7 FL
MONOCYTES # BLD AUTO: 0.46 X10(3) UL (ref 0.1–1)
MONOCYTES NFR BLD AUTO: 10.2 %
NEUTROPHILS # BLD AUTO: 2.76 X10 (3) UL (ref 1.5–7.7)
NEUTROPHILS # BLD AUTO: 2.76 X10(3) UL (ref 1.5–7.7)
NEUTROPHILS NFR BLD AUTO: 61.5 %
OSMOLALITY SERPL CALC.SUM OF ELEC: 291 MOSM/KG (ref 275–295)
PLATELET # BLD AUTO: 238 10(3)UL (ref 150–450)
POTASSIUM SERPL-SCNC: 4.2 MMOL/L (ref 3.5–5.1)
RBC # BLD AUTO: 2.82 X10(6)UL
SODIUM SERPL-SCNC: 140 MMOL/L (ref 136–145)
WBC # BLD AUTO: 4.5 X10(3) UL (ref 4–11)

## 2024-04-18 PROCEDURE — 0DJD8ZZ INSPECTION OF LOWER INTESTINAL TRACT, VIA NATURAL OR ARTIFICIAL OPENING ENDOSCOPIC: ICD-10-PCS | Performed by: INTERNAL MEDICINE

## 2024-04-18 PROCEDURE — 99232 SBSQ HOSP IP/OBS MODERATE 35: CPT | Performed by: HOSPITALIST

## 2024-04-18 PROCEDURE — 99233 SBSQ HOSP IP/OBS HIGH 50: CPT | Performed by: SPECIALIST

## 2024-04-18 RX ORDER — PANTOPRAZOLE SODIUM 40 MG/1
40 TABLET, DELAYED RELEASE ORAL DAILY
Qty: 30 TABLET | Refills: 0 | Status: SHIPPED | OUTPATIENT
Start: 2024-04-18 | End: 2024-05-18

## 2024-04-18 RX ORDER — LIDOCAINE HYDROCHLORIDE 10 MG/ML
INJECTION, SOLUTION EPIDURAL; INFILTRATION; INTRACAUDAL; PERINEURAL AS NEEDED
Status: DISCONTINUED | OUTPATIENT
Start: 2024-04-18 | End: 2024-04-18 | Stop reason: SURG

## 2024-04-18 RX ORDER — FOLIC ACID 1 MG/1
1 TABLET ORAL DAILY
Qty: 30 TABLET | Refills: 0 | Status: SHIPPED | OUTPATIENT
Start: 2024-04-19 | End: 2024-05-19

## 2024-04-18 RX ADMIN — LIDOCAINE HYDROCHLORIDE 50 MG: 10 INJECTION, SOLUTION EPIDURAL; INFILTRATION; INTRACAUDAL; PERINEURAL at 11:37:00

## 2024-04-18 NOTE — PLAN OF CARE
Pt a/o x4. VSS, remained afebrile. Meds given per MAR. Colonoscopy performed today. NPO. MRI pending. Colostomy and urostomy bag intact. Dilaudid given with moderate relief. IVF infusing. Call light within reach.     Problem: GASTROINTESTINAL - ADULT  Goal: Maintains or returns to baseline bowel function  Description: INTERVENTIONS:  - Assess bowel function  - Maintain adequate hydration with IV or PO as ordered and tolerated  - Evaluate effectiveness of GI medications  - Encourage mobilization and activity  - Obtain nutritional consult as needed  - Establish a toileting routine/schedule  - Consider collaborating with pharmacy to review patient's medication profile  Outcome: Progressing

## 2024-04-18 NOTE — ANESTHESIA POSTPROCEDURE EVALUATION
[facility]    Luciano Hernández Patient Status:  Inpatient   Age/Gender 73 year old male MRN [id number]   Location [facility] Attending Juan Miguel Shepherd DO   [facility] Day # 3 PCP Gavin Brenal MD       Anesthesia Post-op Note    COLONOSCOPY    Procedure Summary       Date: 04/18/24 Room / Location:  ENDOSCOPY 02 / [facility] END[facility]COPY    Anesthesia Start: 1131 Anesthesia Stop: 1224    Procedure: COLONOSCOPY Diagnosis: (Diverticulosis, polyps)    Surgeons: Quentin Llamas MD Responsible Provider: Fernie Montana MD    Anesthesia Type: MAC, general ASA Status: 3            Anesthesia Type: MAC, general    Vitals Value Taken Time   /88 04/18/24 1223   Temp  04/18/24 1225   Pulse 64 04/18/24 1224   Resp 8 04/18/24 1224   SpO2 100 % 04/18/24 1224   Vitals shown include unfiled device data.    Patient Location: Endoscopy    Anesthesia Type: MAC    Airway Patency: patent    Postop Pain Control: adequate    Mental Status: preanesthetic baseline    Nausea/Vomiting: none    Cardiopulmonary/Hydration status: stable euvolemic    Complications: no apparent anesthesia related complications    Postop vital signs: stable    Dental Exam: Unchanged from Preop

## 2024-04-18 NOTE — PROGRESS NOTES
[facility]   part of [facility]     Hospitalist Progress Note     Luciano Hernández Patient Status:  Inpatient    10/23/1950 MRN [id number]   Location [facility] 4NW-A Attending Jocelin May MD   Hosp Day # 3 PCP Gavin Bernal MD     Chief Complaint: GIB    Subjective:     Patient seen and examined. No acute complaints.     Objective:    Review of Systems:   A comprehensive review of systems was completed; pertinent positive and negatives stated in subjective.    Vital signs:  Temp:  [97.4 °F (36.3 °C)-98.3 °F (36.8 °C)] 97.6 °F (36.4 °C)  Pulse:  [53-89] 64  Resp:  [8-18] 16  BP: (102-142)/(35-88) 128/72  SpO2:  [98 %-100 %] 100 %    Physical Exam:    General: No acute distress  Respiratory: No wheezes, no rhonchi  Cardiovascular: S1, S2, regular rate and rhythm  Abdomen: Soft, Non-tender, non-distended, positive bowel sounds  Neuro: No new focal deficits.   Extremities: No edema      Diagnostic Data:    Labs:  Recent Labs   Lab 04/15/24  0911 04/15/24  1020 04/15/24  1607 24  0635 24  1600 24  2350 24  0559 24  1605 24  0021 24  0752   WBC 8.9 7.9  --  5.8  --   --  4.8  --   --  4.5   HGB 7.9* 7.7*   < > 7.8*  7.8*   < > 7.6* 7.9* 7.8* 7.5* 8.1*  8.1*   MCV 89.1 91.4  --  86.7  --   --  87.0  --   --  88.7   .0 296.0  --  214.0  --   --  223.0  --   --  238.0    < > = values in this interval not displayed.       Recent Labs   Lab 04/15/24  0911 04/15/24  1020 24  0635 24  1919 24  0559 24  1605 24  0752   * 167* 137*  --  134*  --  140*   BUN 20 20 12  --  7*  --  8*   CREATSERUM 1.14 1.09 0.82  --  0.79  --  0.77   CA 8.1* 8.6 8.0*  --  8.7  --  8.4*   ALB 2.9* 2.9*  --   --   --   --   --     139 143  --  144  --  140   K 3.9 3.6 3.4*   < > 3.6 4.3 4.2    110 115*  --  116*  --  112   CO2 24.0 23.0 23.0  --  21.0  --  25.0   ALKPHO 87 87  --   --   --   --   --    AST 29 25  --   --   --    --   --    ALT 20 19  --   --   --   --   --    BILT 0.4 0.3  --   --   --   --   --    TP 5.9* 5.9*  --   --   --   --   --     < > = values in this interval not displayed.       Estimated Creatinine Clearance: 96.6 mL/min (based on SCr of 0.77 mg/dL).    No results for input(s): \"TROP\", \"TROPHS\", \"CK\" in the last 168 hours.    No results for input(s): \"PTP\", \"INR\" in the last 168 hours.               Microbiology    No results found for this visit on 04/15/24.      Imaging: Reviewed in Epic.    Medications:    folic acid  1 mg Oral Daily    pantoprazole  40 mg Intravenous Q12H    atorvastatin  40 mg Oral Nightly    escitalopram  10 mg Oral Daily    levothyroxine  100 mcg Oral Before breakfast    insulin aspart  1-10 Units Subcutaneous TID AC and HS       Assessment & Plan:      #Acute GIB  #Acute blood loss anemia  -s/p EGD 4/16 revealing esophagitis, no active bleeding  -Colonoscopy 4/18 without active bleeding  -Pending MRE  -sp 1 unit PRBCs - hgb stable   -GI following    # Metastatic GIST  -Onc following    # Hypothyroidism  -Levothyroxine    # CAD  -ASA and Plavix on hold pending completion of GI w/u    #Disposition  -DC planning pending MRE w/u    Juan Miguel Shepherd DO    Supplementary Documentation:     Quality:  DVT Mechanical Prophylaxis:   SCDs,    DVT Pharmacologic Prophylaxis   Medication   None                Code Status: DNAR/Selective Treatment  Quigley: No urinary catheter in place  Quigley Duration (in days):   Central line:    TUAN:     Discharge is dependent on: progress  At this point Mr. Hernández is expected to be discharge to: home    The 21st Century Cures Act makes medical notes like these available to patients in the interest of transparency. Please be advised this is a medical document. Medical documents are intended to carry relevant information, facts as evident, and the clinical opinion of the practitioner. The medical note is intended as peer to peer communication and may appear blunt or direct.  It is written in medical language and may contain abbreviations or verbiage that are unfamiliar.
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
## 2024-04-18 NOTE — PLAN OF CARE
Received pt alert and oriented x4. VSS on RA. No complaints of pain. NPO for MRE and colonoscopy. Hgb 7.5 at 0021. Medications given per MAR. Safety precautions in place. Call light within reach.     Problem: GASTROINTESTINAL - ADULT  Goal: Maintains or returns to baseline bowel function  Description: INTERVENTIONS:  - Assess bowel function  - Maintain adequate hydration with IV or PO as ordered and tolerated  - Evaluate effectiveness of GI medications  - Encourage mobilization and activity  - Obtain nutritional consult as needed  - Establish a toileting routine/schedule  - Consider collaborating with pharmacy to review patient's medication profile  Outcome: Progressing     Problem: ANEMIA OF PREMATURITY  Goal: Hematocrit/Hemoblobin within normal range  Description: Interventions:  - Monitor CBC as ordered  - Administer Iron and nutrition supplements as ordered  - Administer epoetin farrukh as ordered  - Administer blood products as ordered  - Educate parent/family on condition and treatment plan  Outcome: Progressing

## 2024-04-18 NOTE — OPERATIVE REPORT
Operative Report-Colonoscopy    PREOPERATIVE DIAGNOSIS/INDICATION:  Anemia    POSTOPERTATIVE DIAGNOSIS: Diverticulosis, colon polyps    PROCEDURE PERFORMED: COLONOSCOPY    INFORMED CONSENT:  Once a brief history and physical examination was performed, the risks, benefits and alternatives to the procedure were discussed with the patient and/or family and informed consent was obtained. The risks of sedation, perforation, missed lesions and need for surgery were all discussed.  Patient expressed understanding of the risks and agreed to proceed.      PROCEDURE DESCRIPTION:The patient was then brought to the endoscopy suite where his pulse, pulse oximetry and blood pressure were monitored. Patient was placed in the left lateral decubitus position and deep sedation was administered. A lubricated tip of an Olympus video colonoscope was then inserted through the ostomy and gently manipulated under direct visualization to the cecal pole and the terminal ileum. The quality of the preparation was fair. Upon withdrawal of the colonoscope, careful visualization of the mucosa was performed.     Brady Prep Score: Right Colon 1 Transverse colon 2 Left colon 2    FINDINGS/THERAPEUTICS:    TERMINAL ILEUM: Normal terminal ileum   COLON: Diverticulosis found throughout the entire examined colon.  No active bleeding seen on this examination.  A few small to medium size polyps were seen in the colon.  These were not removed given the nature of the examination.  RECOMMENDATIONS:     Post Colonoscopy/polypectomy precautions, watch for bleeding, infection, perforation, adverse drug reactions   MR FERREIRA pending  Can consider repeat colonoscopy as outpatient for polypectomy depending on patient preference    Quentin Llamas MD  4/18/2024  12:41 PM

## 2024-04-19 ENCOUNTER — APPOINTMENT (OUTPATIENT)
Dept: MRI IMAGING | Facility: HOSPITAL | Age: 74
End: 2024-04-19
Attending: SPECIALIST
Payer: MEDICARE

## 2024-04-19 ENCOUNTER — APPOINTMENT (OUTPATIENT)
Dept: WOUND CARE | Facility: HOSPITAL | Age: 74
End: 2024-04-19
Attending: NURSE PRACTITIONER
Payer: MEDICARE

## 2024-04-19 VITALS
OXYGEN SATURATION: 100 % | HEIGHT: 73 IN | TEMPERATURE: 98 F | WEIGHT: 212 LBS | SYSTOLIC BLOOD PRESSURE: 123 MMHG | RESPIRATION RATE: 16 BRPM | DIASTOLIC BLOOD PRESSURE: 72 MMHG | BODY MASS INDEX: 28.1 KG/M2 | HEART RATE: 51 BPM

## 2024-04-19 LAB
BASOPHILS # BLD AUTO: 0.02 X10(3) UL (ref 0–0.2)
BASOPHILS NFR BLD AUTO: 0.4 %
EOSINOPHIL # BLD AUTO: 0.19 X10(3) UL (ref 0–0.7)
EOSINOPHIL NFR BLD AUTO: 4 %
ERYTHROCYTE [DISTWIDTH] IN BLOOD BY AUTOMATED COUNT: 17.1 %
GLUCOSE BLD-MCNC: 103 MG/DL (ref 70–99)
GLUCOSE BLD-MCNC: 112 MG/DL (ref 70–99)
GLUCOSE BLD-MCNC: 143 MG/DL (ref 70–99)
HCT VFR BLD AUTO: 25.3 %
HGB BLD-MCNC: 7.7 G/DL
HGB BLD-MCNC: 7.9 G/DL
HGB BLD-MCNC: 8.3 G/DL
IMM GRANULOCYTES # BLD AUTO: 0.02 X10(3) UL (ref 0–1)
IMM GRANULOCYTES NFR BLD: 0.4 %
LYMPHOCYTES # BLD AUTO: 1.18 X10(3) UL (ref 1–4)
LYMPHOCYTES NFR BLD AUTO: 24.9 %
MCH RBC QN AUTO: 29.4 PG (ref 26–34)
MCHC RBC AUTO-ENTMCNC: 32.8 G/DL (ref 31–37)
MCV RBC AUTO: 89.7 FL
MONOCYTES # BLD AUTO: 0.6 X10(3) UL (ref 0.1–1)
MONOCYTES NFR BLD AUTO: 12.7 %
NEUTROPHILS # BLD AUTO: 2.72 X10 (3) UL (ref 1.5–7.7)
NEUTROPHILS # BLD AUTO: 2.72 X10(3) UL (ref 1.5–7.7)
NEUTROPHILS NFR BLD AUTO: 57.6 %
PLATELET # BLD AUTO: 252 10(3)UL (ref 150–450)
RBC # BLD AUTO: 2.82 X10(6)UL
WBC # BLD AUTO: 4.7 X10(3) UL (ref 4–11)

## 2024-04-19 PROCEDURE — 99239 HOSP IP/OBS DSCHRG MGMT >30: CPT | Performed by: HOSPITALIST

## 2024-04-19 PROCEDURE — 74183 MRI ABD W/O CNTR FLWD CNTR: CPT | Performed by: SPECIALIST

## 2024-04-19 PROCEDURE — 72197 MRI PELVIS W/O & W/DYE: CPT | Performed by: SPECIALIST

## 2024-04-19 RX ORDER — GADOTERATE MEGLUMINE 376.9 MG/ML
20 INJECTION INTRAVENOUS
Status: COMPLETED | OUTPATIENT
Start: 2024-04-19 | End: 2024-04-19

## 2024-04-19 NOTE — PLAN OF CARE
NURSING DISCHARGE NOTE    Discharged Home via Ambulatory.  Accompanied by Support staff  Belongings Taken by patient/family.    Pt received A&Ox4. Afebrile. VSS. C/o pain on bottom - prn PO dilaudid given w/ relief. Pt to MRI for MRI enteroscopy. NPO status maintained. Surg onc consulted per GI. No IP w/u necessary per PA. Tolerating regular diet. Cleared to dc per Dr. Shepherd. Dc instructions given to pt at bedside. Verbalized understanding. F/u info placed for Surg Onc, GI, and heme/onc. PIV removed.

## 2024-04-19 NOTE — DISCHARGE SUMMARY
[place] [facility]OSPITAL[facility]  DISCHARGE SUMMARY     Luciano Hernández Patient Status:  Inpatient    10/23/1950 MRN [id number]   Location [facility] 4NW-A Attending Juan Miguel Shepherd,    Hosp Day # 4 PCP Gavin Bernal MD     Date of Admission: 4/15/2024  Date of Discharge:   2024    Discharge Disposition: Home or Self Care    Discharge Diagnosis:  GI bleed  Acute blood loss anemia  Esophagitis  Metastatic GIST  Hypothyroidism  CAD    History of Present Illness: Luciano Hernández is a 73 year old male with medical history of BPH, GIST, A.fib, DM type II, diet controlled, CAD and hypothyroidism who comes to the ER with complaints of dizziness.  He was seen at his oncologist's office today for follow up of his metastatic gastrointestinal stromal tumor and reported dizziness and dark black stools in his ostomy.  He started to notice that he was having dark stool in his ostomy for the past 4 days and it has been associated with dizziness .  He is on chronic plavix and ASA.  He was brought to the ER from his oncologist's office and he was noted to have blood loss anemia .  He was noted to have acute anemia with a hemoglobin of 7.9 which is down from his baseline of 13.5     Brief Synopsis:     #Acute GIB  #Acute blood loss anemia  -s/p EGD  revealing esophagitis, no active bleeding - daily PPI at AL  -Colonoscopy  without active bleeding  -MRE reviewed   -sp 1 unit PRBCs - hgb stable   -GI following     #Metastatic GIST  -Onc following  -MRE reviewed  -Surg/onc eval noted, no plan for acute surgical intervention      #Hypothyroidism  -Levothyroxine     #CAD  -ASA and Plavix to resume at AL    #Disposition  -Stable for DC home    Lace+ Score: 72  59-90 High Risk  29-58 Medium Risk  0-28   Low Risk       TCM Follow-Up Recommendation:  LACE > 58: High Risk of readmission after discharge from the hospital.      Procedures during hospitalization:   EGD  Colonoscopy    Incidental or significant findings and  recommendations (brief descriptions):  None    Lab/Test results pending at Discharge:   None    Consultants:  GI  Heme/Onc  Surgical oncology    Discharge Medication List:     Discharge Medications        START taking these medications        Instructions Prescription details   folic acid 1 MG Tabs  Commonly known as: Folvite      Take 1 tablet (1 mg total) by mouth daily.   Stop taking on: May 19, 2024  Quantity: 30 tablet  Refills: 0     pantoprazole 40 MG Tbec  Commonly known as: Protonix      Take 1 tablet (40 mg total) by mouth daily.   Stop taking on: May 18, 2024  Quantity: 30 tablet  Refills: 0            CONTINUE taking these medications        Instructions Prescription details   atorvastatin 40 MG Tabs  Commonly known as: Lipitor      Take 1 tablet (40 mg total) by mouth nightly.   Refills: 0     escitalopram 10 MG Tabs  Commonly known as: Lexapro      Take 1 tablet (10 mg total) by mouth daily.   Quantity: 90 tablet  Refills: 1     HYDROmorphone 4 MG Tabs  Commonly known as: Dilaudid      Take 1-2 tablets (4-8 mg total) by mouth every 3 (three) hours as needed (cancer related pain.).   Quantity: 100 tablet  Refills: 0     levothyroxine 100 MCG Tabs  Commonly known as: Synthroid      Take 1 tablet (100 mcg total) by mouth before breakfast. TAKE ON EMPTY STOMACH   Quantity: 30 tablet  Refills: 1               Where to Get Your Medications        These medications were sent to [facility]/pharmacy #[id number] - [place] [id number] [facility] [address], [phone], [phone]  [address] [place] [address] [address], [facility] [address]      Phone: [phone]   folic acid 1 MG Tabs  pantoprazole 40 MG Tbec         [place]    Follow-up appointment:   No follow-up provider specified.  Appointments for Next 30 Days 4/19/2024 - 5/19/2024        Date Arrival Time Visit Type Length Department Provider     5/14/2024  1:00 PM  FOLLOW UP VISIT [0014] 15 min [facility], [address] [place] [address],  Gavin Isabel MD    Patient Instructions:         Location Instructions:     Masks are optional for all patients and visitors, unless otherwise indicated.               2024  1:45 PM  APN NEW PATIENT NO PUMP/CGM [6167] 45 min [facility], [address] [place] [address] Shawnee OteroHUMPHREY [place]    Patient Instructions:         Location Instructions:     Your appointment is scheduled at [address] [place] [id number].  Masks are optional for all patients and visitors, unless otherwise indicated.                    -----------------------------------------------------------------------------------------------  PATIENT DISCHARGE INSTRUCTIONS: See electronic chart    Juan Miguel Shepherd,     Total time spent on discharge plannin minutes     The  [place] Cures Act makes medical notes like these available to patients in the interest of transparency. Please be advised this is a medical document. Medical documents are intended to carry relevant information, facts as evident, and the clinical opinion of the practitioner. The medical note is intended as peer to peer communication and may appear blunt or direct. It is written in medical language and may contain abbreviations or verbiage that are unfamiliar.

## 2024-04-19 NOTE — DIETARY NOTE
[facility]   part of [facility]   CLINICAL NUTRITION    Luciano Hernández     Admitting diagnosis:  Acute GI bleeding [K92.2]    Ht: 185.4 cm (6' 1\")  Wt: 96.2 kg (212 lb).   Body mass index is 27.97 kg/m².  IBW: 83.6kg    Wt Readings from Last 6 Encounters:   04/15/24 96.2 kg (212 lb)   04/15/24 96.2 kg (212 lb)   03/20/24 95.3 kg (210 lb)   02/22/24 97.5 kg (215 lb)   02/15/24 99.2 kg (218 lb 12.8 oz)   02/08/24 98.9 kg (218 lb)        Labs/Meds reviewed    Diet:       Procedures    NPO     Percent Meals Eaten (last 3 days)       Date/Time Percent Meals Eaten (%)    04/16/24 0900 0 %    04/16/24 1300 100 %    04/16/24 1800 100 %          73 year old male admitted with acute gi bleed.   PMH: DM, Ckd, HTN, CVA with right sided deficit    Pt chart reviewed d/t at NPO times 4 days. Pt at procedure at time of RD visit.    No wt loss noted and good appetite/intake.   Advance diet to least restrictive diet as medically able in next 24 to 48 hours   Patient reports good appetite at this time.  Nursing notes reports Percent Meals Eaten (%): 100 % intake for last meal.  Tolerating po diet without diarrhea, emesis, or constipation.   No significant weight changes noted.     Patient is at low nutrition risk at this time.    Please consult if patient status changes or nutrition issues arise.    Christal Gandhi,RD,LDN  Clinical Dietitian  [id number]

## 2024-04-19 NOTE — PLAN OF CARE
Pt alert and oriented x4. VSS on RA. PO Dilaudid given once for pain. Medications given per MAR. NPO at midnight. Safety precautions in place. Call light within reach.     Problem: GASTROINTESTINAL - ADULT  Goal: Maintains or returns to baseline bowel function  Description: INTERVENTIONS:  - Assess bowel function  - Maintain adequate hydration with IV or PO as ordered and tolerated  - Evaluate effectiveness of GI medications  - Encourage mobilization and activity  - Obtain nutritional consult as needed  - Establish a toileting routine/schedule  - Consider collaborating with pharmacy to review patient's medication profile  Outcome: Progressing

## 2024-04-19 NOTE — PROGRESS NOTES
[facility]   part of [facility]     Hospitalist Progress Note     Luciano Hernández Patient Status:  Inpatient    10/23/1950 MRN [id number]   Location [facility] 4NW-A Attending Jocelin May MD   Hosp Day # 4 PCP Gavin Bernal MD     Chief Complaint: GIB    Subjective:     Patient seen and examined. No acute complaints. Wants to eat.     Objective:    Review of Systems:   A comprehensive review of systems was completed; pertinent positive and negatives stated in subjective.    Vital signs:  Temp:  [97.3 °F (36.3 °C)-98.4 °F (36.9 °C)] 98.2 °F (36.8 °C)  Pulse:  [57-71] 66  Resp:  [16-20] 16  BP: ()/(55-81) 122/81  SpO2:  [96 %-100 %] 100 %    Physical Exam:    General: No acute distress  Respiratory: No wheezes, no rhonchi  Cardiovascular: S1, S2, regular rate and rhythm  Abdomen: Soft, Non-tender, non-distended, positive bowel sounds. + urostomy.   Neuro: No new focal deficits.   Extremities: No edema      Diagnostic Data:    Labs:  Recent Labs   Lab 04/15/24  1020 04/15/24  1607 24  0635 24  1600 24  0559 24  1605 24  0021 24  0752 24  1646 24  2346 24  0726   WBC 7.9  --  5.8  --  4.8  --   --  4.5  --   --  4.7   HGB 7.7*   < > 7.8*  7.8*   < > 7.9*   < > 7.5* 8.1*  8.1* 8.3* 7.7* 8.3*   MCV 91.4  --  86.7  --  87.0  --   --  88.7  --   --  89.7   .0  --  214.0  --  223.0  --   --  238.0  --   --  252.0    < > = values in this interval not displayed.       Recent Labs   Lab 04/15/24  0911 04/15/24  1020 24  0635 24  1919 24  0559 24  1605 24  0752   * 167* 137*  --  134*  --  140*   BUN 20 20 12  --  7*  --  8*   CREATSERUM 1.14 1.09 0.82  --  0.79  --  0.77   CA 8.1* 8.6 8.0*  --  8.7  --  8.4*   ALB 2.9* 2.9*  --   --   --   --   --     139 143  --  144  --  140   K 3.9 3.6 3.4*   < > 3.6 4.3 4.2    110 115*  --  116*  --  112   CO2 24.0 23.0 23.0  --  21.0  --  25.0    ALKPHO 87 87  --   --   --   --   --    AST 29 25  --   --   --   --   --    ALT 20 19  --   --   --   --   --    BILT 0.4 0.3  --   --   --   --   --    TP 5.9* 5.9*  --   --   --   --   --     < > = values in this interval not displayed.       Estimated Creatinine Clearance: 96.6 mL/min (based on SCr of 0.77 mg/dL).    No results for input(s): \"TROP\", \"TROPHS\", \"CK\" in the last 168 hours.    No results for input(s): \"PTP\", \"INR\" in the last 168 hours.               Microbiology    No results found for this visit on 04/15/24.      Imaging: Reviewed in Epic.    Medications:    folic acid  1 mg Oral Daily    pantoprazole  40 mg Intravenous Q12H    atorvastatin  40 mg Oral Nightly    escitalopram  10 mg Oral Daily    levothyroxine  100 mcg Oral Before breakfast    insulin aspart  1-10 Units Subcutaneous TID AC and HS       Assessment & Plan:      #Acute GIB  #Acute blood loss anemia  -s/p EGD 4/16 revealing esophagitis, no active bleeding  -Colonoscopy 4/18 without active bleeding  -MRE reviewed   -sp 1 unit PRBCs - hgb stable   -GI following    # Metastatic GIST  -Onc following  -MRE reviewed  -Pending surg/onc eval     # Hypothyroidism  -Levothyroxine    # CAD  -ASA and Plavix on hold pending completion of GI w/u    Juan Miguel Shepherd DO    Supplementary Documentation:     Quality:  DVT Mechanical Prophylaxis:   SCDs,    DVT Pharmacologic Prophylaxis   Medication   None                Code Status: DNAR/Selective Treatment  Quigley: No urinary catheter in place  Quigley Duration (in days):   Central line:    TUAN:     Discharge is dependent on: progress  At this point Mr. Hernández is expected to be discharge to: home    The 21st Century Cures Act makes medical notes like these available to patients in the interest of transparency. Please be advised this is a medical document. Medical documents are intended to carry relevant information, facts as evident, and the clinical opinion of the practitioner. The medical note is  intended as peer to peer communication and may appear blunt or direct. It is written in medical language and may contain abbreviations or verbiage that are unfamiliar.

## 2024-04-19 NOTE — CONSULTS
[facility]    Patient Name:  Luciano Hernández   YOB: 1950   Gender:  Male   Appt Date:  4/19/2024   Provider:  Dr. David Daniels   Insurance:  MEDICARE PART A&B     PATIENT PROVIDERS  Referring Provider: No ref. provider found   Address: No referring provider defined for this encounter.   Phone #: N/A    Primary Care Provider:Gavin Bernal MD   Address: [unfilled]   Phone #: [phone]       CHIEF COMPLAINT  Chief Complaint   Patient presents with    GI Bleeding        PROBLEMS  Reviewed   Patient Active Problem List   Diagnosis    GIST (gastrointestinal stroma tumor), malignant, colon (HCC)    Mixed hyperlipidemia    Benign prostatic hyperplasia with urinary retention    Attention to urostomy (HCC)    Diet-controlled diabetes mellitus (HCC)    Parastomal hernia with obstruction and without gangrene    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    Constipation due to opioid therapy    Peripheral vascular disease (HCC)    Hepatitis C antibody test negative    Enlarged thoracic aorta (HCC)    Enlargement of aortic root (HCC)    Essential hypertension, benign    Atherosclerosis of native arteries of extremities with rest pain, left leg (HCC)    Major depressive disorder, recurrent, unspecified (HCC)    Atrial fibrillation (HCC)    Third degree AV block (HCC)    Thrombus of left atrial appendage    History of urostomy    Sedative, hypnotic or anxiolytic dependence, uncomplicated (HCC)    Chronic cholecystitis    Pelvic mass    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 Daniels 1/30/2024 c/b delayed wound healing. He is admitted for new lower GI bleeding  undergoing workup. We are consulted to render opinion on enlarging right posterior pelvic mass noted on MRI enterography today.    Patient presented to Dr. Tapia's clinic on Monday to resume systemic therapy for his GIST now that his surgical wound is completely healed. However, ostomy bag noted to have black output and Hb 7.9 from 13.5 1 month prior, so sent to the ED for evaluation and admitted for workup. Thus far, patient has undergone EGD, colonoscopy and MRI enterography without active bleeding noted. Hb now stable, hemodynamically stable and patient being supported with IV iron. MRI enterography 4/19 notes soft tissue density of the right posterior pelvis along the presacral space and right pelvic sidewall, increased since 11/2023 that is suspicious for recurrent disease. In this area, there is intrinsic hyperintensity indicating hemorrhagic or proteinaceous content. Of note, these masses are separate than the perineal mass for which resection attempted 1/2024.     Vital Signs:  BP 99/55 (BP Location: Right arm)   Pulse 51   Temp 98.2 °F (36.8 °C) (Oral)   Resp 16   Ht 1.854 m (6' 1\")   Wt 96.2 kg (212 lb)   SpO2 100%   BMI 27.97 kg/m²      Medications Reviewed:    Current Outpatient Medications:     folic acid 1 MG Oral Tab, Take 1 tablet (1 mg total) by mouth daily., Disp: 30 tablet, Rfl: 0    pantoprazole 40 MG Oral Tab EC, Take 1 tablet (40 mg total) by mouth daily., Disp: 30 tablet, Rfl: 0     Allergies Reviewed:  Allergies   Allergen Reactions    Radiology Contrast Iodinated Dyes HIVES, RESPIRATORY FAILURE and OTHER (SEE COMMENTS)     Originally with \"Barium enema for lower GI\" 50 years ago. He states he developed hives and went into respiratory arrest. Pt has since had CT iodinated contrast with 13 hr pre-meds and no break-through reactions, HUMBERTO Mera, Radiology RN.         History:  Reviewed:  Past Medical History:    Anxiety state    Arrhythmia    atrial fibrillation intermittently     Back problem    back surgery 20 years ago    BPH (benign prostatic hyperplasia)    Cancer (HCC)    GIST    Carcinoma of gastrointestinal tract (HCC)    Coronary atherosclerosis    Depression    Diabetes (HCC)    per pcp not 11/28/22 diet controlled dm- pt denies any hx of dm    Disorder of thyroid    Esophageal reflux    Hearing impairment    Does not wear hearing aids    Heart attack (HCC)    High blood pressure    High cholesterol    Hx of motion sickness    > 30 yrs ago while on a boat    Hypothyroidism    Other and unspecified hyperlipidemia    Peripheral vascular disease (HCC)    Thyroid disease    Visual impairment    glasses      Reviewed:  Past Surgical History:   Procedure Laterality Date    Back surgery  1998    Cath drug eluting stent  09/09/2022    LAD    Colonoscopy  2006    Colonoscopy N/A 4/18/2024    Procedure: COLONOSCOPY;  Surgeon: Quentin Llamas MD;  Location:  ENDOSCOPY    Colostomy  10/03/2017    Cystoscopy,insert ureteral stent      Hernia surgery Left 2006    Hernia surgery Left 1975    Hernia surgery Left 12/26/2019    Laparoscopy, surgical prostatectomy, retropubic radical, w/nerve sparing      Other surgical history  12/03/2015    fracture radiius and ulna  Right arm -     Other surgical history  10/03/2017    Pelvic exenteration to include 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 Resection subcutaneous fat with wire localization.    Part removal colon w end colostomy        Reviewed Social History:  Social History     Socioeconomic History    Marital status:     Number of children: 3   Occupational History    Occupation: RETIRED TEACHER   Tobacco Use    Smoking status: Former     Current packs/day: 0.00     Average  packs/day: 1 pack/day for 45.0 years (45.0 ttl pk-yrs)     Types: Cigarettes     Start date: 1971     Quit date: 2016     Years since quittin.5    Smokeless tobacco: Never   Vaping Use    Vaping status: Never Used   Substance and Sexual Activity    Alcohol use: Not Currently    Drug use: No    Sexual activity: Not Currently   Other Topics Concern    Caffeine Concern Yes     Comment: coffee three times a day     Exercise No    Seat Belt No    Special Diet No    Stress Concern No    Weight Concern Yes      Reviewed:  Family History   Problem Relation Age of Onset    Alcohol and Other Disorders Associated Father     Cancer Mother         Breast    Other (Other) Sister         parkinsons    Heart Disorder Brother         Review of Systems:  Review of Systems   Constitutional:  Negative for appetite change, fatigue and unexpected weight change.   HENT: Negative.     Eyes: Negative.    Respiratory:  Negative for shortness of breath.    Cardiovascular:  Negative for chest pain.   Gastrointestinal:  Positive for blood in stool. Negative for abdominal distention, abdominal pain, constipation, diarrhea, nausea and vomiting.   Endocrine: Negative.    Genitourinary: Negative.    Musculoskeletal:  Negative for back pain.   Skin:  Negative for rash and wound.   Neurological:  Negative for weakness.   Hematological:  Negative for adenopathy.   Psychiatric/Behavioral: Negative.          Physical Examination:  Physical Exam  Constitutional:       General: He is not in acute distress.     Appearance: He is well-developed.   HENT:      Head: Normocephalic and atraumatic.   Eyes:      General: No scleral icterus.  Pulmonary:      Effort: Pulmonary effort is normal. No respiratory distress.   Abdominal:      General: There is no distension.      Palpations: Abdomen is soft. There is no mass.      Tenderness: There is no abdominal tenderness. There is no guarding or rebound.   Musculoskeletal:         General: Normal range  of motion.      Cervical back: Normal range of motion and neck supple.   Skin:     General: Skin is warm and dry.          Neurological:      Mental Status: He is alert and oriented to person, place, and time.        CBC  Recent Labs   Lab 04/17/24  0559 04/17/24  1605 04/18/24  0752 04/18/24  1646 04/18/24  2346 04/19/24  0726 04/19/24  1617   RBC 2.70*  --  2.82*  --   --  2.82*  --    HGB 7.9*   < > 8.1*  8.1*   < > 7.7* 8.3* 7.9*   HCT 23.5*  --  25.0*  --   --  25.3*  --    MCV 87.0  --  88.7  --   --  89.7  --    MCH 29.3  --  28.7  --   --  29.4  --    MCHC 33.6  --  32.4  --   --  32.8  --    RDW 17.2  --  16.9  --   --  17.1  --    NEPRELIM 3.10  --  2.76  --   --  2.72  --    WBC 4.8  --  4.5  --   --  4.7  --    .0  --  238.0  --   --  252.0  --     < > = values in this interval not displayed.       Basic Metabolic Panel  Recent Labs   Lab 04/16/24  0635 04/16/24  1919 04/17/24  0559 04/17/24  1605 04/18/24  0752   *  --  134*  --  140*   BUN 12  --  7*  --  8*   CREATSERUM 0.82  --  0.79  --  0.77   CA 8.0*  --  8.7  --  8.4*     --  144  --  140   K 3.4*   < > 3.6 4.3 4.2   *  --  116*  --  112   CO2 23.0  --  21.0  --  25.0    < > = values in this interval not displayed.       Hepatic Function Tests  Recent Labs   Lab 04/15/24  0911 04/15/24  1020   ALT 20 19   AST 29 25   ALKPHO 87 87   BILT 0.4 0.3   TP 5.9* 5.9*   ALB 2.9* 2.9*   GLOBULIN 3.0 3.0       Document Review:  MRI Enteropgraphy - 4/19/2024  FINDINGS:    LIVER:  Normal signal and morphology.  BILIARY:  Moderate volume of gallbladder sludge.  Normal caliber of the bile ducts.  PANCREAS:  No lesion, fluid collection, ductal dilatation, or atrophy.    SPLEEN:  No enlargement or focal lesion.    KIDNEYS:  Renal cortical scarring of the left superior pole.  Few bilateral renal cortical cysts, dominant cyst of the right lower pole measuring 2.2 cm.  No obstructive uropathy.  ADRENALS:  Stable right adrenal adenoma.   Normal left adrenal gland.  AORTA/VASCULAR:  No aneurysm or dissection.    RETROPERITONEUM:  No mass or adenopathy.    BOWEL/MESENTERY:  Abdominal perineal resection with left mid abdominal colostomy.  Stable parastomal hernia containing a short segment of small bowel.  No evidence of bowel incarceration.  Diverting ileostomy of the right mid abdomen.  No evidence of  bowel inflammation or obstruction.  No evidence of active GI bleed.    ABDOMINAL WALL:  No mass or hernia.  Focus of intrinsic T1 hyperintensity located within parastomal hernia of the left mid abdomen indicating hemorrhagic or proteinaceous content, though this is unchanged compared to CT of 11/27/2023.  PELVIC NODES:  Confluent, enhancing soft tissue density of the right posterior pelvis along the presacral space measures approximately 7.5 x 2.8 cm in cross-section, previously 6.0 x 1.5 cm on CT of 11/27/2023.  This is in addition to enhancing soft  tissue of the right pelvic sidewall measuring 3.9 x 1.8 cm (series 100, image 168), previously 3.3 x 1.8 cm.  These findings are suspicious for residual/recurrent malignancy.  There are areas of intrinsic T1 hyperintensity along the periphery of the  elongated mass which may represent hemorrhagic or proteinaceous content (series 100, image 185).    PELVIC ORGANS:  Cysto prostatectomy with ileal conduit urinary diversion.  BONES:  No aggressive osseous lesions.    LUNG BASES:  Lung bases not well assessed with MRI.    OTHER:  Negative.            Impression   CONCLUSION:       1. Confluent, enhancing soft tissue density of the right posterior pelvis along the presacral space and right pelvic sidewall, increased in size compared to 11/27/2023, suspicious for residual/recurrent neoplastic disease.     2. Regarding confluent soft tissue density of the right posterior pelvis, there are areas of intrinsic T1 hyperintensity along its periphery indicating hemorrhagic or proteinaceous content.       3. No focal  areas of contrast extravasation to indicate active GI bleed.        Assessment / Plan:  Metastatic GIST  Posterior right pelvic mass, enlarging, with possible hemorrhagic component  GI bleed    Imaging reviewed with Dr. Daniels. Pelvic masses not likely to be source of bleeding.  No acute surgical intervention recommended  Okay for discharge from surgical standpoint  Plan for patient to resume systemic therapy with Dr. Tapia now that wound is well healed.    Discussed with Dr. Frances Tejada PA-C  Department of Surgical Oncology  [facility]  [address] [place], [place]  [id number] [place] [address]  [address] [place] [address]  T: [phone]  F: [phone]    '

## 2024-04-19 NOTE — PROGRESS NOTES
[facility] Progress Note    CC: GIB, anemia   S: No dark stool at this time   O: /58 (BP Location: Left arm)   Pulse 71   Temp 98.4 °F (36.9 °C) (Oral)   Resp 18   Ht 6' 1\" (1.854 m)   Wt 212 lb (96.2 kg)   SpO2 99%   BMI 27.97 kg/m²     Gen: NAD  Abd: (+)BS, soft, non-tender, non-distended; no rebound or guarding; ostomy in place     Laboratory/Imaging Data:       Recent Labs   Lab 04/18/24  0550 04/18/24  1306 04/18/24  1701 04/18/24  2050 04/19/24  0621   PGLU 144* 142* 130* 106* 112*     No results for input(s): \"INR\" in the last 168 hours.      Recent Labs   Lab 04/15/24  1020 04/15/24  1607 04/16/24  0635 04/16/24  1600 04/17/24  0559 04/17/24  1605 04/18/24  0021 04/18/24  0752 04/18/24  1646 04/18/24  2346 04/19/24  0726   WBC 7.9  --  5.8  --  4.8  --   --  4.5  --   --  4.7   HGB 7.7*   < > 7.8*  7.8*   < > 7.9*   < > 7.5* 8.1*  8.1* 8.3* 7.7* 8.3*   .0  --  214.0  --  223.0  --   --  238.0  --   --  252.0    < > = values in this interval not displayed.       Recent Labs   Lab 04/15/24  0911 04/15/24  1020 04/16/24  0635 04/16/24  1919 04/17/24  0559 04/17/24  1605 04/18/24  0752    139 143  --  144  --  140   K 3.9 3.6 3.4* 3.9 3.6 4.3 4.2    110 115*  --  116*  --  112   CO2 24.0 23.0 23.0  --  21.0  --  25.0   BUN 20 20 12  --  7*  --  8*   CREATSERUM 1.14 1.09 0.82  --  0.79  --  0.77       Recent Labs   Lab 04/15/24  0911 04/15/24  1020   ALT 20 19   AST 29 25       Assessment/Plan:   73-year-old male with a past medical history of CAD on Plavix, metastatic GIST status post abdominal peritoneal resection and end colostomy presenting for dark stool and anemia.  EGD 4/16 with nodular esophagitis, gastric polyp. Stable hgb at this time. Flex 4/16 with poor prep; 4/17 unable to prep 4/18 colonoscopy with no acute bleeding. Hgb stable at this time.   - will look to obtain MRE  - serial hgb; transfuse to a goal hgb > 7  - heme onc following    Quentin  MD Farhad, 04/19/24, 10:15 AM  [facility]

## 2024-04-22 ENCOUNTER — PATIENT OUTREACH (OUTPATIENT)
Dept: CASE MANAGEMENT | Age: 74
End: 2024-04-22

## 2024-04-22 ENCOUNTER — TELEPHONE (OUTPATIENT)
Dept: INTERNAL MEDICINE CLINIC | Facility: CLINIC | Age: 74
End: 2024-04-22

## 2024-04-22 DIAGNOSIS — Z02.9 ENCOUNTERS FOR UNSPECIFIED ADMINISTRATIVE PURPOSE: Primary | ICD-10-CM

## 2024-04-22 DIAGNOSIS — K92.2 ACUTE GI BLEEDING: ICD-10-CM

## 2024-04-22 PROCEDURE — 1111F DSCHRG MED/CURRENT MED MERGE: CPT

## 2024-04-22 NOTE — TELEPHONE ENCOUNTER
Routed to [facility] to contact pt to schedule for HFU  See below message

## 2024-04-22 NOTE — PROGRESS NOTES
Initial Post Discharge Follow Up   Discharge Date: 4/19/24  Contact Date: 4/22/2024    Consent Verification:  Assessment Completed With: Patient  HIPAA Verified?  Yes    Discharge Dx:   GI bleed  Acute blood loss anemia  Esophagitis  Metastatic GIST  Hypothyroidism  CAD    General:   How have you been since your discharge from the hospital? NCM spoke with patient states is feeling okay. Pt states has not had a BM yet. Pt reports feeling a little warm and chills, he denies any fevers, nausea, vomiting, shortness of breath, chest pain or any other symptoms. He denies feeling dizzy or lightheaded.   Do you have any pain since discharge?  No    How well was your pain managed while in the hospital?   On a scale of 1-5   1- Very Poor and 5- Very well   5  When you were leaving the hospital were your discharge instructions reviewed with you? Yes  How well were your discharge instructions explained to you?   On a scale of 1-5   1- Very Poor and 5- Very well   5  Do you have any questions about your discharge instructions?  No  Before leaving the hospital was your diagnoses explained to you? Yes  Do you have any questions about your diagnoses? No  Are you able to perform normal daily activities of living as you have prior to your hospital stay (dressing, bathing, ambulating to the bathroom, etc)? yes  (NCM) Was patient given a different diet per AVS? no      Medications: Reviewed medication list.  Medications are up to date.  Current Outpatient Medications   Medication Sig Dispense Refill    folic acid 1 MG Oral Tab Take 1 tablet (1 mg total) by mouth daily. 30 tablet 0    pantoprazole 40 MG Oral Tab EC Take 1 tablet (40 mg total) by mouth daily. 30 tablet 0    HYDROmorphone 4 MG Oral Tab Take 1-2 tablets (4-8 mg total) by mouth every 3 (three) hours as needed (cancer related pain.). 100 tablet 0    levothyroxine 100 MCG Oral Tab Take 1 tablet (100 mcg total) by mouth before breakfast. TAKE ON EMPTY STOMACH 30 tablet 1     escitalopram 10 MG Oral Tab Take 1 tablet (10 mg total) by mouth daily. 90 tablet 1    atorvastatin 40 MG Oral Tab Take 1 tablet (40 mg total) by mouth nightly.       Were there any changes to your current medication(s) noted on the AVS? Yes  If so, were these medication changes discussed with you prior to leaving the hospital? Yes  If a new medication was prescribed:    Was the new medication's purpose & side effects reviewed? Yes  Do you have any questions about your new medication? No  Did you  your discharge medications when you left the hospital? Yes  Let's go over your medications together to make sure we are not missing anything. Medications Reviewed  Are there any reasons that keep you from taking your medication as prescribed? No  Are you having any concerns with constipation? No      Discharge medications reviewed/discussed/and reconciled against outpatient medications with patient.  Any changes or updates to medications sent to PCP.  Patient Acknowledged     Referrals/orders at D/C:  Referrals/orders placed at D/C? no    DME ordered at D/C? No      Discharge orders, AVS reviewed and discussed with patient. Any changes or updates to orders sent to PCP.  Patient Acknowledged      SDOH:   Transportation Needs: No Transportation Needs (4/15/2024)    Transportation Needs     Lack of Transportation: No     Financial Resource Strain: Low Risk  (4/22/2024)    Financial Resource Strain     Difficulty of Paying Living Expenses: Not hard at all     Med Affordability: Not on file           Follow up appointments:      Your appointments       Date & Time Appointment Department (Center)    Apr 24, 2024 10:45 AM CDT HEMATOLOGY ONCOLOGY FOLLOW UP with Zaire Tapia MD [facility] in [place] ([facility])        May 14, 2024 1:00 PM CDT Follow Up Visit with Gavin Bernal MD [facility], [address] [place] [address] ([facility] [address])         May 17, 2024 1:45 PM CDT APN New Patient Visit with Isabella Otero APRN [facility], [address] [place] [address] ([facility] [place])              [facility] in [facility]  [address]  [facility] [address]  [phone] [facility], [address] [place] [address]  [facility] [place]  [address]  [facility] [id number] [phone] [facility], [address] [facility] [address]  [address]  [facility] [id number] [phone]            TCC  Was TCC ordered: No      PCP (If no TCC appointment)  Does patient already have a PCP appointment scheduled? No  NCM Attempted to schedule PCP office TCM appointment with patient   If no appointment scheduled: Explain: pt declined will keep scheduled appt on 5/14/24    Specialist    Does the patient have any other follow up appointment(s) needing to be scheduled? No      Is there any reason as to why you cannot make your appointment(s)?  No     Needs post D/C:   Now that you are home, are there any needs or concerns you need addressed before your next visit with your PCP?  (DME, meds, questions, etc.): No    Interventions by NCM:   All discharge instructions reviewed with the patient. Reviewed when to call MD vs when to call 911 or go the ED. Educated patient on the importance of taking all meds as prescribed as well as close f/u with PCP/specialists. Pt verbalized understanding and will contact the office with any further questions or concerns. Patient denies fevers, chills, nausea, vomiting, shortness of breath, chest pain, or any other symptoms at this time.  NCM attempted to schedule HFU, patient declined will call PCP/TCC office directly. NCM sent TE to PCP office re: assistance in scheduling HFU appt. NCM provided contact information for any further questions/concerns. Patient verbalized understanding and  agreeable.       Overall Rating:   How would you rate the care you received while in the hospital? excellent    CCM referral placed:    No    BOOK BY DATE: 5/3/24

## 2024-04-22 NOTE — TELEPHONE ENCOUNTER
Spoke to pt for TCM today.  Pt does not have an appointment scheduled at this time.  TCM appt recommended by 04/26/24 as pt is a high risk for readmission.  Please advise.    BOOK BY DATE (last date for TCM): 05/03/24    Clinical staff:  Please f/u with pt and try to get them to schedule as pt would greatly benefit from TCM appt.  Thank you!

## 2024-04-24 ENCOUNTER — OFFICE VISIT (OUTPATIENT)
Dept: HEMATOLOGY/ONCOLOGY | Age: 74
End: 2024-04-24
Attending: SPECIALIST
Payer: MEDICARE

## 2024-04-24 ENCOUNTER — APPOINTMENT (OUTPATIENT)
Dept: HEMATOLOGY/ONCOLOGY | Age: 74
End: 2024-04-24
Attending: SPECIALIST
Payer: MEDICARE

## 2024-04-24 VITALS
WEIGHT: 202 LBS | RESPIRATION RATE: 18 BRPM | HEART RATE: 82 BPM | OXYGEN SATURATION: 100 % | BODY MASS INDEX: 27.36 KG/M2 | SYSTOLIC BLOOD PRESSURE: 107 MMHG | TEMPERATURE: 97 F | DIASTOLIC BLOOD PRESSURE: 66 MMHG | HEIGHT: 72.01 IN

## 2024-04-24 DIAGNOSIS — E03.4 HYPOTHYROIDISM DUE TO ACQUIRED ATROPHY OF THYROID: Primary | ICD-10-CM

## 2024-04-24 DIAGNOSIS — C49.A4 GIST (GASTROINTESTINAL STROMA TUMOR), MALIGNANT, COLON (HCC): ICD-10-CM

## 2024-04-24 DIAGNOSIS — D50.0 IRON DEFICIENCY ANEMIA DUE TO CHRONIC BLOOD LOSS: ICD-10-CM

## 2024-04-24 LAB
ALBUMIN SERPL-MCNC: 3.3 G/DL (ref 3.4–5)
ALBUMIN/GLOB SERPL: 0.9 {RATIO} (ref 1–2)
ALP LIVER SERPL-CCNC: 103 U/L
ALT SERPL-CCNC: 23 U/L
ANION GAP SERPL CALC-SCNC: 7 MMOL/L (ref 0–18)
AST SERPL-CCNC: 21 U/L (ref 15–37)
BASOPHILS # BLD AUTO: 0.03 X10(3) UL (ref 0–0.2)
BASOPHILS NFR BLD AUTO: 0.5 %
BILIRUB SERPL-MCNC: 0.5 MG/DL (ref 0.1–2)
BUN BLD-MCNC: 14 MG/DL (ref 9–23)
CALCIUM BLD-MCNC: 9.1 MG/DL (ref 8.5–10.1)
CHLORIDE SERPL-SCNC: 112 MMOL/L (ref 98–112)
CO2 SERPL-SCNC: 24 MMOL/L (ref 21–32)
CREAT BLD-MCNC: 1.07 MG/DL
DEPRECATED HBV CORE AB SER IA-ACNC: 175.9 NG/ML
EGFRCR SERPLBLD CKD-EPI 2021: 73 ML/MIN/1.73M2 (ref 60–?)
EOSINOPHIL # BLD AUTO: 0.23 X10(3) UL (ref 0–0.7)
EOSINOPHIL NFR BLD AUTO: 3.7 %
ERYTHROCYTE [DISTWIDTH] IN BLOOD BY AUTOMATED COUNT: 18.2 %
FASTING STATUS PATIENT QL REPORTED: NO
GLOBULIN PLAS-MCNC: 3.6 G/DL (ref 2.8–4.4)
GLUCOSE BLD-MCNC: 186 MG/DL (ref 70–99)
HCT VFR BLD AUTO: 32.6 %
HGB BLD-MCNC: 10.6 G/DL
HGB RETIC QN AUTO: 31.8 PG (ref 28.2–36.6)
IMM GRANULOCYTES # BLD AUTO: 0.03 X10(3) UL (ref 0–1)
IMM GRANULOCYTES NFR BLD: 0.5 %
IMM RETICS NFR: 0.25 RATIO (ref 0.1–0.3)
IRON SATN MFR SERPL: 14 %
IRON SERPL-MCNC: 43 UG/DL
LYMPHOCYTES # BLD AUTO: 1.41 X10(3) UL (ref 1–4)
LYMPHOCYTES NFR BLD AUTO: 22.9 %
MCH RBC QN AUTO: 29.7 PG (ref 26–34)
MCHC RBC AUTO-ENTMCNC: 32.5 G/DL (ref 31–37)
MCV RBC AUTO: 91.3 FL
MONOCYTES # BLD AUTO: 0.45 X10(3) UL (ref 0.1–1)
MONOCYTES NFR BLD AUTO: 7.3 %
NEUTROPHILS # BLD AUTO: 4.02 X10 (3) UL (ref 1.5–7.7)
NEUTROPHILS # BLD AUTO: 4.02 X10(3) UL (ref 1.5–7.7)
NEUTROPHILS NFR BLD AUTO: 65.1 %
OSMOLALITY SERPL CALC.SUM OF ELEC: 301 MOSM/KG (ref 275–295)
PLATELET # BLD AUTO: 342 10(3)UL (ref 150–450)
POTASSIUM SERPL-SCNC: 4 MMOL/L (ref 3.5–5.1)
PROT SERPL-MCNC: 6.9 G/DL (ref 6.4–8.2)
RBC # BLD AUTO: 3.57 X10(6)UL
RETICS # AUTO: 161 X10(3) UL (ref 22.5–147.5)
RETICS/RBC NFR AUTO: 4.5 %
SODIUM SERPL-SCNC: 143 MMOL/L (ref 136–145)
T4 FREE SERPL-MCNC: 1.2 NG/DL (ref 0.8–1.7)
TIBC SERPL-MCNC: 310 UG/DL (ref 240–450)
TRANSFERRIN SERPL-MCNC: 208 MG/DL (ref 200–360)
TSI SER-ACNC: 16.6 MIU/ML (ref 0.36–3.74)
WBC # BLD AUTO: 6.2 X10(3) UL (ref 4–11)

## 2024-04-24 PROCEDURE — G2211 COMPLEX E/M VISIT ADD ON: HCPCS | Performed by: SPECIALIST

## 2024-04-24 PROCEDURE — 99214 OFFICE O/P EST MOD 30 MIN: CPT | Performed by: SPECIALIST

## 2024-04-24 RX ORDER — RIPRETINIB 50 MG/1
150 TABLET ORAL DAILY
COMMUNITY

## 2024-04-24 NOTE — PROGRESS NOTES
[facility] Progress Note      Patient Name: Chepe Hernández   YOB: 1950  Medical Record Number: [id number]    The 21st Century Cures Act makes medical notes like these available to patients in the interest of transparency. Please be advised this is a medical document. Medical documents are intended to carry relevant information, facts as evident, and the clinical opinion of the practitioner. The medical note is intended as peer to peer communication and may appear blunt or direct. It is written in medical language and may contain abbreviations or verbiage that are unfamiliar.     Date of Visit: 4/24/2024       Chief Complaint  Metastatic gastrointestinal stromal tumor - follow up.    Oncologic History  Luciano Hernández is a 73 year old male who originally presented in 2012 with left lower quadrant abdominal pain and obstructive uropathy. He was found to have a 16 cm mass abutting the bladder and rectum. 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 He was started on imatinib 400 mg daily with decrease in size of the tumor. While he has no metastatic disease he has been seen by several surgical oncologists and told that complete surgical resection would require colostomy and urostomy.     Routine imaging studies on 03/15/2016 showed increase in size of established tumor without metastatic disease. Increase in tumor size coincided with increased urinary frequency and hesitancy. As a result on 03/25/2016 patient increased imatinib dose to 800 mg daily.      On 06/19/2017 he presented to the emergency room with rectal pain. He reports that he was constipated and pushed to have a bowel movement. After the bowel movement he developed severe pain. He states that he had noticed that for the one month prior, he was having increasing issues with constipation. 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     On 06/20/2017 patient began  therapy with sunitinib 50 mg daily. At the end of 07/2016 he continued sunitinib at a dose of 37.5 mg daily. Follow up imaging studies on 08/31/2017 showed small progression of the tumor. Patient was also experiencing increased pain, difficulty moving his bowels, and symptoms of urinary obstruction.      On 10/03/2017 he underwent pelvic exenteration including en-bloc resection of pelvic GIST with abdominoperineal resection, total cystectomy, prostatectomy, end colostomy, and urostomy. Pathology showed a 10.2 cm GIST with 80% necrosis; necrotic tumor was present in prostatic tissue and wall of rectum; 10/10 lymph nodes were negative for metastasis; tumor showed 16 mitoses per 50 hpf; surgical margins were negative.     Patient presented to ED on 12/02/2019 with rectal pain. CT abdomen/pelvis with contrast on that day showed multiple new pelvic nodules that were not present in 07/2019. On 12/06/2019 patient underwent biopsy of a left lower quadrant soft tissue mass. Pathology confirmed gastrointestinal stromal tumor. 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     On 12/19/2019 patient started regorafenib. He states that within hours of taking the first dose, his pain resolved.     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 He was taken to the OR emergently for lysis of adhesions, repair of the parastomal hernia with mesh, and excision of the left lower quadrant tumor. Pathology from the tumor showed GIST. Regorafenib was discontinued to allow for post surgical healing.     On 01/13/2020 patient restarted regorafenib. With therapy his pain resolved but he repeatedly discontinued therapy. Once it was due to stomal herniation requiring surgery. A second time it was due to dehydration. A third time it was due to severe pelvic pain.     Since 01/31/2020 he has consistently remained on regorafenib.      In 09/2022 he was diagnosed with myocardial infarction and had coronary artery stents placed.     On 10/02/2022 patient was admitted with atrial fibrillation which was managed with medications. He was found to a left atrial appendage thrombus and started on DOAC.    CT imaging on 11/28/2022 showed progressive disease.     In 12/2022 he began therapy with ripretinib. CT imaging on 02/17/2023 showed partial response.     On 01/30/2024 he underwent attempted resection of the growing perineal mass; however, the mass was not found at the time of resection. Because of a persistent open would, ripretinib could not be immediately restarted.     At the end of 03/2024, we decided to start imatinib with the hopes it would slow progression of his disease while we wait for his surgical would to close.     On 04/15/2024 patient presented with complaints of fatigue, tachycardia with exertion, lightheadedness, hypotension, and melena. Hemoglobin was 7.9 g/dl whereas it was normal on 03/20/2024. Patient was subsequently admitted to the hospital. On 04/16/2024 he underwent EGD which showed nodular esophagitis and a 0.4 cm gastric polyp but no evidence of bleeding. Colonoscopy and MRI enterography failed to show a source of bleeding. Upon discharge he restarted ripretinib.     History of Present Illness  Patient returns for follow up. He states that he feels better since discharge but still fatigue. With the restart of ripretinib, his perirectal pain has resolved completely. His stool is brown in color.     Past Medical History (historical data, reviewed by physician)  GIST (as above); benign prostatic hyperplasia; hypothyroidism; hypercholesterolemia; paroxysmal atrial fibrillation; peripheral vascular disease.      Past Surgical History (historical data, reviewed by physician)  Stomal hernia repair; inguinal hernia repair x 4; lumbar discectomy; resection of GIST (as above); angioplasty left lower extremity arteries; attempted  resection of perineal mass (as above).     Family History (historical data, reviewed by physician)  Mother with breast cancer.     Social History (historical data, reviewed by physician)  Current cigar smoker; social alcohol use.      Current Medications   Ripretinib (QINLOCK) 50 MG Oral Tab Take 150 mg by mouth daily.      folic acid 1 MG Oral Tab Take 1 tablet (1 mg total) by mouth daily. 30 tablet 0    pantoprazole 40 MG Oral Tab EC Take 1 tablet (40 mg total) by mouth daily. 30 tablet 0    levothyroxine 100 MCG Oral Tab Take 1 tablet (100 mcg total) by mouth before breakfast. TAKE ON EMPTY STOMACH 30 tablet 1    escitalopram 10 MG Oral Tab Take 1 tablet (10 mg total) by mouth daily. 90 tablet 1    atorvastatin 40 MG Oral Tab Take 1 tablet (40 mg total) by mouth nightly.       Allergies   Mr. Hernández is allergic to radiology contrast iodinated dyes.     Vital Signs   /66 (BP Location: Left arm, Patient Position: Sitting, Cuff Size: large)   Pulse 82   Temp 96.9 °F (36.1 °C) (Tympanic)   Resp 18   Ht 1.829 m (6' 0.01\")   Wt 91.6 kg (202 lb)   SpO2 100%   BMI 27.39 kg/m²     Physical Examination  Constitutional      No apparent distress.   Head                   Normocephalic and atraumatic.  Eyes                   Conjunctiva clear; sclera anicteric.  ENMT                 External nose normal; external ears normal.  Respiratory          Normal effort; no respiratory distress; clear to auscultation bilaterally.  Cardiovascular  Regular rate and rhythm.  Abdomen  Not distended. Black stool in ostomy bag.  Neurologic           Motor and sensory grossly intact.  Psychiatric          Mood and affect appropriate.    Laboratory  Recent Results (from the past 24 hour(s))   COMP METABOLIC PANEL [E]    Collection Time: 04/24/24 10:32 AM   Result Value Ref Range    Glucose 186 (H) 70 - 99 mg/dL    Sodium 143 136 - 145 mmol/L    Potassium 4.0 3.5 - 5.1 mmol/L    Chloride 112 98 - 112 mmol/L    CO2 24.0 21.0 - 32.0  mmol/L    Anion Gap 7 0 - 18 mmol/L    BUN 14 9 - 23 mg/dL    Creatinine 1.07 0.70 - 1.30 mg/dL    Calcium, Total 9.1 8.5 - 10.1 mg/dL    Calculated Osmolality 301 (H) 275 - 295 mOsm/kg    eGFR-Cr 73 >=60 mL/min/1.73m2    AST 21 15 - 37 U/L    ALT 23 16 - 61 U/L    Alkaline Phosphatase 103 45 - 117 U/L    Bilirubin, Total 0.5 0.1 - 2.0 mg/dL    Total Protein 6.9 6.4 - 8.2 g/dL    Albumin 3.3 (L) 3.4 - 5.0 g/dL    Globulin  3.6 2.8 - 4.4 g/dL    A/G Ratio 0.9 (L) 1.0 - 2.0    Patient Fasting for CMP? No    RETICULOCYTE COUNT [E]    Collection Time: 04/24/24 10:32 AM   Result Value Ref Range    Retic% 4.5 (H) 0.5 - 2.5 %    Retic Absolute 161.0 (H) 22.5 - 147.5 x10(3) uL    Retic IRF 0.246 0.100 - 0.300 Ratio    Reticulocyte Hemoglobin Equivalent 31.8 28.2 - 36.6 pg   CBC W/ DIFFERENTIAL    Collection Time: 04/24/24 10:32 AM   Result Value Ref Range    WBC 6.2 4.0 - 11.0 x10(3) uL    RBC 3.57 (L) 3.80 - 5.80 x10(6)uL    HGB 10.6 (L) 13.0 - 17.5 g/dL    HCT 32.6 (L) 39.0 - 53.0 %    .0 150.0 - 450.0 10(3)uL    MCV 91.3 80.0 - 100.0 fL    MCH 29.7 26.0 - 34.0 pg    MCHC 32.5 31.0 - 37.0 g/dL    RDW 18.2 %    Neutrophil Absolute Prelim 4.02 1.50 - 7.70 x10 (3) uL    Neutrophil Absolute 4.02 1.50 - 7.70 x10(3) uL    Lymphocyte Absolute 1.41 1.00 - 4.00 x10(3) uL    Monocyte Absolute 0.45 0.10 - 1.00 x10(3) uL    Eosinophil Absolute 0.23 0.00 - 0.70 x10(3) uL    Basophil Absolute 0.03 0.00 - 0.20 x10(3) uL    Immature Granulocyte Absolute 0.03 0.00 - 1.00 x10(3) uL    Neutrophil % 65.1 %    Lymphocyte % 22.9 %    Monocyte % 7.3 %    Eosinophil % 3.7 %    Basophil % 0.5 %    Immature Granulocyte % 0.5 %     Impression and Plan   1.   Metastatic GIST: Continue ripretinib. Patient counseled to notify us immediately if his surgical wound should open.     2.   Hypothyroidism: Patient remains on levothyroxine 100 mcg daily. TFTs are pending.    3.   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 Patient's pain will respond to ripretinib - pain from nerve sheath tumors can respond to VEGF inhibitor therapy. Since restarting ripretinib, his pain has resolved.    4.   Contrast allergy: Patient requires prednisone and Benadryl for CT imaging.     5.   Iron deficiency anemia: Due to GI bleed. Source was not found. He is on PPI therapy as per GI. Iron studies pending. If there is iron deficiency, he will return tomorrow for IV iron dextran.     Patient will continue to receive longitudinal care by me for the complex care required for the cancer diagnosis including the expected complications related to anticancer therapy.     Planned Follow Up  Patient will return for follow up in 1 month.    Electronically signed by:    Zaire Tapia M.D.  System Medical Director of Oncology Services  [facility]

## 2024-04-24 NOTE — PROGRESS NOTES
Patient is here for post hospital follow up. Patient c/o weakness and chills. Appetite is good. No melena or bleeding. Started Qinlock 3 tablets daily on Saturday. Since starting Qinlock, no longer having any pain.     Education Record    Learner:  Patient    Disease / Diagnosis:  post hospital follow up    Barriers / Limitations:  None   Comments:    Method:  Discussion   Comments:    General Topics:  Plan of care reviewed   Comments:    Outcome:  Shows understanding   Comments:

## 2024-04-25 ENCOUNTER — OFFICE VISIT (OUTPATIENT)
Dept: HEMATOLOGY/ONCOLOGY | Age: 74
End: 2024-04-25
Attending: SPECIALIST
Payer: MEDICARE

## 2024-04-25 VITALS
TEMPERATURE: 98 F | HEART RATE: 69 BPM | SYSTOLIC BLOOD PRESSURE: 127 MMHG | DIASTOLIC BLOOD PRESSURE: 78 MMHG | OXYGEN SATURATION: 100 % | RESPIRATION RATE: 16 BRPM

## 2024-04-25 DIAGNOSIS — D50.0 IRON DEFICIENCY ANEMIA SECONDARY TO BLOOD LOSS (CHRONIC): Primary | ICD-10-CM

## 2024-04-25 DIAGNOSIS — C49.A4 GIST (GASTROINTESTINAL STROMA TUMOR), MALIGNANT, COLON (HCC): ICD-10-CM

## 2024-04-25 PROCEDURE — 96365 THER/PROPH/DIAG IV INF INIT: CPT

## 2024-04-25 NOTE — PROGRESS NOTES
Pt here for INFED- initial dose . Pt denies any new issues or concerns.      Ordering MD: Dr Tapia  Order Exp: x1 this visit     Pt tolerated test dose and infusion without difficulty or complaint. Reviewed next apt date/time: yes, via [employer] ([employer]), already scheduled. Post VS per flow sheet      Education Record  Learner:  Patient  Disease / Diagnosis: iron def anemia  Barriers / Limitations:  Other:  [place]  Method:  Discussion and Reinforcement  General Topics:  Medication, Procedure, Side effects and symptom management, Plan of care reviewed, and Fall risk and prevention  Outcome:  Shows understanding    Reviewed AVS and INFED handout information given. Pt verbalized understanding. Departed stable.

## 2024-04-26 ENCOUNTER — APPOINTMENT (OUTPATIENT)
Dept: WOUND CARE | Facility: HOSPITAL | Age: 74
End: 2024-04-26
Attending: NURSE PRACTITIONER
Payer: MEDICARE

## 2024-05-03 ENCOUNTER — TELEPHONE (OUTPATIENT)
Dept: HEMATOLOGY/ONCOLOGY | Facility: HOSPITAL | Age: 74
End: 2024-05-03

## 2024-05-03 DIAGNOSIS — C49.A4 GIST (GASTROINTESTINAL STROMA TUMOR), MALIGNANT, COLON (HCC): ICD-10-CM

## 2024-05-03 DIAGNOSIS — E03.4 HYPOTHYROIDISM DUE TO ACQUIRED ATROPHY OF THYROID: ICD-10-CM

## 2024-05-03 RX ORDER — HYDROMORPHONE HYDROCHLORIDE 4 MG/1
TABLET ORAL
Qty: 100 TABLET | Refills: 0 | Status: SHIPPED | OUTPATIENT
Start: 2024-05-03

## 2024-05-03 RX ORDER — HYDROMORPHONE HYDROCHLORIDE 4 MG/1
TABLET ORAL
Qty: 100 TABLET | Refills: 0 | Status: SHIPPED | OUTPATIENT
Start: 2024-05-03 | End: 2024-05-03

## 2024-05-03 NOTE — TELEPHONE ENCOUNTER
Called patient and notified him that his hydromorphone has been sent to the [place] pharmacy at [address] [place] [address] in [place]. Patient conveyed understanding.

## 2024-05-03 NOTE — TELEPHONE ENCOUNTER
[facility] called regarding the hydromorphone 4 mg tablets. They do not have it in stock and may not receive it until Tuesday. Please advise Dr Tapia if he wants to send it to another pharmacy.  Thank you.

## 2024-05-16 ENCOUNTER — PATIENT MESSAGE (OUTPATIENT)
Dept: HEMATOLOGY/ONCOLOGY | Facility: HOSPITAL | Age: 74
End: 2024-05-16

## 2024-05-16 ENCOUNTER — LAB ENCOUNTER (OUTPATIENT)
Dept: LAB | Age: 74
End: 2024-05-16
Attending: INTERNAL MEDICINE

## 2024-05-16 DIAGNOSIS — E11.9 DIET-CONTROLLED DIABETES MELLITUS (HCC): ICD-10-CM

## 2024-05-16 LAB
ALBUMIN SERPL-MCNC: 3.7 G/DL (ref 3.4–5)
ALBUMIN/GLOB SERPL: 1.1 {RATIO} (ref 1–2)
ALP LIVER SERPL-CCNC: 113 U/L
ALT SERPL-CCNC: 29 U/L
ANION GAP SERPL CALC-SCNC: 6 MMOL/L (ref 0–18)
AST SERPL-CCNC: 20 U/L (ref 15–37)
BILIRUB SERPL-MCNC: 0.4 MG/DL (ref 0.1–2)
BUN BLD-MCNC: 17 MG/DL (ref 9–23)
CALCIUM BLD-MCNC: 9 MG/DL (ref 8.5–10.1)
CHLORIDE SERPL-SCNC: 114 MMOL/L (ref 98–112)
CO2 SERPL-SCNC: 22 MMOL/L (ref 21–32)
CREAT BLD-MCNC: 1 MG/DL
EGFRCR SERPLBLD CKD-EPI 2021: 79 ML/MIN/1.73M2 (ref 60–?)
FASTING STATUS PATIENT QL REPORTED: YES
GLOBULIN PLAS-MCNC: 3.3 G/DL (ref 2.8–4.4)
GLUCOSE BLD-MCNC: 158 MG/DL (ref 70–99)
OSMOLALITY SERPL CALC.SUM OF ELEC: 299 MOSM/KG (ref 275–295)
POTASSIUM SERPL-SCNC: 4.3 MMOL/L (ref 3.5–5.1)
PROT SERPL-MCNC: 7 G/DL (ref 6.4–8.2)
SODIUM SERPL-SCNC: 142 MMOL/L (ref 136–145)

## 2024-05-16 PROCEDURE — 80053 COMPREHEN METABOLIC PANEL: CPT

## 2024-05-16 PROCEDURE — 36415 COLL VENOUS BLD VENIPUNCTURE: CPT

## 2024-05-20 RX ORDER — LEVOTHYROXINE SODIUM 0.1 MG/1
100 TABLET ORAL
Qty: 30 TABLET | Refills: 0 | Status: SHIPPED | OUTPATIENT
Start: 2024-05-20

## 2024-05-20 NOTE — TELEPHONE ENCOUNTER
Lizzy Velazquez RN 5/20/2024 11:19 AM CDT      ----- Message -----  From: Luciano Hernández \"Chepe\"  Sent: 5/20/2024 11:11 AM CDT  To: [facility]  Subject: thyroid     Took last one this am., but I have 88mcg and 125 mags that I can take for two days until wed. Thanks
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
## 2024-05-22 ENCOUNTER — OFFICE VISIT (OUTPATIENT)
Dept: HEMATOLOGY/ONCOLOGY | Age: 74
End: 2024-05-22
Attending: SPECIALIST

## 2024-05-22 VITALS
HEIGHT: 72.01 IN | SYSTOLIC BLOOD PRESSURE: 134 MMHG | BODY MASS INDEX: 27.62 KG/M2 | HEART RATE: 75 BPM | RESPIRATION RATE: 18 BRPM | DIASTOLIC BLOOD PRESSURE: 75 MMHG | WEIGHT: 203.88 LBS | TEMPERATURE: 97 F | OXYGEN SATURATION: 99 %

## 2024-05-22 DIAGNOSIS — E03.2 HYPOTHYROIDISM DUE TO DRUGS: ICD-10-CM

## 2024-05-22 DIAGNOSIS — D50.0 IRON DEFICIENCY ANEMIA SECONDARY TO BLOOD LOSS (CHRONIC): Primary | ICD-10-CM

## 2024-05-22 DIAGNOSIS — C49.A4 GIST (GASTROINTESTINAL STROMA TUMOR), MALIGNANT, COLON (HCC): ICD-10-CM

## 2024-05-22 DIAGNOSIS — E03.4 HYPOTHYROIDISM DUE TO ACQUIRED ATROPHY OF THYROID: ICD-10-CM

## 2024-05-22 LAB
ALBUMIN SERPL-MCNC: 3.7 G/DL (ref 3.4–5)
ALBUMIN/GLOB SERPL: 1 {RATIO} (ref 1–2)
ALP LIVER SERPL-CCNC: 101 U/L
ALT SERPL-CCNC: 22 U/L
ANION GAP SERPL CALC-SCNC: 4 MMOL/L (ref 0–18)
AST SERPL-CCNC: 14 U/L (ref 15–37)
BASOPHILS # BLD AUTO: 0.06 X10(3) UL (ref 0–0.2)
BASOPHILS NFR BLD AUTO: 0.9 %
BILIRUB SERPL-MCNC: 0.4 MG/DL (ref 0.1–2)
BUN BLD-MCNC: 20 MG/DL (ref 9–23)
CALCIUM BLD-MCNC: 9.7 MG/DL (ref 8.5–10.1)
CHLORIDE SERPL-SCNC: 110 MMOL/L (ref 98–112)
CO2 SERPL-SCNC: 25 MMOL/L (ref 21–32)
CREAT BLD-MCNC: 1.09 MG/DL
EGFRCR SERPLBLD CKD-EPI 2021: 72 ML/MIN/1.73M2 (ref 60–?)
EOSINOPHIL # BLD AUTO: 0.21 X10(3) UL (ref 0–0.7)
EOSINOPHIL NFR BLD AUTO: 3 %
ERYTHROCYTE [DISTWIDTH] IN BLOOD BY AUTOMATED COUNT: 15.7 %
FASTING STATUS PATIENT QL REPORTED: NO
GLOBULIN PLAS-MCNC: 3.8 G/DL (ref 2.8–4.4)
GLUCOSE BLD-MCNC: 180 MG/DL (ref 70–99)
HCT VFR BLD AUTO: 41 %
HGB BLD-MCNC: 13.6 G/DL
IMM GRANULOCYTES # BLD AUTO: 0.03 X10(3) UL (ref 0–1)
IMM GRANULOCYTES NFR BLD: 0.4 %
LYMPHOCYTES # BLD AUTO: 1.86 X10(3) UL (ref 1–4)
LYMPHOCYTES NFR BLD AUTO: 26.7 %
MCH RBC QN AUTO: 30.2 PG (ref 26–34)
MCHC RBC AUTO-ENTMCNC: 33.2 G/DL (ref 31–37)
MCV RBC AUTO: 90.9 FL
MONOCYTES # BLD AUTO: 0.51 X10(3) UL (ref 0.1–1)
MONOCYTES NFR BLD AUTO: 7.3 %
NEUTROPHILS # BLD AUTO: 4.3 X10 (3) UL (ref 1.5–7.7)
NEUTROPHILS # BLD AUTO: 4.3 X10(3) UL (ref 1.5–7.7)
NEUTROPHILS NFR BLD AUTO: 61.7 %
OSMOLALITY SERPL CALC.SUM OF ELEC: 295 MOSM/KG (ref 275–295)
PLATELET # BLD AUTO: 250 10(3)UL (ref 150–450)
POTASSIUM SERPL-SCNC: 4.1 MMOL/L (ref 3.5–5.1)
PROT SERPL-MCNC: 7.5 G/DL (ref 6.4–8.2)
RBC # BLD AUTO: 4.51 X10(6)UL
SODIUM SERPL-SCNC: 139 MMOL/L (ref 136–145)
T4 FREE SERPL-MCNC: 1.3 NG/DL (ref 0.8–1.7)
TSI SER-ACNC: 1.11 MIU/ML (ref 0.36–3.74)
WBC # BLD AUTO: 7 X10(3) UL (ref 4–11)

## 2024-05-22 PROCEDURE — G2211 COMPLEX E/M VISIT ADD ON: HCPCS | Performed by: SPECIALIST

## 2024-05-22 PROCEDURE — 99214 OFFICE O/P EST MOD 30 MIN: CPT | Performed by: SPECIALIST

## 2024-05-22 RX ORDER — PREDNISONE 50 MG/1
TABLET ORAL
Qty: 3 TABLET | Refills: 0 | Status: SHIPPED | OUTPATIENT
Start: 2024-05-22

## 2024-05-22 RX ORDER — DIPHENHYDRAMINE HCL 25 MG
CAPSULE ORAL
Qty: 2 CAPSULE | Refills: 0 | Status: SHIPPED | OUTPATIENT
Start: 2024-05-22

## 2024-05-22 NOTE — PROGRESS NOTES
Patient is here for MD f/u for GIST. He continues on Qinlock 150 mg daily.  Tolerating well. Denies any side effects. Patient reports symptoms improved since the last office visit. No GI complaints.     Education Record    Learner:  Patient    Disease / Diagnosis:  GIST     Barriers / Limitations:  None   Comments:    Method:  Discussion   Comments:    General Topics:  Plan of care reviewed   Comments:    Outcome:  Shows understanding   Comments:

## 2024-05-24 ENCOUNTER — OFFICE VISIT (OUTPATIENT)
Dept: INTERNAL MEDICINE CLINIC | Facility: CLINIC | Age: 74
End: 2024-05-24

## 2024-05-24 VITALS
OXYGEN SATURATION: 98 % | RESPIRATION RATE: 16 BRPM | BODY MASS INDEX: 27.77 KG/M2 | HEART RATE: 58 BPM | SYSTOLIC BLOOD PRESSURE: 132 MMHG | TEMPERATURE: 98 F | HEIGHT: 72 IN | DIASTOLIC BLOOD PRESSURE: 80 MMHG | WEIGHT: 205 LBS

## 2024-05-24 DIAGNOSIS — I48.0 PAROXYSMAL ATRIAL FIBRILLATION (HCC): ICD-10-CM

## 2024-05-24 DIAGNOSIS — F34.1 DYSTHYMIA: ICD-10-CM

## 2024-05-24 DIAGNOSIS — E11.9 DIET-CONTROLLED DIABETES MELLITUS (HCC): Primary | ICD-10-CM

## 2024-05-24 DIAGNOSIS — I10 HYPERTENSION, ESSENTIAL: ICD-10-CM

## 2024-05-24 PROBLEM — R19.00 PELVIC MASS: Status: RESOLVED | Noted: 2023-12-10 | Resolved: 2024-05-24

## 2024-05-24 PROBLEM — E11.59 HYPERTENSION ASSOCIATED WITH DIABETES (HCC): Status: ACTIVE | Noted: 2024-05-24

## 2024-05-24 PROBLEM — I15.2 HYPERTENSION ASSOCIATED WITH DIABETES (HCC): Status: ACTIVE | Noted: 2024-05-24

## 2024-05-24 PROBLEM — E78.5 HYPERLIPIDEMIA ASSOCIATED WITH TYPE 2 DIABETES MELLITUS (HCC): Status: ACTIVE | Noted: 2024-05-24

## 2024-05-24 PROBLEM — R73.9 HYPERGLYCEMIA: Status: RESOLVED | Noted: 2024-04-15 | Resolved: 2024-05-24

## 2024-05-24 PROBLEM — D64.9 ANEMIA: Status: RESOLVED | Noted: 2024-04-15 | Resolved: 2024-05-24

## 2024-05-24 PROBLEM — E11.69 HYPERLIPIDEMIA ASSOCIATED WITH TYPE 2 DIABETES MELLITUS (HCC): Status: ACTIVE | Noted: 2024-05-24

## 2024-05-24 PROBLEM — K92.2 ACUTE GI BLEEDING: Status: RESOLVED | Noted: 2024-04-15 | Resolved: 2024-05-24

## 2024-05-24 PROBLEM — D50.0 IRON DEFICIENCY ANEMIA SECONDARY TO BLOOD LOSS (CHRONIC): Status: RESOLVED | Noted: 2024-04-24 | Resolved: 2024-05-24

## 2024-05-24 PROCEDURE — 99214 OFFICE O/P EST MOD 30 MIN: CPT | Performed by: INTERNAL MEDICINE

## 2024-05-24 RX ORDER — CLOPIDOGREL BISULFATE 75 MG/1
75 TABLET ORAL DAILY
COMMUNITY

## 2024-05-24 RX ORDER — ALPRAZOLAM 0.5 MG/1
0.5 TABLET ORAL 3 TIMES DAILY PRN
Qty: 90 TABLET | Refills: 1 | Status: SHIPPED | OUTPATIENT
Start: 2024-05-24

## 2024-05-24 NOTE — PROGRESS NOTES
Subjective:   Patient ID: Luciano Hernández is a 73 year old male.    HPI  HPI:   uLciano Hernández is a 73 year old male who presents for recheck of his diabetes, HTN and PAF. Patient’s FBS have been at goal.  Pt has been checking his feet on a regular basis. Pt denies any tingling of the feet. Pt denies any issues with depression. Pt complains of nothing  Needs refills on depression med.    Wt Readings from Last 6 Encounters:   05/24/24 205 lb (93 kg)   05/22/24 203 lb 14.4 oz (92.5 kg)   05/15/24 204 lb (92.5 kg)   04/24/24 202 lb (91.6 kg)   04/15/24 212 lb (96.2 kg)   04/15/24 212 lb (96.2 kg)     Body mass index is 27.8 kg/m².     Lab Results   Component Value Date    A1C 6.3 (H) 04/15/2024    A1C 7.1 (H) 08/23/2023    A1C 6.0 (H) 01/10/2023     Lab Results   Component Value Date    CHOLEST 102 01/20/2023    CHOLEST 170 10/02/2022    CHOLEST 135 09/02/2022     Lab Results   Component Value Date    HDL 39 (L) 01/20/2023    HDL 38 (L) 10/02/2022    HDL 32 (L) 09/02/2022     Lab Results   Component Value Date    LDL 47 01/20/2023    LDL 96 10/02/2022    LDL 79 09/02/2022     Lab Results   Component Value Date    TRIG 76 01/20/2023    TRIG 207 (H) 10/02/2022    TRIG 134 09/02/2022     Lab Results   Component Value Date    AST 14 (L) 05/22/2024    AST 20 05/16/2024    AST 21 04/24/2024     Lab Results   Component Value Date    ALT 22 05/22/2024    ALT 29 05/16/2024    ALT 23 04/24/2024     Malb/Cre Calc   Date Value Ref Range Status   02/15/2021 24.7 <=30.0 ug/mg Final     Comment:     <30 ug/mg creatinine       Normal     ug/mg creatinine   Microalbuminuria   >300 ug/mg creatinine      Albuminuria       11/20/2019 10.6 <=30.0 ug/mg Final     Comment:     <30 ug/mg creatinine       Normal     ug/mg creatinine   Microalbuminuria   >300 ug/mg creatinine      Albuminuria       05/08/2019 75.6 (H) <=30.0 ug/mg Final     Comment:       <30 ug/mg creatinine       Normal     ug/mg creatinine    Microalbuminuria   >300 ug/mg creatinine      Albuminuria           Current Outpatient Medications   Medication Sig Dispense Refill    ASPIRIN 81 OR Take by mouth.      clopidogrel 75 MG Oral Tab Take 1 tablet (75 mg total) by mouth daily.      ALPRAZolam (XANAX) 0.5 MG Oral Tab Take 1 tablet (0.5 mg total) by mouth 3 (three) times daily as needed for Sleep or Anxiety. 90 tablet 1    predniSONE 50 MG Oral Tab Take 13 hr, 7 hr, and 1 hr prior to CT scan. 3 tablet 0    diphenhydrAMINE (BENADRYL ALLERGY) 25 MG Oral Cap 2 tabs PO 1 hour prior to scan. 2 capsule 0    levothyroxine 100 MCG Oral Tab Take 1 tablet (100 mcg total) by mouth before breakfast. TAKE ON EMPTY STOMACH 30 tablet 0    Ripretinib (QINLOCK) 50 MG Oral Tab Take 150 mg by mouth daily.      escitalopram 10 MG Oral Tab Take 1 tablet (10 mg total) by mouth daily. 90 tablet 1    atorvastatin 40 MG Oral Tab Take 1 tablet (40 mg total) by mouth nightly.        Past Medical History:    Abdominal hernia    Anxiety state    Arrhythmia    atrial fibrillation intermittently    Back problem    back surgery 20 years ago    Black stools    No longer a concern    BPH (benign prostatic hyperplasia)    Cancer (HCC)    GIST    Carcinoma of gastrointestinal tract (HCC)    Coronary atherosclerosis    Depression    Diabetes (HCC)    per pcp not 11/28/22 diet controlled dm- pt denies any hx of dm    Disorder of thyroid    Esophageal reflux    Hearing impairment    Does not wear hearing aids    Hearing loss    Heart attack (HCC)    Heart palpitations    AF    High blood pressure    High cholesterol    Hx of motion sickness    > 30 yrs ago while on a boat    Hypothyroidism    Other and unspecified hyperlipidemia    Peripheral vascular disease (HCC)    Stented coronary artery    3 stents total    Thyroid disease    Visual impairment    glasses    Wears glasses      Past Surgical History:   Procedure Laterality Date    Angioplasty (coronary)  9-1-22    Back surgery  1998     Bowel resection  10-3-2017    Cath drug eluting stent  2022    LAD    Colonoscopy  2006    Colonoscopy N/A 2024    Procedure: COLONOSCOPY;  Surgeon: Quentin Llamas MD;  Location:  ENDOSCOPY    Colostomy  10/03/2017    Cystoscopy,insert ureteral stent      Hernia surgery Left     Hernia surgery Left     Hernia surgery Left 2019    Laparoscopy, surgical prostatectomy, retropubic radical, w/nerve sparing      Other surgical history  2015    fracture radiius and ulna  Right arm -     Other surgical history  10/03/2017    Pelvic exenteration to include en-bloc resection of pelvic gastrointestinal stromal tumor with abdominoperineal resection, total cystectomy, prostatectomy, end colostomy, and urostomy    Other surgical history  2019    DIAGNOSTIC LAPAROSCOPY, ROBOTIC LYSIS OF ADHESION    Other surgical history  2024    Exploration perineum. Resection subcutaneous fat with wire localization.    Part removal colon w end colostomy        Social History:   Social History     Socioeconomic History    Marital status:     Number of children: 3   Occupational History    Occupation: RETIRED TEACHER   Tobacco Use    Smoking status: Former     Current packs/day: 0.00     Average packs/day: 1 pack/day for 45.0 years (45.0 ttl pk-yrs)     Types: Cigarettes     Start date: 1971     Quit date: 10/1/2017     Years since quittin.6    Smokeless tobacco: Never   Vaping Use    Vaping status: Never Used   Substance and Sexual Activity    Alcohol use: Yes     Alcohol/week: 3.0 standard drinks of alcohol     Types: 3 Standard drinks or equivalent per week     Comment: rarely    Drug use: No    Sexual activity: Not Currently   Other Topics Concern    Caffeine Concern Yes     Comment: coffee three times a day     Exercise No    Seat Belt No    Special Diet No    Stress Concern No    Weight Concern Yes   Social History Narrative    , lives alone    Has 2 daughters and 1  son - living close by     Social Determinants of Health     Financial Resource Strain: Low Risk  (4/22/2024)    Financial Resource Strain     Difficulty of Paying Living Expenses: Not hard at all   Food Insecurity: No Food Insecurity (4/15/2024)    Food Insecurity     Food Insecurity: Never true   Transportation Needs: No Transportation Needs (4/15/2024)    Transportation Needs     Lack of Transportation: No   Housing Stability: Low Risk  (4/15/2024)    Housing Stability     Housing Instability: No     Exercise: minimal.  Diet: watches minimally     REVIEW OF SYSTEMS:   GENERAL HEALTH: feels well otherwise  SKIN: denies any unusual skin lesions or rashes  RESPIRATORY: denies shortness of breath with exertion  CARDIOVASCULAR: denies chest pain on exertion  GI: denies abdominal pain and denies heartburn  NEURO: denies headaches    EXAM:   /80   Pulse 58   Temp 98 °F (36.7 °C)   Resp 16   Ht 6' (1.829 m)   Wt 205 lb (93 kg)   SpO2 98%   BMI 27.80 kg/m²   GENERAL: well developed, well nourished,in no apparent distress  SKIN: no rashes,no suspicious lesions  NECK: supple,no adenopathy,no bruits  LUNGS: clear to auscultation  CARDIO: RRR without murmur  GI: good BS's,no masses, HSM or tenderness  EXTREMITIES: no cyanosis, clubbing or edema  NEURO: Bilateral barefoot skin diabetic exam is normal, visualized feet and the appearance is normal.  Bilateral monofilament/sensation of both feet is normal.  Pulsation pedal pulse exam of both lower legs/feet is normal as well.        ASSESSMENT AND PLAN:   Luciano Hernández is a 73 year old male who presents for a recheck of his diabetes. Diabetic control is at goal. HTN is controoled  PAF rate controlled.   Recommendations are: continue present meds, check HgbA1C, fasting lipids and CMP, lose wgt with carbohydrate controlled diet and exercise, refer to Ophthamology, check feet daily.  The patient indicates understanding of these issues and agrees to the plan.  The  patient is asked to return in 6 m.    History/Other:   Review of Systems  Current Outpatient Medications   Medication Sig Dispense Refill    ASPIRIN 81 OR Take by mouth.      clopidogrel 75 MG Oral Tab Take 1 tablet (75 mg total) by mouth daily.      ALPRAZolam (XANAX) 0.5 MG Oral Tab Take 1 tablet (0.5 mg total) by mouth 3 (three) times daily as needed for Sleep or Anxiety. 90 tablet 1    predniSONE 50 MG Oral Tab Take 13 hr, 7 hr, and 1 hr prior to CT scan. 3 tablet 0    diphenhydrAMINE (BENADRYL ALLERGY) 25 MG Oral Cap 2 tabs PO 1 hour prior to scan. 2 capsule 0    levothyroxine 100 MCG Oral Tab Take 1 tablet (100 mcg total) by mouth before breakfast. TAKE ON EMPTY STOMACH 30 tablet 0    Ripretinib (QINLOCK) 50 MG Oral Tab Take 150 mg by mouth daily.      escitalopram 10 MG Oral Tab Take 1 tablet (10 mg total) by mouth daily. 90 tablet 1    atorvastatin 40 MG Oral Tab Take 1 tablet (40 mg total) by mouth nightly.       Allergies:  Allergies   Allergen Reactions    Radiology Contrast Iodinated Dyes HIVES, RESPIRATORY FAILURE and OTHER (SEE COMMENTS)     Originally with \"Barium enema for lower GI\" 50 years ago. He states he developed hives and went into respiratory arrest. Pt has since had CT iodinated contrast with 13 hr pre-meds and no break-through reactions, HUMBERTO Mera, Radiology RN.        Objective:   Physical Exam    Assessment & Plan:   1. Diet-controlled diabetes mellitus (HCC)    2. Hypertension, essential    3. Paroxysmal atrial fibrillation (HCC)    4. Dysthymia        No orders of the defined types were placed in this encounter.      Meds This Visit:  Requested Prescriptions     Signed Prescriptions Disp Refills    ALPRAZolam (XANAX) 0.5 MG Oral Tab 90 tablet 1     Sig: Take 1 tablet (0.5 mg total) by mouth 3 (three) times daily as needed for Sleep or Anxiety.       Imaging & Referrals:  None

## 2024-06-10 DIAGNOSIS — F33.1 MODERATE EPISODE OF RECURRENT MAJOR DEPRESSIVE DISORDER (HCC): ICD-10-CM

## 2024-06-11 RX ORDER — ESCITALOPRAM OXALATE 10 MG/1
10 TABLET ORAL DAILY
Qty: 90 TABLET | Refills: 1 | Status: SHIPPED | OUTPATIENT
Start: 2024-06-11

## 2024-06-11 NOTE — TELEPHONE ENCOUNTER
Last time medication was refilled 12/18/2023  Last office visit  05/24/2024  Next office visit due/scheduled   Future Appointments   Date Time Provider Department Center   7/22/2024 10:00 AM PF CT [facility] [place]   7/31/2024  2:30 PM Zaire Tapia MD PF [facility] [place]   11/25/2024  2:00 PM Gavin Bernal MD [facility] [address]       Sent to Doctor Dolores for approval

## 2024-06-17 RX ORDER — LEVOTHYROXINE SODIUM 100 UG/1
100 TABLET ORAL
Qty: 30 TABLET | Refills: 3 | Status: SHIPPED | OUTPATIENT
Start: 2024-06-17 | End: 2024-09-13

## 2024-07-22 ENCOUNTER — HOSPITAL ENCOUNTER (OUTPATIENT)
Dept: CT IMAGING | Age: 74
Discharge: HOME | End: 2024-07-22
Attending: SPECIALIST
Payer: MEDICARE

## 2024-07-22 DIAGNOSIS — C49.A4 GIST (GASTROINTESTINAL STROMA TUMOR), MALIGNANT, COLON (HCC): ICD-10-CM

## 2024-07-22 PROCEDURE — 74177 CT ABD & PELVIS W/CONTRAST: CPT | Performed by: SPECIALIST

## 2024-07-24 ENCOUNTER — HOSPITAL ENCOUNTER (INPATIENT)
Facility: HOSPITAL | Age: 74
LOS: 1 days | Discharge: HOME | End: 2024-07-25
Attending: EMERGENCY MEDICINE | Admitting: HOSPITALIST
Payer: MEDICARE

## 2024-07-24 DIAGNOSIS — R00.1 BRADYCARDIA: ICD-10-CM

## 2024-07-24 DIAGNOSIS — R42 DIZZINESS: ICD-10-CM

## 2024-07-24 DIAGNOSIS — K92.2 UPPER GI BLEED: Primary | ICD-10-CM

## 2024-07-24 PROBLEM — I25.10 CORONARY ARTERY DISEASE INVOLVING NATIVE CORONARY ARTERY OF NATIVE HEART WITHOUT ANGINA PECTORIS: Status: ACTIVE | Noted: 2024-07-24

## 2024-07-24 PROBLEM — R73.9 HYPERGLYCEMIA: Status: ACTIVE | Noted: 2024-07-24

## 2024-07-24 LAB
ALBUMIN SERPL-MCNC: 4.5 G/DL (ref 3.2–4.8)
ALBUMIN/GLOB SERPL: 1.7 {RATIO} (ref 1–2)
ALP LIVER SERPL-CCNC: 107 U/L
ALT SERPL-CCNC: 22 U/L
ANION GAP SERPL CALC-SCNC: 5 MMOL/L (ref 0–18)
ANTIBODY SCREEN: NEGATIVE
AST SERPL-CCNC: 22 U/L (ref ?–34)
BASOPHILS # BLD AUTO: 0.05 X10(3) UL (ref 0–0.2)
BASOPHILS NFR BLD AUTO: 0.5 %
BILIRUB SERPL-MCNC: 0.5 MG/DL (ref 0.2–1.1)
BUN BLD-MCNC: 19 MG/DL (ref 9–23)
CALCIUM BLD-MCNC: 9.7 MG/DL (ref 8.7–10.4)
CHLORIDE SERPL-SCNC: 110 MMOL/L (ref 98–112)
CO2 SERPL-SCNC: 23 MMOL/L (ref 21–32)
CREAT BLD-MCNC: 1.04 MG/DL
EGFRCR SERPLBLD CKD-EPI 2021: 76 ML/MIN/1.73M2 (ref 60–?)
EOSINOPHIL # BLD AUTO: 0.1 X10(3) UL (ref 0–0.7)
EOSINOPHIL NFR BLD AUTO: 1 %
ERYTHROCYTE [DISTWIDTH] IN BLOOD BY AUTOMATED COUNT: 14.4 %
GLOBULIN PLAS-MCNC: 2.7 G/DL (ref 2.8–4.4)
GLUCOSE BLD-MCNC: 176 MG/DL (ref 70–99)
HCT VFR BLD AUTO: 36.1 %
HGB BLD-MCNC: 10.4 G/DL
HGB BLD-MCNC: 12.4 G/DL
IMM GRANULOCYTES # BLD AUTO: 0.06 X10(3) UL (ref 0–1)
IMM GRANULOCYTES NFR BLD: 0.6 %
INR BLD: 0.97 (ref 0.8–1.2)
LYMPHOCYTES # BLD AUTO: 1.92 X10(3) UL (ref 1–4)
LYMPHOCYTES NFR BLD AUTO: 20 %
MCH RBC QN AUTO: 29.6 PG (ref 26–34)
MCHC RBC AUTO-ENTMCNC: 34.3 G/DL (ref 31–37)
MCV RBC AUTO: 86.2 FL
MONOCYTES # BLD AUTO: 0.67 X10(3) UL (ref 0.1–1)
MONOCYTES NFR BLD AUTO: 7 %
NEUTROPHILS # BLD AUTO: 6.79 X10 (3) UL (ref 1.5–7.7)
NEUTROPHILS # BLD AUTO: 6.79 X10(3) UL (ref 1.5–7.7)
NEUTROPHILS NFR BLD AUTO: 70.9 %
OSMOLALITY SERPL CALC.SUM OF ELEC: 293 MOSM/KG (ref 275–295)
PLATELET # BLD AUTO: 270 10(3)UL (ref 150–450)
POTASSIUM SERPL-SCNC: 3.7 MMOL/L (ref 3.5–5.1)
PROT SERPL-MCNC: 7.2 G/DL (ref 5.7–8.2)
PROTHROMBIN TIME: 12.9 SECONDS (ref 11.6–14.8)
RBC # BLD AUTO: 4.19 X10(6)UL
RH BLOOD TYPE: POSITIVE
SODIUM SERPL-SCNC: 138 MMOL/L (ref 136–145)
TROPONIN I SERPL HS-MCNC: 11 NG/L
TSI SER-ACNC: 4.24 MIU/ML (ref 0.55–4.78)
WBC # BLD AUTO: 9.6 X10(3) UL (ref 4–11)

## 2024-07-24 PROCEDURE — 99223 1ST HOSP IP/OBS HIGH 75: CPT | Performed by: HOSPITALIST

## 2024-07-24 RX ORDER — ENEMA 19; 7 G/133ML; G/133ML
1 ENEMA RECTAL ONCE AS NEEDED
Status: DISCONTINUED | OUTPATIENT
Start: 2024-07-24 | End: 2024-07-25

## 2024-07-24 RX ORDER — ESCITALOPRAM OXALATE 10 MG/1
10 TABLET ORAL DAILY
Status: DISCONTINUED | OUTPATIENT
Start: 2024-07-25 | End: 2024-07-25

## 2024-07-24 RX ORDER — FOLIC ACID 1 MG/1
1 TABLET ORAL DAILY
Status: DISCONTINUED | OUTPATIENT
Start: 2024-07-24 | End: 2024-07-25

## 2024-07-24 RX ORDER — SODIUM CHLORIDE 9 MG/ML
125 INJECTION, SOLUTION INTRAVENOUS CONTINUOUS
Status: DISCONTINUED | OUTPATIENT
Start: 2024-07-24 | End: 2024-07-25

## 2024-07-24 RX ORDER — SODIUM CHLORIDE 9 MG/ML
INJECTION, SOLUTION INTRAVENOUS CONTINUOUS
Status: ACTIVE | OUTPATIENT
Start: 2024-07-24 | End: 2024-07-24

## 2024-07-24 RX ORDER — ALPRAZOLAM 0.25 MG/1
0.5 TABLET ORAL 3 TIMES DAILY PRN
Status: DISCONTINUED | OUTPATIENT
Start: 2024-07-24 | End: 2024-07-25

## 2024-07-24 RX ORDER — MELATONIN
3 NIGHTLY PRN
Status: DISCONTINUED | OUTPATIENT
Start: 2024-07-24 | End: 2024-07-25

## 2024-07-24 RX ORDER — ONDANSETRON 2 MG/ML
4 INJECTION INTRAMUSCULAR; INTRAVENOUS EVERY 4 HOURS PRN
Status: DISCONTINUED | OUTPATIENT
Start: 2024-07-24 | End: 2024-07-24

## 2024-07-24 RX ORDER — BISACODYL 10 MG
10 SUPPOSITORY, RECTAL RECTAL
Status: DISCONTINUED | OUTPATIENT
Start: 2024-07-24 | End: 2024-07-25

## 2024-07-24 RX ORDER — SENNOSIDES 8.6 MG
17.2 TABLET ORAL NIGHTLY PRN
Status: DISCONTINUED | OUTPATIENT
Start: 2024-07-24 | End: 2024-07-25

## 2024-07-24 RX ORDER — POLYETHYLENE GLYCOL 3350 17 G/17G
17 POWDER, FOR SOLUTION ORAL DAILY PRN
Status: DISCONTINUED | OUTPATIENT
Start: 2024-07-24 | End: 2024-07-25

## 2024-07-24 RX ORDER — ATORVASTATIN CALCIUM 40 MG/1
40 TABLET, FILM COATED ORAL NIGHTLY
Status: DISCONTINUED | OUTPATIENT
Start: 2024-07-24 | End: 2024-07-25

## 2024-07-24 RX ORDER — ACETAMINOPHEN 500 MG
500 TABLET ORAL EVERY 4 HOURS PRN
Status: DISCONTINUED | OUTPATIENT
Start: 2024-07-24 | End: 2024-07-25

## 2024-07-24 RX ORDER — ONDANSETRON 2 MG/ML
4 INJECTION INTRAMUSCULAR; INTRAVENOUS EVERY 6 HOURS PRN
Status: DISCONTINUED | OUTPATIENT
Start: 2024-07-24 | End: 2024-07-25

## 2024-07-24 RX ORDER — SODIUM CHLORIDE 9 MG/ML
INJECTION, SOLUTION INTRAVENOUS CONTINUOUS
Status: DISCONTINUED | OUTPATIENT
Start: 2024-07-24 | End: 2024-07-25

## 2024-07-24 RX ORDER — LEVOTHYROXINE SODIUM 0.1 MG/1
100 TABLET ORAL
Status: DISCONTINUED | OUTPATIENT
Start: 2024-07-25 | End: 2024-07-25

## 2024-07-24 NOTE — CONSULTS
[facility]  Consultation    Lucianoancelmo Hernández Patient Status:  Emergency    10/23/1950 MRN [id number]   Location [facility] EMERGENCY DEPARTMENT Attending Aroldo Thompson MD   Hosp Day # 0 PCP Gavin Bernal MD       Reason for consultation;  Antiplatelet management  CAD     HPI:  This is a 73 year old male patient with PMH of CAD s/p PCI, PAF, HTN who presented with dizziness today, only when standing and black formed stools in his ostomy for several days. He reports no ABD pain (though he has left flank discomfort), or nausea. No CP or SOB.     MDM / Diagnostics reviewed & interpreted:  ECG shows sinus bradycardia    Assessment:    Dizziness  CAD s/p PCI  PAF  HTN    Plan:  -Presentation with dizziness with standing and black stool. Hb is minimally lower than prior, and it is questionable if he has GIB. Will defer to GI for further evaluation. Okay to hod ASA/Plavix if needed, since his coronary stenting was back in   -HR noted in the 40s, with ECG showing sinus bradycardia. This appears to be asymptomatic. No evidence of AV block, or arrhythmia. He has known Afib, with only occasional paroxysms, and is not on anticoagulation after discussion with Dr. Hernandez. We can continue to monitor on tele    Cardiology will follow.    Discussed with patient.     Please call with questions. Thank you for your consult.    Level of care: C5      Sheree Flores MD  Interventional Cardiology  [facility]    --------------------------------------------------------------------------------------------------------------------------------  ROS 10 systems reviewed, pertinent findings above.    History:  Past Medical History:    Abdominal hernia    Anxiety state    Arrhythmia    atrial fibrillation intermittently    Back problem    back surgery 20 years ago    Black stools    No longer a concern    BPH (benign prostatic hyperplasia)    Cancer (HCC)    GIST    Carcinoma of gastrointestinal  tract (HCC)    Coronary atherosclerosis    Depression    Diabetes (HCC)    per pcp not 11/28/22 diet controlled dm- pt denies any hx of dm    Disorder of thyroid    Esophageal reflux    Hearing impairment    Does not wear hearing aids    Hearing loss    Heart attack (HCC)    Heart palpitations    AF    High blood pressure    High cholesterol    Hx of motion sickness    > 30 yrs ago while on a boat    Hypothyroidism    Other and unspecified hyperlipidemia    Peripheral vascular disease (HCC)    Stented coronary artery    3 stents total    Thyroid disease    Visual impairment    glasses    Wears glasses     Past Surgical History:   Procedure Laterality Date    Angioplasty (coronary)  9-1-22    Back surgery  1998    Bowel resection  10-3-2017    Cath drug eluting stent  09/09/2022    LAD    Colonoscopy  2006    Colonoscopy N/A 04/18/2024    Procedure: COLONOSCOPY;  Surgeon: Quentin Llamas MD;  Location:  ENDOSCOPY    Colostomy  10/03/2017    Cystoscopy,insert ureteral stent      Hernia surgery Left 2006    Hernia surgery Left 1975    Hernia surgery Left 12/26/2019    Laparoscopy, surgical prostatectomy, retropubic radical, w/nerve sparing      Other surgical history  12/03/2015    fracture radiius and ulna  Right arm -     Other surgical history  10/03/2017    Pelvic exenteration to include 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 Resection subcutaneous fat with wire localization.    Part removal colon w end colostomy       Family History   Problem Relation Age of Onset    Alcohol and Other Disorders Associated Father     Cancer Mother         Breast    Other (Other) Sister         parkinsons    Heart Disorder Brother       reports that he quit  smoking about 6 years ago. His smoking use included cigarettes. He started smoking about 52 years ago. He has a 45 pack-year smoking history. He has never used smokeless tobacco. He reports current alcohol use of about 3.0 standard drinks of alcohol per week. He reports that he does not use drugs.    Objective:   Temp: 98.1 °F (36.7 °C)  Pulse: 45  Resp: 10  BP: 150/75    Intake/Output:   No intake or output data in the 24 hours ending 07/24/24 2184    Physical Exam:    General: NAD. Conversant.   HEENT: Normocephalic, atraumatic.  Neck: Supple.  Cardiac: RRR. Normal S1, S2 . No murmur.  Lungs: GAEB, no wheezing.  GI: Soft, non-distended  Extremities: Rt radial pulses 2+. No edema.  Skin: Warm and dry.      Sheree Flores MD  7/24/2024  4:24 PM

## 2024-07-24 NOTE — PLAN OF CARE
Pt Aox4  Pt denies pain  RA  SB  IV fluids   Pt admitted via cart and made familiar to room.   Pt's call light within reach,   Pt's bed alarm on and bed in lowest position   All of pt's question answered

## 2024-07-24 NOTE — ED INITIAL ASSESSMENT (HPI)
Pt noted black stool x 5 days, states has a GI appointment next week, states this morning had a near syncopal episode.  Pt c/o left flank pain since yesterday.   has a precancerous nodule in his stomach after his last scope.

## 2024-07-24 NOTE — CONSULTS
[facility]                       Gastroenterology Consultation-[facility]    Luciano Hernández Patient Status:  Inpatient    10/23/1950 MRN [id number]   Location [facility] 7NE-A Attending Adolfo Alamo MD   Hosp Day # 0 PCP Gavin Bernal MD     Reason for consultation: Black stool, anemia  HPI: This is a 73-year-old male with an extensive PMHx that includes CAD s/p PCI with stenting (Plavix-last dose  AM), AF, dyslipidemia, DM, and metastatic GIST s/p abd peritoneal resection which included total cystectomy, prostatectomy, end colostomy, and urostomy (10/2017) who is known to GI service from admission 2024 when admitted with dark stools + Hgb 7 from 13 s/p EGD/colonoscopy (Dr. RIO Llamas) revealing nodular esophagitis seen at the [facility] (BX unremarkable), 4 mm gastric polyp seen in the pylorus (bx hyperplastic polyp with focal intestinal metaplasia; H. Pylori neg), sliding hiatal hernia, normal duodenum to extent examined, normal TI, diverticulosis throughout the entire examined colon, and a few small-medium polyps seen in the colon however not removed d/t the nature of the exam; MRE with no areas of contrast extravasation to indicate active GI bleed.   PT returned to the ER today with dark stools via ostomy x 6-7 days.  He denies any increase in frequency of stool and denies diarrhea as all output has been formed.  No hematochezia.  Pt denies abd pain, nausea, vomiting, heartburn, dysphagia, and odynophagia.  No change in appetite.  Pt has been on daily baby ASA which he stopped 5 days ago due to darkening of stool; pt denies routine acid suppression. Pt denies chest pain or dyspnea however felt lightheaded this morning prompting ER evaluation.  Hgb 12.4 from 13.6 in May 2024, normal platelet, and normal BUN/creatinine.  PMHx:   Past Medical History:    Abdominal hernia    Anxiety state    Arrhythmia    atrial fibrillation intermittently    Back problem    back surgery 20 years  ago    Black stools    No longer a concern    BPH (benign prostatic hyperplasia)    Cancer (HCC)    GIST    Carcinoma of gastrointestinal tract (HCC)    Coronary atherosclerosis    Depression    Diabetes (HCC)    per pcp not 11/28/22 diet controlled dm- pt denies any hx of dm    Disorder of thyroid    Esophageal reflux    Hearing impairment    Does not wear hearing aids    Hearing loss    Heart attack (HCC)    Heart palpitations    AF    High blood pressure    High cholesterol    Hx of motion sickness    > 30 yrs ago while on a boat    Hypothyroidism    Other and unspecified hyperlipidemia    Peripheral vascular disease (HCC)    Stented coronary artery    3 stents total    Thyroid disease    Visual impairment    glasses    Wears glasses                PSHx:   Past Surgical History:   Procedure Laterality Date    Angioplasty (coronary)  9-1-22    Back surgery  1998    Bowel resection  10-3-2017    Cath drug eluting stent  09/09/2022    LAD    Colonoscopy  2006    Colonoscopy N/A 04/18/2024    Procedure: COLONOSCOPY;  Surgeon: Quentin Llamas MD;  Location:  ENDOSCOPY    Colostomy  10/03/2017    Cystoscopy,insert ureteral stent      Hernia surgery Left 2006    Hernia surgery Left 1975    Hernia surgery Left 12/26/2019    Laparoscopy, surgical prostatectomy, retropubic radical, w/nerve sparing      Other surgical history  12/03/2015    fracture radiius and ulna  Right arm -     Other surgical history  10/03/2017    Pelvic exenteration to include 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 Resection subcutaneous fat with wire localization.    Part removal colon w end colostomy       Medications:    sodium chloride 0.9% infusion  125 mL/hr Intravenous  Continuous    [COMPLETED] pantoprazole (Protonix) 40 mg in sodium chloride 0.9% PF 10 mL IV push  40 mg Intravenous Once    sodium chloride 0.9% infusion   Intravenous Continuous    ondansetron (Zofran) 4 MG/2ML injection 4 mg  4 mg Intravenous Q4H PRN    ALPRAZolam (Xanax) tab 0.5 mg  0.5 mg Oral TID PRN    atorvastatin (Lipitor) tab 40 mg  40 mg Oral Nightly    escitalopram (Lexapro) tab 10 mg  10 mg Oral Daily    folic acid (Folvite) tab 1 mg  1 mg Oral Daily    levothyroxine (Synthroid) tab 100 mcg  100 mcg Oral Before breakfast    melatonin tab 3 mg  3 mg Oral Nightly PRN    sodium chloride 0.9% infusion   Intravenous Continuous    acetaminophen (Tylenol Extra Strength) tab 500 mg  500 mg Oral Q4H PRN    polyethylene glycol (PEG 3350) (Miralax) 17 g oral packet 17 g  17 g Oral Daily PRN    sennosides (Senokot) tab 17.2 mg  17.2 mg Oral Nightly PRN    bisacodyl (Dulcolax) 10 MG rectal suppository 10 mg  10 mg Rectal Daily PRN    fleet enema (Fleet) 7-19 GM/118ML rectal enema 133 mL  1 enema Rectal Once PRN    ondansetron (Zofran) 4 MG/2ML injection 4 mg  4 mg Intravenous Q6H PRN    pantoprazole (Protonix) 40 mg in sodium chloride 0.9% PF 10 mL IV push  40 mg Intravenous Q12H     Allergies:   Allergies   Allergen Reactions    Radiology Contrast Iodinated Dyes HIVES, RESPIRATORY FAILURE and OTHER (SEE COMMENTS)     Originally with \"Barium enema for lower GI\" 50 years ago. He states he developed hives and went into respiratory arrest. Pt has since had CT iodinated contrast with 13 hr pre-meds and no break-through reactions, HUMBERTO Mera, Radiology RN.      Social HX:   Social History     Socioeconomic History    Marital status:     Number of children: 3   Occupational History    Occupation: RETIRED TEACHER   Tobacco Use    Smoking status: Former     Current packs/day: 0.00     Average packs/day: 1 pack/day for 45.0 years (45.0 ttl pk-yrs)     Types: Cigarettes     Start date: 9/22/1971     Quit date:  10/1/2017     Years since quittin.8    Smokeless tobacco: Never   Vaping Use    Vaping status: Never Used   Substance and Sexual Activity    Alcohol use: Yes     Alcohol/week: 3.0 standard drinks of alcohol     Types: 3 Standard drinks or equivalent per week     Comment: rarely    Drug use: No    Sexual activity: Not Currently   Other Topics Concern    Caffeine Concern Yes     Comment: coffee three times a day     Exercise No    Seat Belt No    Special Diet No    Stress Concern No    Weight Concern Yes   Social History Narrative    , lives alone    Has 2 daughters and 1 son - living close by     Social Determinants of Health     Financial Resource Strain: Low Risk  (2024)    Financial Resource Strain     Difficulty of Paying Living Expenses: Not hard at all   Food Insecurity: No Food Insecurity (4/15/2024)    Food Insecurity     Food Insecurity: Never true   Transportation Needs: No Transportation Needs (4/15/2024)    Transportation Needs     Lack of Transportation: No   Housing Stability: Low Risk  (4/15/2024)    Housing Stability     Housing Instability: No      FamHx: The patient has no family history of colon cancer or other gastrointestinal malignancies;  No family history of ulcer disease, or inflammatory bowel disease  ROS:  In addition to the pertinent positives described above:            Infectious Disease:  No chronic infections or recent fevers prior to the acute illness            Cardiovascular: + CAD s/p PCI with stenting (most recent 2022; Plavix), A-fib, dyslipidemia             Respiratory: No shortness of breath, asthma, copd, recurrent pneumonia            Hematologic: The patient reports no easy bruising, frequent gum bleeding or nose bleeding;  + anemia            Dermatologic: The patient reports no recent rashes or chronic skin disorders            Rheumatologic: The patient reports no history of chronic arthritis, myalgias, arthralgias            Genitourinary:  + BPH            Psychiatric: + depression, anxiety           Oncologic: + GIST           ENT: + hearing loss           Neurologic: The patient reports no history of seizure, stroke, or frequent headaches  PE: /74   Pulse 55   Temp 98.1 °F (36.7 °C) (Oral)   Resp 12   Ht 6' 1\" (1.854 m)   Wt 205 lb (93 kg)   SpO2 100%   BMI 27.05 kg/m²   Gen: AAO x 3, able to speak in complete sentences  HENT: EOMI, PERRL, oropharynx is clear with moist mucosal membranes  Eyes: Sclerae are anicteric  Neck:  Supple without nuchal rigidity  CV: Bradycardic, regular  Resp: Clear to auscultation bilaterally without wheezes; rubs, rhonchi, or rales  Abdomen: Soft, non-tender, non-distended with the presence of bowel sounds; No hepatosplenomegaly; no rebound or guarding; No ascites is clinically apparent; no tympany to percussion, left-sided colostomy with scant black drainage  Ext: No peripheral edema or cyanosis  Skin: Warm and dry  Psychiatric: Appropriate mood and congruent affect without obvious depression or anxiety  Labs:   Lab Results   Component Value Date    WBC 9.6 07/24/2024    HGB 12.4 07/24/2024    HCT 36.1 07/24/2024    .0 07/24/2024    CREATSERUM 1.04 07/24/2024    BUN 19 07/24/2024     07/24/2024    K 3.7 07/24/2024     07/24/2024    CO2 23.0 07/24/2024     07/24/2024    CA 9.7 07/24/2024    ALB 4.5 07/24/2024    ALKPHO 107 07/24/2024    BILT 0.5 07/24/2024    AST 22 07/24/2024    ALT 22 07/24/2024     Recent Labs   Lab 07/24/24  1246   *   BUN 19   CREATSERUM 1.04   CA 9.7      K 3.7      CO2 23.0     Recent Labs   Lab 07/24/24  1246   RBC 4.19   HGB 12.4*   HCT 36.1*   MCV 86.2   MCH 29.6   MCHC 34.3   RDW 14.4   NEPRELIM 6.79   WBC 9.6   .0       Recent Labs   Lab 07/24/24  1246   ALT 22   AST 22       Impression:  73-year-old male with an extensive Hx including CAD s/p PCI with stenting (Plavix-last dose 7/24 AM), AF, dyslipidemia, DM, and metastatic GIST s/p abd  peritoneal resection which included total cystectomy, prostatectomy, end colostomy, and urostomy (10/2017) known to GI service from admission 4/2024 when admitted with dark stools + Hgb 7 from 13 s/p EGD/colonoscopy (Dr. RIO Llamas) with no source of bleeding being identified and MRE unremarkable for contrast extravasation.  Patient now returned with recurrent dark stools x 6 days.  No additional GI symptoms.  Hgb 12.4 from 13.6 in May 2024, normal platelet, and normal BUN/creatinine.  Can plan for diagnostic push enteroscopy to assess for potential sources of bleeding including ulcerations, AVMs, polyps, IBD, and neoplasm  The risks, benefits, alternatives of the procedure including the risks of anesthesia, bleeding, perforation, missed lesions, need for surgery, and infection were discussed with the patient. He expressed understanding of the risks and was agreeable to proceed.    Recommendations:     1. Plan for diagnostic push enteroscopy in AM under MAC with Dr. REBEKAH Llamas  2.  Cardiac diet today; NPO after midnight with sips of water for essential medications  3. Protonix 40 mg IV twice daily  4. Please hold Plavix if the risk remains acceptable  5.  Serial Hgb monitoring to transfuse PRBC as needed to maintain Hgb greater than 8  6.  Stat INR to blood collected in the ER; repeat CBC, BMP, Mg, PT/INR in AM transfusing PRBC as needed and correcting electrolytes per hospitalist recommendations    Thank you for the consultation, we will follow the patient with you.  Attending addendum (Dr. REBEKAH Llamas) to follow later today and provide formal, final recommendations at that time   HUMPHREY Butler  4:49 PM  7/24/2024  [facility]  [phone]    GI attending addendum:    I have personally seen and examined this patient and agree with above nurse practitioner's assessment and recommendations.     Briefly, patient is a 72-year-old male with chronic medical conditions coronary artery disease  status post stenting currently on Plavix with last dose this morning, A-fib, and metastatic GIST with abdominal peritoneal resection as above that we were consulted today for dark stools.  Patient notes he has seen dark stools over the past 6 days and denies any iron or Pepto-Bismol use.  EGD and colonoscopy in April 2024 was overall unremarkable and MR JHON was also unremarkable.  On physical exam, patient was resting comfortably in bed.  His abdomen is soft, nontender, and nondistended.  Patient with dark stools and mild anemia.  Will need to rule out upper GI bleed.  Will tentatively plan for diagnostic push enteroscopy tomorrow given patient is currently on Plavix.  If any bleeding lesions or sources for his anemia or melena are noted, then may need to repeat endoscopic procedure for therapeutics once off of Plavix.  Patient is aware of this as well.  Hold Plavix if okay with primary.  If repeat EGD is needed this will need to be held for 5 days.  In the meantime continue to monitor hemoglobin and transfuse for hemoglobin less than 8 given his coronary disease history.  Continue to monitor for signs of overt bleeding.  Will also treat with PPI IV twice daily.    Ford Llamas,   12:16 AM  7/25/2024  [facility]  [phone]

## 2024-07-24 NOTE — H&P
[facility]     Luciano Hernández Patient Status:  Emergency    10/23/1950 MRN [id number]   Location [facility] EMERGENCY DEPARTMENT Attending Aroldo Thompson MD   Hosp Day # 0 PCP Gavin Benral MD     Chief Complaint: Black stools from ostomy    Subjective:    History of Present Illness:     Luciano Hernández is a 73 year old male with a PMH of BPH, GIST, afib, DMII, CAD, and hypothyroidism presented to ED due to dark stools from ostomy over the past week. Patient had been feeling well despite black stools this week but developed light-headedness this morning prompting ED evaluation. Denies abdominal pain/N/V. Denies CP/SOB. Patient recently admitted for the same in April. GI workup including EGD and colonoscopy was negative for active bleeding.     History/Other:    Past Medical History:  Past Medical History:    Abdominal hernia    Anxiety state    Arrhythmia    atrial fibrillation intermittently    Back problem    back surgery 20 years ago    Black stools    No longer a concern    BPH (benign prostatic hyperplasia)    Cancer (HCC)    GIST    Carcinoma of gastrointestinal tract (HCC)    Coronary atherosclerosis    Depression    Diabetes (HCC)    per pcp not 22 diet controlled dm- pt denies any hx of dm    Disorder of thyroid    Esophageal reflux    Hearing impairment    Does not wear hearing aids    Hearing loss    Heart attack (HCC)    Heart palpitations    AF    High blood pressure    High cholesterol    Hx of motion sickness    > 30 yrs ago while on a boat    Hypothyroidism    Other and unspecified hyperlipidemia    Peripheral vascular disease (HCC)    Stented coronary artery    3 stents total    Thyroid disease    Visual impairment    glasses    Wears glasses     Past Surgical History:   Past Surgical History:   Procedure Laterality Date    Angioplasty (coronary)  22    Back surgery      Bowel resection  10-3-2017    Cath drug eluting stent   09/09/2022    LAD    Colonoscopy  2006    Colonoscopy N/A 04/18/2024    Procedure: COLONOSCOPY;  Surgeon: Quentin Llamas MD;  Location:  ENDOSCOPY    Colostomy  10/03/2017    Cystoscopy,insert ureteral stent      Hernia surgery Left 2006    Hernia surgery Left 1975    Hernia surgery Left 12/26/2019    Laparoscopy, surgical prostatectomy, retropubic radical, w/nerve sparing      Other surgical history  12/03/2015    fracture radiius and ulna  Right arm -     Other surgical history  10/03/2017    Pelvic exenteration to include 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 Resection subcutaneous fat with wire localization.    Part removal colon w end colostomy        Family History:   Family History   Problem Relation Age of Onset    Alcohol and Other Disorders Associated Father     Cancer Mother         Breast    Other (Other) Sister         parkinsons    Heart Disorder Brother      Social History:    reports that he quit smoking about 6 years ago. His smoking use included cigarettes. He started smoking about 52 years ago. He has a 45 pack-year smoking history. He has never used smokeless tobacco. He reports current alcohol use of about 3.0 standard drinks of alcohol per week. He reports that he does not use drugs.     Allergies:   Allergies   Allergen Reactions    Radiology Contrast Iodinated Dyes HIVES, RESPIRATORY FAILURE and OTHER (SEE COMMENTS)     Originally with \"Barium enema for lower GI\" 50 years ago. He states he developed hives and went into respiratory arrest. Pt has since had CT iodinated contrast with 13 hr pre-meds and no break-through reactions, HUMBERTO Mera, Radiology RN.        Medications:    Current Facility-Administered Medications  on File Prior to Encounter   Medication Dose Route Frequency Provider Last Rate Last Admin    [COMPLETED] iopamidol 76% (ISOVUE-370) injection for power injector  100 mL Intravenous ONCE PRN Gavin Bernal MD   100 mL at 24 1035     Current Outpatient Medications on File Prior to Encounter   Medication Sig Dispense Refill    levothyroxine 100 MCG Oral Tab Take 1 tablet (100 mcg total) by mouth before breakfast. TAKE ON EMPTY STOMACH 30 tablet 3    ESCITALOPRAM 10 MG Oral Tab TAKE 1 TABLET BY MOUTH EVERY DAY 90 tablet 1    ASPIRIN 81 OR Take by mouth.      clopidogrel 75 MG Oral Tab Take 1 tablet (75 mg total) by mouth daily.      ALPRAZolam (XANAX) 0.5 MG Oral Tab Take 1 tablet (0.5 mg total) by mouth 3 (three) times daily as needed for Sleep or Anxiety. 90 tablet 1    predniSONE 50 MG Oral Tab Take 13 hr, 7 hr, and 1 hr prior to CT scan. 3 tablet 0    diphenhydrAMINE (BENADRYL ALLERGY) 25 MG Oral Cap 2 tabs PO 1 hour prior to scan. 2 capsule 0    Ripretinib (QINLOCK) 50 MG Oral Tab Take 150 mg by mouth daily.      [] folic acid 1 MG Oral Tab Take 1 tablet (1 mg total) by mouth daily. 30 tablet 0    [] pantoprazole 40 MG Oral Tab EC Take 1 tablet (40 mg total) by mouth daily. 30 tablet 0    atorvastatin 40 MG Oral Tab Take 1 tablet (40 mg total) by mouth nightly.         Review of Systems:   A comprehensive review of systems was completed.    Pertinent positives and negatives noted in the HPI.    Objective:   Physical Exam:    /81   Pulse (!) 43   Temp 98.1 °F (36.7 °C) (Oral)   Resp 13   Ht 185.4 cm (6' 1\")   Wt 205 lb (93 kg)   SpO2 100%   BMI 27.05 kg/m²   General: No acute distress, Alert  Respiratory: No rhonchi, no wheezes  Cardiovascular: S1, S2. Bradycardic.   Abdomen: Soft, Non-tender, non-distended, positive bowel sounds. + Ostomy.   Neuro: No new focal deficits  Extremities: No edema    Results:    Labs:      Labs Last 24 Hours:    Recent Labs   Lab 24  1246   RBC  4.19   HGB 12.4*   HCT 36.1*   MCV 86.2   MCH 29.6   MCHC 34.3   RDW 14.4   NEPRELIM 6.79   WBC 9.6   .0       Recent Labs   Lab 07/24/24  1246   *   BUN 19   CREATSERUM 1.04   EGFRCR 76   CA 9.7   ALB 4.5      K 3.7      CO2 23.0   ALKPHO 107   AST 22   ALT 22   BILT 0.5   TP 7.2       Lab Results   Component Value Date    INR 1.07 01/30/2024    INR 1.01 10/02/2022    INR 1.28 (H) 02/17/2021       Recent Labs   Lab 07/24/24  1246   TROPHS 11       No results for input(s): \"TROP\", \"PBNP\" in the last 168 hours.    No results for input(s): \"PCT\" in the last 168 hours.    Imaging: Imaging data reviewed in Epic.    Assessment & Plan:      #Dark ostomy output concerning for UGIB  -BID IV PPI  -NPO for now  -GI eval  -Monitor hgb   -EGD 4/16 revealing esophagitis, no active bleeding  -Colonoscopy 4/18 without active bleeding     #Metastatic GIST  -Holding chemo agent for now - will need onc consult if to be restarted IP     #Hypothyroidism  -Levothyroxine     #CAD  -ASA and Plavix on hold for now    #Pre-diabetes     Plan of care discussed with patient and ED physician.     Juan Miguel Shepherd DO    Supplementary Documentation:     The 21st Century Cures Act makes medical notes like these available to patients in the interest of transparency. Please be advised this is a medical document. Medical documents are intended to carry relevant information, facts as evident, and the clinical opinion of the practitioner. The medical note is intended as peer to peer communication and may appear blunt or direct. It is written in medical language and may contain abbreviations or verbiage that are unfamiliar.

## 2024-07-24 NOTE — HISTORICAL OFFICE NOTE
[facility]  [address] [place] [address], 4th floor, [place] [address]  [phone]      Chepe Hernández  Progress Note  Demographics:  Name: Chepe Hernández YOB: 1950  Age: 73, Male Medical Record No: [id number]  Visited Date/Time: 01/18/2024 11:10 AM    Chief Complaints  6 month follow up  CRA for resection of perineal soft tissue  History of Present Illness  73-year-old male with first diagnosis of A. fib in 2020 when his pulse went to the 180s.  More recently he has had A. fib with controlled ventricular response but hypotension and severe symptoms of shortness of breath and palpitations.  Patient recently had a STEMI in 2022 with RCA PCI.  He is on Brilinta for this.    Currently he has very infrequent episodes of A. fib but is not on any medications to control this due to hypotension.  He is on midodrine for his A. fib induced hypotension    Prior visit:  Patient is strongly wanting to be off anticoagulation due to bleeding risk.  We discussed that A-fib is a chronic disease and oftentimes patient can have episodes and he is very adamant that he knows exactly when he is in A-fib.  Given his adamant stance we decided to come up with other options to monitor his A-fib    Current visit January 18, 2024  - Patient is doing well.  His heart rate was running slow based on his Apple Watch while awake so he stopped his metoprolol and has gone up and he feels great    Patient denies chest pain, shortness of breath, dizziness, syncope or palpitations.    Patient is having a peroneal surgery that is a minor outpatient surgery but will need to be off medications/antiplatelet anticoagulants  Cardiac risk factors Former smoker  Past Medical History  1.S/P drug eluting coronary stent placement - DESC  2.AF (atrial fibrillation)  3.Major depressive disorder, recurrent, unspecified  4.Atherosclerosis of native arteries of extremities with rest pain, left leg  5.Hypertension, essential,  benign  6.Enlarged thoracic aorta  7.Enlargement of aortic root  8.Peripheral vascular disease  9.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  10.Gastrointestinal stromal tumor, unspecified site  11.Parastomal hernia with obstruction, without gangrene  12.Diet-controlled diabetes mellitus  13.Perineal pain in male  14.Attention to urostomy  15.Colostomy status  16.Benign prostatic hyperplasia with urinary retention  17.Hyperlipidemia, mixed  18.GIST (gastrointestinal stroma tumor), malignant, colon  19.Acute MI, inferior wall  Family History  1. Brother - Heart disorder  Social History  Smoking status Former [id number]/2021 (End Date)  Tobacco usage - No (Non-smoker for personal reasons (finding))  Review of systems  Cardiovascular No history of Chest pain, KIM, Palpitations, Syncope, PND, Orthopnea, Edema and Claudication  Physical Examination  Vitals Right Arm Sitting  / 68 mmHg, Pulse rate 56 bpm, Height in 6' 1\", BMI: 28.8, Weight in 218 lbs (or) 98.88 kgs and BSA : 2.28 cc/m²  General Appearance No Acute Distress, Well groomed and Normal Body habitus  Cardiovascular   EKG/Other abnormalities  General - NAD  PEERLA/EOMI  JVD - normal  CTA Bilaterally  CV- RRR, S1/S2, No murmurs  GI- Soft, Nontender  Extremities normal pulses  Mood/Affect Congruent  Skin no lesions  Joint/Musculoskeletal - Normal  Allergies  1.Radiology Contrast iodinated dyes (Reaction:HIVES, RESPIRATORY FAILURE, Lower GI ANEMIA , Severity:Severe)  Medications  1.atorvastatin 40 mg tablet, Take 1 tablet orally once a day.  2.clopidogreL 75 mg tablet, Take 1 tablet orally once a day.  3.escitalopram 10 mg tablet, Take 1 tablet orally once a day.  4.levothyroxine 100 mcg tablet, Take 1 tablet orally once a day.  5.Qinlock 50 mg tablet, Take 3 tablets orally once a day.  Impression  1.S/P drug eluting coronary stent placement - DESC  2.AF (atrial fibrillation)  3.Major depressive disorder, recurrent, unspecified  4.Atherosclerosis of native arteries of  extremities with rest pain, left leg  5.Hypertension, essential, benign  6.Enlarged thoracic aorta  7.Enlargement of aortic root  8.Peripheral vascular disease  9.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  10.Gastrointestinal stromal tumor, unspecified site  11.Parastomal hernia with obstruction, without gangrene  12.Diet-controlled diabetes mellitus  13.Perineal pain in male  14.Attention to urostomy  15.Colostomy status  16.Benign prostatic hyperplasia with urinary retention  17.Hyperlipidemia, mixed  18.GIST (gastrointestinal stroma tumor), malignant, colon  19.Acute MI, inferior wall  Assessment & Plan  ===============================  Cardiac Testing Personally Reviewed    ECG Reviewed -sinus bradycardia 49 bpm left anterior fascicular block    Echo Results echocardiogram in October showed an EF of 60%.    Stress Test Results []    Monitor Results []    EP Procedures: []  ==============================    Problem List:    #Paroxysmal atrial fibrillation  -Highly symptomatic  - Chads Vascor of 3 for age, hypertension, vascular disease  - Currently off Eliquis and metoprolol  - Not on any antiarrhythmics due to the low heart rate  -Plan to discontinue Eliquis today  - Our compromise was that the patient would wear his Apple Watch all the time and only charged during the day for an hour and thus if he has any A-fib we will treat the A-fib with blood thinners immediately.  - We also discussed the possibility of an implantable loop recorder.  If he calls back to schedule this please schedule him for an implantable loop recorder     #Coronary artery disease RCA stent STEMI  -On Plavix     #Essential hypertension     #Colostomy urostomy     # Cardiac risk assessment  - I have inquired with Dr. Flores about the Plavix.  Outside of that 1 issue he would be considered acceptable risk to proceed with his surgery from an arrhythmia perspective.  Once we know the answer on the Plavix he will get be given full risk  assessment      Continue current meds except as outlined above.  =====================================    Check out Items:  Follow up 6 months  Medications Ordered  1.atorvastatin 40 mg tablet, Take 1 tablet orally once a day.  Labs and Diagnostics ordered  1.EKG (electrocardiogram) (Today)  Nurses documentation  Refills: none  Upcoming surgeries: resection of perineal soft tissue sx 1/30/24  Use of assistive device: none  Verbal order for EKG: yes  (MB, JACK)       Patient instructions  Hold Plavix 7 days before surgery and resume as advised by surgeon   Follow up with Dr. Hernandez in 6 months  Lab Details  LIPID PANEL  01/20/2023 03:24:54 PM  CHOLESTEROL 102 <200 mg/dL  F  HDL CHOL 39 40-59 mg/dL L F  TRIGLYCERIDES 76  mg/dL  F  LDL CHOLESTROL 47 <100 mg/dL  F  VLDL 11 0-30 mg/dL  F  NON HDL CHOL 63 <130 mg/dL  F  FASTING PATIENT LIPID ANSWER Yes   F  Diagnostics Details  Holter Monitoring 02/17/2023  1.This is an excellent quality study.    2.Predominant rhythm is sinus bradycardia.    3.The minimum heart rate recorded was 40 beats / minute (2/18/2023). The maximum heart rate is 131 beats / minute (2/18/2023). The mean heart rate is 55 beats / minute.    4.A FIB [place] 0%    Exercise Treadmill Testing 10/20/2022  1.Stress EKG is normal. No ischemic changes.    2.Maximal exercise treadmill test (MPHR : 86 %).    3.The functional capacity is fair (7.0 METs).    4.The heart rate recovery is abnormal due to development of atrial fibrillation    5.There were frequent isolated PACs and developed atrial fibrillation in recovery and remianed in it    6.The study quality is good.    CPOE Orders carried out by: Sheree Flores MD and Madelaine Bertrand  Care Providers: Kun Hernandez MD, Madelaine Bertrand and Nichole BONILLA  Electronically Authenticated by  Kun Hernandez MD  01/18/2024 12:42:06 PM  Disclaimer: Components of this note were documented using voice recognition system and are subject to errors not  corrected at proofreading. Contact the author of this note for any clarifications.

## 2024-07-24 NOTE — IMAGING NOTE
[facility] Holter 2/2023  1. This is an excellent quality study.   2. Predominant rhythm is sinus bradycardia.   3. The minimum heart rate recorded was 40 beats / minute (2/18/2023). The maximum heart rate is 131 beats / minute (2/18/2023). The mean heart rate is 55 beats / minute.   4. A FIB [place] 0%    Exercise stress 10/2022       1. Stress EKG is normal.  No ischemic changes.  2. Maximal exercise treadmill test (MPHR : 86 %).  3. The functional capacity is fair (7.0 METs).  4. The heart rate recovery is abnormal due to development of atrial fibrillation  5. There were frequent isolated PACs and developed atrial fibrillation in recovery and remianed in it  6. The study quality is good.

## 2024-07-24 NOTE — ED PROVIDER NOTES
Patient Seen in: [facility] Emergency Department      History     Chief Complaint   Patient presents with    GI Bleeding    Hypotension     Stated Complaint: black stools hypotensive    Subjective:   HPI    This is a 73-year-old male who has a history of just cancer.  He has had colostomy before.  The patient has history of coronary disease is on aspirin, Plavix.  The patient has stated that last 5 to 6 days he has had some black stools material that come out of this colostomy.  Over the last day he said today when he still stands up he feels dizzy.  He never actually passed out he had no chest pain associated with it.  He did call his primary care physician, GI specialist they told him to come in for further evaluation.  He has no complaints of pain in his abdomen chest he is not short of breath.  He does have a little bit of a left flank pain which she says has been there for several days and he did get a CT on July 22 just recently.  He denies any dysuria or diarrhea.  Is any fever chills cough or chest pain.  Objective:   Past Medical History:    Abdominal hernia    Anxiety state    Arrhythmia    atrial fibrillation intermittently    Back problem    back surgery 20 years ago    Black stools    No longer a concern    BPH (benign prostatic hyperplasia)    Cancer (HCC)    GIST    Carcinoma of gastrointestinal tract (HCC)    Coronary atherosclerosis    Depression    Diabetes (HCC)    per pcp not 11/28/22 diet controlled dm- pt denies any hx of dm    Disorder of thyroid    Esophageal reflux    Hearing impairment    Does not wear hearing aids    Hearing loss    Heart attack (HCC)    Heart palpitations    AF    High blood pressure    High cholesterol    Hx of motion sickness    > 30 yrs ago while on a boat    Hypothyroidism    Other and unspecified hyperlipidemia    Peripheral vascular disease (HCC)    Stented coronary artery    3 stents total    Thyroid disease    Visual impairment    glasses    Wears glasses               Past Surgical History:   Procedure Laterality Date    Angioplasty (coronary)  22    Back surgery  1998    Bowel resection  10-3-2017    Cath drug eluting stent  2022    LAD    Colonoscopy      Colonoscopy N/A 2024    Procedure: COLONOSCOPY;  Surgeon: Quentin Llamas MD;  Location:  ENDOSCOPY    Colostomy  10/03/2017    Cystoscopy,insert ureteral stent      Hernia surgery Left     Hernia surgery Left 1975    Hernia surgery Left 2019    Laparoscopy, surgical prostatectomy, retropubic radical, w/nerve sparing      Other surgical history  2015    fracture radiius and ulna  Right arm -     Other surgical history  10/03/2017    Pelvic exenteration to include en-bloc resection of pelvic gastrointestinal stromal tumor with abdominoperineal resection, total cystectomy, prostatectomy, end colostomy, and urostomy    Other surgical history  2019    DIAGNOSTIC LAPAROSCOPY, ROBOTIC LYSIS OF ADHESION    Other surgical history  2024    Exploration perineum. Resection subcutaneous fat with wire localization.    Part removal colon w end colostomy                  Social History     Socioeconomic History    Marital status:     Number of children: 3   Occupational History    Occupation: RETIRED TEACHER   Tobacco Use    Smoking status: Former     Current packs/day: 0.00     Average packs/day: 1 pack/day for 45.0 years (45.0 ttl pk-yrs)     Types: Cigarettes     Start date: 1971     Quit date: 10/1/2017     Years since quittin.8    Smokeless tobacco: Never   Vaping Use    Vaping status: Never Used   Substance and Sexual Activity    Alcohol use: Yes     Alcohol/week: 3.0 standard drinks of alcohol     Types: 3 Standard drinks or equivalent per week     Comment: rarely    Drug use: No    Sexual activity: Not Currently   Other Topics Concern    Caffeine Concern Yes     Comment: coffee three times a day     Exercise No    Seat Belt No    Special Diet No     Stress Concern No    Weight Concern Yes   Social History Narrative    , lives alone    Has 2 daughters and 1 son - living close by     Social Determinants of Health     Financial Resource Strain: Low Risk  (4/22/2024)    Financial Resource Strain     Difficulty of Paying Living Expenses: Not hard at all   Food Insecurity: No Food Insecurity (4/15/2024)    Food Insecurity     Food Insecurity: Never true   Transportation Needs: No Transportation Needs (4/15/2024)    Transportation Needs     Lack of Transportation: No   Housing Stability: Low Risk  (4/15/2024)    Housing Stability     Housing Instability: No              Review of Systems    Positive for stated Chief Complaint: GI Bleeding and Hypotension    Other systems are as noted in HPI.  Constitutional and vital signs reviewed.      All other systems reviewed and negative except as noted above.    Physical Exam     ED Triage Vitals [07/24/24 1234]   /76   Pulse 70   Resp 14   Temp 98.1 °F (36.7 °C)   Temp src Oral   SpO2 100 %   O2 Device None (Room air)       Current Vitals:   Vital Signs  BP: 147/69  Pulse: (!) 45  Resp: 10  Temp: 98.1 °F (36.7 °C)  Temp src: Oral  MAP (mmHg): 96    Oxygen Therapy  SpO2: 100 %  O2 Device: None (Room air)            Physical Exam    General: .  Patient is in no respiratory distress.  At this present time.  The patient is in no respiratory distress    HEENT: Atraumatic, conjunctiva are slightly pale pale.  There is no icterus.  Oral mucosa Is wet.  No facial trauma.  The neck is supple.    LUNGS: Clear to auscultation, there is no wheezing or retraction.  No crackles.    CV: Cardiovascular is regular without murmurs or rubs.    ABD: The abdomen is soft nondistended nontender.  There is no rebound.  There is no guarding.  Colostomy is producing black material noted on the left side of his abdomen.  EXT: There is good pulses bilaterally.  There is no calf tenderness.  There is no rash noted.  There is no  edema    NEURO: Alert and oriented x4.  Muscle strength and sensory exam is grossly normal.  And the patient is neurologically intact with no focal findings.  Normal gait.    ED Course     Labs Reviewed   COMP METABOLIC PANEL (14) - Abnormal; Notable for the following components:       Result Value    Glucose 176 (*)     Globulin  2.7 (*)     All other components within normal limits   CBC W/ DIFFERENTIAL - Abnormal; Notable for the following components:    HGB 12.4 (*)     HCT 36.1 (*)     All other components within normal limits   TROPONIN I HIGH SENSITIVITY - Normal   CBC WITH DIFFERENTIAL WITH PLATELET    Narrative:     The following orders were created for panel order CBC With Differential With Platelet.  Procedure                               Abnormality         Status                     ---------                               -----------         ------                     CBC W/ DIFFERENTIAL[[id number]]          Abnormal            Final result                 Please view results for these tests on the individual orders.   TSH W REFLEX TO FREE T4   TYPE AND SCREEN    Narrative:     The following orders were created for panel order Type and screen.  Procedure                               Abnormality         Status                     ---------                               -----------         ------                     ABORH (Blood Type)[[id number]]                               Final result               Antibody Screen[[id number]]                                  Final result                 Please view results for these tests on the individual orders.   ABORH (BLOOD TYPE)   ANTIBODY SCREEN   RAINBOW DRAW LAVENDER   RAINBOW DRAW LIGHT GREEN   RAINBOW DRAW BLUE               The patient was placed on monitors, IV was started, blood was drawn.     Clinically findings of what seems to be is upper GI bleed.         MDM      Reviewed the CT findings from 2 days ago which showed  Admission disposition: 7/24/2024   2:24 PM           ONCLUSION:    1. Unfavorable changes.   2. Increasing presacral space soft tissue, with new pelvic nodule and left internal iliac chain adenopathy.   3. Stable subtle liver foci.   4. Stable right adrenal nodule.   5. Details as above continued clinical correlation recommended.        The EKG shows sinus bradycardia there is an intraventricular conduction delay.  There is some nonspecific ST changes noted.  There is no acute ST elevations or ischemic findings.  The rest of the EKG including rate rhythm axis and intervals I agree with the EKG report . The rate is 49  When compared to an old EKG from April 2024 there is no significant changes it does show sinus rhythm there is a right bundle branch bloated on the old EKG.        Patient's troponin is negative blood type is no negative.  CBC shows white count 9.6 hemoglobin 12, platelet count of 270 comprehensive shows no significant abdomen troponins negative.          Protonix his IV was given.  He was typed and screened patient's case [facility] Shepherd, also discussed this case with the cardiologist.  The patient has episodes where he does drop down in the 40s and 1 episode he will drop down to the 39 but he was asymptomatic his blood pressure was stable while he has been here.  But felt that the patient will probably need to go to a monitored bed for further evaluation.  He is not dizzy right now he is not having any chest pain his troponins negative.  Will order thyroid I discussed this case with Dr. Llamas.  The patient was given IV Protonix patient will be kept NPO.  Patient will be admitted for further evaluation treatment.  80 aspirin, Plavix Wix will be held.         I have also discussed this case with the [facility]N from [facility] as the patient did drop his heart rates down into the 40s but he had no hypotension associated with it as far as we can tell.  But will admit him to the CTU I discussed this case with  charge nurse, nursing administration to make sure that the patient does go to a cardiac telemetry bed.  Patient be admitted for further evaluation treatment.    My nurses did do Gastroccult and it is heme positive.  She was given IV fluids kept NPO.    Medical Decision Making      Disposition and Plan     Clinical Impression:  1. Upper GI bleed    2. Dizziness    3. Bradycardia       #3 dizziness  Bradycardia  Disposition:  Admit  7/24/2024  2:24 pm    Follow-up:  No follow-up provider specified.        Medications Prescribed:  Current Discharge Medication List                            Hospital Problems       Present on Admission  Date Reviewed: 5/24/2024            ICD-10-CM Noted POA    * (Principal) Upper GI bleed K92.2 7/24/2024 Unknown    Hyperglycemia R73.9 7/24/2024 Yes

## 2024-07-24 NOTE — ED QUICK NOTES
Orders for admission, patient is aware of plan and ready to go upstairs. Any questions, please call ED CHOLO reyna at extension [phone].     Patient Covid vaccination status: Fully vaccinated     COVID Test Ordered in ED: None    COVID Suspicion at Admission: N/A    Running Infusions:    sodium chloride 125 mL/hr (07/24/24 1428)        Mental Status/LOC at time of transport: a&ox4    Other pertinent information:   CIWA score: N/A   NIH score:  N/A

## 2024-07-25 ENCOUNTER — ANESTHESIA EVENT (OUTPATIENT)
Dept: ENDOSCOPY | Facility: HOSPITAL | Age: 74
End: 2024-07-25
Payer: MEDICARE

## 2024-07-25 ENCOUNTER — ANESTHESIA (OUTPATIENT)
Dept: ENDOSCOPY | Facility: HOSPITAL | Age: 74
End: 2024-07-25
Payer: MEDICARE

## 2024-07-25 VITALS
DIASTOLIC BLOOD PRESSURE: 66 MMHG | TEMPERATURE: 98 F | WEIGHT: 205 LBS | BODY MASS INDEX: 27.17 KG/M2 | RESPIRATION RATE: 12 BRPM | HEART RATE: 48 BPM | OXYGEN SATURATION: 86 % | SYSTOLIC BLOOD PRESSURE: 131 MMHG | HEIGHT: 73 IN

## 2024-07-25 LAB
ANION GAP SERPL CALC-SCNC: 6 MMOL/L (ref 0–18)
ATRIAL RATE: 49 BPM
BUN BLD-MCNC: 18 MG/DL (ref 9–23)
CALCIUM BLD-MCNC: 8.7 MG/DL (ref 8.7–10.4)
CHLORIDE SERPL-SCNC: 115 MMOL/L (ref 98–112)
CO2 SERPL-SCNC: 21 MMOL/L (ref 21–32)
CREAT BLD-MCNC: 0.87 MG/DL
EGFRCR SERPLBLD CKD-EPI 2021: 91 ML/MIN/1.73M2 (ref 60–?)
ERYTHROCYTE [DISTWIDTH] IN BLOOD BY AUTOMATED COUNT: 14.5 %
GLUCOSE BLD-MCNC: 132 MG/DL (ref 70–99)
HCT VFR BLD AUTO: 28.7 %
HGB BLD-MCNC: 10.3 G/DL
HGB BLD-MCNC: 9.7 G/DL
MAGNESIUM SERPL-MCNC: 1.9 MG/DL (ref 1.6–2.6)
MCH RBC QN AUTO: 29 PG (ref 26–34)
MCHC RBC AUTO-ENTMCNC: 33.8 G/DL (ref 31–37)
MCV RBC AUTO: 85.7 FL
OSMOLALITY SERPL CALC.SUM OF ELEC: 298 MOSM/KG (ref 275–295)
P AXIS: 52 DEGREES
P-R INTERVAL: 204 MS
PLATELET # BLD AUTO: 206 10(3)UL (ref 150–450)
POTASSIUM SERPL-SCNC: 3.7 MMOL/L (ref 3.5–5.1)
Q-T INTERVAL: 460 MS
QRS DURATION: 122 MS
QTC CALCULATION (BEZET): 415 MS
R AXIS: -46 DEGREES
RBC # BLD AUTO: 3.35 X10(6)UL
SODIUM SERPL-SCNC: 142 MMOL/L (ref 136–145)
T AXIS: 74 DEGREES
VENTRICULAR RATE: 49 BPM
WBC # BLD AUTO: 7.2 X10(3) UL (ref 4–11)

## 2024-07-25 PROCEDURE — 0DJ08ZZ INSPECTION OF UPPER INTESTINAL TRACT, VIA NATURAL OR ARTIFICIAL OPENING ENDOSCOPIC: ICD-10-PCS | Performed by: STUDENT IN AN ORGANIZED HEALTH CARE EDUCATION/TRAINING PROGRAM

## 2024-07-25 PROCEDURE — 99239 HOSP IP/OBS DSCHRG MGMT >30: CPT | Performed by: STUDENT IN AN ORGANIZED HEALTH CARE EDUCATION/TRAINING PROGRAM

## 2024-07-25 RX ORDER — FLUCONAZOLE 100 MG/1
200 TABLET ORAL DAILY
Status: DISCONTINUED | OUTPATIENT
Start: 2024-07-26 | End: 2024-07-25

## 2024-07-25 RX ORDER — SODIUM CHLORIDE, SODIUM LACTATE, POTASSIUM CHLORIDE, CALCIUM CHLORIDE 600; 310; 30; 20 MG/100ML; MG/100ML; MG/100ML; MG/100ML
INJECTION, SOLUTION INTRAVENOUS CONTINUOUS PRN
Status: DISCONTINUED | OUTPATIENT
Start: 2024-07-25 | End: 2024-07-25 | Stop reason: SURG

## 2024-07-25 RX ORDER — EPHEDRINE SULFATE 50 MG/ML
INJECTION INTRAVENOUS AS NEEDED
Status: DISCONTINUED | OUTPATIENT
Start: 2024-07-25 | End: 2024-07-25 | Stop reason: SURG

## 2024-07-25 RX ORDER — LIDOCAINE HYDROCHLORIDE 10 MG/ML
INJECTION, SOLUTION EPIDURAL; INFILTRATION; INTRACAUDAL; PERINEURAL AS NEEDED
Status: DISCONTINUED | OUTPATIENT
Start: 2024-07-25 | End: 2024-07-25 | Stop reason: SURG

## 2024-07-25 RX ORDER — FLUCONAZOLE 200 MG/1
200 TABLET ORAL DAILY
Qty: 13 TABLET | Refills: 0 | Status: SHIPPED | OUTPATIENT
Start: 2024-07-26 | End: 2024-08-08

## 2024-07-25 RX ORDER — PANTOPRAZOLE SODIUM 40 MG/1
40 TABLET, DELAYED RELEASE ORAL
Qty: 120 TABLET | Refills: 0 | Status: SHIPPED | OUTPATIENT
Start: 2024-07-25 | End: 2024-09-23

## 2024-07-25 RX ADMIN — LIDOCAINE HYDROCHLORIDE 50 MG: 10 INJECTION, SOLUTION EPIDURAL; INFILTRATION; INTRACAUDAL; PERINEURAL at 09:48:00

## 2024-07-25 RX ADMIN — SODIUM CHLORIDE, SODIUM LACTATE, POTASSIUM CHLORIDE, CALCIUM CHLORIDE: 600; 310; 30; 20 INJECTION, SOLUTION INTRAVENOUS at 09:45:00

## 2024-07-25 RX ADMIN — EPHEDRINE SULFATE 5 MG: 50 INJECTION INTRAVENOUS at 10:02:00

## 2024-07-25 RX ADMIN — SODIUM CHLORIDE, SODIUM LACTATE, POTASSIUM CHLORIDE, CALCIUM CHLORIDE: 600; 310; 30; 20 INJECTION, SOLUTION INTRAVENOUS at 10:02:00

## 2024-07-25 NOTE — ANESTHESIA PREPROCEDURE EVALUATION
PRE-OP EVALUATION    Patient Name: Luciano Hernández    Admit Diagnosis: Dizziness [R42]  Bradycardia [R00.1]  Upper GI bleed [K92.2]    Pre-op Diagnosis: INPT    ENTEROSCOPY    Anesthesia Procedure: ENTEROSCOPY    Surgeons and Role:     * Ford Llamas,  - Primary    Pre-op vitals reviewed.  Temp: 98.8 °F (37.1 °C)  Pulse: 46  Resp: 12  BP: 132/72  SpO2: 95 %  Body mass index is 27.05 kg/m².    Current medications reviewed.  Hospital Medications:   sodium chloride 0.9% infusion  125 mL/hr Intravenous Continuous    [COMPLETED] pantoprazole (Protonix) 40 mg in sodium chloride 0.9% PF 10 mL IV push  40 mg Intravenous Once    [] sodium chloride 0.9% infusion   Intravenous Continuous    ALPRAZolam (Xanax) tab 0.5 mg  0.5 mg Oral TID PRN    atorvastatin (Lipitor) tab 40 mg  40 mg Oral Nightly    escitalopram (Lexapro) tab 10 mg  10 mg Oral Daily    folic acid (Folvite) tab 1 mg  1 mg Oral Daily    levothyroxine (Synthroid) tab 100 mcg  100 mcg Oral Before breakfast    melatonin tab 3 mg  3 mg Oral Nightly PRN    sodium chloride 0.9% infusion   Intravenous Continuous    acetaminophen (Tylenol Extra Strength) tab 500 mg  500 mg Oral Q4H PRN    polyethylene glycol (PEG 3350) (Miralax) 17 g oral packet 17 g  17 g Oral Daily PRN    sennosides (Senokot) tab 17.2 mg  17.2 mg Oral Nightly PRN    bisacodyl (Dulcolax) 10 MG rectal suppository 10 mg  10 mg Rectal Daily PRN    fleet enema (Fleet) 7-19 GM/118ML rectal enema 133 mL  1 enema Rectal Once PRN    ondansetron (Zofran) 4 MG/2ML injection 4 mg  4 mg Intravenous Q6H PRN    pantoprazole (Protonix) 40 mg in sodium chloride 0.9% PF 10 mL IV push  40 mg Intravenous Q12H       Outpatient Medications:     Medications Prior to Admission   Medication Sig Dispense Refill Last Dose    Multiple Vitamins-Minerals (CENTRUM SILVER 50+MEN OR) Take 1 tablet by mouth daily.   2024 at 1000    levothyroxine 100 MCG Oral Tab Take 1 tablet (100 mcg total) by mouth before  breakfast. TAKE ON EMPTY STOMACH 30 tablet 3 7/24/2024 at 0800    ESCITALOPRAM 10 MG Oral Tab TAKE 1 TABLET BY MOUTH EVERY DAY 90 tablet 1 7/24/2024 at 1000    clopidogrel 75 MG Oral Tab Take 1 tablet (75 mg total) by mouth daily.   7/24/2024 at 1000    ALPRAZolam (XANAX) 0.5 MG Oral Tab Take 1 tablet (0.5 mg total) by mouth 3 (three) times daily as needed for Sleep or Anxiety. 90 tablet 1 Past Month    Ripretinib (QINLOCK) 50 MG Oral Tab Take 150 mg by mouth daily.   7/24/2024 at 0900    atorvastatin 40 MG Oral Tab Take 1 tablet (40 mg total) by mouth nightly.   7/23/2024 at 2200    ASPIRIN 81 OR Take by mouth. (Patient not taking: Reported on 7/24/2024)   Not Taking    predniSONE 50 MG Oral Tab Take 13 hr, 7 hr, and 1 hr prior to CT scan. 3 tablet 0     diphenhydrAMINE (BENADRYL ALLERGY) 25 MG Oral Cap 2 tabs PO 1 hour prior to scan. 2 capsule 0        Allergies: Radiology contrast iodinated dyes      Anesthesia Evaluation    Patient summary reviewed.    Anesthetic Complications  (-) history of anesthetic complications         GI/Hepatic/Renal      (+) GERD                           Cardiovascular      ECG reviewed.            (+) hypertension     (+) CAD    (+) CABG/stent         (+) dysrhythmias and atrial fibrillation                  Endo/Other      (+) diabetes  type 2,                          Pulmonary                           Neuro/Psych      (+) depression                        Patient Active Problem List:     GIST (gastrointestinal stroma tumor), malignant, colon (HCC)     Benign prostatic hyperplasia with urinary retention     Hypothyroidism     Attention to urostomy (HCC)     Diet-controlled diabetes mellitus (HCC)     Parastomal hernia with obstruction and without gangrene     Malignant gastrointestinal stromal tumor (HCC)     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     Constipation due to opioid therapy     Peripheral vascular disease (HCC)     Hepatitis C antibody test negative     Enlarged thoracic aorta  (HCC)     Enlargement of aortic root (HCC)     Atherosclerosis of native arteries of extremities with rest pain, left leg (HCC)     Major depressive disorder, recurrent, unspecified (HCC)     Atrial fibrillation (HCC)     Third degree AV block (HCC)     Thrombus of left atrial appendage     History of urostomy     Sedative, hypnotic or anxiolytic dependence, uncomplicated (HCC)     Chronic cholecystitis     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)     Malignant gastrointestinal stromal tumor (GIST) of rectum (HCC)     Acquired hypothyroidism     Hypertension associated with diabetes (HCC)     Hyperlipidemia associated with type 2 diabetes mellitus (HCC)     Hyperglycemia     Upper GI bleed     Coronary artery disease involving native coronary artery of native heart without angina pectoris     Dizziness     Bradycardia            Past Surgical History:   Procedure Laterality Date    Angioplasty (coronary)  9-1-22    Back surgery  1998    Bowel resection  10-3-2017    Cath drug eluting stent  09/09/2022    LAD    Colonoscopy  2006    Colonoscopy N/A 04/18/2024    Procedure: COLONOSCOPY;  Surgeon: Quentin Llamas MD;  Location:  ENDOSCOPY    Colostomy  10/03/2017    Cystoscopy,insert ureteral stent      Hernia surgery Left 2006    Hernia surgery Left 1975    Hernia surgery Left 12/26/2019    Laparoscopy, surgical prostatectomy, retropubic radical, w/nerve sparing      Other surgical history  12/03/2015    fracture radiius and ulna  Right arm -     Other surgical history  10/03/2017    Pelvic exenteration to include 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 Resection subcutaneous fat with wire localization.    Part removal colon w end  colostomy       Social History     Socioeconomic History    Marital status:     Number of children: 3   Occupational History    Occupation: RETIRED TEACHER   Tobacco Use    Smoking status: Former     Current packs/day: 0.00     Average packs/day: 1 pack/day for 45.0 years (45.0 ttl pk-yrs)     Types: Cigarettes     Start date: 1971     Quit date: 10/1/2017     Years since quittin.8    Smokeless tobacco: Never   Vaping Use    Vaping status: Never Used   Substance and Sexual Activity    Alcohol use: Yes     Alcohol/week: 3.0 standard drinks of alcohol     Types: 3 Standard drinks or equivalent per week     Comment: rarely    Drug use: No    Sexual activity: Not Currently   Other Topics Concern    Caffeine Concern Yes     Comment: coffee three times a day     Exercise No    Seat Belt No    Special Diet No    Stress Concern No    Weight Concern Yes     History   Drug Use No     Available pre-op labs reviewed.  Lab Results   Component Value Date    WBC 7.2 2024    RBC 3.35 (L) 2024    HGB 9.7 (L) 2024    HCT 28.7 (L) 2024    MCV 85.7 2024    MCH 29.0 2024    MCHC 33.8 2024    RDW 14.5 2024    .0 2024     Lab Results   Component Value Date     2024    K 3.7 2024     (H) 2024    CO2 21.0 2024    BUN 18 2024    CREATSERUM 0.87 2024     (H) 2024    CA 8.7 2024     Lab Results   Component Value Date    INR 0.97 2024         Airway      Mallampati: II  Mouth opening: 3 FB  TM distance: 4 - 6 cm  Neck ROM: full Cardiovascular      Rhythm: regular  Rate: normal     Dental             Pulmonary      Breath sounds clear to auscultation bilaterally.               Other findings              ASA: 3   Plan: MAC  NPO status verified and patient meets guidelines.    Post-procedure pain management plan discussed with surgeon and patient.    Comment: Plan is MAC anesthesia, which may  include deep sedation.  Implied that memory of procedure is unlikely although intraop recall, if it occurs, may be a reasonable and comfortable experience with this anesthetic.  Aware that general anesthesia is not intended though deep sedation may include occasional moments of general anesthesia.  The backup plan for safe patient care may include change of plan to full general anesthesia with potential airway placement.  Patient (legal guardian) demonstrated understanding, accepts risks and benefits, and wishes to proceed as reflected in the signed consent form.  Difficulty airway equipment available as needed, may need general anesthesia OETT.  Previous anesthesia records reviewed.      Plan/risks discussed with: patient                Present on Admission:   Hyperglycemia   Hypothyroidism   Malignant gastrointestinal stromal tumor (HCC)

## 2024-07-25 NOTE — ANESTHESIA POSTPROCEDURE EVALUATION
[facility]    Luciano Hernández Patient Status:  Inpatient   Age/Gender 73 year old male MRN [id number]   Location [facility] ENDOSCOPY [facility] Attending Loyda Dorsey DO   Hosp Day # 1 PCP Gavin Bernal MD       Anesthesia Post-op Note    ENTEROSCOPY    Procedure Summary       Date: 07/25/24 Room / Location:  ENDOSCOPY 02 / [facility] ENDOSCOPY    Anesthesia Start: 0945 Anesthesia Stop:     Procedure: ENTEROSCOPY Diagnosis: (WHITE PLAQUES IN ESOPHAGUS, GASTRITIS, HIATAL HERNIA, NONBLEEDING DUODENAL AVM)    Surgeons: Ford Llamas DO Anesthesiologist: Harjeet Boland MD    Anesthesia Type: MAC ASA Status: 3            Anesthesia Type: MAC    Vitals Value Taken Time   BP 91/53 07/25/24 1008   Temp available 07/25/24 1008   Pulse 74 07/25/24 1008   Resp 16 07/25/24 1008   SpO2 98% 07/25/24 1008       Patient Location: Endoscopy    Anesthesia Type: MAC    Airway Patency: patent    Postop Pain Control: adequate    Mental Status: mildly sedated but able to meaningfully participate in the post-anesthesia evaluation    Nausea/Vomiting: none    Cardiopulmonary/Hydration status: stable euvolemic    Complications: no apparent anesthesia related complications    Postop vital signs: stable    Dental Exam: Unchanged from Preop    Patient to be discharged from PACU when criteria met.

## 2024-07-25 NOTE — PLAN OF CARE
Assumed care at approximately 1930.   A & O x 4.   RA.   NSR/ SB on tele.   Denies pain  overnight.   NPO at midnight for EGD.   Call light within reach.   Patient updated on plan of care.

## 2024-07-25 NOTE — PLAN OF CARE
Discussed low dose ASA monotherapy versus plavix monotherapy with GI Dr Llamas and Madelaine Krause. Plan for ASA 81 mg daily at discharge. Discontinue plavix.

## 2024-07-25 NOTE — PROGRESS NOTES
[facility]   part of [facility]     Hospitalist Progress Note     Luciano Hernández Patient Status:  Inpatient    10/23/1950 MRN [id number]   Location [facility] 7NE-A Attending Loyda Dorsey, DO   Hosp Day # 1 PCP Gavin Bernal MD     Chief Complaint: Black stools from ostomy    Subjective:     Patient resting in bed and feels well after push enteroscopy    Objective:    Review of Systems:   A comprehensive review of systems was completed; pertinent positive and negatives stated in subjective.    Vital signs:  Temp:  [97.5 °F (36.4 °C)-98.8 °F (37.1 °C)] 97.5 °F (36.4 °C)  Pulse:  [43-81] 48  Resp:  [10-15] 12  BP: ()/() 117/60  SpO2:  [95 %-100 %] 100 %    Physical Exam:    General: No acute distress  Respiratory: No wheezes, no rhonchi  Cardiovascular: S1, S2, regular rate and rhythm  Abdomen: Soft, Non-tender, non-distended, positive bowel sounds  Neuro: No new focal deficits.   Extremities: No edema    Diagnostic Data:    Labs:  Recent Labs   Lab 24  1246 24  2255 24  0529   WBC 9.6  --  7.2   HGB 12.4* 10.4* 9.7*   MCV 86.2  --  85.7   .0  --  206.0   INR 0.97  --   --        Recent Labs   Lab 24  1246 24  0529   * 132*   BUN 19 18   CREATSERUM 1.04 0.87   CA 9.7 8.7   ALB 4.5  --     142   K 3.7 3.7    115*   CO2 23.0 21.0   ALKPHO 107  --    AST 22  --    ALT 22  --    BILT 0.5  --    TP 7.2  --        Estimated Creatinine Clearance: 85.5 mL/min (based on SCr of 0.87 mg/dL).    Recent Labs   Lab 24  1246   TROPHS 11       Recent Labs   Lab 24  1246   PTP 12.9   INR 0.97       Lab Results   Component Value Date    TSH 4.244 2024             Microbiology    No results found for this visit on 24.      Imaging: Reviewed in Epic.    Medications:    fluconazole  400 mg Oral Once    [START ON 2024] fluconazole  200 mg Oral Daily    atorvastatin  40 mg Oral Nightly    escitalopram  10 mg Oral Daily     folic acid  1 mg Oral Daily    levothyroxine  100 mcg Oral Before breakfast    pantoprazole  40 mg Intravenous Q12H       Assessment & Plan:      #Dark ostomy output  #Concern for upper GI bleed  -IV PPI twice daily  -EGD 4/16 revealed esophagitis  -Colonoscopy 4/18 showed no active bleeding  -Push enteroscopy this morning showed white plaques in esophagus concerning for Candida, irregular GE junction, moderate gastritis, nonbleeding duodenal AVM  -GI following; recommend discharge home if hemoglobin at 1 PM is stable.  Will need pantoprazole 40 mg twice daily and fluconazole for 14 days on discharge    #Metastatic GIST  -Chemo on hold while inpatient    #Hypothyroidism  -Levothyroxine    #CAD  -Aspirin, Plavix on hold; okay to resume on discharge  -Cardiology following    DC planning      Loyda Dorsey DO    Supplementary Documentation:     Quality:  DVT Mechanical Prophylaxis:   SCDs, Early ambuation  DVT Pharmacologic Prophylaxis   Medication   None                Code Status: DNAR/Selective Treatment  Quigley: No urinary catheter in place  Quigley Duration (in days):   Central line:    TUAN: 7/25/2024    Discharge is dependent on: Clinical improvement  At this point Mr. Hernández is expected to be discharge to: Home    The 21st Century Cures Act makes medical notes like these available to patients in the interest of transparency. Please be advised this is a medical document. Medical documents are intended to carry relevant information, facts as evident, and the clinical opinion of the practitioner. The medical note is intended as peer to peer communication and may appear blunt or direct. It is written in medical language and may contain abbreviations or verbiage that are unfamiliar.             **Certification      PHYSICIAN Certification of Need for Inpatient Hospitalization - Initial Certification    Patient will require inpatient services that will reasonably be expected to span two midnight's based on the clinical  documentation in H+P.   Based on patients current state of illness, I anticipate that, after discharge, patient will require TBD.

## 2024-07-25 NOTE — DISCHARGE SUMMARY
[facility]     Luciano Hernández Patient Status:  Inpatient    10/23/1950 MRN [id number]   Location [facility] 7NE-A Attending No att. providers found   Hosp Day # 1 PCP Gavin Bernal MD     Date of Admission: 2024  Date of Discharge:  2024    Discharge Disposition: Home or Self Care    Discharge Diagnosis:  #Ostomy output concerning for upper GI bleed  #Metastatic GIST  #Hypothyroidism  #CAD    History of Present Illness: Luciano Hernández is a 73 year old male with a PMH of BPH, GIST, afib, DMII, CAD, and hypothyroidism presented to ED due to dark stools from ostomy over the past week. Patient had been feeling well despite black stools this week but developed light-headedness this morning prompting ED evaluation. Denies abdominal pain/N/V. Denies CP/SOB. Patient recently admitted for the same in April. GI workup including EGD and colonoscopy was negative for active bleeding.         Brief Synopsis: GI was consulted.  Patient was started on IV PPI twice daily.  Push enteroscopy showed white plaques in esophagus concerning for Candida, moderate gastritis, nonbleeding hemoglobin stable.  Patient was discharged home with outpatient follow-up.    Lace+ Score: 78  59-90 High Risk  29-58 Medium Risk  0-28   Low Risk       TCM Follow-Up Recommendation:  LACE > 58: High Risk of readmission after discharge from the hospital.  **Certification    Admission date was 2024.  Inpatient stay was shorter than expected.  Patient's Upper GI bleed was initially serious enough to expect a more lengthy hospitalization but patient improved faster than expected.                 Procedures during hospitalization:   Patient uroscopy    Incidental or significant findings and recommendations (brief descriptions):  See brief synopsis above    Lab/Test results pending at Discharge:   None    Consultants:  GI  Cardiology    Discharge Medication List:     Discharge Medications        START  taking these medications        Instructions Prescription details   fluconazole 200 MG Tabs  Commonly known as: Diflucan  Notes to patient: Antifungal medicine      Take 1 tablet (200 mg total) by mouth daily for 13 doses.   Stop taking on: August 8, 2024  Quantity: 13 tablet  Refills: 0     pantoprazole 40 MG Tbec  Commonly known as: Protonix  Notes to patient: For gastritis.  Acid in stomach      Take 1 tablet (40 mg total) by mouth 2 (two) times daily before meals.   Stop taking on: September 23, 2024  Quantity: 120 tablet  Refills: 0            CONTINUE taking these medications        Instructions Prescription details   ALPRAZolam 0.5 MG Tabs  Commonly known as: Xanax      Take 1 tablet (0.5 mg total) by mouth 3 (three) times daily as needed for Sleep or Anxiety.   Quantity: 90 tablet  Refills: 1     ASPIRIN 81 OR      Take by mouth.   Refills: 0     atorvastatin 40 MG Tabs  Commonly known as: Lipitor  Notes to patient: For high cholesterol      Take 1 tablet (40 mg total) by mouth nightly.   Refills: 0     CENTRUM SILVER 50+MEN OR  Notes to patient: Vitamin supplement      Take 1 tablet by mouth daily.   Refills: 0     escitalopram 10 MG Tabs  Commonly known as: Lexapro  Notes to patient: For depression      TAKE 1 TABLET BY MOUTH EVERY DAY   Quantity: 90 tablet  Refills: 1     levothyroxine 100 MCG Tabs  Commonly known as: Synthroid  Notes to patient: For Thyroid      Take 1 tablet (100 mcg total) by mouth before breakfast. TAKE ON EMPTY STOMACH   Quantity: 30 tablet  Refills: 3     Qinlock 50 MG Tabs  Generic drug: Ripretinib  Notes to patient: For stomach tumor      Take 150 mg by mouth daily.   Refills: 0            STOP taking these medications      clopidogrel 75 MG Tabs  Commonly known as: Plavix                  Where to Get Your Medications        These medications were sent to [facility]/pharmacy #[id number] - [place], [place] - [address] AT [address], [phone], [phone]  [address], [facility] [address]      Phone: [phone]   fluconazole 200 MG Tabs  pantoprazole 40 MG Tbec         [place]    Follow-up appointment:   Gavin Bernal MD   [address] [address] [facility] [address]  [facility] [address]  [facility] [address] [phone]    Follow up in 1 week(s)      Ford Llamas DO  [address]   [facility] [address] [phone]    Follow up in 2 week(s)      Sheree Flores MD  [address] [facility]  [address] [facility] [address] [phone]    Follow up in 3 week(s)  Our office will call to schedule appointment    Appointments for Next 30 Days 2024 - 2024        Date Arrival Time Visit Type Length Department Provider     2024  2:30 PM  FOLLOW UP-HEM/ONC [5523] 15 min [facility] [place], Zaire Baker MD    Patient Instructions:         Location Instructions:     **IF YOU NEED LABWORK OR AN INFUSION ALONG WITH YOUR APPOINTMENT, YOU MUST CALL TO SCHEDULE.**  Your appointment is scheduled at the [facility] [place]. The address is [address] [place] [address]. Please park in the GREEN LOT and enter through the CANCER CENTER ENTRANCE of BUILDING A. Once you arrive, please register at the [facility]  on the 1st floor.  Masks are optional for all patients and visitors, unless otherwise indicated.                      Vital signs:       Physical Exam:    General: No acute distress   Lungs: clear to auscultation  Cardiovascular: S1, S2  Abdomen: Soft      -----------------------------------------------------------------------------------------------  PATIENT DISCHARGE INSTRUCTIONS: See electronic chart    Loyda Dorsey DO    Total time spent on discharge plannin minutes     The  Century Cures Act makes medical notes like these available to patients in the interest of transparency. Please be advised this is a medical document. Medical documents are intended to carry relevant information, facts as evident, and the  clinical opinion of the practitioner. The medical note is intended as peer to peer communication and may appear blunt or direct. It is written in medical language and may contain abbreviations or verbiage that are unfamiliar.

## 2024-07-25 NOTE — PLAN OF CARE
Pt Aox4  RA  NSR/SB  EGD completed   Discharge planning:   -iv and tele removed   -pt has all belongings    -went over meds and follow up   All of pt and family question answered

## 2024-07-25 NOTE — OPERATIVE REPORT
EGD Operative Report  Patient Name: Luciano Hernández  YOB: 1950  MRN: [id number]  Procedure: Push enteroscopy  Date of Service: 7/25/2024  Pre-operative Diagnosis & Indication: Melena and anemia  Post-operative Diagnosis:   White plaques in esophagus concerning for Candida.  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  Not treated at this time due to current use of Plavix.  Attending Endoscopist: Ford Llamas D.O.  Informed Consent: The planned procedure(s), the explanation of the procedure, its expected benefits, the potential complications and risks and possible alternatives and their benefits and risks were discussed with the patient or the patient's surrogate. The discussion of risks, not limited to but including bleeding, infection, perforation, adverse effects from anesthesia, need for emergency surgery/prolonged hospitalization,  cardiac arrhythmias,  and aspiration were discussed with patient.  Pt and/or surrogate understood the proposed procedure(s), its risks, benefits and alternatives and wish to proceed with procedure(s). All questions answered in full.  After all questions were answered to their satisfaction, a signed, informed, and witnessed consent was obtained.  A TIME OUT WAS COMPLETED prior to the procedure to confirm the patient, procedure(s) and complete endoscopy safety procedure.  Sedation: Monitored Anesthesia Care; ASA class per anesthesiology team   Monitoring: Pulsoximetry, pulse, respirations, and blood pressure , vitals were monitored throughout the entire procedure under monitored anesthesia care.   Procedure: The patient was then brought to the endoscopy suite where his/her pulse, pulse oximetry and blood pressure were monitored. The patient was placed in the left lateral decubitus position  and deep sedation was administered. Once adequate sedation was achieved, a bite block was placed and a lubricated tip of an Olympus video upper endoscope was inserted through the oropharynx and gently manipulated through the esophagus into the stomach and the second portion of the duodenum. Upon withdrawal of the endoscope, careful visualization of the mucosa was performed. The endoscope was then withdrawn into the gastric antrum and placed in a retroflexed position.  The endoscope was then righted, and air was suctioned from the stomach.  The endoscope was then withdrawn from the patient, with careful visual inspection of the mucosa. The patient left the procedure room in stable condition for recovery. Findings and endotherapy as listed below  Toleration: Patient tolerated procedure well  Complications: No immediate complications  Technical Difficulty: The procedure was not technically difficult   Estimated Blood Loss: Minimal, less than 5mL of estimated blood loss.   Findings and Therapeutics:  Esophagus:   There were white plaques noted in the esophagus concerning for Candida esophagitis.  Biopsies were obtained at this time due to current use of Plavix and concern for bleeding  There were no strictures or stenosis. GEJ junction traversed with endoscope without resistance.  The Z-line was irregular, appreciated at 40 cm from the incisors with diaphragmatic pinch at 43 cm.    Stomach:    The gastric body, fundus, cardia, and angularis were mildly erythematous endoscopically consistent with mild gastritis.  There were linear areas of erythema noted in the antrum concerning for moderate gastritis or possible GAVE.  No ulcers, erosions or masses visualized. Endoscope was placed in a retroflexion view in the stomach. There was evidence of a Hill grade 2 hiatal hernia.  No biopsies were obtained today due to current use of Plavix.  Duodenum/Jejunum:   The duodenum/proximal jejunum were examined to 110 cm.  1 small  nonbleeding AVM was noted in the duodenum at 85 cm.  This was not treated at this time due to current use of Plavix.  Recommendations:  Post EGD precautions, watch for bleeding, infection, perforation, adverse drug reactions   Pantoprazole 40 mg twice daily for 2 months then daily  Will empirically treat for Candida esophagitis with fluconazole for 14 days  Avoid non-aspirin NSAID  Continue to monitor hemoglobin while inpatient and transfuse for hemoglobin less than 7  GI soft diet  Repeat EGD/push enteroscopy as outpatient off of Plavix to reassess gastritis, GIM, duodenal AVM, and possible esophageal candidiasis.  Repeat hemoglobin today at 1 PM.  If stable, and no further signs of bleeding then okay to discharge from GI perspective for continued outpatient follow-up.  Please call with any further questions or concerns.    Ford Llamas DO  7/25/2024  10:05 AM

## 2024-07-25 NOTE — PROGRESS NOTES
Progress Note  Luciano Hernández Patient Status:  Inpatient    10/23/1950 MRN [id number]   [facility] 7NE-A Attending Loyda Dorsey, DO   Hosp Day # 1 PCP Gavin Bernal MD     Subjective:  Results of EGD noted.  He is feeling better without dizziness today. He just came up from EGD. He denies shortness of breath or chest pain.     He is very active and likes to work out. Yesterday, he went for a 20 mile bike ride and felt a bit lightheaded after his ride with dark stools. He routinely wears apple smart watch. Denies near syncope    Objective:  /68   Pulse 63   Temp 98.8 °F (37.1 °C) (Oral)   Resp 12   Ht 6' 1\" (1.854 m)   Wt 205 lb (93 kg)   SpO2 100%   BMI 27.05 kg/m²     Intake/Output:    Intake/Output Summary (Last 24 hours) at 2024 1145  Last data filed at 2024 1002  Gross per 24 hour   Intake 100 ml   Output 325 ml   Net -225 ml       Last 3 Weights   24 1234 205 lb (93 kg)   24 1300 205 lb (93 kg)   24 1357 203 lb 14.4 oz (92.5 kg)       Labs:  Recent Labs   Lab 24  1246 24  0529   * 132*   BUN 19 18   CREATSERUM 1.04 0.87   EGFRCR 76 91   CA 9.7 8.7    142   K 3.7 3.7    115*   CO2 23.0 21.0     Recent Labs   Lab 24  1246 24  2255 24  0529   RBC 4.19  --  3.35*   HGB 12.4* 10.4* 9.7*   HCT 36.1*  --  28.7*   MCV 86.2  --  85.7   MCH 29.6  --  29.0   MCHC 34.3  --  33.8   RDW 14.4  --  14.5   NEPRELIM 6.79  --   --    WBC 9.6  --  7.2   .0  --  206.0         Recent Labs   Lab 24  1246   TROPHS 11       Diagnostics:   Telemetry: sinus rhythm with sinus rates ranging  bpm. 1:1 AV conduction at elevated rates    EKG sinus bradycardia, IVCD borderline RBBB (seen on prior)    Cath   LM: Large caliber artery giving rise to LAD & LCX. No significant stenosis.  LAD: Large caliber artery giving rise to diagonal and septal branches. 70% distal LAD stenosis.  LCX: Medium to large caliber  artery giving rise to OM branches. 95% OM2 subtotal stenosis.   A/P:  1. 70% distal LAD s/p LUIS E placement 2.5 x 15mm. P.E 2.5 NC.      Review of Systems   Cardiovascular:  Negative for chest pain.   Respiratory:  Negative for shortness of breath.        Physical Exam:  General: Alert and oriented in no apparent distress.  HEENT: Pupils equal. Mucous membranes moist.   Neck: No JVD  Cardiac:  Normal S1 S2, Regular. No murmur  Lungs: Clear without wheezes or crackles    Abdomen: Soft, non-tender, ND  Extremities: Without clubbing, cyanosis or edema.    Neurologic: No focal deficits. Normal affect.  Skin: Warm and dry,    Medications:   fluconazole  400 mg Oral Once    [START ON 7/26/2024] fluconazole  200 mg Oral Daily    atorvastatin  40 mg Oral Nightly    escitalopram  10 mg Oral Daily    folic acid  1 mg Oral Daily    levothyroxine  100 mcg Oral Before breakfast    pantoprazole  40 mg Intravenous Q12H      sodium chloride 125 mL/hr (07/24/24 1428)    sodium chloride 100 mL/hr at 07/24/24 1645       Assessment:    Dizziness, melena  EGD 7/25 with concerns for candida esophagitis, mild gastritis, small non-bleeding AVM duodenum. GI plans for repeat EGD off plavix in outpatient setting for bx.  Home ASA/plavix held on arrival. Discussed with Dr Farhad GUZMÁN and ok to resume plavix   Sinus bradycardia on arrival. He demonstrates chronotropic competency with 1:1 AV conduction at elevated HR up to 100 bpm on telemetry. With exercise, he reports sinus rates up to 120 bpm  CAD s/p PCI LUIS E to distal LAD 2022  Denies angina  LUIS E >12 months old. Will discuss with Dr Cisneros regarding plavix monotherapy and discontinuation of ASA given patient's gastritis. Continue atorvastatin.  Paroxysmal AFIB  Sinus rhythm here. Isolated AFIB episode without recurrence and wears smart watch daily for tracking. He is not on anticoagulation after discussions with Dr Hernandez.   Hypertension  Controlled  History of metastatic GIST s/p abd  peritoneal resection which included total cystectomy, prostatectomy, end colostomy, and urostomy (10/2017)   on chemotherapy ripretinib    Plan:    Discussed with Dr Llamas with GI and okay to resume plavix.   Will discuss with Dr Cisneros, patient is >12 months from LUIS E and should be acceptable risk to hold plavix 7 days prior to outpatient EGD next month. Will also discuss risk/benefit of continued DAPT therapy vs discontinuation of ASA and plavix monotherapy.  Likely ok for discharge later today from cardiac standpoint if repeat hgb stable     Plan of care discussed with patient, RN.    ALE Fuentes  7/25/2024  11:45 AM    Seen and examined.   Agree with exam and assessment as amended.  As LAD stent was placed in 2022, he does not require indefinite/continued DAPT.  Probable candida esophagitis and AVM's on EKG.  Should either be on monotherapy with clopidogrel or low dose ASA, whichever GI feels has lower risk of exacerbating GI issues.  Follow up with Dr Chatterjee after discharge.  Cardiology will sign off.  Thanks.  Cardiac MDM reviewed in detail with ALE Elizabeth.    KEYANA Cisneros MD  2

## 2024-07-26 ENCOUNTER — PATIENT OUTREACH (OUTPATIENT)
Dept: CASE MANAGEMENT | Age: 74
End: 2024-07-26

## 2024-07-26 DIAGNOSIS — Z02.9 ENCOUNTERS FOR UNSPECIFIED ADMINISTRATIVE PURPOSE: Primary | ICD-10-CM

## 2024-07-26 PROCEDURE — 1111F DSCHRG MED/CURRENT MED MERGE: CPT

## 2024-07-26 NOTE — PROGRESS NOTES
Initial Post Discharge Follow Up   Discharge Date: 7/25/24  Contact Date: 7/26/2024    Consent Verification:  Assessment Completed With: Patient  HIPAA Verified?  Yes    Discharge Dx:   #Dark ostomy output  #Concern for upper GI bleed    General:   How have you been since your discharge from the hospital? Spoke with patient who reports doing great since getting home.  Patient states that he just go back from 10 mile bike ride.  He states that this went well and was comfortable doing this.  Patient denies any additional bloody stools.  Patient does report having a bowel movement since getting home that was small.  Patient otherwise, denies any new or worsening symptoms.    Do you have any pain since discharge?  No    When you were leaving the hospital were your discharge instructions reviewed with you? Yes  How well were your discharge instructions explained to you?   On a scale of 1-5   1- Very Poor and 5- Very well   Very Well  Do you have any questions about your discharge instructions?  No  Before leaving the hospital was your diagnoses explained to you? Yes  Do you have any questions about your diagnoses? No  Are you able to perform normal daily activities of living as you have prior to your hospital stay (dressing, bathing, ambulating to the bathroom, etc)? yes  (NCM) Was patient given a different diet per AVS? no      Medications:   Current Outpatient Medications   Medication Sig Dispense Refill    fluconazole 200 MG Oral Tab Take 1 tablet (200 mg total) by mouth daily for 13 doses. 13 tablet 0    pantoprazole 40 MG Oral Tab EC Take 1 tablet (40 mg total) by mouth 2 (two) times daily before meals. 120 tablet 0    Multiple Vitamins-Minerals (CENTRUM SILVER 50+MEN OR) Take 1 tablet by mouth daily.      levothyroxine 100 MCG Oral Tab Take 1 tablet (100 mcg total) by mouth before breakfast. TAKE ON EMPTY STOMACH 30 tablet 3    ESCITALOPRAM 10 MG Oral Tab TAKE 1 TABLET BY MOUTH EVERY DAY 90 tablet 1    ASPIRIN  81 OR Take by mouth.      ALPRAZolam (XANAX) 0.5 MG Oral Tab Take 1 tablet (0.5 mg total) by mouth 3 (three) times daily as needed for Sleep or Anxiety. 90 tablet 1    Ripretinib (QINLOCK) 50 MG Oral Tab Take 150 mg by mouth daily.      atorvastatin 40 MG Oral Tab Take 1 tablet (40 mg total) by mouth nightly.       Were there any changes to your current medication(s) noted on the AVS? Yes  If so, were these medication changes discussed with you prior to leaving the hospital? Yes  If a new medication was prescribed:    Was the new medication's purpose & side effects reviewed? Yes  Do you have any questions about your new medication? No  Did you  your discharge medications when you left the hospital? Yes  Let's go over your medications together to make sure we are not missing anything. Medications Reviewed  Are there any reasons that keep you from taking your medication as prescribed? No  Are you having any concerns with constipation? No      Discharge medications reviewed/discussed/and reconciled against outpatient medications with patient.  Any changes or updates to medications sent to PCP.  Patient Acknowledged     Referrals/orders at D/C:  Referrals/orders placed at D/C? no    Discharge orders, AVS reviewed and discussed with patient. Any changes or updates to orders sent to PCP.  Patient Acknowledged      SDOH:   Transportation Needs: No Transportation Needs (7/26/2024)    Transportation Needs     Lack of Transportation: No     Car Seat: Not on file     Financial Resource Strain: Low Risk  (7/26/2024)    Financial Resource Strain     Difficulty of Paying Living Expenses: Not very hard     Med Affordability: Not on file         Follow up appointments:      Your appointments       Date & Time Appointment Department (Center)    Jul 31, 2024 2:30 PM CDT HEMATOLOGY ONCOLOGY FOLLOW UP with Zaire Tapia MD [facility] in [place] ([facility])        Nov 25, 2024 2:00  PM CST Physical - Established with Gavin Bernal MD [facility], [address] [place] [address] ([facility] [address])              [facility] in [facility]  [address] [facility] [address]  [phone] [address]  [address] [place] [address]  [facility] [id number] [phone]            TCC  Was TCC ordered: No    PCP (If no TCC appointment)  Does patient already have a PCP appointment scheduled? No  NCM Attempted to schedule PCP office TCM appointment with patient   If no appointment scheduled: Explain: patient prefers to call himself    Specialist    Does the patient have any other follow up appointment(s) needing to be scheduled? Yes  If yes: NCM reviewed upcoming specialist appointment with patient: Yes  Does the patient need assistance scheduling appointment(s): No    Is there any reason as to why you cannot make your appointment(s)?  No     Needs post D/C:   Now that you are home, are there any needs or concerns you need addressed before your next visit with your PCP?  (DME, meds, questions, etc.): No    Interventions by NCM:   Spoke with patient who reports doing great since getting home.  Patient states that he just go back from 10 mile bike ride.  He states that this went well and was comfortable doing this.  Patient denies any additional bloody stools.  Patient does report having a bowel movement since getting home that was small.  Patient otherwise, denies any new or worsening symptoms.  Patient has stopped his Plavix as directed.  He has resumed the baby aspirin.  Patient is aware when to contact providers and when to seek immediate medical attention.    Overall Rating:   How would you rate the care you received while in the hospital? good    CCM referral placed:    No    BOOK BY DATE: 8/1/24

## 2024-07-31 ENCOUNTER — OFFICE VISIT (OUTPATIENT)
Dept: HEMATOLOGY/ONCOLOGY | Age: 74
End: 2024-07-31
Attending: SPECIALIST
Payer: MEDICARE

## 2024-07-31 VITALS
RESPIRATION RATE: 18 BRPM | WEIGHT: 206 LBS | OXYGEN SATURATION: 99 % | DIASTOLIC BLOOD PRESSURE: 73 MMHG | HEART RATE: 65 BPM | HEIGHT: 72.01 IN | TEMPERATURE: 98 F | SYSTOLIC BLOOD PRESSURE: 148 MMHG | BODY MASS INDEX: 27.9 KG/M2

## 2024-07-31 DIAGNOSIS — E03.4 HYPOTHYROIDISM DUE TO ACQUIRED ATROPHY OF THYROID: ICD-10-CM

## 2024-07-31 DIAGNOSIS — C78.7 SECONDARY LIVER CANCER (HCC): ICD-10-CM

## 2024-07-31 DIAGNOSIS — D50.0 IRON DEFICIENCY ANEMIA DUE TO CHRONIC BLOOD LOSS: ICD-10-CM

## 2024-07-31 DIAGNOSIS — C79.89 METASTATIC SARCOMA (HCC): ICD-10-CM

## 2024-07-31 DIAGNOSIS — E03.2 HYPOTHYROIDISM DUE TO DRUGS: ICD-10-CM

## 2024-07-31 DIAGNOSIS — C49.A4 GIST (GASTROINTESTINAL STROMA TUMOR), MALIGNANT, COLON (HCC): Primary | ICD-10-CM

## 2024-07-31 DIAGNOSIS — D50.0 IRON DEFICIENCY ANEMIA SECONDARY TO BLOOD LOSS (CHRONIC): ICD-10-CM

## 2024-07-31 LAB
BASOPHILS # BLD AUTO: 0.04 X10(3) UL (ref 0–0.2)
BASOPHILS NFR BLD AUTO: 0.5 %
DEPRECATED HBV CORE AB SER IA-ACNC: 25.8 NG/ML
EOSINOPHIL # BLD AUTO: 0.22 X10(3) UL (ref 0–0.7)
EOSINOPHIL NFR BLD AUTO: 2.5 %
ERYTHROCYTE [DISTWIDTH] IN BLOOD BY AUTOMATED COUNT: 14.6 %
HCT VFR BLD AUTO: 34.3 %
HGB BLD-MCNC: 11.1 G/DL
IMM GRANULOCYTES # BLD AUTO: 0.04 X10(3) UL (ref 0–1)
IMM GRANULOCYTES NFR BLD: 0.5 %
IRON SATN MFR SERPL: 12 %
IRON SERPL-MCNC: 38 UG/DL
LYMPHOCYTES # BLD AUTO: 1.83 X10(3) UL (ref 1–4)
LYMPHOCYTES NFR BLD AUTO: 20.9 %
MCH RBC QN AUTO: 28.9 PG (ref 26–34)
MCHC RBC AUTO-ENTMCNC: 32.4 G/DL (ref 31–37)
MCV RBC AUTO: 89.3 FL
MONOCYTES # BLD AUTO: 0.7 X10(3) UL (ref 0.1–1)
MONOCYTES NFR BLD AUTO: 8 %
NEUTROPHILS # BLD AUTO: 5.93 X10 (3) UL (ref 1.5–7.7)
NEUTROPHILS # BLD AUTO: 5.93 X10(3) UL (ref 1.5–7.7)
NEUTROPHILS NFR BLD AUTO: 67.6 %
PLATELET # BLD AUTO: 266 10(3)UL (ref 150–450)
RBC # BLD AUTO: 3.84 X10(6)UL
T4 FREE SERPL-MCNC: 1.6 NG/DL (ref 0.8–1.7)
TOTAL IRON BINDING CAPACITY: 314 UG/DL (ref 250–425)
TRANSFERRIN SERPL-MCNC: 230 MG/DL (ref 215–365)
TSI SER-ACNC: 3.24 MIU/ML (ref 0.55–4.78)
WBC # BLD AUTO: 8.8 X10(3) UL (ref 4–11)

## 2024-07-31 PROCEDURE — G2211 COMPLEX E/M VISIT ADD ON: HCPCS | Performed by: SPECIALIST

## 2024-07-31 PROCEDURE — 99215 OFFICE O/P EST HI 40 MIN: CPT | Performed by: SPECIALIST

## 2024-07-31 RX ORDER — DIPHENHYDRAMINE HCL 25 MG
CAPSULE ORAL
Qty: 2 CAPSULE | Refills: 0 | Status: SHIPPED | OUTPATIENT
Start: 2024-07-31

## 2024-07-31 RX ORDER — PREDNISONE 50 MG/1
TABLET ORAL
Qty: 3 TABLET | Refills: 0 | Status: SHIPPED | OUTPATIENT
Start: 2024-07-31

## 2024-07-31 NOTE — PROGRESS NOTES
Here for MD fu visit for met GIST  Pt admitted 7/24-7/25 for GI bleed, hypotension  Reports doing okay   Continues on Ripretinib  No active bleeding  No bowel issues  No pain  CT done 7/22  Education Record     Learner:  Patient     Disease / Diagnosis:      Barriers / Limitations:  none                Comments:     Method:  Discussion                Comments:     General Topics:  Plan of care reviewed                Comments:     Outcome:  Shows understanding                Comments:

## 2024-07-31 NOTE — PROGRESS NOTES
[facility] Progress Note      Patient Name: Chepe Hernández   YOB: 1950  Medical Record Number: [id number]    The 21st Century Cures Act makes medical notes like these available to patients in the interest of transparency. Please be advised this is a medical document. Medical documents are intended to carry relevant information, facts as evident, and the clinical opinion of the practitioner. The medical note is intended as peer to peer communication and may appear blunt or direct. It is written in medical language and may contain abbreviations or verbiage that are unfamiliar.     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     Oncologic History  Luciano Hernández is a 73 year old male who originally presented in 2012 with left lower quadrant abdominal pain and obstructive uropathy. He was found to have a 16 cm mass abutting the bladder and rectum. 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 He was started on imatinib 400 mg daily with decrease in size of the tumor. While he has no metastatic disease he has been seen by several surgical oncologists and told that complete surgical resection would require colostomy and urostomy.     Routine imaging studies on 03/15/2016 showed increase in size of established tumor without metastatic disease. Increase in tumor size coincided with increased urinary frequency and hesitancy. As a result on 03/25/2016 patient increased imatinib dose to 800 mg daily.      On 06/19/2017 he presented to the emergency room with rectal pain. He reports that he was constipated and pushed to have a bowel movement. After the bowel movement he developed severe pain. He states that he had noticed that for the one month prior, he was having increasing issues with constipation. 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     On 06/20/2017 patient began  therapy with sunitinib 50 mg daily. At the end of 07/2016 he continued sunitinib at a dose of 37.5 mg daily. Follow up imaging studies on 08/31/2017 showed small progression of the tumor. Patient was also experiencing increased pain, difficulty moving his bowels, and symptoms of urinary obstruction.      On 10/03/2017 he underwent pelvic exenteration including en-bloc resection of pelvic GIST with abdominoperineal resection, total cystectomy, prostatectomy, end colostomy, and urostomy. Pathology showed a 10.2 cm GIST with 80% necrosis; necrotic tumor was present in prostatic tissue and wall of rectum; 10/10 lymph nodes were negative for metastasis; tumor showed 16 mitoses per 50 hpf; surgical margins were negative.     Patient presented to ED on 12/02/2019 with rectal pain. CT abdomen/pelvis with contrast on that day showed multiple new pelvic nodules that were not present in 07/2019. On 12/06/2019 patient underwent biopsy of a left lower quadrant soft tissue mass. Pathology confirmed gastrointestinal stromal tumor. 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     On 12/19/2019 patient started regorafenib. He states that within hours of taking the first dose, his pain resolved.     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 He was taken to the OR emergently for lysis of adhesions, repair of the parastomal hernia with mesh, and excision of the left lower quadrant tumor. Pathology from the tumor showed GIST. Regorafenib was discontinued to allow for post surgical healing.     On 01/13/2020 patient restarted regorafenib. With therapy his pain resolved but he repeatedly discontinued therapy. Once it was due to stomal herniation requiring surgery. A second time it was due to dehydration. A third time it was due to severe pelvic pain.     Since 01/31/2020 he has consistently remained on regorafenib.      In 09/2022 he was diagnosed with myocardial infarction and had coronary artery stents placed.     On 10/02/2022 patient was admitted with atrial fibrillation which was managed with medications. He was found to a left atrial appendage thrombus and started on DOAC.    CT imaging on 11/28/2022 showed progressive disease.     In 12/2022 he began therapy with ripretinib. CT imaging on 02/17/2023 showed partial response.     On 01/30/2024 he underwent attempted resection of the growing perineal mass; however, the mass was not found at the time of resection. Because of a persistent open would, ripretinib could not be immediately restarted.     At the end of 03/2024, we decided to start imatinib with the hopes it would slow progression of his disease while we wait for his surgical would to close.     On 04/15/2024 patient presented with complaints of fatigue, tachycardia with exertion, lightheadedness, hypotension, and melena. Hemoglobin was 7.9 g/dl whereas it was normal on 03/20/2024. Patient was subsequently admitted to the hospital. On 04/16/2024 he underwent EGD which showed nodular esophagitis and a 0.4 cm gastric polyp but no evidence of bleeding. Colonoscopy and MRI enterography failed to show a source of bleeding. Upon discharge he restarted ripretinib.    Patient presented to the ED on 07/24/2024 with melena and drop in hemoglobin. On 07/25/2024 he underwent push enteroscopy which showed white plaques in the esophagus consistent with candida, grade 2 hiatal hernia, moderate gastritis, non-bleeding AVM in the duodenum. Upon discharge, aspirin was continued but clopidogrel was discontinued.      History of Present Illness  Patient returns for follow up. He feels well and has no specific complaints. No further melena.     Past Medical History (historical data, reviewed by physician)  GIST (as above); benign prostatic hyperplasia; hypothyroidism; hypercholesterolemia; paroxysmal atrial fibrillation; peripheral  vascular disease.      Past Surgical History (historical data, reviewed by physician)  Stomal hernia repair; inguinal hernia repair x 4; lumbar discectomy; resection of GIST (as above); angioplasty left lower extremity arteries; attempted resection of perineal mass (as above).     Family History (historical data, reviewed by physician)  Mother with breast cancer.     Social History (historical data, reviewed by physician)  Current cigar smoker; social alcohol use.      Current Medications   predniSONE 50 MG Oral Tab Take 13 hr, 7 hr, and 1 hr prior to CT scan. 3 tablet 0    diphenhydrAMINE (BENADRYL ALLERGY) 25 MG Oral Cap 2 tabs PO 1 hour prior to scan. 2 capsule 0    fluconazole 200 MG Oral Tab Take 1 tablet (200 mg total) by mouth daily for 13 doses. 13 tablet 0    pantoprazole 40 MG Oral Tab EC Take 1 tablet (40 mg total) by mouth 2 (two) times daily before meals. 120 tablet 0    Multiple Vitamins-Minerals (CENTRUM SILVER 50+MEN OR) Take 1 tablet by mouth daily.      levothyroxine 100 MCG Oral Tab Take 1 tablet (100 mcg total) by mouth before breakfast. TAKE ON EMPTY STOMACH 30 tablet 3    ESCITALOPRAM 10 MG Oral Tab TAKE 1 TABLET BY MOUTH EVERY DAY 90 tablet 1    ASPIRIN 81 OR Take by mouth.      ALPRAZolam (XANAX) 0.5 MG Oral Tab Take 1 tablet (0.5 mg total) by mouth 3 (three) times daily as needed for Sleep or Anxiety. 90 tablet 1    Ripretinib (QINLOCK) 50 MG Oral Tab Take 150 mg by mouth daily.      atorvastatin 40 MG Oral Tab Take 1 tablet (40 mg total) by mouth nightly.       Allergies   Mr. Hernández is allergic to radiology contrast iodinated dyes.     Vital Signs   /73 (BP Location: Left arm, Patient Position: Sitting, Cuff Size: large)   Pulse 65   Temp 98.3 °F (36.8 °C) (Tympanic)   Resp 18   Ht 1.829 m (6' 0.01\")   Wt 93.4 kg (206 lb)   SpO2 99%   BMI 27.93 kg/m²     Physical Examination  Constitutional      No apparent distress.   Head                   Normocephalic and atraumatic.  Eyes                    Conjunctiva clear; sclera anicteric.  ENMT                 External nose normal; external ears normal.  Neck   Supple; no masses.   Lymphatics  No cervical, supraclavicular adenopathy.  Respiratory          Normal effort; no respiratory distress; clear to auscultation bilaterally.   Cardiovascular  Regular rate and rhythm; normal S1S2.  Abdomen  Soft; not tender; no masses.   Neurologic           Motor and sensory grossly intact.  Psychiatric          Mood and affect appropriate.    Laboratory  Recent Results (from the past 24 hour(s))   FERRITIN [E]    Collection Time: 07/31/24  2:19 PM   Result Value Ref Range    Ferritin 25.8 (L) 50.0 - 336.0 ng/mL   IRON AND TIBC [E]    Collection Time: 07/31/24  2:19 PM   Result Value Ref Range    Iron 38 (L) 65 - 175 ug/dL    Transferrin 230 215 - 365 mg/dL    Total Iron Binding Capacity 314 250 - 425 ug/dL    % Saturation 12 (L) 20 - 50 %   TSH + FREE T4 [E]    Collection Time: 07/31/24  2:19 PM   Result Value Ref Range    Free T4 1.6 0.8 - 1.7 ng/dL    TSH 3.237 0.550 - 4.780 mIU/mL   CBC W/ DIFFERENTIAL    Collection Time: 07/31/24  2:19 PM   Result Value Ref Range    WBC 8.8 4.0 - 11.0 x10(3) uL    RBC 3.84 3.80 - 5.80 x10(6)uL    HGB 11.1 (L) 13.0 - 17.5 g/dL    HCT 34.3 (L) 39.0 - 53.0 %    .0 150.0 - 450.0 10(3)uL    MCV 89.3 80.0 - 100.0 fL    MCH 28.9 26.0 - 34.0 pg    MCHC 32.4 31.0 - 37.0 g/dL    RDW 14.6 %    Neutrophil Absolute Prelim 5.93 1.50 - 7.70 x10 (3) uL    Neutrophil Absolute 5.93 1.50 - 7.70 x10(3) uL    Lymphocyte Absolute 1.83 1.00 - 4.00 x10(3) uL    Monocyte Absolute 0.70 0.10 - 1.00 x10(3) uL    Eosinophil Absolute 0.22 0.00 - 0.70 x10(3) uL    Basophil Absolute 0.04 0.00 - 0.20 x10(3) uL    Immature Granulocyte Absolute 0.04 0.00 - 1.00 x10(3) uL    Neutrophil % 67.6 %    Lymphocyte % 20.9 %    Monocyte % 8.0 %    Eosinophil % 2.5 %    Basophil % 0.5 %    Immature Granulocyte % 0.5 %     Radiology  07/22/2024:  CT  abdomen/pelvis w contrast - I independently visualized the radiologic images in addition to reviewing the written report: In comparison with CT dated 02/2024, there is some increase in pelvic nodularity.     Impression and Plan   1.   Metastatic GIST: While CT shows progression as compared to 02/2024, patient was off of ripretinib for 2 months after the CT scan. Unclear if the progression on CT occurred before the restart of medication. Continue ripretinib without modification and repeat imaging in 2 months.    2.   Hypothyroidism: TFTs are within normal limits. Continue levothyroxine without modification.     3.   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 Patient's pain responds to ripretinib - pain from nerve sheath tumors can respond to VEGF inhibitor therapy. Since restarting ripretinib, his pain has resolved.    4.   Contrast allergy: Patient requires prednisone and Benadryl for CT imaging.     5.   Iron deficiency anemia: Laboratory studies today show iron deficiency. Arrange for IV iron therapy with iron dextran.    Patient will continue to receive longitudinal care by me for the complex care required for the cancer diagnosis including the expected complications related to anticancer therapy.     Planned Follow Up  Patient will return for follow up in 2 months.    Electronically signed by:    Zaire Tapia M.D.  System Medical Director of Oncology Services  [facility]

## 2024-08-01 ENCOUNTER — TELEPHONE (OUTPATIENT)
Dept: HEMATOLOGY/ONCOLOGY | Facility: HOSPITAL | Age: 74
End: 2024-08-01

## 2024-08-01 NOTE — TELEPHONE ENCOUNTER
----- Message from Zaire Tapia sent at 8/1/2024  7:02 AM CDT -----  Please call patient. He is iron deficient and I recommend IV iron. Can come in this week or next. Iron dextran without test dose.

## 2024-08-06 ENCOUNTER — OFFICE VISIT (OUTPATIENT)
Dept: HEMATOLOGY/ONCOLOGY | Age: 74
End: 2024-08-06
Attending: SPECIALIST
Payer: MEDICARE

## 2024-08-06 VITALS
DIASTOLIC BLOOD PRESSURE: 78 MMHG | TEMPERATURE: 97 F | RESPIRATION RATE: 16 BRPM | SYSTOLIC BLOOD PRESSURE: 128 MMHG | OXYGEN SATURATION: 100 % | HEART RATE: 50 BPM

## 2024-08-06 DIAGNOSIS — D50.0 IRON DEFICIENCY ANEMIA SECONDARY TO BLOOD LOSS (CHRONIC): Primary | ICD-10-CM

## 2024-08-06 PROCEDURE — 96365 THER/PROPH/DIAG IV INF INIT: CPT

## 2024-08-06 NOTE — PROGRESS NOTES
Pt here for iron infusion.  Arrives Ambulating independently, accompanied by self           Modifications in dose or schedule: No     Frequency of blood return and site check throughout administration: Prior to administration   Discharged to Home, Ambulating independently, accompanied by self    Outpatient Oncology Care Plan  Problem list:  anemia  Problems related to:    disease/disease progression  Interventions:  administered iron  Expected outcomes:  optimal lab values  Progress towards outcome:  making progress    Education Record    Learner:  Patient  Barriers / Limitations:  None  Method:  Brief focused  Outcome:  Shows understanding  Comments: Pt tolerated infusion. No c/o. VSS. See flowsheet

## 2024-09-13 RX ORDER — LEVOTHYROXINE SODIUM 100 UG/1
100 TABLET ORAL
Qty: 90 TABLET | Refills: 1 | OUTPATIENT
Start: 2024-09-13

## 2024-09-13 RX ORDER — LEVOTHYROXINE SODIUM 100 UG/1
100 TABLET ORAL
Qty: 90 TABLET | Refills: 1 | Status: SHIPPED | OUTPATIENT
Start: 2024-09-13

## 2024-09-23 ENCOUNTER — HOSPITAL ENCOUNTER (OUTPATIENT)
Dept: CT IMAGING | Age: 74
Discharge: HOME | End: 2024-09-23
Attending: SPECIALIST
Payer: MEDICARE

## 2024-09-23 DIAGNOSIS — E03.4 HYPOTHYROIDISM DUE TO ACQUIRED ATROPHY OF THYROID: ICD-10-CM

## 2024-09-23 DIAGNOSIS — D50.0 IRON DEFICIENCY ANEMIA SECONDARY TO BLOOD LOSS (CHRONIC): ICD-10-CM

## 2024-09-23 DIAGNOSIS — C49.A4 GIST (GASTROINTESTINAL STROMA TUMOR), MALIGNANT, COLON (HCC): ICD-10-CM

## 2024-09-23 LAB
CREAT BLD-MCNC: 1 MG/DL
EGFRCR SERPLBLD CKD-EPI 2021: 79 ML/MIN/1.73M2 (ref 60–?)

## 2024-09-23 PROCEDURE — 74177 CT ABD & PELVIS W/CONTRAST: CPT | Performed by: SPECIALIST

## 2024-09-23 PROCEDURE — 82565 ASSAY OF CREATININE: CPT

## 2024-09-24 NOTE — PROGRESS NOTES
[facility] Progress Note      Patient Name: Chepe Hernández   YOB: 1950  Medical Record Number: [id number]    The 21st Century Cures Act makes medical notes like these available to patients in the interest of transparency. Please be advised this is a medical document. Medical documents are intended to carry relevant information, facts as evident, and the clinical opinion of the practitioner. The medical note is intended as peer to peer communication and may appear blunt or direct. It is written in medical language and may contain abbreviations or verbiage that are unfamiliar.     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     Oncologic History  Luciano Hernández is a 73 year old male who originally presented in 2012 with left lower quadrant abdominal pain and obstructive uropathy. He was found to have a 16 cm mass abutting the bladder and rectum. 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 He was started on imatinib 400 mg daily with decrease in size of the tumor. While he has no metastatic disease he has been seen by several surgical oncologists and told that complete surgical resection would require colostomy and urostomy.     Routine imaging studies on 03/15/2016 showed increase in size of established tumor without metastatic disease. Increase in tumor size coincided with increased urinary frequency and hesitancy. As a result on 03/25/2016 patient increased imatinib dose to 800 mg daily.      On 06/19/2017 he presented to the emergency room with rectal pain. He reports that he was constipated and pushed to have a bowel movement. After the bowel movement he developed severe pain. He states that he had noticed that for the one month prior, he was having increasing issues with constipation. 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     On 06/20/2017 patient began  therapy with sunitinib 50 mg daily. At the end of 07/2016 he continued sunitinib at a dose of 37.5 mg daily. Follow up imaging studies on 08/31/2017 showed small progression of the tumor. Patient was also experiencing increased pain, difficulty moving his bowels, and symptoms of urinary obstruction.      On 10/03/2017 he underwent pelvic exenteration including en-bloc resection of pelvic GIST with abdominoperineal resection, total cystectomy, prostatectomy, end colostomy, and urostomy. Pathology showed a 10.2 cm GIST with 80% necrosis; necrotic tumor was present in prostatic tissue and wall of rectum; 10/10 lymph nodes were negative for metastasis; tumor showed 16 mitoses per 50 hpf; surgical margins were negative.     Patient presented to ED on 12/02/2019 with rectal pain. CT abdomen/pelvis with contrast on that day showed multiple new pelvic nodules that were not present in 07/2019. On 12/06/2019 patient underwent biopsy of a left lower quadrant soft tissue mass. Pathology confirmed gastrointestinal stromal tumor. Mutational analysis showed exon 11 mutation ([id number], [id number]).     On 12/19/2019 patient started regorafenib. He states that within hours of taking the first dose, his pain resolved.     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 He was taken to the OR emergently for lysis of adhesions, repair of the parastomal hernia with mesh, and excision of the left lower quadrant tumor. Pathology from the tumor showed GIST. Regorafenib was discontinued to allow for post surgical healing.     On 01/13/2020 patient restarted regorafenib. With therapy his pain resolved but he repeatedly discontinued therapy. Once it was due to stomal herniation requiring surgery. A second time it was due to dehydration. A third time it was due to severe pelvic pain.     Since 01/31/2020 he has consistently remained on regorafenib.      In 09/2022 he was diagnosed with myocardial infarction and had coronary artery stents placed.     On 10/02/2022 patient was admitted with atrial fibrillation which was managed with medications. He was found to a left atrial appendage thrombus and started on DOAC.    CT imaging on 11/28/2022 showed progressive disease.     In 12/2022 he began therapy with ripretinib. CT imaging on 02/17/2023 showed partial response.     On 01/30/2024 he underwent attempted resection of the growing perineal mass; however, the mass was not found at the time of resection. Because of a persistent open would, ripretinib could not be immediately restarted.     At the end of 03/2024, we decided to start imatinib with the hopes it would slow progression of his disease while we wait for his surgical would to close.     On 04/15/2024 patient presented with complaints of fatigue, tachycardia with exertion, lightheadedness, hypotension, and melena. Hemoglobin was 7.9 g/dl whereas it was normal on 03/20/2024. Patient was subsequently admitted to the hospital. On 04/16/2024 he underwent EGD which showed nodular esophagitis and a 0.4 cm gastric polyp but no evidence of bleeding. Colonoscopy and MRI enterography failed to show a source of bleeding. Upon discharge he restarted ripretinib.    Patient presented to the ED on 07/24/2024 with melena and drop in hemoglobin. On 07/25/2024 he underwent push enteroscopy which showed white plaques in the esophagus consistent with candida, grade 2 hiatal hernia, moderate gastritis, non-bleeding AVM in the duodenum. Upon discharge, aspirin was continued but clopidogrel was discontinued.      History of Present Illness  Patient returns for follow up. He feels well and has no specific complaints. Pain is well controlled. No melena or bright red blood per rectum.     Past Medical History (historical data, reviewed by physician)  GIST (as above); benign prostatic hyperplasia; hypothyroidism;  hypercholesterolemia; paroxysmal atrial fibrillation; peripheral vascular disease.      Past Surgical History (historical data, reviewed by physician)  Stomal hernia repair; inguinal hernia repair x 4; lumbar discectomy; resection of GIST (as above); angioplasty left lower extremity arteries; attempted resection of perineal mass (as above).     Family History (historical data, reviewed by physician)  Mother with breast cancer.     Social History (historical data, reviewed by physician)  Current cigar smoker; social alcohol use.      Current Medications   HYDROmorphone 4 MG Oral Tab Take 1 tablet (4 mg total) by mouth every 4 (four) hours as needed for Pain.      HYDROmorphone 4 MG Oral Tab Take 1-2 tablets (4-8 mg total) by mouth every 4 (four) hours as needed (cancer related pain.). 100 tablet 0    LEVOTHYROXINE 100 MCG Oral Tab TAKE 1 TABLET (100 MCG TOTAL) BY MOUTH BEFORE BREAKFAST. TAKE ON EMPTY STOMACH 90 tablet 1    [DISCONTINUED] predniSONE 50 MG Oral Tab Take 13 hr, 7 hr, and 1 hr prior to CT scan. 3 tablet 0    diphenhydrAMINE (BENADRYL ALLERGY) 25 MG Oral Cap 2 tabs PO 1 hour prior to scan. (Patient not taking: Reported on 10/3/2024) 2 capsule 0    Multiple Vitamins-Minerals (CENTRUM SILVER 50+MEN OR) Take 1 tablet by mouth daily.      ESCITALOPRAM 10 MG Oral Tab TAKE 1 TABLET BY MOUTH EVERY DAY 90 tablet 1    ASPIRIN 81 OR Take by mouth.      [DISCONTINUED] ALPRAZolam (XANAX) 0.5 MG Oral Tab Take 1 tablet (0.5 mg total) by mouth 3 (three) times daily as needed for Sleep or Anxiety. (Patient not taking: Reported on 9/30/2024) 90 tablet 1    Ripretinib (QINLOCK) 50 MG Oral Tab Take 150 mg by mouth daily.      atorvastatin 40 MG Oral Tab Take 1 tablet (40 mg total) by mouth nightly.       Allergies   Mr. Hernández is allergic to radiology contrast iodinated dyes.     Vital Signs   /71 (BP Location: Right arm, Patient Position: Sitting, Cuff Size: large)   Pulse 104   Temp 98 °F (36.7 °C) (Tympanic)    Resp 18   Ht 1.829 m (6' 0.01\")   Wt 94.8 kg (209 lb)   SpO2 99%   BMI 28.34 kg/m²     Physical Examination  Constitutional      No apparent distress.   Head                   Normocephalic and atraumatic.  Eyes                   Conjunctiva clear; sclera anicteric.  ENMT                 External nose normal; external ears normal.  Respiratory          Normal effort; no respiratory distress; clear to auscultation bilaterally.   Cardiovascular  Regular rate and rhythm; normal S1S2.  Abdomen  Not distended.   Neurologic           Motor and sensory grossly intact.  Psychiatric          Mood and affect appropriate.    Laboratory  Recent Results (from the past 336 hour(s))   POCT CREATININE    Collection Time: 09/23/24  9:53 AM   Result Value Ref Range    ISTAT Creatinine 1.00 0.70 - 1.30 mg/dL    eGFR-Cr 79 >=60 mL/min/1.73m2   CBC W/DIFF [E]    Collection Time: 09/25/24 12:49 PM   Result Value Ref Range    WBC 10.0 4.0 - 11.0 x10(3) uL    RBC 5.10 3.80 - 5.80 x10(6)uL    HGB 14.8 13.0 - 17.5 g/dL    HCT 44.5 39.0 - 53.0 %    .0 150.0 - 450.0 10(3)uL    MCV 87.3 80.0 - 100.0 fL    MCH 29.0 26.0 - 34.0 pg    MCHC 33.3 31.0 - 37.0 g/dL    RDW 15.1 %    Neutrophil Absolute Prelim 7.78 (H) 1.50 - 7.70 x10 (3) uL    Neutrophil Absolute 7.78 (H) 1.50 - 7.70 x10(3) uL    Lymphocyte Absolute 1.29 1.00 - 4.00 x10(3) uL    Monocyte Absolute 0.67 0.10 - 1.00 x10(3) uL    Eosinophil Absolute 0.12 0.00 - 0.70 x10(3) uL    Basophil Absolute 0.06 0.00 - 0.20 x10(3) uL    Immature Granulocyte Absolute 0.03 0.00 - 1.00 x10(3) uL    Neutrophil % 78.2 %    Lymphocyte % 13.0 %    Monocyte % 6.7 %    Eosinophil % 1.2 %    Basophil % 0.6 %    Immature Granulocyte % 0.3 %   COMP METABOLIC PANEL [E]    Collection Time: 09/25/24 12:49 PM   Result Value Ref Range    Glucose 315 (H) 70 - 99 mg/dL    Sodium 136 136 - 145 mmol/L    Potassium 4.2 3.5 - 5.1 mmol/L    Chloride 106 98 - 112 mmol/L    CO2 26.0 21.0 - 32.0 mmol/L    Anion Gap  4 0 - 18 mmol/L    BUN 19 9 - 23 mg/dL    Creatinine 1.23 0.70 - 1.30 mg/dL    Calcium, Total 9.6 8.5 - 10.1 mg/dL    Calculated Osmolality 296 (H) 275 - 295 mOsm/kg    eGFR-Cr 62 >=60 mL/min/1.73m2    AST 16 15 - 37 U/L    ALT 19 16 - 61 U/L    Alkaline Phosphatase 120 (H) 45 - 117 U/L    Bilirubin, Total 0.7 0.1 - 2.0 mg/dL    Total Protein 7.0 6.4 - 8.2 g/dL    Albumin 3.3 (L) 3.4 - 5.0 g/dL    Globulin  3.7 2.8 - 4.4 g/dL    A/G Ratio 0.9 (L) 1.0 - 2.0    Patient Fasting for CMP? No    IRON AND TIBC [E]    Collection Time: 09/25/24 12:49 PM   Result Value Ref Range    Iron 36 (L) 65 - 175 ug/dL    Transferrin 176 (L) 215 - 365 mg/dL    Total Iron Binding Capacity 246 (L) 250 - 425 ug/dL    % Saturation 15 (L) 20 - 50 %   FERRITIN [E]    Collection Time: 09/25/24 12:49 PM   Result Value Ref Range    Ferritin 103 50 - 336 ng/mL   TSH + FREE T4 [E]    Collection Time: 09/25/24 12:49 PM   Result Value Ref Range    Free T4 1.5 0.8 - 1.7 ng/dL    TSH 5.386 (H) 0.550 - 4.780 mIU/mL   HGB A1C [E]    Collection Time: 09/25/24 12:49 PM   Result Value Ref Range    HgbA1C 7.5 (H) <5.7 %    Estimated Average Glucose 169 (H) 68 - 126 mg/dL     Radiology  09/232024:  CT abdomen/pelvis w contrast - I independently visualized the radiologic images in addition to reviewing the written report: In comparison with previous CT scan, there is evidence of small progression in several of the established metastatic lesions in the pelvis. There are no new metastatic lesions. There is no definitive progression within the liver.     Impression and Plan   1.   Metastatic GIST: Imaging studies show small disease progression. Patient has no new disease related symptoms. I recommend he continue ripretinib without modification for now as the likelihood for response with other standard options is small. Patient is in agreement. Where there is more substantial disease progression, a treatment regimen change will be advised.           Patient will  return with CT imaging at the beginning of 12/2024.     2.   Hypothyroidism: Free T4 is within normal limits and TSH is mildly elevated. Continue current dose of levothyroxine.     3.   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 Patient's pain responds to ripretinib - pain from nerve sheath tumors can respond to VEGF inhibitor therapy. Hydromorphone PRN prescribed.     4.   Contrast allergy: Patient requires prednisone and Benadryl for CT imaging.     5.   Hyperglycemia: Serum glucose > 300 and HbA1c > 7. Patient is advised to follow up with his primary care provider for this issue.     Patient will continue to receive longitudinal care by me for the complex care required for the cancer diagnosis including the expected complications related to anticancer therapy.     Planned Follow Up  Patient will return for follow up in 12/2024.     Electronically signed by:    Zaire Tapia M.D.  System Medical Director of Oncology Services  [facility]

## 2024-09-25 ENCOUNTER — OFFICE VISIT (OUTPATIENT)
Dept: HEMATOLOGY/ONCOLOGY | Age: 74
End: 2024-09-25
Attending: SPECIALIST
Payer: MEDICARE

## 2024-09-25 VITALS
BODY MASS INDEX: 28.31 KG/M2 | OXYGEN SATURATION: 99 % | TEMPERATURE: 98 F | RESPIRATION RATE: 18 BRPM | HEIGHT: 72.01 IN | HEART RATE: 104 BPM | SYSTOLIC BLOOD PRESSURE: 107 MMHG | WEIGHT: 209 LBS | DIASTOLIC BLOOD PRESSURE: 71 MMHG

## 2024-09-25 DIAGNOSIS — E03.9 HYPOTHYROIDISM (ACQUIRED): ICD-10-CM

## 2024-09-25 DIAGNOSIS — C49.9 METASTATIC SARCOMA (HCC): ICD-10-CM

## 2024-09-25 DIAGNOSIS — G89.3 PAIN, NEOPLASM-RELATED: ICD-10-CM

## 2024-09-25 DIAGNOSIS — D50.0 IRON DEFICIENCY ANEMIA SECONDARY TO BLOOD LOSS (CHRONIC): Primary | ICD-10-CM

## 2024-09-25 DIAGNOSIS — C78.7 SECONDARY LIVER CANCER (HCC): ICD-10-CM

## 2024-09-25 DIAGNOSIS — C49.A4 GIST (GASTROINTESTINAL STROMA TUMOR), MALIGNANT, COLON (HCC): ICD-10-CM

## 2024-09-25 DIAGNOSIS — R73.9 HYPERGLYCEMIA: ICD-10-CM

## 2024-09-25 DIAGNOSIS — E03.2 HYPOTHYROIDISM DUE TO DRUGS: ICD-10-CM

## 2024-09-25 LAB
ALBUMIN SERPL-MCNC: 3.3 G/DL (ref 3.4–5)
ALBUMIN/GLOB SERPL: 0.9 {RATIO} (ref 1–2)
ALP LIVER SERPL-CCNC: 120 U/L
ALT SERPL-CCNC: 19 U/L
ANION GAP SERPL CALC-SCNC: 4 MMOL/L (ref 0–18)
AST SERPL-CCNC: 16 U/L (ref 15–37)
BASOPHILS # BLD AUTO: 0.06 X10(3) UL (ref 0–0.2)
BASOPHILS NFR BLD AUTO: 0.6 %
BILIRUB SERPL-MCNC: 0.7 MG/DL (ref 0.1–2)
BUN BLD-MCNC: 19 MG/DL (ref 9–23)
CALCIUM BLD-MCNC: 9.6 MG/DL (ref 8.5–10.1)
CHLORIDE SERPL-SCNC: 106 MMOL/L (ref 98–112)
CO2 SERPL-SCNC: 26 MMOL/L (ref 21–32)
CREAT BLD-MCNC: 1.23 MG/DL
DEPRECATED HBV CORE AB SER IA-ACNC: 103 NG/ML
EGFRCR SERPLBLD CKD-EPI 2021: 62 ML/MIN/1.73M2 (ref 60–?)
EOSINOPHIL # BLD AUTO: 0.12 X10(3) UL (ref 0–0.7)
EOSINOPHIL NFR BLD AUTO: 1.2 %
ERYTHROCYTE [DISTWIDTH] IN BLOOD BY AUTOMATED COUNT: 15.1 %
EST. AVERAGE GLUCOSE BLD GHB EST-MCNC: 169 MG/DL (ref 68–126)
FASTING STATUS PATIENT QL REPORTED: NO
GLOBULIN PLAS-MCNC: 3.7 G/DL (ref 2.8–4.4)
GLUCOSE BLD-MCNC: 315 MG/DL (ref 70–99)
HBA1C MFR BLD: 7.5 % (ref ?–5.7)
HCT VFR BLD AUTO: 44.5 %
HGB BLD-MCNC: 14.8 G/DL
IMM GRANULOCYTES # BLD AUTO: 0.03 X10(3) UL (ref 0–1)
IMM GRANULOCYTES NFR BLD: 0.3 %
IRON SATN MFR SERPL: 15 %
IRON SERPL-MCNC: 36 UG/DL
LYMPHOCYTES # BLD AUTO: 1.29 X10(3) UL (ref 1–4)
LYMPHOCYTES NFR BLD AUTO: 13 %
MCH RBC QN AUTO: 29 PG (ref 26–34)
MCHC RBC AUTO-ENTMCNC: 33.3 G/DL (ref 31–37)
MCV RBC AUTO: 87.3 FL
MONOCYTES # BLD AUTO: 0.67 X10(3) UL (ref 0.1–1)
MONOCYTES NFR BLD AUTO: 6.7 %
NEUTROPHILS # BLD AUTO: 7.78 X10 (3) UL (ref 1.5–7.7)
NEUTROPHILS # BLD AUTO: 7.78 X10(3) UL (ref 1.5–7.7)
NEUTROPHILS NFR BLD AUTO: 78.2 %
OSMOLALITY SERPL CALC.SUM OF ELEC: 296 MOSM/KG (ref 275–295)
PLATELET # BLD AUTO: 270 10(3)UL (ref 150–450)
POTASSIUM SERPL-SCNC: 4.2 MMOL/L (ref 3.5–5.1)
PROT SERPL-MCNC: 7 G/DL (ref 6.4–8.2)
RBC # BLD AUTO: 5.1 X10(6)UL
SODIUM SERPL-SCNC: 136 MMOL/L (ref 136–145)
T4 FREE SERPL-MCNC: 1.5 NG/DL (ref 0.8–1.7)
TOTAL IRON BINDING CAPACITY: 246 UG/DL (ref 250–425)
TRANSFERRIN SERPL-MCNC: 176 MG/DL (ref 215–365)
TSI SER-ACNC: 5.39 MIU/ML (ref 0.55–4.78)
WBC # BLD AUTO: 10 X10(3) UL (ref 4–11)

## 2024-09-25 PROCEDURE — G2211 COMPLEX E/M VISIT ADD ON: HCPCS | Performed by: SPECIALIST

## 2024-09-25 PROCEDURE — 99215 OFFICE O/P EST HI 40 MIN: CPT | Performed by: SPECIALIST

## 2024-09-25 RX ORDER — HYDROMORPHONE HYDROCHLORIDE 4 MG/1
TABLET ORAL EVERY 4 HOURS PRN
Qty: 100 TABLET | Refills: 0 | Status: SHIPPED | OUTPATIENT
Start: 2024-09-25

## 2024-09-25 RX ORDER — HYDROMORPHONE HYDROCHLORIDE 4 MG/1
4 TABLET ORAL EVERY 4 HOURS PRN
COMMUNITY

## 2024-09-25 NOTE — PROGRESS NOTES
Patient is here for MD f/u for GIST. He is on Qinlock 150 mg daily. Patient reports worsening pain in buttock over the last few days. Due to the pain, he restarted Dilaudid. He will need a refill today. He had a ct scan on 9/23. Here to further discuss.       Education Record    Learner:  Patient    Disease / Diagnosis:  GIST     Barriers / Limitations:  None   Comments:    Method:  Discussion   Comments:    General Topics:  Plan of care reviewed   Comments:    Outcome:  Shows understanding   Comments:

## 2024-09-26 ENCOUNTER — TELEPHONE (OUTPATIENT)
Dept: HEMATOLOGY/ONCOLOGY | Facility: HOSPITAL | Age: 74
End: 2024-09-26

## 2024-09-26 RX ORDER — PREDNISONE 50 MG/1
TABLET ORAL
Qty: 3 TABLET | Refills: 0 | Status: SHIPPED | OUTPATIENT
Start: 2024-09-26

## 2024-09-26 NOTE — TELEPHONE ENCOUNTER
Test(s) Completed: Labs    Results: Please contact patient. His blood sugar was over 300 yesterday. I added a hb A1c and it is also high. Recommend he see his PCP soon.     Plan: Spoke with patient, patient stated understanding. Requesting MD to send prophylactic prednisone to his pharmacy for his CT scan in November.

## 2024-09-30 ENCOUNTER — OFFICE VISIT (OUTPATIENT)
Dept: INTERNAL MEDICINE CLINIC | Facility: CLINIC | Age: 74
End: 2024-09-30
Payer: MEDICARE

## 2024-09-30 VITALS
TEMPERATURE: 98 F | BODY MASS INDEX: 28.17 KG/M2 | DIASTOLIC BLOOD PRESSURE: 78 MMHG | RESPIRATION RATE: 15 BRPM | WEIGHT: 208 LBS | SYSTOLIC BLOOD PRESSURE: 124 MMHG | HEART RATE: 60 BPM | HEIGHT: 72 IN | OXYGEN SATURATION: 96 %

## 2024-09-30 DIAGNOSIS — Z91.041 ALLERGY TO INTRAVENOUS CONTRAST: ICD-10-CM

## 2024-09-30 DIAGNOSIS — E11.65 TYPE 2 DIABETES MELLITUS WITH HYPERGLYCEMIA, WITHOUT LONG-TERM CURRENT USE OF INSULIN (HCC): ICD-10-CM

## 2024-09-30 DIAGNOSIS — E11.9 DIET-CONTROLLED DIABETES MELLITUS (HCC): Primary | ICD-10-CM

## 2024-09-30 PROCEDURE — G2211 COMPLEX E/M VISIT ADD ON: HCPCS | Performed by: PHYSICIAN ASSISTANT

## 2024-09-30 PROCEDURE — 99214 OFFICE O/P EST MOD 30 MIN: CPT | Performed by: PHYSICIAN ASSISTANT

## 2024-09-30 RX ORDER — BLOOD-GLUCOSE METER
1 EACH MISCELLANEOUS DAILY
Qty: 1 KIT | Refills: 0 | COMMUNITY
Start: 2024-09-30 | End: 2024-09-30

## 2024-09-30 RX ORDER — BLOOD SUGAR DIAGNOSTIC
STRIP MISCELLANEOUS
Qty: 100 STRIP | Refills: 1 | Status: SHIPPED | OUTPATIENT
Start: 2024-09-30 | End: 2025-09-30

## 2024-09-30 RX ORDER — LANCETS 33 GAUGE
1 EACH MISCELLANEOUS DAILY
Qty: 100 EACH | Refills: 0 | Status: SHIPPED | OUTPATIENT
Start: 2024-09-30 | End: 2025-09-30

## 2024-09-30 RX ORDER — BLOOD-GLUCOSE METER
1 EACH MISCELLANEOUS DAILY
Qty: 1 KIT | Refills: 0 | Status: SHIPPED | OUTPATIENT
Start: 2024-09-30

## 2024-09-30 NOTE — PROGRESS NOTES
Luciano Hernández is a 73 year old male.  HPI:   Pt here with complaint of high blood sugars  Pt had oral prednisone prior to CT due to contrast allergy, on 9/23.   Then he had blood drawn on 9/25 and his glucose was 315. This is unusual for him. He does not check blood sugars at home, but A1c has been usually 6-7, most recently 7.5%  Pt checked his blood sugar this morning with neighbor's meter, it was 220.     Cut down his sugar intake since last week, and states he's sleeping better now.   Current Outpatient Medications   Medication Sig Dispense Refill    metFORMIN 500 MG Oral Tab Take with food, twice daily when blood sugar is above 200. 60 tablet 3    Glucose Blood (ONETOUCH VERIO) In Vitro Strip Use as directed. 100 strip 1    OneTouch Delica Lancets 33G Does not apply Misc 1 Lancet by Finger stick route daily. 100 each 0    Blood Glucose Monitoring Suppl (ONETOUCH ULTRA 2) w/Device Does not apply Kit 1 Device by Other route daily. Use as directed. 1 kit 0    HYDROmorphone 4 MG Oral Tab Take 1 tablet (4 mg total) by mouth every 4 (four) hours as needed for Pain.      HYDROmorphone 4 MG Oral Tab Take 1-2 tablets (4-8 mg total) by mouth every 4 (four) hours as needed (cancer related pain.). 100 tablet 0    LEVOTHYROXINE 100 MCG Oral Tab TAKE 1 TABLET (100 MCG TOTAL) BY MOUTH BEFORE BREAKFAST. TAKE ON EMPTY STOMACH 90 tablet 1    diphenhydrAMINE (BENADRYL ALLERGY) 25 MG Oral Cap 2 tabs PO 1 hour prior to scan. 2 capsule 0    Multiple Vitamins-Minerals (CENTRUM SILVER 50+MEN OR) Take 1 tablet by mouth daily.      ESCITALOPRAM 10 MG Oral Tab TAKE 1 TABLET BY MOUTH EVERY DAY 90 tablet 1    ASPIRIN 81 OR Take by mouth.      Ripretinib (QINLOCK) 50 MG Oral Tab Take 150 mg by mouth daily.      atorvastatin 40 MG Oral Tab Take 1 tablet (40 mg total) by mouth nightly.      predniSONE 50 MG Oral Tab Take 13 hr, 7 hr, and 1 hr prior to CT scan. (Patient not taking: Reported on 9/30/2024) 3 tablet 0      Past Medical  History:    Abdominal hernia    Anxiety state    Arrhythmia    atrial fibrillation intermittently    Back problem    back surgery 20 years ago    Black stools    No longer a concern    BPH (benign prostatic hyperplasia)    Cancer (HCC)    GIST    Carcinoma of gastrointestinal tract (HCC)    Coronary atherosclerosis    Depression    Diabetes (HCC)    per pcp not 22 diet controlled dm- pt denies any hx of dm    Disorder of thyroid    Esophageal reflux    Hearing impairment    Does not wear hearing aids    Hearing loss    Heart attack (HCC)    Heart palpitations    AF    High blood pressure    High cholesterol    Hx of motion sickness    > 30 yrs ago while on a boat    Hypothyroidism    Other and unspecified hyperlipidemia    Peripheral vascular disease (HCC)    Pulmonary embolism (HCC)    Stented coronary artery    3 stents total    Thyroid disease    Visual impairment    glasses    Wears glasses      Social History:  Social History     Socioeconomic History    Marital status:     Number of children: 3   Occupational History    Occupation: RETIRED TEACHER   Tobacco Use    Smoking status: Former     Current packs/day: 0.00     Average packs/day: 1 pack/day for 45.0 years (45.0 ttl pk-yrs)     Types: Cigarettes     Start date: 1971     Quit date: 10/1/2017     Years since quittin.0    Smokeless tobacco: Never   Vaping Use    Vaping status: Never Used   Substance and Sexual Activity    Alcohol use: Yes     Alcohol/week: 3.0 standard drinks of alcohol     Types: 3 Standard drinks or equivalent per week     Comment: rarely    Drug use: No    Sexual activity: Not Currently   Other Topics Concern    Caffeine Concern Yes     Comment: coffee three times a day     Exercise No    Seat Belt No    Special Diet No    Stress Concern No    Weight Concern Yes   Social History Narrative    , lives alone    Has 2 daughters and 1 son - living close by     Social Determinants of Health     Financial Resource  Strain: Low Risk  (7/26/2024)    Financial Resource Strain     Difficulty of Paying Living Expenses: Not very hard   Food Insecurity: No Food Insecurity (7/24/2024)    Food Insecurity     Food Insecurity: Never true   Transportation Needs: No Transportation Needs (7/26/2024)    Transportation Needs     Lack of Transportation: No   Housing Stability: Low Risk  (4/15/2024)    Housing Stability     Housing Instability: No        REVIEW OF SYSTEMS:   GENERAL HEALTH: feels well otherwise. Denies fever, chills, unintentional weight change  SKIN: denies any unusual skin lesions or rashes  RESPIRATORY: denies shortness of breath with exertion, denies cough or wheezing  CARDIOVASCULAR: denies chest pain or palpitations, denies leg swelling  GI: denies abdominal pain and denies heartburn. Denies nausea, vomiting, diarrhea, constipation  NEURO: denies headaches, dizziness, weakness, syncope    EXAM:   /78   Pulse 60   Temp 97.8 °F (36.6 °C)   Resp 15   Ht 6' (1.829 m)   Wt 208 lb (94.3 kg)   SpO2 96%   BMI 28.21 kg/m²   GENERAL: well developed, well nourished,in no apparent distress  SKIN: no rashes,no suspicious lesions, warm and dry  HEENT: atraumatic, normocephalic,ears and throat are clear  NECK: supple,no adenopathy, no thyromegaly  LUNGS: clear to auscultation b/l no W/R/R  CARDIO: RRR without murmur  GI: good BS's,no masses, HSM, distension or tenderness  EXTREMITIES: no cyanosis, clubbing or edema  MUSCULOSKELETAL: FROM, no joint swelling or bony tenderness  NEURO: a/ox3, no focal deficits  PSYCH: mood and affect normal    ASSESSMENT AND PLAN:   1. Diet-controlled diabetes mellitus (HCC) (Primary)  Patient's blood sugar is usually diet-controlled but has had recent oral prednisone which caused it to spike. Start monitoring blood sugar. If morning FBS  >200 pt will take metformin 500mg with food. He will recheck before dinner and if > 200 will take again. Continue diet changes.   -     OneTouch Verio; Use  as directed.  Dispense: 100 strip; Refill: 1  -     OneTouch Delica Lancets 33G; 1 Lancet by Finger stick route daily.  Dispense: 100 each; Refill: 0  -     OneTouch Ultra 2; 1 Device by Other route daily. Use as directed.  Dispense: 1 kit; Refill: 0  -     Microalb/Creat Ratio, Random Urine; Future; Expected date: 09/30/2024  2. Type 2 diabetes mellitus with hyperglycemia, without long-term current use of insulin (HCC)   Patient has future scans planned that will require prednisone; add prn metformin as above.  -     metFORMIN HCl; Take with food, twice daily when blood sugar is above 200.  Dispense: 60 tablet; Refill: 3  3. Allergy to intravenous contrast  Educated on prednisone symptoms and side effects.      The patient indicates understanding of these issues and agrees to the plan.  Return if symptoms worsen or fail to improve.

## 2024-09-30 NOTE — PATIENT INSTRUCTIONS
Flu shot and COVID booster at pharmacy  RSV vaccine in 1 week    Check blood sugar once daily before breakfast  If over 200: take one metformin with breakfast. Recheck blood sugar before dinner. If still above 200, take another metformin with dinner.

## 2024-10-03 ENCOUNTER — PATIENT MESSAGE (OUTPATIENT)
Dept: INTERNAL MEDICINE CLINIC | Facility: CLINIC | Age: 74
End: 2024-10-03

## 2024-10-03 DIAGNOSIS — E11.9 DIET-CONTROLLED DIABETES MELLITUS (HCC): ICD-10-CM

## 2024-10-03 DIAGNOSIS — E11.9 TYPE 2 DIABETES MELLITUS WITHOUT COMPLICATION, WITHOUT LONG-TERM CURRENT USE OF INSULIN (HCC): Primary | ICD-10-CM

## 2024-10-03 RX ORDER — PANTOPRAZOLE SODIUM 40 MG/1
TABLET, DELAYED RELEASE ORAL
COMMUNITY
Start: 2024-08-16

## 2024-10-04 RX ORDER — BLOOD-GLUCOSE METER
1 EACH MISCELLANEOUS DAILY
Qty: 1 KIT | Refills: 0 | Status: SHIPPED | OUTPATIENT
Start: 2024-10-04 | End: 2024-10-08

## 2024-10-04 NOTE — TELEPHONE ENCOUNTER
From: Luciano Hernández  To: Briana Forde  Sent: 10/3/2024 4:29 PM CDT  Subject: Blood glucose meter    leonardo Tomas asked me to have you contact them, regarding meter. I am taking metformin, but still have no meter.

## 2024-10-08 RX ORDER — BLOOD SUGAR DIAGNOSTIC
STRIP MISCELLANEOUS
Qty: 100 STRIP | Refills: 1 | Status: SHIPPED | OUTPATIENT
Start: 2024-10-08 | End: 2025-10-08

## 2024-10-08 RX ORDER — BLOOD-GLUCOSE METER
EACH MISCELLANEOUS
Qty: 1 KIT | Refills: 0 | Status: SHIPPED | OUTPATIENT
Start: 2024-10-08

## 2024-10-08 RX ORDER — BLOOD SUGAR DIAGNOSTIC
STRIP MISCELLANEOUS
Qty: 100 STRIP | Refills: 1 | Status: SHIPPED | OUTPATIENT
Start: 2024-10-08 | End: 2024-10-08

## 2024-10-08 NOTE — TELEPHONE ENCOUNTER
Luci at [facility] said wrong test strips and lancets were incorrect type.    Need One Touch Ultra   Lancets and test strips     Please re-send.

## 2024-10-20 ENCOUNTER — ANESTHESIA EVENT (OUTPATIENT)
Dept: ENDOSCOPY | Facility: HOSPITAL | Age: 74
End: 2024-10-20
Payer: MEDICARE

## 2024-10-21 ENCOUNTER — HOSPITAL ENCOUNTER (OUTPATIENT)
Facility: HOSPITAL | Age: 74
Setting detail: HOSPITAL OUTPATIENT SURGERY
Discharge: HOME | End: 2024-10-21
Attending: INTERNAL MEDICINE | Admitting: INTERNAL MEDICINE
Payer: MEDICARE

## 2024-10-21 ENCOUNTER — ANESTHESIA (OUTPATIENT)
Dept: ENDOSCOPY | Facility: HOSPITAL | Age: 74
End: 2024-10-21
Payer: MEDICARE

## 2024-10-21 VITALS
HEART RATE: 62 BPM | SYSTOLIC BLOOD PRESSURE: 99 MMHG | TEMPERATURE: 98 F | DIASTOLIC BLOOD PRESSURE: 64 MMHG | HEIGHT: 72 IN | BODY MASS INDEX: 27.77 KG/M2 | OXYGEN SATURATION: 100 % | WEIGHT: 205 LBS | RESPIRATION RATE: 16 BRPM

## 2024-10-21 LAB — GLUCOSE BLD-MCNC: 175 MG/DL (ref 70–99)

## 2024-10-21 PROCEDURE — 0D578ZZ DESTRUCTION OF STOMACH, PYLORUS, VIA NATURAL OR ARTIFICIAL OPENING ENDOSCOPIC: ICD-10-PCS | Performed by: INTERNAL MEDICINE

## 2024-10-21 PROCEDURE — 82962 GLUCOSE BLOOD TEST: CPT

## 2024-10-21 DEVICE — REPLAY HEMOSTASIS CLIP, 11MM SPAN
Type: IMPLANTABLE DEVICE | Status: FUNCTIONAL
Brand: REPLAY

## 2024-10-21 RX ORDER — PHENYLEPHRINE HCL 10 MG/ML
VIAL (ML) INJECTION AS NEEDED
Status: DISCONTINUED | OUTPATIENT
Start: 2024-10-21 | End: 2024-10-21 | Stop reason: SURG

## 2024-10-21 RX ORDER — LIDOCAINE HYDROCHLORIDE 10 MG/ML
INJECTION, SOLUTION EPIDURAL; INFILTRATION; INTRACAUDAL; PERINEURAL AS NEEDED
Status: DISCONTINUED | OUTPATIENT
Start: 2024-10-21 | End: 2024-10-21 | Stop reason: SURG

## 2024-10-21 RX ORDER — SODIUM CHLORIDE, SODIUM LACTATE, POTASSIUM CHLORIDE, CALCIUM CHLORIDE 600; 310; 30; 20 MG/100ML; MG/100ML; MG/100ML; MG/100ML
INJECTION, SOLUTION INTRAVENOUS CONTINUOUS
Status: DISCONTINUED | OUTPATIENT
Start: 2024-10-21 | End: 2024-10-21

## 2024-10-21 RX ADMIN — PHENYLEPHRINE HCL 50 MCG: 10 MG/ML VIAL (ML) INJECTION at 11:25:00

## 2024-10-21 RX ADMIN — PHENYLEPHRINE HCL 100 MCG: 10 MG/ML VIAL (ML) INJECTION at 11:31:00

## 2024-10-21 RX ADMIN — SODIUM CHLORIDE, SODIUM LACTATE, POTASSIUM CHLORIDE, CALCIUM CHLORIDE: 600; 310; 30; 20 INJECTION, SOLUTION INTRAVENOUS at 11:07:00

## 2024-10-21 RX ADMIN — PHENYLEPHRINE HCL 100 MCG: 10 MG/ML VIAL (ML) INJECTION at 11:21:00

## 2024-10-21 RX ADMIN — LIDOCAINE HYDROCHLORIDE 25 MG: 10 INJECTION, SOLUTION EPIDURAL; INFILTRATION; INTRACAUDAL; PERINEURAL at 11:08:00

## 2024-10-21 NOTE — OPERATIVE REPORT
Operative Report-Push enteroscopy      PREOPERATIVE DIAGNOSIS/INDICATION: Melena, AVMs    POSTOPERTATIVE DIAGNOSIS: AVMs, hiatal hernia    PROCEDURE PERFORMED: EGD    INFORMED CONSENT: Once a brief history and physical examination was performed, the risks, benefits and alternatives to the procedure were discussed with the patient and/or family and informed consent was obtained.  The risks of sedation, perforation, missed lesions and need for surgery were all discussed.  Patient expressed understanding of the risks and agreed to proceed.      PROCEDURE DESCRIPTION:  The patient was then brought to the endoscopy suite where his/her pulse, pulse oximetry and blood pressure were monitored. He/she was placed in the left lateral decubitus position and deep sedation was administered. Once adequate sedation was achieved, a bite block was placed and a lubricated tip of an Olympus video upper endoscope was inserted through the oropharynx and gently manipulated through the esophagus into the stomach and the distal duodenum/proximal jejunum. Upon withdrawal of the endoscope, careful visualization of the mucosa was performed.     FINDINGS:    ESOPHAGUS: Nodular Z-line at 40 cm, previously biopsied earlier this year, therefore not biopsied.  STOMACH: Small nonbleeding AVM seen in the gastric body.  This was fulgurated with APC.  Sliding hiatal hernia on retroflexion.  JEJUNUM/DUODENUM: 4 small nonbleeding AVMs seen in the small bowel.  These were fulgurated with APC.  One of the AVMs was clipped x 1.    THERAPEUTICS: APC  RECOMMENDATIONS:   Post EGD precautions, watch for bleeding, infection, perforation, adverse drug reactions   Repeat EGD in 3-6 months     Quentin Llmaas MD  10/21/2024  11:35 AM

## 2024-10-21 NOTE — ANESTHESIA PREPROCEDURE EVALUATION
PRE-OP EVALUATION    Patient Name: Luciano Hernández    Admit Diagnosis: Dizziness [R42]  Bradycardia [R00.1]  Upper GI bleed [K92.2]    Pre-op Diagnosis: INPT    PUSH ENTEROSCOPY    Anesthesia Procedure: PUSH ENTEROSCOPY    Surgeons and Role:     * Ford Llamas, DO - Primary    Pre-op vitals reviewed.  Temp: 98.3 °F (36.8 °C)  Pulse: 72  Resp: 20  BP: 92/72  SpO2: 100 %  Body mass index is 27.8 kg/m².    Current medications reviewed.  Hospital Medications:   lactated ringers infusion   Intravenous Continuous       Outpatient Medications:     Medications Prior to Admission   Medication Sig Dispense Refill Last Dose/Taking    pantoprazole 40 MG Oral Tab EC pantoprazole 40 MG Oral Tab EC, [RxNorm: 380646]   Past Week    metFORMIN 500 MG Oral Tab Take with food, twice daily when blood sugar is above 200. 60 tablet 3 Past Week    predniSONE 50 MG Oral Tab Take 13 hr, 7 hr, and 1 hr prior to CT scan. 3 tablet 0 Past Month    HYDROmorphone 4 MG Oral Tab Take 1 tablet (4 mg total) by mouth every 4 (four) hours as needed for Pain.   Past Month    HYDROmorphone 4 MG Oral Tab Take 1-2 tablets (4-8 mg total) by mouth every 4 (four) hours as needed (cancer related pain.). 100 tablet 0 Taking As Needed    LEVOTHYROXINE 100 MCG Oral Tab TAKE 1 TABLET (100 MCG TOTAL) BY MOUTH BEFORE BREAKFAST. TAKE ON EMPTY STOMACH 90 tablet 1 10/21/2024 Morning    Multiple Vitamins-Minerals (CENTRUM SILVER 50+MEN OR) Take 1 tablet by mouth daily.   Past Week    ESCITALOPRAM 10 MG Oral Tab TAKE 1 TABLET BY MOUTH EVERY DAY 90 tablet 1 10/20/2024    ASPIRIN 81 OR Take by mouth.   10/19/2024    Ripretinib (QINLOCK) 50 MG Oral Tab Take 150 mg by mouth daily.   Past Week    atorvastatin 40 MG Oral Tab Take 1 tablet (40 mg total) by mouth nightly.   Past Week    Glucose Blood (ONETOUCH ULTRA) In Vitro Strip Use as directed once daily. 100 strip 0     Blood Glucose Monitoring Suppl (ONETOUCH ULTRA 2) w/Device Does not apply Kit Use as directed  to check blood glucose daily 1 kit 0     Glucose Blood (ONETOUCH VERIO) In Vitro Strip Use as directed to check blood glucose daily 100 strip 1     OneTouch Delica Lancets 33G Does not apply Misc 1 Lancet by Finger stick route daily. 100 each 0     diphenhydrAMINE (BENADRYL ALLERGY) 25 MG Oral Cap 2 tabs PO 1 hour prior to scan. (Patient not taking: Reported on 10/3/2024) 2 capsule 0 Not Taking       Allergies: Radiology contrast iodinated dyes      Anesthesia Evaluation    Patient summary reviewed.    Anesthetic Complications  (-) history of anesthetic complications         GI/Hepatic/Renal      (+) GERD                           Cardiovascular  Comment: TTE 2022  · Overall normal LVSF without wall motion abnormalities. (LVEF visually estimated at 60%). Chamber sizes were within normal limits.  · RV size and function normal.   · Mitral valve with mild nonspecific thickening.  There is mild-to-moderate MR noted with a very medial, centrally directed jet noted.  · Normal tricuspid valve. Pulmonic valve not well seen.   · There was a trileaflet aortic valve without stenosis or insufficiency.   · Adequate velocities were seen in the ALEXY. At 90 degree views of the ALEXY a clot was visualized.  · There was no evidence for reversal of flow in the pulmonary veins.   · Color Doppler flow interrogation of the intra-atrial septum was performed and there was no intracardiac shunting to suggest an ASD or PFO.  · The aorta was evaluated. There was no evidence for mobile atheromata or thrombus.      ECG reviewed.            (+) hypertension     (+) CAD (PCI (Sept 2022) 99% RCA stenosis s/p LUIS E x 2. Staged 70% LAD s/p LUIS E)             (+) dysrhythmias (only 1 episode) and atrial fibrillation                  Endo/Other      (+) diabetes  type 2,                          Pulmonary                           Neuro/Psych      (+) depression                        Patient Active Problem List:     GIST (gastrointestinal stroma tumor),  malignant, colon (HCC)     Benign prostatic hyperplasia with urinary retention     Hypothyroidism     Attention to urostomy (HCC)     Diet-controlled diabetes mellitus (HCC)     Parastomal hernia with obstruction and without gangrene     Malignant gastrointestinal stromal tumor (HCC)     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     Constipation due to opioid therapy     Peripheral vascular disease (HCC)     Hepatitis C antibody test negative     Enlarged thoracic aorta (HCC)     Enlargement of aortic root (HCC)     Atherosclerosis of native arteries of extremities with rest pain, left leg (HCC)     Major depressive disorder, recurrent, unspecified (HCC)     Atrial fibrillation (HCC)     Third degree AV block (HCC)     Thrombus of left atrial appendage     History of urostomy     Sedative, hypnotic or anxiolytic dependence, uncomplicated (HCC)     Chronic cholecystitis     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)     Malignant gastrointestinal stromal tumor (GIST) of rectum (HCC)     Acquired hypothyroidism     Hypertension associated with diabetes (HCC)     Hyperlipidemia associated with type 2 diabetes mellitus (HCC)     Hyperglycemia     Upper GI bleed     Coronary artery disease involving native coronary artery of native heart without angina pectoris     Dizziness     Bradycardia            Past Surgical History:   Procedure Laterality Date    Angioplasty (coronary)  9-1-22    Back surgery  1998    Bowel resection  10-3-2017    Cath drug eluting stent  09/09/2022    LAD    Colonoscopy  2006    Colonoscopy N/A 04/18/2024    Procedure: COLONOSCOPY;  Surgeon: Quentin Llamas MD;  Location:  ENDOSCOPY    Colostomy  10/03/2017    Cystoscopy,insert ureteral stent      Hernia surgery Left 2006    Hernia surgery Left 1975    Hernia surgery Left 12/26/2019    Laparoscopy, surgical prostatectomy, retropubic radical, w/nerve sparing      Other surgical history  12/03/2015    fracture radiius and ulna  Right arm -      Other surgical history  10/03/2017    Pelvic exenteration to include en-bloc resection of pelvic gastrointestinal stromal tumor with abdominoperineal resection, total cystectomy, prostatectomy, end colostomy, and urostomy    Other surgical history  2019    DIAGNOSTIC LAPAROSCOPY, ROBOTIC LYSIS OF ADHESION    Other surgical history  2024    Exploration perineum. Resection subcutaneous fat with wire localization.    Part removal colon w end colostomy       Social History     Socioeconomic History    Marital status:     Number of children: 3   Occupational History    Occupation: RETIRED TEACHER   Tobacco Use    Smoking status: Former     Current packs/day: 0.00     Average packs/day: 1 pack/day for 45.0 years (45.0 ttl pk-yrs)     Types: Cigarettes     Start date: 1971     Quit date: 10/1/2017     Years since quittin.0    Smokeless tobacco: Never   Vaping Use    Vaping status: Never Used   Substance and Sexual Activity    Alcohol use: Yes     Alcohol/week: 3.0 standard drinks of alcohol     Types: 3 Standard drinks or equivalent per week     Comment: RARE    Drug use: No    Sexual activity: Not Currently   Other Topics Concern    Caffeine Concern Yes     Comment: coffee three times a day     Exercise No    Seat Belt No    Special Diet No    Stress Concern No    Weight Concern Yes     History   Drug Use No     Available pre-op labs reviewed.  Lab Results   Component Value Date    WBC 10.0 2024    RBC 5.10 2024    HGB 14.8 2024    HCT 44.5 2024    MCV 87.3 2024    MCH 29.0 2024    MCHC 33.3 2024    RDW 15.1 2024    .0 2024     Lab Results   Component Value Date     2024    K 4.2 2024     2024    CO2 26.0 2024    BUN 19 2024    CREATSERUM 1.23 2024     (H) 2024    CA 9.6 2024              Airway      Mallampati: II  Mouth opening: 3 FB  TM distance: 4 - 6 cm  Neck ROM:  full Cardiovascular      Rhythm: regular  Rate: normal     Dental             Pulmonary      Breath sounds clear to auscultation bilaterally.               Other findings              ASA: 3   Plan: MAC  NPO status verified and patient meets guidelines.    Post-procedure pain management plan discussed with surgeon and patient.    Comment: Plan is MAC anesthesia, which may include deep sedation.  Implied that memory of procedure is unlikely although intraop recall, if it occurs, may be a reasonable and comfortable experience with this anesthetic.  Aware that general anesthesia is not intended though deep sedation may include occasional moments of general anesthesia.  The backup plan for safe patient care may include change of plan to full general anesthesia with potential airway placement.  Patient (legal guardian) demonstrated understanding, accepts risks and benefits, and wishes to proceed as reflected in the signed consent form.  Difficulty airway equipment available as needed, may need general anesthesia OETT.  Previous anesthesia records reviewed.      Plan/risks discussed with: patient                Present on Admission:  **None**

## 2024-10-21 NOTE — H&P
[facility]  History & Physical    Luciano Hernández Patient Status:  Hospital Outpatient Surgery    10/23/1950 MRN [id number]   Location [facility] Attending Quentin Llamas MD   Hosp Day # 0 PCP Gavin Bernal MD     History of Present Illness:  Luciano Hernández is a(n) 73 year old male with metastatic GIST s/p abd peritoneal resection which included total cystectomy, prostatectomy, end colostomy, and urostomy (10/2017) with recurrent progressive disease underwent attempted resection of a growing mass 2024 (Dr Daniels) presenting for EGD/push enteroscopy. Hgb 14s recently. No bleeding sx. Off AC/AP agents other than ASA.     History:  Past Medical History:    Abdominal hernia    Anxiety state    Arrhythmia    atrial fibrillation intermittently    Back problem    back surgery 20 years ago    Black stools    No longer a concern    BPH (benign prostatic hyperplasia)    Cancer (HCC)    GIST    Carcinoma of gastrointestinal tract (HCC)    Coronary atherosclerosis    Depression    Diabetes (HCC)    per pcp not 22 diet controlled dm- pt denies any hx of dm    Disorder of thyroid    Esophageal reflux    Hearing impairment    DOESN'T WEAR HEARING AIDS    Hearing loss    Heart attack (HCC)    Heart palpitations    AF    High blood pressure    High cholesterol    Hx of motion sickness    > 30 yrs ago while on a boat    Hypothyroidism    Other and unspecified hyperlipidemia    Peripheral vascular disease (HCC)    Pulmonary embolism (HCC)    Stented coronary artery    3 stents total    Thyroid disease    Visual impairment    GLASSES    Wears glasses     Past Surgical History:   Procedure Laterality Date    Angioplasty (coronary)  22    Back surgery      Bowel resection  10-3-2017    Cath drug eluting stent  2022    LAD    Colonoscopy      Colonoscopy N/A 2024    Procedure: COLONOSCOPY;  Surgeon: Quentin Llamas MD;  Location:  ENDOSCOPY     Colostomy  10/03/2017    Cystoscopy,insert ureteral stent      Hernia surgery Left 2006    Hernia surgery Left 1975    Hernia surgery Left 12/26/2019    Laparoscopy, surgical prostatectomy, retropubic radical, w/nerve sparing      Other surgical history  12/03/2015    fracture radiius and ulna  Right arm -     Other surgical history  10/03/2017    Pelvic exenteration to include 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 Resection subcutaneous fat with wire localization.    Part removal colon w end colostomy       Family History   Problem Relation Age of Onset    Alcohol and Other Disorders Associated Father     Cancer Mother         Breast    Other (Other) Sister         parkinsons    Heart Disorder Brother       reports that he quit smoking about 7 years ago. His smoking use included cigarettes. He started smoking about 53 years ago. He has a 45 pack-year smoking history. He has never used smokeless tobacco. He reports current alcohol use of about 3.0 standard drinks of alcohol per week. He reports that he does not use drugs.  Allergies[1]     lactated ringers infusion   Intravenous Continuous     No current outpatient medications on file.    Review of Systems:  Gastrointestinal: Denies positive test for blood stool, heartburn/indigestion/reflux, belching, difficulty swallowing, irregular bowel habits, painful swallowing, diarrhea, abdominal pain, constipation, nausea, incontinence of stool, vomiting, black stools, get full quickly at meals, blood in stools, abdominal distention, jaundice, flatulence, vomiting blood, bloating, hernia, laxative use, food/milk intolerance, pain with bowel movement, hemorrhoids.  General: Denies fatigue, chills/fever, night  sweats, weight loss, loss of appetite, weight gain, sleep disturbance.  Neurological: Denies frequent headaches, history of stroke, recent passing out, recent dizziness, convulsions/seizures, dementia.  Cardiovascular: Denies history of heart murmur, leg swelling, history of rheumatic fever, pacemaker, chest pain or pressure after eating or when upset, automatic defibrillator, angina, other implanted devices, irregular heart rate/palpitations, high cholesterol or triglycerides, coronary stents.  Respiratory: Denies shortness of breath, chronic/frequent hoarseness, wheezing, exposure to tuberculosis, chronic cough, spitting up blood, cough up sputum, sleep apnea.  Genitourinary: Denies kidney stones, painful/difficult urination, frequent urinary infections, frequent urination, blood in urine, incontinence, kidney failure, prostate problems.  Endocrine: Denies thyroid disease, denies diabetes.  Female complaints: Denies endometriosis, painful menstrual periods, heavy menstrual periods.  Patient is not pregnant.  Psychosocial: Denies history of mental illness, denies usually feeling lonely or depressed, denies history of depression, anxiety, history of physical or sexual abuse, stress, history of eating disorder.  Skin: Denies severe itching, unusual moles, rash, flushing, change in hair or nails.  Bone/joint: Denies arthritis, back pain, joint pain, osteoporosis.  Heme/Lymphatic: Denies easy bruising, anemia, excessive bleeding, enlarging or painful lymph nodes.  Allergy: Denies medication allergy, latex/rubber allergy, anaphylactic or other reaction to anesthesia, food allergy.   Eyes: Denies blurred/double vision, eye disease, glasses or contacts, glaucoma.  ENT: Denies nose or gums bleeding, mouth sores, bad breath or bad taste in mouth, hearing loss.    Physical Exam:   General: alert, cooperative, oriented.  No respiratory distress.   Head: Normocephalic, without obvious abnormality, atraumatic.   Eyes:  Conjunctivae/corneas clear.  No scleral icterus.  No conjunctival     hemorrhage.   Nose: Nares normal.   Throat: Lips, mucosa, and tongue normal.  No thrush noted.   Neck: Soft, supple neck; trachea midline, no adenopathy, no thyromegaly.   Chest wall: No tenderness or deformity.   Heart: Regular rate and rhythm, normal S1S2, no murmur.   Abdomen: soft, non-tender, non-distended, no masses, no guarding, no     rebound, positive BS.   Extremity: no edema, no cyanosis   Skin: No rashes or lesions.    Neurological: Alert, interactive, no focal deficits    ASA Score: 3-Patient with severe systemic disease    Plan: Push enteroscopy  Risk/Benefits:  The risks and benefits of the procedure were discussed in detail with the patient, including the risk of bleeding, infection, pain, sedation, and perforation.  The patient was also informed that polyps and tumors can be missed on rare occasions, which may require future procedures. The patient indicates understanding of these issues and agrees to proceed with the scheduled procedure.   Quentin Llamas MD  10/21/2024  10:38 AM             [1]   Allergies  Allergen Reactions    Radiology Contrast Iodinated Dyes HIVES, RESPIRATORY FAILURE and OTHER (SEE COMMENTS)     Originally with \"Barium enema for lower GI\" 50 years ago. He states he developed hives and went into respiratory arrest. Pt has since had CT iodinated contrast with 13 hr pre-meds and no break-through reactions, HUMBERTO Mera, Radiology RN.

## 2024-10-21 NOTE — DISCHARGE INSTRUCTIONS

## 2024-11-07 DIAGNOSIS — E11.9 TYPE 2 DIABETES MELLITUS WITHOUT COMPLICATION, WITHOUT LONG-TERM CURRENT USE OF INSULIN (HCC): ICD-10-CM

## 2024-11-07 RX ORDER — BLOOD-GLUCOSE METER
EACH MISCELLANEOUS
Qty: 1 KIT | Refills: 0 | Status: SHIPPED | OUTPATIENT
Start: 2024-11-07

## 2024-11-07 NOTE — TELEPHONE ENCOUNTER
Last time medication was refilled 10/08/2024  Last office visit  09/30/2024  Next office visit due/scheduled   Future Appointments   Date Time Provider Department Center   11/25/2024 12:00 PM Gavin Bernal MD [facility] [address]   11/26/2024 10:00 AM PF CT 1 PF CT [place]   12/4/2024  2:30 PM Zaire Tapia MD PF HEM [facility] [place]       Passed protocol, Medication sent.

## 2024-11-25 ENCOUNTER — OFFICE VISIT (OUTPATIENT)
Dept: INTERNAL MEDICINE CLINIC | Facility: CLINIC | Age: 74
End: 2024-11-25
Payer: MEDICARE

## 2024-11-25 VITALS
SYSTOLIC BLOOD PRESSURE: 126 MMHG | HEIGHT: 72 IN | BODY MASS INDEX: 27.77 KG/M2 | DIASTOLIC BLOOD PRESSURE: 78 MMHG | WEIGHT: 205 LBS | OXYGEN SATURATION: 99 % | TEMPERATURE: 98 F | HEART RATE: 70 BPM | RESPIRATION RATE: 16 BRPM

## 2024-11-25 DIAGNOSIS — I77.89 ENLARGED THORACIC AORTA (HCC): ICD-10-CM

## 2024-11-25 DIAGNOSIS — F13.20 SEDATIVE, HYPNOTIC OR ANXIOLYTIC DEPENDENCE, UNCOMPLICATED (HCC): ICD-10-CM

## 2024-11-25 DIAGNOSIS — I15.2 HYPERTENSION ASSOCIATED WITH DIABETES (HCC): ICD-10-CM

## 2024-11-25 DIAGNOSIS — R33.8 BENIGN PROSTATIC HYPERPLASIA WITH URINARY RETENTION: ICD-10-CM

## 2024-11-25 DIAGNOSIS — I48.0 PAROXYSMAL ATRIAL FIBRILLATION (HCC): ICD-10-CM

## 2024-11-25 DIAGNOSIS — C79.89 SECONDARY MALIGNANCY OF SOFT TISSUES OF PERINEUM (HCC): ICD-10-CM

## 2024-11-25 DIAGNOSIS — K59.03 CONSTIPATION DUE TO OPIOID THERAPY: ICD-10-CM

## 2024-11-25 DIAGNOSIS — E03.2 HYPOTHYROIDISM DUE TO DRUGS: ICD-10-CM

## 2024-11-25 DIAGNOSIS — C49.A4 GIST (GASTROINTESTINAL STROMA TUMOR), MALIGNANT, COLON (HCC): ICD-10-CM

## 2024-11-25 DIAGNOSIS — I51.3 THROMBUS OF LEFT ATRIAL APPENDAGE: ICD-10-CM

## 2024-11-25 DIAGNOSIS — I25.10 CORONARY ARTERY DISEASE INVOLVING NATIVE CORONARY ARTERY OF NATIVE HEART WITHOUT ANGINA PECTORIS: ICD-10-CM

## 2024-11-25 DIAGNOSIS — E11.69 HYPERLIPIDEMIA ASSOCIATED WITH TYPE 2 DIABETES MELLITUS (HCC): ICD-10-CM

## 2024-11-25 DIAGNOSIS — K43.3 PARASTOMAL HERNIA WITH OBSTRUCTION AND WITHOUT GANGRENE: ICD-10-CM

## 2024-11-25 DIAGNOSIS — C49.A5 MALIGNANT GASTROINTESTINAL STROMAL TUMOR (GIST) OF RECTUM (HCC): ICD-10-CM

## 2024-11-25 DIAGNOSIS — R00.1 BRADYCARDIA: ICD-10-CM

## 2024-11-25 DIAGNOSIS — Z00.00 ANNUAL PHYSICAL EXAM: Primary | ICD-10-CM

## 2024-11-25 DIAGNOSIS — E11.59 HYPERTENSION ASSOCIATED WITH DIABETES (HCC): ICD-10-CM

## 2024-11-25 DIAGNOSIS — E11.9 DIET-CONTROLLED DIABETES MELLITUS (HCC): ICD-10-CM

## 2024-11-25 DIAGNOSIS — T40.2X5A CONSTIPATION DUE TO OPIOID THERAPY: ICD-10-CM

## 2024-11-25 DIAGNOSIS — D50.0 IRON DEFICIENCY ANEMIA SECONDARY TO BLOOD LOSS (CHRONIC): ICD-10-CM

## 2024-11-25 DIAGNOSIS — E78.5 HYPERLIPIDEMIA ASSOCIATED WITH TYPE 2 DIABETES MELLITUS (HCC): ICD-10-CM

## 2024-11-25 DIAGNOSIS — Z43.6 ATTENTION TO UROSTOMY (HCC): ICD-10-CM

## 2024-11-25 DIAGNOSIS — E03.9 ACQUIRED HYPOTHYROIDISM: ICD-10-CM

## 2024-11-25 DIAGNOSIS — I70.222 ATHEROSCLEROSIS OF NATIVE ARTERIES OF EXTREMITIES WITH REST PAIN, LEFT LEG (HCC): ICD-10-CM

## 2024-11-25 DIAGNOSIS — K81.1 CHRONIC CHOLECYSTITIS: ICD-10-CM

## 2024-11-25 DIAGNOSIS — F33.1 MODERATE EPISODE OF RECURRENT MAJOR DEPRESSIVE DISORDER (HCC): ICD-10-CM

## 2024-11-25 DIAGNOSIS — N40.1 BENIGN PROSTATIC HYPERPLASIA WITH URINARY RETENTION: ICD-10-CM

## 2024-11-25 DIAGNOSIS — I44.2 THIRD DEGREE AV BLOCK (HCC): ICD-10-CM

## 2024-11-25 DIAGNOSIS — I77.89 ENLARGEMENT OF AORTIC ROOT (HCC): ICD-10-CM

## 2024-11-25 DIAGNOSIS — Z00.00 ENCOUNTER FOR ANNUAL HEALTH EXAMINATION: ICD-10-CM

## 2024-11-25 DIAGNOSIS — I73.9 PERIPHERAL VASCULAR DISEASE (HCC): ICD-10-CM

## 2024-11-25 PROBLEM — R42 DIZZINESS: Status: RESOLVED | Noted: 2024-07-24 | Resolved: 2024-11-25

## 2024-11-25 PROBLEM — K92.2 UPPER GI BLEED: Status: RESOLVED | Noted: 2024-07-24 | Resolved: 2024-11-25

## 2024-11-25 PROBLEM — R73.9 HYPERGLYCEMIA: Status: RESOLVED | Noted: 2024-07-24 | Resolved: 2024-11-25

## 2024-11-25 RX ORDER — METFORMIN HYDROCHLORIDE 500 MG/1
500 TABLET, EXTENDED RELEASE ORAL
Qty: 90 TABLET | Refills: 3 | Status: SHIPPED | OUTPATIENT
Start: 2024-11-25

## 2024-11-25 RX ORDER — ALPRAZOLAM 0.5 MG
0.5 TABLET ORAL 2 TIMES DAILY PRN
Qty: 60 TABLET | Refills: 1 | Status: SHIPPED | OUTPATIENT
Start: 2024-11-25

## 2024-11-25 NOTE — PROGRESS NOTES
Subjective:   Luciano Hernández is a 74 year old male who presents for a Medicare Subsequent Annual Wellness visit (Pt already had Initial Annual Wellness) and scheduled follow up of multiple significant but stable problems.   HPI:  Normal state of health  Denies cp or sob  Wt is stable    PAST MEDICAL, SOCIAL, FAMILY HISTORIES REVIEWED WITH PT      History/Other:   Fall Risk Assessment:   He has been screened for Falls and is low risk.      Cognitive Assessment:   He had a completely normal cognitive assessment - see flowsheet entries     Functional Ability/Status:   Luciano Hernández has a completely normal functional assessment. See flowsheet for details.      Depression Screening (PHQ):  PHQ-2 SCORE: 0  , done 11/25/2024            Advanced Directives:   He does NOT have a Living Will. [Do you have a living will?: No]  He does NOT have a Power of  for Health Care. [Do you have a healthcare power of ?: No]  Discussed Advance Care Planning with patient (and family/surrogate if present). Standard forms made available to patient in After Visit Summary.      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    Constipation due to opioid therapy    Peripheral vascular disease (HCC)    Hepatitis C antibody test negative    Enlarged thoracic aorta (HCC)    Enlargement of aortic root (HCC)    Atherosclerosis of native arteries of extremities with rest pain, left leg (HCC)    Major depressive disorder, recurrent, unspecified (HCC)    Atrial fibrillation (HCC)    Third degree AV block (HCC)    Thrombus of left atrial appendage    History of urostomy    Sedative, hypnotic or  anxiolytic dependence, uncomplicated (HCC)    Chronic cholecystitis    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    Current Medications:  Outpatient Medications Marked as Taking for the 11/25/24 encounter (Office Visit) with Gavin Bernal MD   Medication Sig    metFORMIN  MG Oral Tablet 24 Hr Take 1 tablet (500 mg total) by mouth daily with breakfast.    ALPRAZolam (XANAX) 0.5 MG Oral Tab Take 1 tablet (0.5 mg total) by mouth 2 (two) times daily as needed for Anxiety or Sleep.    Blood Glucose Monitoring Suppl (ONETOUCH ULTRA 2) w/Device Does not apply Kit 1 DEVICE BY OTHER ROUTE DAILY. USE AS DIRECTED.    Glucose Blood (ONETOUCH ULTRA) In Vitro Strip Use as directed once daily.    Glucose Blood (ONETOUCH VERIO) In Vitro Strip Use as directed to check blood glucose daily    pantoprazole 40 MG Oral Tab EC pantoprazole 40 MG Oral Tab EC, [RxNorm: 568990]    OneTouch Delica Lancets 33G Does not apply Misc 1 Lancet by Finger stick route daily.    predniSONE 50 MG Oral Tab Take 13 hr, 7 hr, and 1 hr prior to CT scan.    HYDROmorphone 4 MG Oral Tab Take 1-2 tablets (4-8 mg total) by mouth every 4 (four) hours as needed (cancer related pain.).    LEVOTHYROXINE 100 MCG Oral Tab TAKE 1 TABLET (100 MCG TOTAL) BY MOUTH BEFORE BREAKFAST. TAKE ON EMPTY STOMACH    diphenhydrAMINE (BENADRYL ALLERGY) 25 MG Oral Cap 2 tabs PO 1 hour prior to scan.    Multiple Vitamins-Minerals (CENTRUM SILVER 50+MEN OR) Take 1 tablet by mouth daily.    ESCITALOPRAM 10 MG Oral Tab TAKE 1 TABLET BY MOUTH EVERY DAY    ASPIRIN 81 OR Take by mouth.     Ripretinib (QINLOCK) 50 MG Oral Tab Take 150 mg by mouth daily.    atorvastatin 40 MG Oral Tab Take 1 tablet (40 mg total) by mouth nightly.       Medical History:  He  has a past medical history of Abdominal hernia, Anxiety state, Arrhythmia, Back problem, Black stools (4-1-24), BPH (benign prostatic hyperplasia), Cancer (HCC) (02/2012), Carcinoma of gastrointestinal tract (HCC), Coronary atherosclerosis, Depression, Diabetes (HCC), Disorder of thyroid, Esophageal reflux, Hearing impairment, Hearing loss, Heart attack (HCC) (09/2022), Heart palpitations, High blood pressure, High cholesterol, motion sickness, Hypothyroidism, Other and unspecified hyperlipidemia, Peripheral vascular disease (HCC), Pulmonary embolism (HCC), Stented coronary artery (9-1-24), Thyroid disease, Visual impairment, and Wears glasses.  Surgical History:  He  has a past surgical history that includes back surgery (1998); colonoscopy (2006); other surgical history (12/03/2015); other surgical history (10/03/2017); other surgical history (12/26/2019); hernia surgery (Left, 2006); hernia surgery (Left, 1975); hernia surgery (Left, 12/26/2019); part removal colon w end colostomy; cystoscopy,insert ureteral stent; laparoscopy, surgical prostatectomy, retropubic radical, w/nerve sparing; colostomy (10/03/2017); cath drug eluting stent (09/09/2022); other surgical history (01/30/2024); colonoscopy (N/A, 04/18/2024); angioplasty (coronary) (9-1-22); and bowel resection (10-3-2017).   Family History:  His family history includes Alcohol and Other Disorders Associated in his father; Cancer in his mother; Heart Disorder in his brother; Other in his sister.  Social History:  He  reports that he quit smoking about 7 years ago. His smoking use included cigarettes. He started smoking about 53 years ago. He has a 45 pack-year smoking history. He has never used smokeless tobacco. He reports current alcohol use of about 3.0 standard drinks of alcohol per  week. He reports that he does not use drugs.    Tobacco:  He smoked tobacco in the past but quit greater than 12 months ago.  Social History     Tobacco Use   Smoking Status Former    Current packs/day: 0.00    Average packs/day: 1 pack/day for 45.0 years (45.0 ttl pk-yrs)    Types: Cigarettes    Start date: 1971    Quit date: 10/1/2017    Years since quittin.1   Smokeless Tobacco Never        CAGE Alcohol Screen:   CAGE screening score of 0 on 2024, showing low risk of alcohol abuse.      Patient Care Team:  Gavin Bernal MD as PCP - General (Internal Medicine)  Zaire Tapia MD (ONCOLOGY)  David Daniels MD (Surgical Oncology)  Maged Sánchez MD (SURGERY, GENERAL)  Sheree Flores MD (Cardiovascular Diseases)  Kun Hernandez MD (Clinical Cardiac Electrophysiology)  Isabella Otero APRN (Nurse Practitioner)  Quentin Llamas MD (GASTROENTEROLOGY)    Review of Systems  A comprehensive 10 point review of systems was completed.     Pertinent positives and negatives noted in the HPI.      Objective:   Physical Exam  General: No acute distress. Alert and oriented x 3.  HEENT: Normocephalic atraumatic. Moist mucous membranes. EOM-I. PERRLA. Anicteric.  Neck: No lymphadenopathy.   Respiratory: Clear to auscultation bilaterally. No wheezes. No rhonchi.  Cardiovascular: S1, S2. Regular rate and rhythm.   Abdomen: Soft,  no pain.  nontender, nondistended.  Positive bowel sounds. .  Neurologic: Bilateral barefoot skin diabetic exam is normal, visualized feet and the appearance is normal.  Bilateral monofilament/sensation of both feet is normal.  Pulsation pedal pulse exam of both lower legs/feet is normal as well.  Musculoskeletal: Moves all extremities.  Extremities: No edema or cyanosis.  Integument: No rashes or lesions.    Psychiatric: Appropriate mood and affect.    /78   Pulse 70   Temp 98 °F (36.7 °C)   Resp 16   Ht 6' (1.829 m)   Wt 205 lb (93 kg)   SpO2 99%   BMI  27.80 kg/m²  Estimated body mass index is 27.8 kg/m² as calculated from the following:    Height as of this encounter: 6' (1.829 m).    Weight as of this encounter: 205 lb (93 kg).    Medicare Hearing Assessment:   Hearing Screening    Screening Method: Whisper Test  Whisper Test Result: Pass               Assessment & Plan:   Luciano Hernández is a 74 year old male who presents for a Medicare Assessment.     1. Annual physical exam (Primary)  2. Thrombus of left atrial appendage  3. Third degree AV block (HCC)  4. Sedative, hypnotic or anxiolytic dependence, uncomplicated (HCC)  5. 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 Peripheral vascular disease (HCC)  7. Parastomal hernia with obstruction and without gangrene  8. Malignant gastrointestinal stromal tumor (GIST) of rectum (HCC)  9. Moderate episode of recurrent major depressive disorder (HCC)  -     ALPRAZolam; Take 1 tablet (0.5 mg total) by mouth 2 (two) times daily as needed for Anxiety or Sleep.  Dispense: 60 tablet; Refill: 1  10. Iron deficiency anemia secondary to blood loss (chronic)  11. 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 Hypertension associated with diabetes (HCC)  13. Hyperlipidemia associated with type 2 diabetes mellitus (HCC)  14. GIST (gastrointestinal stroma tumor), malignant, colon (HCC)  15. Enlargement of aortic root (HCC)  16. Enlarged thoracic aorta (HCC)  17. Diet-controlled diabetes mellitus (HCC)  -     metFORMIN HCl ER; Take 1 tablet (500 mg total) by mouth daily with breakfast.  Dispense: 90 tablet; Refill: 3  18. Coronary artery disease involving native coronary artery of native heart without angina pectoris  19. Constipation due to opioid therapy  20. Chronic cholecystitis  21. Bradycardia  22. Benign prostatic hyperplasia with urinary retention  23. Attention to urostomy (HCC)  24. Atherosclerosis of native arteries of extremities with rest pain, left leg (HCC)  25. Paroxysmal atrial fibrillation (HCC)  26.  Acquired hypothyroidism    atherosclerosis of arteries of extremities, left, rest pain- stable. continue control of cad risk factors     Enlarged aortic root- stable. continue control of cad risk factors     Depression, major- controlled. Cont current med therapy     PAD- stable. continue control of cad risk factors    GIST (gastrointestinal stroma tumor), malignant, colon (HCC)- defer to oncology     Mixed hyperlipidemia-controlled. Cont current med therapy     Benign prostatic hyperplasia with urinary retention- stable . monitor     Anemia of chronic disease- stable cont to monitor     Hypothyroidism due to acquired atrophy of thyroid- stable. Cont replacement therapy     Peristomal hernia- defer to ostomy clinic for management     Colostomy status (HCC)- continues follow up with ostomy clinic     Attention to urostomy (HCC)- defer to ostomy clinic     Perineal pain in male- stable. Pain free currently     Diet-controlled diabetes mellitus (HCC)- controlled. Cont current med therapy   can not due microalbuminuria due to his of colostomy/urolsotmy related to GIST tumor     The patient indicates understanding of these issues and agrees to the plan.  Continue with current treatment plan.  Lab work ordered.  Reinforced healthy diet, lifestyle, and exercise.      Return in about 6 months (around 5/25/2025).     Gavin Bernal MD, 11/25/2024     Supplementary Documentation:   General Health:  In the past six months, have you lost more than 10 pounds without trying?: 2 - No  Has your appetite been poor?: No  Type of Diet: Balanced  How does the patient maintain a good energy level?: Appropriate Exercise  How would you describe your daily physical activity?: Light  How would you describe your current health state?: Fair  How do you maintain positive mental well-being?: Social Interaction;Visiting Family  On a scale of 0 to 10, with 0 being no pain and 10 being severe pain, what is your pain level?: 0 - (None)  In the past  six months, have you experienced urine leakage?: 0-No  At any time do you feel concerned for the safety/well-being of yourself and/or your children, in your home or elsewhere?: No  Have you had any immunizations at another office such as Influenza, Hepatitis B, Tetanus, or Pneumococcal?: No    Health Maintenance   Topic Date Due    Diabetes Care: Microalb/Creat Ratio  Never done    Lung Cancer Screening  Never done    COVID-19 Vaccine (8 - 2024-25 season) 11/25/2024    Diabetes Care Dilated Eye Exam  12/20/2024    Diabetes Care A1C  03/25/2025    Diabetes Care Foot Exam  05/24/2025    Diabetes Care: GFR  09/25/2025    Annual Physical  11/25/2025    Influenza Vaccine  Completed    Annual Depression Screening  Completed    Fall Risk Screening (Annual)  Completed    Pneumococcal Vaccine: 65+ Years  Completed    Zoster Vaccines  Completed    Colorectal Cancer Screening  Discontinued

## 2024-11-26 ENCOUNTER — HOSPITAL ENCOUNTER (OUTPATIENT)
Dept: CT IMAGING | Age: 74
Discharge: HOME | End: 2024-11-26
Attending: SPECIALIST
Payer: MEDICARE

## 2024-11-26 DIAGNOSIS — E03.2 HYPOTHYROIDISM DUE TO DRUGS: ICD-10-CM

## 2024-11-26 DIAGNOSIS — C49.9 METASTATIC SARCOMA (HCC): ICD-10-CM

## 2024-11-26 DIAGNOSIS — C78.7 SECONDARY LIVER CANCER (HCC): ICD-10-CM

## 2024-11-26 DIAGNOSIS — D50.0 IRON DEFICIENCY ANEMIA SECONDARY TO BLOOD LOSS (CHRONIC): ICD-10-CM

## 2024-11-26 DIAGNOSIS — C49.A4 GIST (GASTROINTESTINAL STROMA TUMOR), MALIGNANT, COLON (HCC): ICD-10-CM

## 2024-11-26 LAB
CREAT BLD-MCNC: 1 MG/DL
EGFRCR SERPLBLD CKD-EPI 2021: 79 ML/MIN/1.73M2 (ref 60–?)

## 2024-11-26 PROCEDURE — 82565 ASSAY OF CREATININE: CPT

## 2024-11-26 PROCEDURE — 74177 CT ABD & PELVIS W/CONTRAST: CPT | Performed by: SPECIALIST
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
## 2024-12-01 DIAGNOSIS — F33.1 MODERATE EPISODE OF RECURRENT MAJOR DEPRESSIVE DISORDER (HCC): ICD-10-CM

## 2024-12-02 RX ORDER — ESCITALOPRAM OXALATE 10 MG/1
10 TABLET ORAL DAILY
Qty: 90 TABLET | Refills: 0 | Status: SHIPPED | OUTPATIENT
Start: 2024-12-02

## 2024-12-02 NOTE — TELEPHONE ENCOUNTER
Last time medication was refilled 06/11/2024  Last office visit  11/25/2024  Next office visit due/scheduled   Future Appointments   Date Time Provider Department Center   12/4/2024  2:30 PM Zaire Tapia MD [facility]   5/12/2025 10:00 AM Gavin Bernal MD [facility] [address]       Medication not on protocol.

## 2024-12-03 ENCOUNTER — TELEPHONE (OUTPATIENT)
Dept: INTERNAL MEDICINE CLINIC | Facility: CLINIC | Age: 74
End: 2024-12-03

## 2024-12-03 DIAGNOSIS — F33.1 MODERATE EPISODE OF RECURRENT MAJOR DEPRESSIVE DISORDER (HCC): ICD-10-CM

## 2024-12-03 RX ORDER — ESCITALOPRAM OXALATE 10 MG/1
10 TABLET ORAL DAILY
Qty: 90 TABLET | Refills: 0 | Status: CANCELLED | OUTPATIENT
Start: 2024-12-03

## 2024-12-03 NOTE — TELEPHONE ENCOUNTER
Medication requested: escitalopram 10 MG Oral Tab       Is patient requesting 30 or 90 day supply:  90    Pharmacy name/location:  [facility] [id number] [place] [address] [phone], [phone] [[id number]]         Additional notes:  Pt says pharmacy told him Dr. Bernal denied refill - please advise and call patient if there is a problem

## 2024-12-04 ENCOUNTER — OFFICE VISIT (OUTPATIENT)
Dept: HEMATOLOGY/ONCOLOGY | Age: 74
End: 2024-12-04
Attending: SPECIALIST
Payer: MEDICARE

## 2024-12-04 VITALS
SYSTOLIC BLOOD PRESSURE: 147 MMHG | DIASTOLIC BLOOD PRESSURE: 78 MMHG | HEART RATE: 66 BPM | RESPIRATION RATE: 18 BRPM | TEMPERATURE: 97 F | HEIGHT: 72.01 IN | OXYGEN SATURATION: 100 % | BODY MASS INDEX: 27.63 KG/M2 | WEIGHT: 204 LBS

## 2024-12-04 DIAGNOSIS — C78.7 SECONDARY LIVER CANCER (HCC): Primary | ICD-10-CM

## 2024-12-04 DIAGNOSIS — E03.9 HYPOTHYROIDISM (ACQUIRED): ICD-10-CM

## 2024-12-04 DIAGNOSIS — E03.2 HYPOTHYROIDISM DUE TO DRUGS: ICD-10-CM

## 2024-12-04 DIAGNOSIS — C49.A4 GIST (GASTROINTESTINAL STROMA TUMOR), MALIGNANT, COLON (HCC): ICD-10-CM

## 2024-12-04 DIAGNOSIS — D50.0 IRON DEFICIENCY ANEMIA SECONDARY TO BLOOD LOSS (CHRONIC): ICD-10-CM

## 2024-12-04 LAB
ALBUMIN SERPL-MCNC: 4.5 G/DL (ref 3.2–4.8)
ALBUMIN/GLOB SERPL: 1.7 {RATIO} (ref 1–2)
ALP LIVER SERPL-CCNC: 117 U/L
ALT SERPL-CCNC: 18 U/L
ANION GAP SERPL CALC-SCNC: 3 MMOL/L (ref 0–18)
AST SERPL-CCNC: 21 U/L (ref ?–34)
BASOPHILS # BLD AUTO: 0.06 X10(3) UL (ref 0–0.2)
BASOPHILS NFR BLD AUTO: 0.6 %
BILIRUB SERPL-MCNC: 0.5 MG/DL (ref 0.2–1.1)
BUN BLD-MCNC: 18 MG/DL (ref 9–23)
CALCIUM BLD-MCNC: 10.1 MG/DL (ref 8.7–10.4)
CHLORIDE SERPL-SCNC: 112 MMOL/L (ref 98–112)
CO2 SERPL-SCNC: 25 MMOL/L (ref 21–32)
CREAT BLD-MCNC: 1.03 MG/DL
DEPRECATED HBV CORE AB SER IA-ACNC: 78 NG/ML
EGFRCR SERPLBLD CKD-EPI 2021: 76 ML/MIN/1.73M2 (ref 60–?)
EOSINOPHIL # BLD AUTO: 0.37 X10(3) UL (ref 0–0.7)
EOSINOPHIL NFR BLD AUTO: 3.9 %
ERYTHROCYTE [DISTWIDTH] IN BLOOD BY AUTOMATED COUNT: 15.5 %
FASTING STATUS PATIENT QL REPORTED: NO
GLOBULIN PLAS-MCNC: 2.6 G/DL (ref 2–3.5)
GLUCOSE BLD-MCNC: 195 MG/DL (ref 70–99)
HCT VFR BLD AUTO: 44.7 %
HGB BLD-MCNC: 15 G/DL
IMM GRANULOCYTES # BLD AUTO: 0.09 X10(3) UL (ref 0–1)
IMM GRANULOCYTES NFR BLD: 1 %
IRON SATN MFR SERPL: 31 %
IRON SERPL-MCNC: 82 UG/DL
LYMPHOCYTES # BLD AUTO: 2.36 X10(3) UL (ref 1–4)
LYMPHOCYTES NFR BLD AUTO: 24.9 %
MCH RBC QN AUTO: 28.8 PG (ref 26–34)
MCHC RBC AUTO-ENTMCNC: 33.6 G/DL (ref 31–37)
MCV RBC AUTO: 85.8 FL
MONOCYTES # BLD AUTO: 0.76 X10(3) UL (ref 0.1–1)
MONOCYTES NFR BLD AUTO: 8 %
NEUTROPHILS # BLD AUTO: 5.82 X10 (3) UL (ref 1.5–7.7)
NEUTROPHILS # BLD AUTO: 5.82 X10(3) UL (ref 1.5–7.7)
NEUTROPHILS NFR BLD AUTO: 61.6 %
OSMOLALITY SERPL CALC.SUM OF ELEC: 297 MOSM/KG (ref 275–295)
PLATELET # BLD AUTO: 267 10(3)UL (ref 150–450)
POTASSIUM SERPL-SCNC: 4 MMOL/L (ref 3.5–5.1)
PROT SERPL-MCNC: 7.1 G/DL (ref 5.7–8.2)
RBC # BLD AUTO: 5.21 X10(6)UL
SODIUM SERPL-SCNC: 140 MMOL/L (ref 136–145)
T4 FREE SERPL-MCNC: 1.3 NG/DL (ref 0.8–1.7)
TOTAL IRON BINDING CAPACITY: 263 UG/DL (ref 250–425)
TRANSFERRIN SERPL-MCNC: 197 MG/DL (ref 215–365)
TSI SER-ACNC: 15.74 UIU/ML (ref 0.55–4.78)
WBC # BLD AUTO: 9.5 X10(3) UL (ref 4–11)

## 2024-12-04 PROCEDURE — G2211 COMPLEX E/M VISIT ADD ON: HCPCS | Performed by: SPECIALIST

## 2024-12-04 PROCEDURE — 99215 OFFICE O/P EST HI 40 MIN: CPT | Performed by: SPECIALIST

## 2024-12-04 NOTE — PROGRESS NOTES
Patient is here for 3 month MD follow up for GIST. Patient had a CT scan on 11/26. Continues on Qinlock 150 mg daily. Patient reports increase fatigue and less of an appetite. Has more down days, than good days. No N/V. No diarrhea or constipation.     Education Record    Learner:  Patient    Disease / Diagnosis:  GIST     Barriers / Limitations:  None   Comments:    Method:  Discussion   Comments:    General Topics:  Plan of care reviewed   Comments:    Outcome:  Shows understanding   Comments:

## 2024-12-11 ENCOUNTER — HOSPITAL ENCOUNTER (OUTPATIENT)
Dept: RADIATION ONCOLOGY | Facility: HOSPITAL | Age: 74
Discharge: HOME | End: 2024-12-11
Attending: RADIOLOGY
Payer: MEDICARE

## 2024-12-11 VITALS
HEIGHT: 73 IN | RESPIRATION RATE: 20 BRPM | SYSTOLIC BLOOD PRESSURE: 125 MMHG | DIASTOLIC BLOOD PRESSURE: 77 MMHG | HEART RATE: 85 BPM | OXYGEN SATURATION: 98 % | BODY MASS INDEX: 27.43 KG/M2 | TEMPERATURE: 98 F | WEIGHT: 207 LBS

## 2024-12-11 DIAGNOSIS — C49.A4 GIST (GASTROINTESTINAL STROMA TUMOR), MALIGNANT, COLON (HCC): Primary | ICD-10-CM

## 2024-12-11 PROCEDURE — 99213 OFFICE O/P EST LOW 20 MIN: CPT

## 2024-12-11 NOTE — PROGRESS NOTES
Nursing Re- Consult Note    Patient: Luciano Hernández  YOB: 1950  Age: 74 year old  Radiation Oncologist:Roya Foley MD  Referring Physician: Dr. Tapia  Diagnosis: METASTATIC GIST  Consult Date: 12/11/2024    History of Present Illness:  Pt diagnosed with pelvic GIST in 2012, underwent targeted chemotherapy. Had progression in 2017, pt underwent pelvic exenteration. In 2019, started on a different chemo regimen. Had CT Abd/Pelvis on 11/26/24 which showed progressive disease within L pelvis. Case discussed, recommended palliative RT. Referred for consult. Pt here alone, AOx4. C/O intermittent pain to buttocks, states felt more with prolonged standing. Denies pain around left pelvic area; denies weakness or paresthesias to lower extremities.       Vital Signs: /77 (BP Location: Right arm, Patient Position: Sitting, Cuff Size: large)   Pulse 85   Temp 97.6 °F (36.4 °C) (Tympanic)   Resp 20   Ht 1.854 m (6' 1\")   Wt 93.9 kg (207 lb)   SpO2 98%   BMI 27.31 kg/m²     Wt Readings from Last 6 Encounters:   12/11/24 93.9 kg (207 lb)   12/04/24 92.5 kg (204 lb)   11/25/24 93 kg (205 lb)   10/21/24 93 kg (205 lb)   09/30/24 94.3 kg (208 lb)   09/25/24 94.8 kg (209 lb)       ROS: Review of Systems   Constitutional: Negative.    HENT: Negative.     Eyes: Negative.    Respiratory: Negative.     Cardiovascular: Negative.    Gastrointestinal: Negative.         Has colostomy on LLQ of abdomen   Genitourinary: Negative.    Musculoskeletal:  Positive for back pain.   Skin: Negative.    Neurological: Negative.    Endo/Heme/Allergies: Negative.    Psychiatric/Behavioral: Negative.         Medications and Allergies review by RN: yes

## 2024-12-11 NOTE — PATIENT INSTRUCTIONS
- WE WILL CALL TO SCHEDULE YOUR CT SIMULATION FOR RADIATION PLANNING.       - IF YOU HAVE ANY QUESTIONS OR CONCERNS REGARDING RADIATION THERAPY, PLEASE CALL [phone].

## 2024-12-13 NOTE — PROGRESS NOTES
[facility]  RADIATION ONCOLOGY   FOLLOW UP     uLciano Hernández  10/23/1950    DIAGNOSIS: Metastatic GIST      CANCER HISTORY   74-year-old man.  Consulted with him 4 years ago.  At that time, given the lack of evidence that radiation can help with GIST, and with some existing systemic treatment options, no radiation therapy was pursued at that time.      He was diagnosed originally in 2012 with GIST involving the bladder and rectum.  Tried systemic therapy first, but eventually needed pelvic exenteration in 2017 including APR, cystoprostatectomy, urostomy and colostomy.      In 2019, he underwent emergency lysis of adhesions and repair of a parastomal hernia on the left along with further resection of a left pelvic tumor.    INTERIM HISTORY   Here for follow-up.  On a drug called Qinlock.  When off the drug, he reports significant perineal flareup of pain.  There has been progression of disease particularly in the right low pelvic region by CT scan of late.  He does not have any systemic treatment options to switch to.  He takes Dilaudid as needed for this pain.      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  Fairly unremarkable colonoscopy in 4/2024 with a few polyps.  Followed by Dr. Llamas.  In addition to the above past surgical history.    He has good bowel and urinary function through the ostomies.    CT 11/2024 shows a single 17 mm liver lesion that may be slightly larger.  He has lobulated left internal iliac adenopathy that appears fairly stable.  Axial measurement 2.5 x 2.5 cm.  There is a larger right sided pelvic mass in the deep pelvis presacral perineal region and measures 11.6 x 5.7 cm and seems noticeably larger.  No evidence of small bowel obstruction though the mass has extensive appointment of the small bowel and colon.  This mass extends down into the perineum.  Because of his prior surgical history, he has a lot of small bowel and colon adhered  to the pelvic floor.    Right now not showing any symptoms of an impending bowel obstruction.    EXAM     Vitals:    12/11/24 1334   BP: 125/77   Pulse: 85   Resp: 20   Temp: 97.6 °F (36.4 °C)     207 pounds.  No acute distress.  No lymphadenopathy.  Abdomen is benign.    IMPRESSION/PLAN   We again discussed the lack of convincing data that conventional radiation therapy can help in the situation.  Ablative radiation cannot be given to this region because of the location near so much bowel.  Even conventional radiation does carry a small risk of causing small bowel obstruction due to inflammation and fibrosis.  But given that there is no other treatment option for the progressing large pelvic mass already abutting the bowel extensively, we decided in favor of trying palliative radiation.  There are case reports in small case series reporting a benefit to radiation therapy in palliation and local control.  I will give a modest 30 Gray in 10 fractions.  He will be simulated prone on belly board to try to coax the bowel away from the low pelvis.  We will include the disease down into the perineum as well so there might be a skin reaction.    I conferred with the patient and Dr. Tapia, and we will not hold the Qinlock, because of his history of significant pain flare when stopping the drug.    Roya Foley MD  Radiation Oncology  [email or web address]  [phone]    CC: REBEKAH Tapia MD    40 minutes were spent with the patient, more than 50 percent on counseling/coordination of care (discuss disease status, management of any side effects, future follow up plans)

## 2024-12-16 NOTE — LETTER
[facility], [facility] Evangelina Armijo, [address] [facility]  [address] [place] [address] [address]  [facility] [address] [phone]             18      Patient: Emilie Pierce  : 10/23/1950      To Whom It May Concern,     The above mentioned is under my medical care The Open Access order in the GI workqueue requested on 11/14/24  by Kenyon Abraham MD has been removed as, unable to contact.   Ordering provider has been notified.     Please contact patient, if further communication is needed.

## 2024-12-18 ENCOUNTER — TELEPHONE (OUTPATIENT)
Age: 74
End: 2024-12-18

## 2024-12-18 DIAGNOSIS — C49.A4 GIST (GASTROINTESTINAL STROMA TUMOR), MALIGNANT, COLON (HCC): Primary | ICD-10-CM

## 2024-12-18 DIAGNOSIS — C49.A4 GIST (GASTROINTESTINAL STROMA TUMOR), MALIGNANT, COLON (HCC): ICD-10-CM

## 2024-12-18 RX ORDER — RIPRETINIB 50 MG/1
150 TABLET ORAL DAILY
Qty: 90 TABLET | Refills: 5 | Status: SHIPPED | OUTPATIENT
Start: 2024-12-18 | End: 2024-12-18

## 2024-12-18 RX ORDER — RIPRETINIB 50 MG/1
150 TABLET ORAL DAILY
Qty: 90 TABLET | Refills: 5 | Status: SHIPPED | OUTPATIENT
Start: 2024-12-18

## 2024-12-18 NOTE — TELEPHONE ENCOUNTER
They need a RX sent to     [facility]    For Quinlock 50mg tabs 3 tabs per day.    Fax # [phone]

## 2024-12-19 ENCOUNTER — HOSPITAL ENCOUNTER (OUTPATIENT)
Dept: RADIATION ONCOLOGY | Facility: HOSPITAL | Age: 74
Discharge: HOME | End: 2024-12-19
Attending: RADIOLOGY
Payer: MEDICARE

## 2024-12-27 ENCOUNTER — TELEPHONE (OUTPATIENT)
Dept: HEMATOLOGY/ONCOLOGY | Facility: HOSPITAL | Age: 74
End: 2024-12-27

## 2024-12-27 NOTE — TELEPHONE ENCOUNTER
[facility] support patient program-Jyoti  P) [phone]  /  f) [phone]  [facility] Medication Program Re-Enrollment  Needs the forms returned. Thanks Fina

## 2024-12-27 NOTE — TELEPHONE ENCOUNTER
Called [facility] Support Patient Program - spoke with Chantel. A completed form for program re-enrollment needs to be completed and faxed to [phone] with latest clinic notes and updated medication list. Requested a fax of the forms to be sent to [phone].

## 2024-12-31 ENCOUNTER — HOSPITAL ENCOUNTER (OUTPATIENT)
Dept: RADIATION ONCOLOGY | Facility: HOSPITAL | Age: 74
Discharge: HOME | End: 2024-12-31
Attending: RADIOLOGY
Payer: MEDICARE

## 2024-12-31 NOTE — PROGRESS NOTES
[facility] Radiation Treatment Management Note 1-5    Patient:  Luciano Hernández  Age:  74 year old  Visit Diagnosis:    1. GIST (gastrointestinal stroma tumor), malignant, colon (HCC)      Primary Rad/Onc:  Dr. Roya Foley    Site Delivered Dose (cGy) Prescribed Dose (cGy) Fraction #   PELVIS 600 3000 2/10     First treatment date:  12/31/24  Concurrent chemotherapy: N/A        12/4/2024     2:23 PM 12/11/2024     1:34 PM 1/2/2025    10:22 AM   Oncology Vitals   Height 6' 0.008\" 6' 1\"    Height 183 cm 185 cm    Weight 204 lb 207 lb 206 lb 3.2 oz   Weight 92.534 kg 93.895 kg 93.532 kg   BSA (m2) 2.15 m2 2.18 m2 2.18 m2   BMI 27.66 kg/m2 27.31 kg/m2 27.2 kg/m2   /78 125/77 144/73   Pulse 66 85 66   Resp 18 20 18   Temp 96.9 °F (36.1 °C) 97.6 °F (36.4 °C) 97.1 °F (36.2 °C)   SpO2 100 % 98 % 98 %   Pain Score 0 0 0        Toxicities:  Fatigue Grade 0= None  Constipation Grade 0= None  Diarrhea  Grade 0= None  Flatulence Grade 0= None  Nausea Grade 0= None  Vomiting Grade 0= None  Urinary Frequency Grade 0= None  Urinary Urgency Grade 0= None  Dysuria Grade 0= None       Nursing Note:  Has rectal pain that pt thinks is better since first RT treatment  Not using dilaudid  Has urostomy and colostomy    Vikki RIDLEY RN MD NOTE   Reviewed and agree with RN note above.  Setup imaging reviewed in ARIA and approved.    S:  -no gi/gu issues; pain stable    O:  -no changes    A/P:  -cont pelvic RT    OTV in 1 week    Roya Foley MD  Radiation Oncology

## 2024-12-31 NOTE — TELEPHONE ENCOUNTER
[facility] Support Patient Program called stating they have not received completed re-enrollment forms for patient. Fax has not been received of forms that need to be completed. Requested for them to send repeat fax for the office to complete, confirmed fax [phone]. Completed forms to be faxed to [phone].

## 2025-01-01 ENCOUNTER — HOSPITAL ENCOUNTER (OUTPATIENT)
Dept: RADIATION ONCOLOGY | Facility: HOSPITAL | Age: 75
Discharge: HOME | End: 2025-01-01
Attending: RADIOLOGY
Payer: MEDICARE

## 2025-01-02 ENCOUNTER — HOSPITAL ENCOUNTER (OUTPATIENT)
Dept: RADIATION ONCOLOGY | Facility: HOSPITAL | Age: 75
Discharge: HOME | End: 2025-01-02
Attending: RADIOLOGY
Payer: MEDICARE

## 2025-01-02 VITALS
HEART RATE: 66 BPM | RESPIRATION RATE: 18 BRPM | TEMPERATURE: 97 F | OXYGEN SATURATION: 98 % | DIASTOLIC BLOOD PRESSURE: 73 MMHG | WEIGHT: 206.19 LBS | BODY MASS INDEX: 27 KG/M2 | SYSTOLIC BLOOD PRESSURE: 144 MMHG

## 2025-01-02 DIAGNOSIS — C49.A4 GIST (GASTROINTESTINAL STROMA TUMOR), MALIGNANT, COLON (HCC): Primary | ICD-10-CM

## 2025-01-02 PROCEDURE — 77386 HC IMRT COMPLEX: CPT | Performed by: RADIOLOGY

## 2025-01-03 PROCEDURE — 77386 HC IMRT COMPLEX: CPT | Performed by: RADIOLOGY

## 2025-01-06 PROCEDURE — 77386 HC IMRT COMPLEX: CPT | Performed by: RADIOLOGY

## 2025-01-07 PROCEDURE — 77386 HC IMRT COMPLEX: CPT | Performed by: RADIOLOGY

## 2025-01-08 PROCEDURE — 77386 HC IMRT COMPLEX: CPT | Performed by: RADIOLOGY

## 2025-01-08 NOTE — PROGRESS NOTES
[facility] Radiation Treatment Management Note 6-10    Patient:  Luciano Hernández  Age:  74 year old  Visit Diagnosis:    1. GIST (gastrointestinal stroma tumor), malignant, colon (HCC)      Primary Rad/Onc:  Dr. Roya Foley    Site Delivered Dose (cGy) Prescribed Dose (cGy) Fraction #   PELVIS 2100 3000 7/10     First treatment date:  12/31/24  Concurrent chemotherapy: None        12/11/2024     1:34 PM 1/2/2025    10:22 AM 1/9/2025    10:35 AM   Oncology Vitals   Height 6' 1\"     Height 185 cm     Weight 207 lb 206 lb 3.2 oz 202 lb 9.6 oz   Weight 93.895 kg 93.532 kg 91.899 kg   BSA (m2) 2.18 m2 2.18 m2 2.16 m2   BMI 27.31 kg/m2 27.2 kg/m2 26.73 kg/m2   /77 144/73 128/80   Pulse 85 66 66   Resp 20 18 18   Temp 97.6 °F (36.4 °C) 97.1 °F (36.2 °C) 96.9 °F (36.1 °C)   SpO2 98 % 98 % 99 %   Pain Score 0 0 2        Toxicities:  Fatigue Grade 0= None  Constipation Grade 0= None  Diarrhea  Grade 0= None  Proctitis Grade 1= Rectal discomfort, intervention not indicated  Flatulence Grade 0= None  Nausea Grade 0= None  Vomiting Grade 0= None       Nursing Note:  Thinks rectal pain is much better  Pain now 2/10  Not needing any pain meds  States urostomy and colostomy both draining well  No diarrhea     Vikki RIDLEY RN MD NOTE   Reviewed and agree with RN note above.  Setup imaging reviewed in ARIA and approved.    S:  -perineal pain much better; no gi issues; no skin sensitivity in perineal area    O:  -no changes    A/P:  -cont RT  -let us know if perineal skin feels irritated    OTV in 1 week    Roya Foley MD  Radiation Oncology         Message is on Dr. Carcamo inbox or desk for eval and advise.  Awaiting response from MD and plan of care.  Please response. Thank you.

## 2025-01-09 ENCOUNTER — HOSPITAL ENCOUNTER (OUTPATIENT)
Dept: RADIATION ONCOLOGY | Facility: HOSPITAL | Age: 75
Discharge: HOME | End: 2025-01-09
Attending: RADIOLOGY
Payer: MEDICARE

## 2025-01-09 VITALS
TEMPERATURE: 97 F | RESPIRATION RATE: 18 BRPM | WEIGHT: 202.63 LBS | BODY MASS INDEX: 27 KG/M2 | HEART RATE: 66 BPM | SYSTOLIC BLOOD PRESSURE: 128 MMHG | OXYGEN SATURATION: 99 % | DIASTOLIC BLOOD PRESSURE: 80 MMHG

## 2025-01-09 DIAGNOSIS — C49.A4 GIST (GASTROINTESTINAL STROMA TUMOR), MALIGNANT, COLON (HCC): Primary | ICD-10-CM

## 2025-01-09 PROCEDURE — 77386 HC IMRT COMPLEX: CPT | Performed by: RADIOLOGY

## 2025-01-10 ENCOUNTER — OFFICE VISIT (OUTPATIENT)
Dept: INTERNAL MEDICINE CLINIC | Facility: CLINIC | Age: 75
End: 2025-01-10
Payer: MEDICARE

## 2025-01-10 VITALS
HEART RATE: 75 BPM | DIASTOLIC BLOOD PRESSURE: 62 MMHG | SYSTOLIC BLOOD PRESSURE: 124 MMHG | RESPIRATION RATE: 16 BRPM | HEIGHT: 73 IN | OXYGEN SATURATION: 99 % | TEMPERATURE: 97 F | WEIGHT: 204.81 LBS | BODY MASS INDEX: 27.14 KG/M2

## 2025-01-10 DIAGNOSIS — H25.813 COMBINED FORMS OF AGE-RELATED CATARACT OF BOTH EYES: ICD-10-CM

## 2025-01-10 DIAGNOSIS — Z01.818 PREOP EXAMINATION: Primary | ICD-10-CM

## 2025-01-10 PROCEDURE — G2211 COMPLEX E/M VISIT ADD ON: HCPCS | Performed by: PHYSICIAN ASSISTANT

## 2025-01-10 PROCEDURE — 77386 HC IMRT COMPLEX: CPT | Performed by: RADIOLOGY

## 2025-01-10 PROCEDURE — 99214 OFFICE O/P EST MOD 30 MIN: CPT | Performed by: PHYSICIAN ASSISTANT

## 2025-01-10 PROCEDURE — 77336 RADIATION PHYSICS CONSULT: CPT | Performed by: RADIOLOGY

## 2025-01-10 NOTE — H&P
Luciano Hernández is a 74 year old year old male who presents for a pre-operative physical exam.  Patient is to have cataract extraction with intraocular lens implant, to be done by Dr Robert Oppenheim at [facility] on 1/16/25 (right eye) and 1/30/25 (left eye).    HPI:  Patient is electing to undergo cataract extraction due to progressive worsening of vision.  Functional capacity: the patient exercises regularly and denies any chest pain or shortness of breath.  States he is able to walk up 2 flights of stairs carrying heavy groceries without difficulty, shortness of breath, or chest pain.  On review of his chart, he has had no recent coronary evaluation.  He is currently undergoing radiation therapy for GIST tumor which he is tolerating very well.  Today, he has no complaints.  Medications:   Current Outpatient Medications:     Ripretinib (QINLOCK) 50 MG Oral Tab, Take 150 mg by mouth daily., Disp: 90 tablet, Rfl: 5    ESCITALOPRAM 10 MG Oral Tab, TAKE 1 TABLET BY MOUTH EVERY DAY, Disp: 90 tablet, Rfl: 0    metFORMIN  MG Oral Tablet 24 Hr, Take 1 tablet (500 mg total) by mouth daily with breakfast., Disp: 90 tablet, Rfl: 3    ALPRAZolam (XANAX) 0.5 MG Oral Tab, Take 1 tablet (0.5 mg total) by mouth 2 (two) times daily as needed for Anxiety or Sleep., Disp: 60 tablet, Rfl: 1    Blood Glucose Monitoring Suppl (ONETOUCH ULTRA 2) w/Device Does not apply Kit, 1 DEVICE BY OTHER ROUTE DAILY. USE AS DIRECTED., Disp: 1 kit, Rfl: 0    Glucose Blood (ONETOUCH ULTRA) In Vitro Strip, Use as directed once daily., Disp: 100 strip, Rfl: 0    Glucose Blood (ONETOUCH VERIO) In Vitro Strip, Use as directed to check blood glucose daily, Disp: 100 strip, Rfl: 1    OneTouch Delica Lancets 33G Does not apply Misc, 1 Lancet by Finger stick route daily., Disp: 100 each, Rfl: 0    predniSONE 50 MG Oral Tab, Take 13 hr, 7 hr, and 1 hr prior to CT scan., Disp: 3 tablet, Rfl: 0    HYDROmorphone 4 MG Oral Tab,  Take 1-2 tablets (4-8 mg total) by mouth every 4 (four) hours as needed (cancer related pain.)., Disp: 100 tablet, Rfl: 0    LEVOTHYROXINE 100 MCG Oral Tab, TAKE 1 TABLET (100 MCG TOTAL) BY MOUTH BEFORE BREAKFAST. TAKE ON EMPTY STOMACH, Disp: 90 tablet, Rfl: 1    diphenhydrAMINE (BENADRYL ALLERGY) 25 MG Oral Cap, 2 tabs PO 1 hour prior to scan., Disp: 2 capsule, Rfl: 0    Multiple Vitamins-Minerals (CENTRUM SILVER 50+MEN OR), Take 1 tablet by mouth daily., Disp: , Rfl:     ASPIRIN 81 OR, Take by mouth., Disp: , Rfl:     atorvastatin 40 MG Oral Tab, Take 1 tablet (40 mg total) by mouth nightly., Disp: , Rfl:   Allergies: Allergies[1]  PMH:  has a past medical history of Abdominal hernia, Anxiety state, Arrhythmia, Back problem, Black stools (4-1-24), BPH (benign prostatic hyperplasia), Cancer (HCC) (02/2012), Carcinoma of gastrointestinal tract (HCC), Coronary atherosclerosis, Depression, Diabetes (HCC), Disorder of thyroid, Esophageal reflux, Hearing impairment, Hearing loss, Heart attack (HCC) (09/2022), Heart palpitations, High blood pressure, High cholesterol, motion sickness, Hypothyroidism, Other and unspecified hyperlipidemia, Peripheral vascular disease (HCC), Pulmonary embolism (HCC), Stented coronary artery (9-1-24), Thyroid disease, Visual impairment, and Wears glasses.    He has no past medical history of Anesthesia complication, Difficult intubation, Family history of malignant hyperthermia, Family history of pseudocholinesterase deficiency, History of adverse reaction to anesthesia, History of blood transfusion, Malignant hyperthermia, Personal history of antineoplastic chemotherapy, PONV (postoperative nausea and vomiting), or Pseudocholinesterase deficiency.  Surgical Hx:  has a past surgical history that includes back surgery (1998); colonoscopy (2006); other surgical history (12/03/2015); other surgical history (10/03/2017); other surgical history (12/26/2019); hernia surgery (Left, 2006); hernia  surgery (Left, ); hernia surgery (Left, 2019); part removal colon w end colostomy; cystoscopy,insert ureteral stent; laparoscopy, surgical prostatectomy, retropubic radical, w/nerve sparing; colostomy (10/03/2017); cath drug eluting stent (2022); other surgical history (2024); colonoscopy (N/A, 2024); angioplasty (coronary) (22); and bowel resection (10-3-2017).  Family Hx: family history includes Alcohol and Other Disorders Associated in his father; Cancer in his mother; Heart Disorder in his brother; Other in his sister.  Social Hx:   Social History     Socioeconomic History    Marital status:     Number of children: 3   Occupational History    Occupation: RETIRED TEACHER   Tobacco Use    Smoking status: Former     Current packs/day: 0.00     Average packs/day: 1 pack/day for 45.0 years (45.0 ttl pk-yrs)     Types: Cigarettes     Start date: 1971     Quit date: 10/1/2017     Years since quittin.2    Smokeless tobacco: Never   Vaping Use    Vaping status: Never Used   Substance and Sexual Activity    Alcohol use: Yes     Alcohol/week: 3.0 standard drinks of alcohol     Types: 3 Standard drinks or equivalent per week     Comment: RARE    Drug use: No    Sexual activity: Not Currently   Other Topics Concern    Caffeine Concern Yes     Comment: coffee three times a day     Exercise No    Seat Belt No    Special Diet No    Stress Concern No    Weight Concern Yes   Social History Narrative    , lives alone    Has 2 daughters and 1 son - living close by     Social Drivers of Health     Financial Resource Strain: Low Risk  (2024)    Financial Resource Strain     Difficulty of Paying Living Expenses: Not very hard   Food Insecurity: No Food Insecurity (2024)    Food Insecurity     Food Insecurity: Never true   Transportation Needs: No Transportation Needs (2024)    Transportation Needs     Lack of Transportation: No   Housing Stability: Low Risk   (4/15/2024)    Housing Stability     Housing Instability: No      REVIEW OF SYSTEMS:    GENERAL: feels well otherwise  SKIN: denies any unusual skin lesions  EYES:denies blurred vision or double vision  HEENT: denies nasal congestion, sinus pain or sore throat  LUNGS: denies shortness of breath with exertion. Denies history of sleep apnea or COPD  CARDIOVASCULAR: denies chest pain on exertion  GI: denies abdominal pain, denies heartburn  : denies dysuria, hematuria or flank pain  MUSCULOSKELETAL: denies back pain  NEURO: denies headaches, dizziness, fainting  PSYCH: denies depression or anxiety  HEMATOLOGIC: denies hx of anemia, bleeding or clotting disorders  ENDOCRINE: denies thyroid history or diabetes  ALL/ASTHMA: denies hx of allergy or asthma. Denies hx of adverse reaction to anesthesia or analgesics.  EXAM:    /62   Pulse 75   Temp 97 °F (36.1 °C) (Temporal)   Resp 16   Ht 6' 1\" (1.854 m)   Wt 204 lb 12.8 oz (92.9 kg)   SpO2 99%   BMI 27.02 kg/m²   GENERAL: well developed, well nourished, in no apparent distress  SKIN: no rashes, no suspicious lesions.  Warm and dry.  HEENT: atraumatic, normocephalic, ears and throat are clear  EYES: PERRLA, EOMI, normal optic disk, conjunctiva are clear  NECK: supple, no adenopathy, no bruits b/l  CHEST: no chest tenderness  LUNGS: clear to auscultation b/l  CARDIO: RRR without murmur  GI: good BS's, no masses, HSM or tenderness  MUSCULOSKELETAL: No CVA tenderness, FROM of the back  EXTREMITIES: no cyanosis, clubbing or edema  NEURO: Oriented times three, cranial nerves are intact, motor and sensory are grossly intact. DTRs +2 and symmetric b/l. Bilateral barefoot skin diabetic exam is normal, visualized feet and the appearance is normal.  Bilateral monofilament/sensation of both feet is normal.  Pulsation pedal pulse exam of both lower legs/feet is normal as well.       ASSESSMENT AND PLAN:    Luciano Hernández is a 74 year old male who presents for  cardiac risk assessment prior to his cataract extraction with intraocular lens implant, to be done by Dr Robert Oppenheim at [facility] on 1/16/25 (right eye) and 1/30/25 (left eye).    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    Constipation due to opioid therapy    Peripheral vascular disease (HCC)    Hepatitis C antibody test negative    Enlarged thoracic aorta (HCC)    Enlargement of aortic root (HCC)    Atherosclerosis of native arteries of extremities with rest pain, left leg (HCC)    Major depressive disorder, recurrent, unspecified (HCC)    Atrial fibrillation (HCC)    Third degree AV block (HCC)    Thrombus of left atrial appendage    History of urostomy    Sedative, hypnotic or anxiolytic dependence, uncomplicated (HCC)    Chronic cholecystitis    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  he has no clinical markers and an exercise capacity of greater than 4 metabolic units.   Therefore according to ACC/AHA guidelines, he is cleared for surgery with low cardiovascular risk.      Patient is instructed to skip his metformin the day of the procedure. He has been instructed by his oncologist to stop Qinlock 48h  before and resume 48h after.    This consult was sent back to the referring physician, Dr Robert Oppenheim.         [1]   Allergies  Allergen Reactions    Radiology Contrast Iodinated Dyes HIVES, RESPIRATORY FAILURE and OTHER (SEE COMMENTS)     Originally with \"Barium enema for lower GI\" 50 years ago. He states he developed hives and went into respiratory arrest. Pt has since had CT iodinated contrast with 13 hr pre-meds and no break-through reactions, HUMBERTO Mera, Radiology RN.

## 2025-01-13 PROCEDURE — 77386 HC IMRT COMPLEX: CPT | Performed by: RADIOLOGY

## 2025-01-14 PROCEDURE — 77386 HC IMRT COMPLEX: CPT | Performed by: RADIOLOGY

## 2025-02-06 NOTE — ADDENDUM NOTE
Addended by: Matt Nicholson on: 11/23/2020 11:29 AM     Modules accepted: Level of Service February 6, 2025     Patient: Lj Jarvis   YOB: 2014   Date of Visit: 2/6/2025       To Whom it May Concern:    Lj Jarvis was seen in my clinic on 2/6/2025 at 1:00 pm.     Please excuse Lj for her absence from school on the date listed above to be able to make her appointment.  Lj may return to school when fever-free for 24 hours.      Sincerely,         Yayo Owen PA-C  [address] [place], [place] [id number] [phone]    Medical information is confidential and cannot be disclosed without the written consent of the patient or her representative.

## 2025-02-10 NOTE — PROGRESS NOTES
[facility] Hematology Oncology Group Progress Note       Patient Name: Chepe Hernández   YOB: 1950  Medical Record Number: [id number]    The 21st Century Cures Act makes medical notes like these available to patients in the interest of transparency. Please be advised this is a medical document. Medical documents are intended to carry relevant information, facts as evident, and the clinical opinion of the practitioner. The medical note is intended as peer to peer communication and may appear blunt or direct. It is written in medical language and may contain abbreviations or verbiage that are unfamiliar.     Date of Visit: 2/12/2025       Chief Complaint  Metastatic gastrointestinal stromal tumor - follow up.     Oncologic History  Luciano Hernández is a 74 year old male who originally presented in 2012 with left lower quadrant abdominal pain and obstructive uropathy. He was found to have a 16 cm mass abutting the bladder and rectum. 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 He was started on imatinib 400 mg daily with decrease in size of the tumor. While he has no metastatic disease he has been seen by several surgical oncologists and told that complete surgical resection would require colostomy and urostomy.     Routine imaging studies on 03/15/2016 showed increase in size of established tumor without metastatic disease. Increase in tumor size coincided with increased urinary frequency and hesitancy. As a result on 03/25/2016 patient increased imatinib dose to 800 mg daily.      On 06/19/2017 he presented to the emergency room with rectal pain. He reports that he was constipated and pushed to have a bowel movement. After the bowel movement he developed severe pain. He states that he had noticed that for the one month prior, he was having increasing issues with constipation. 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     On 06/20/2017  patient began therapy with sunitinib 50 mg daily. At the end of 07/2016 he continued sunitinib at a dose of 37.5 mg daily. Follow up imaging studies on 08/31/2017 showed small progression of the tumor. Patient was also experiencing increased pain, difficulty moving his bowels, and symptoms of urinary obstruction.      On 10/03/2017 he underwent pelvic exenteration including en-bloc resection of pelvic GIST with abdominoperineal resection, total cystectomy, prostatectomy, end colostomy, and urostomy. Pathology showed a 10.2 cm GIST with 80% necrosis; necrotic tumor was present in prostatic tissue and wall of rectum; 10/10 lymph nodes were negative for metastasis; tumor showed 16 mitoses per 50 hpf; surgical margins were negative.     Patient presented to ED on 12/02/2019 with rectal pain. CT abdomen/pelvis with contrast on that day showed multiple new pelvic nodules that were not present in 07/2019. On 12/06/2019 patient underwent biopsy of a left lower quadrant soft tissue mass. Pathology confirmed gastrointestinal stromal tumor. 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     On 12/19/2019 patient started regorafenib. He states that within hours of taking the first dose, his pain resolved.     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 He was taken to the OR emergently for lysis of adhesions, repair of the parastomal hernia with mesh, and excision of the left lower quadrant tumor. Pathology from the tumor showed GIST. Regorafenib was discontinued to allow for post surgical healing.     On 01/13/2020 patient restarted regorafenib. With therapy his pain resolved but he repeatedly discontinued therapy. Once it was due to stomal herniation requiring surgery. A second time it was due to dehydration. A third time it was due to severe pelvic pain.     Since 01/31/2020 he has consistently remained on  regorafenib.     In 09/2022 he was diagnosed with myocardial infarction and had coronary artery stents placed.     On 10/02/2022 patient was admitted with atrial fibrillation which was managed with medications. He was found to a left atrial appendage thrombus and started on DOAC.    CT imaging on 11/28/2022 showed progressive disease.     In 12/2022 he began therapy with ripretinib. CT imaging on 02/17/2023 showed partial response.     On 01/30/2024 he underwent attempted resection of the growing perineal mass; however, the mass was not found at the time of resection. Because of a persistent open would, ripretinib could not be immediately restarted.     At the end of 03/2024, we decided to start imatinib with the hopes it would slow progression of his disease while we wait for his surgical would to close.     On 04/15/2024 patient presented with complaints of fatigue, tachycardia with exertion, lightheadedness, hypotension, and melena. Hemoglobin was 7.9 g/dl whereas it was normal on 03/20/2024. Patient was subsequently admitted to the hospital. On 04/16/2024 he underwent EGD which showed nodular esophagitis and a 0.4 cm gastric polyp but no evidence of bleeding. Colonoscopy and MRI enterography failed to show a source of bleeding. Upon discharge he restarted ripretinib.    Patient presented to the ED on 07/24/2024 with melena and drop in hemoglobin. On 07/25/2024 he underwent push enteroscopy which showed white plaques in the esophagus consistent with candida, grade 2 hiatal hernia, moderate gastritis, non-bleeding AVM in the duodenum. Upon discharge, aspirin was continued but clopidogrel was discontinued.     CT imaging on 09/232024 showed some small progression. It was decided to continue ripretinib until there was more substantial progression.     CT imaging on 11/26/2024 showed further progression of disease. Patient decided against any changes in systemic therapy.     From 12/31/2024 to 01/14/2025 he received  radiation therapy to the right pelvic disease.      History of Present Illness  Patient returns for follow up. He feels well and has no specific complaints. Pain resolved with radiation therapy. No melena or bright red blood per rectum.     Past Medical History (historical data, reviewed by physician)  GIST (as above); benign prostatic hyperplasia; hypothyroidism; hypercholesterolemia; paroxysmal atrial fibrillation; peripheral vascular disease.      Past Surgical History (historical data, reviewed by physician)  Stomal hernia repair; inguinal hernia repair x 4; lumbar discectomy; resection of GIST (as above); angioplasty left lower extremity arteries; attempted resection of perineal mass (as above).     Family History (historical data, reviewed by physician)  Mother with breast cancer.     Social History (historical data, reviewed by physician)  Current cigar smoker; social alcohol use.      Current Medications   predniSONE 50 MG Oral Tab Take 13 hr, 7 hr, and 1 hr prior to CT scan. 3 tablet 0    diphenhydrAMINE (BENADRYL ALLERGY) 25 MG Oral Cap 2 tabs PO 1 hour prior to scan. 2 capsule 0    Ripretinib (QINLOCK) 50 MG Oral Tab Take 150 mg by mouth daily. 90 tablet 5    ESCITALOPRAM 10 MG Oral Tab TAKE 1 TABLET BY MOUTH EVERY DAY 90 tablet 0    metFORMIN  MG Oral Tablet 24 Hr Take 1 tablet (500 mg total) by mouth daily with breakfast. 90 tablet 3    ALPRAZolam (XANAX) 0.5 MG Oral Tab Take 1 tablet (0.5 mg total) by mouth 2 (two) times daily as needed for Anxiety or Sleep. 60 tablet 1    Blood Glucose Monitoring Suppl (ONETOUCH ULTRA 2) w/Device Does not apply Kit 1 DEVICE BY OTHER ROUTE DAILY. USE AS DIRECTED. 1 kit 0    Glucose Blood (ONETOUCH ULTRA) In Vitro Strip Use as directed once daily. 100 strip 0    Glucose Blood (ONETOUCH VERIO) In Vitro Strip Use as directed to check blood glucose daily 100 strip 1    OneTouch Delica Lancets 33G Does not apply Misc 1 Lancet by Finger stick route daily. 100 each 0     LEVOTHYROXINE 100 MCG Oral Tab TAKE 1 TABLET (100 MCG TOTAL) BY MOUTH BEFORE BREAKFAST. TAKE ON EMPTY STOMACH 90 tablet 1    Multiple Vitamins-Minerals (CENTRUM SILVER 50+MEN OR) Take 1 tablet by mouth daily.      ASPIRIN 81 OR Take by mouth.      atorvastatin 40 MG Oral Tab Take 1 tablet (40 mg total) by mouth nightly.       Allergies   Mr. Hernández is allergic to radiology contrast iodinated dyes.     Vital Signs   /74 (BP Location: Left arm, Patient Position: Sitting, Cuff Size: large)   Pulse 62   Temp 97.4 °F (36.3 °C) (Tympanic)   Resp 18   Ht 1.829 m (6' 0.01\")   Wt 92.9 kg (204 lb 11.2 oz)   SpO2 99%   BMI 27.76 kg/m²     Physical Examination  Constitutional      No apparent distress.   Head                   Normocephalic and atraumatic.  Eyes                   Conjunctiva clear; sclera anicteric.  ENMT                 External nose normal; external ears normal.  Respiratory          Normal effort; no respiratory distress; clear to auscultation bilaterally.   Cardiovascular  Regular rate and rhythm; normal S1S2.  Abdomen  Soft; not tender; no masses.   Extremities  No lower extremity edema.  Neurologic           Motor and sensory grossly intact.  Psychiatric          Mood and affect appropriate.    Laboratory  Recent Results (from the past 2 weeks)   CBC W/DIFF [E]    Collection Time: 02/12/25  1:10 PM   Result Value Ref Range    WBC 9.2 4.0 - 11.0 x10(3) uL    RBC 4.62 3.80 - 5.80 x10(6)uL    HGB 14.0 13.0 - 17.5 g/dL    HCT 41.1 39.0 - 53.0 %    .0 150.0 - 450.0 10(3)uL    MCV 89.0 80.0 - 100.0 fL    MCH 30.3 26.0 - 34.0 pg    MCHC 34.1 31.0 - 37.0 g/dL    RDW 14.7 %    Neutrophil Absolute Prelim 6.70 1.50 - 7.70 x10 (3) uL    Neutrophil Absolute 6.70 1.50 - 7.70 x10(3) uL    Lymphocyte Absolute 1.06 1.00 - 4.00 x10(3) uL    Monocyte Absolute 0.88 0.10 - 1.00 x10(3) uL    Eosinophil Absolute 0.33 0.00 - 0.70 x10(3) uL    Basophil Absolute 0.08 0.00 - 0.20 x10(3) uL    Immature  Granulocyte Absolute 0.12 0.00 - 1.00 x10(3) uL    Neutrophil % 73.0 %    Lymphocyte % 11.6 %    Monocyte % 9.6 %    Eosinophil % 3.6 %    Basophil % 0.9 %    Immature Granulocyte % 1.3 %     Impression and Plan   1.   Metastatic GIST: Patient previously decided against further change in therapy. Continue ripretinib without modification. Will plan on imaging studies in 3 months as palliative radiation therapy can be used as needed to manage progression, at least in the short term.     2.   Hypothyroidism: Patient remains on levothyroxine. Await TFT results.    Planned Follow Up  Patient will return for follow up in 3 months.    Electronically Signed by:     Zaire Tapia M.D.  System Medical Director, Oncology Services  [facility]

## 2025-02-12 ENCOUNTER — OFFICE VISIT (OUTPATIENT)
Age: 75
End: 2025-02-12
Attending: SPECIALIST
Payer: MEDICARE

## 2025-02-12 VITALS
HEIGHT: 72.01 IN | WEIGHT: 204.69 LBS | BODY MASS INDEX: 27.73 KG/M2 | OXYGEN SATURATION: 99 % | SYSTOLIC BLOOD PRESSURE: 145 MMHG | RESPIRATION RATE: 18 BRPM | TEMPERATURE: 97 F | HEART RATE: 62 BPM | DIASTOLIC BLOOD PRESSURE: 74 MMHG

## 2025-02-12 DIAGNOSIS — C78.7 SECONDARY LIVER CANCER (HCC): ICD-10-CM

## 2025-02-12 DIAGNOSIS — E03.2 HYPOTHYROIDISM DUE TO DRUGS: ICD-10-CM

## 2025-02-12 DIAGNOSIS — C49.9 METASTATIC SARCOMA (HCC): ICD-10-CM

## 2025-02-12 DIAGNOSIS — D50.0 IRON DEFICIENCY ANEMIA SECONDARY TO BLOOD LOSS (CHRONIC): ICD-10-CM

## 2025-02-12 DIAGNOSIS — C49.A4 GIST (GASTROINTESTINAL STROMA TUMOR), MALIGNANT, COLON (HCC): ICD-10-CM

## 2025-02-12 LAB
ALBUMIN SERPL-MCNC: 4.5 G/DL (ref 3.2–4.8)
ALBUMIN/GLOB SERPL: 1.6 {RATIO} (ref 1–2)
ALP LIVER SERPL-CCNC: 107 U/L
ALT SERPL-CCNC: 15 U/L
ANION GAP SERPL CALC-SCNC: 7 MMOL/L (ref 0–18)
AST SERPL-CCNC: 18 U/L (ref ?–34)
BASOPHILS # BLD AUTO: 0.08 X10(3) UL (ref 0–0.2)
BASOPHILS NFR BLD AUTO: 0.9 %
BILIRUB SERPL-MCNC: 0.6 MG/DL (ref 0.2–1.1)
BUN BLD-MCNC: 15 MG/DL (ref 9–23)
CALCIUM BLD-MCNC: 9.3 MG/DL (ref 8.7–10.6)
CHLORIDE SERPL-SCNC: 108 MMOL/L (ref 98–112)
CO2 SERPL-SCNC: 23 MMOL/L (ref 21–32)
CREAT BLD-MCNC: 0.92 MG/DL
DEPRECATED HBV CORE AB SER IA-ACNC: 114 NG/ML
EGFRCR SERPLBLD CKD-EPI 2021: 87 ML/MIN/1.73M2 (ref 60–?)
EOSINOPHIL # BLD AUTO: 0.33 X10(3) UL (ref 0–0.7)
EOSINOPHIL NFR BLD AUTO: 3.6 %
ERYTHROCYTE [DISTWIDTH] IN BLOOD BY AUTOMATED COUNT: 14.7 %
FASTING STATUS PATIENT QL REPORTED: NO
GLOBULIN PLAS-MCNC: 2.9 G/DL (ref 2–3.5)
GLUCOSE BLD-MCNC: 192 MG/DL (ref 70–99)
HCT VFR BLD AUTO: 41.1 %
HGB BLD-MCNC: 14 G/DL
IMM GRANULOCYTES # BLD AUTO: 0.12 X10(3) UL (ref 0–1)
IMM GRANULOCYTES NFR BLD: 1.3 %
IRON SATN MFR SERPL: 22 %
IRON SERPL-MCNC: 54 UG/DL
LYMPHOCYTES # BLD AUTO: 1.06 X10(3) UL (ref 1–4)
LYMPHOCYTES NFR BLD AUTO: 11.6 %
MCH RBC QN AUTO: 30.3 PG (ref 26–34)
MCHC RBC AUTO-ENTMCNC: 34.1 G/DL (ref 31–37)
MCV RBC AUTO: 89 FL
MONOCYTES # BLD AUTO: 0.88 X10(3) UL (ref 0.1–1)
MONOCYTES NFR BLD AUTO: 9.6 %
NEUTROPHILS # BLD AUTO: 6.7 X10 (3) UL (ref 1.5–7.7)
NEUTROPHILS # BLD AUTO: 6.7 X10(3) UL (ref 1.5–7.7)
NEUTROPHILS NFR BLD AUTO: 73 %
OSMOLALITY SERPL CALC.SUM OF ELEC: 292 MOSM/KG (ref 275–295)
PLATELET # BLD AUTO: 235 10(3)UL (ref 150–450)
POTASSIUM SERPL-SCNC: 4 MMOL/L (ref 3.5–5.1)
PROT SERPL-MCNC: 7.4 G/DL (ref 5.7–8.2)
RBC # BLD AUTO: 4.62 X10(6)UL
SODIUM SERPL-SCNC: 138 MMOL/L (ref 136–145)
T4 FREE SERPL-MCNC: 1.3 NG/DL (ref 0.8–1.7)
TOTAL IRON BINDING CAPACITY: 245 UG/DL (ref 250–425)
TRANSFERRIN SERPL-MCNC: 179 MG/DL (ref 215–365)
TSI SER-ACNC: 8.1 UIU/ML (ref 0.55–4.78)
WBC # BLD AUTO: 9.2 X10(3) UL (ref 4–11)

## 2025-02-12 RX ORDER — PREDNISONE 50 MG/1
TABLET ORAL
Qty: 3 TABLET | Refills: 0 | Status: SHIPPED | OUTPATIENT
Start: 2025-02-12

## 2025-02-12 RX ORDER — DIPHENHYDRAMINE HCL 25 MG
CAPSULE ORAL
Qty: 2 CAPSULE | Refills: 0 | Status: SHIPPED | OUTPATIENT
Start: 2025-02-12

## 2025-02-12 NOTE — PROGRESS NOTES
Patient is here for 2 month MD follow up for GIST. Patient completed pelvic/perineal tumor radiation on 1/14. He continues on Qinlock 150 mg daily. Denies pain. Has not had to use Dilaudid.        Education Record    Learner:  Patient    Disease / Diagnosis:  GIST    Barriers / Limitations:  None   Comments:    Method:  Discussion   Comments:    General Topics:  Plan of care reviewed   Comments:    Outcome:  Shows understanding   Comments:

## 2025-02-28 DIAGNOSIS — F33.1 MODERATE EPISODE OF RECURRENT MAJOR DEPRESSIVE DISORDER (HCC): ICD-10-CM

## 2025-02-28 RX ORDER — LEVOTHYROXINE SODIUM 100 UG/1
100 TABLET ORAL
Qty: 90 TABLET | Refills: 1 | Status: SHIPPED | OUTPATIENT
Start: 2025-02-28 | End: 2025-05-19

## 2025-02-28 RX ORDER — ESCITALOPRAM OXALATE 10 MG/1
10 TABLET ORAL DAILY
Qty: 90 TABLET | Refills: 0 | Status: SHIPPED | OUTPATIENT
Start: 2025-02-28

## 2025-02-28 NOTE — TELEPHONE ENCOUNTER
Last time medication was refilled 12/02/2024  Last office visit  01/10/2025  Next office visit due/scheduled   Future Appointments   Date Time Provider Department Center   5/12/2025  9:30 AM PF [facility] PF [facility]   5/14/2025  2:15 PM Zaire Tapia MD PF [facility]   5/19/2025 10:30 AM Gavin Bernal MD [facility] [address]           Medication not on protocol.

## 2025-04-13 NOTE — ANESTHESIA POSTPROCEDURE EVALUATION
[facility]    Luciano Hernández Patient Status:  Hospital Outpatient Surgery   Age/Gender 73 year old male MRN [id number]   Location [facility] ENDOSCOPY PAIN CENTER Attending Quentin Llamas MD   Hosp Day # 0 PCP Gavin Bernal MD       Anesthesia Post-op Note    PUSH ENTEROSCOPY WITH ARGON PLASMA COAGULATION AND CLIP PLACEMENT X1    Procedure Summary       Date: 10/21/24 Room / Location:  ENDOSCOPY 02 /  ENDOSCOPY    Anesthesia Start: 1106 Anesthesia Stop: 1135    Procedure: PUSH ENTEROSCOPY WITH ARGON PLASMA COAGULATION AND CLIP PLACEMENT X1 Diagnosis:       Gastric intestinal metaplasia      (AVMs,hiatal hernia)    Surgeons: Quentin Llamas MD Anesthesiologist: Preston Damico MD    Anesthesia Type: MAC ASA Status: 3            Anesthesia Type: MAC    Vitals Value Taken Time   BP 98/84 10/21/24 1135   Temp  10/21/24 1135   Pulse 75 10/21/24 1135   Resp 16 10/21/24 1135   SpO2 95 10/21/24 1135       Patient Location: Endoscopy    Anesthesia Type: MAC    Airway Patency: patent    Postop Pain Control: adequate    Mental Status: mildly sedated but able to meaningfully participate in the post-anesthesia evaluation    Nausea/Vomiting: none    Cardiopulmonary/Hydration status: stable euvolemic    Complications: no apparent anesthesia related complications    Postop vital signs: stable    Dental Exam: Unchanged from Preop              
Home

## 2025-04-15 NOTE — PROGRESS NOTES
Nursing Follow-Up Note    Patient: Luciano Hernández  YOB: 1950  Age: 74 year old  Radiation Oncologist: Dr. Roya Foley  Referring Physician: Roya Foley  Chief Complaint:   Chief Complaint   Patient presents with    Follow - Up     Date: 4/15/2025    Toxicities: n/a    Vital Signs: /82 (BP Location: Left arm, Patient Position: Sitting, Cuff Size: large)   Pulse 59   Temp 97.2 °F (36.2 °C) (Tympanic)   Resp 16   Wt 94.8 kg (209 lb)   SpO2 100%   BMI 28.34 kg/m² ,   Wt Readings from Last 6 Encounters:   04/16/25 94.8 kg (209 lb)   02/12/25 92.9 kg (204 lb 11.2 oz)   01/10/25 92.9 kg (204 lb 12.8 oz)   01/09/25 91.9 kg (202 lb 9.6 oz)   01/02/25 93.5 kg (206 lb 3.2 oz)   12/11/24 93.9 kg (207 lb)       Allergies:  Allergies[1]    Nursing Note: Hx of metastatic GIST. Completed pelvic/R perineal RT 1/14/25. Here for follow up today. Has CT a/p scheduled for 5/12/25. Continues on ripretinib. Pt states he feels well. Denies diarrhea or abdominal pain. Good appetite.          [1]   Allergies  Allergen Reactions    Radiology Contrast Iodinated Dyes HIVES, RESPIRATORY FAILURE and OTHER (SEE COMMENTS)     Originally with \"Barium enema for lower GI\" 50 years ago. He states he developed hives and went into respiratory arrest. Pt has since had CT iodinated contrast with 13 hr pre-meds and no break-through reactions, HUMBERTO Mera, Radiology RN.

## 2025-04-16 ENCOUNTER — HOSPITAL ENCOUNTER (OUTPATIENT)
Dept: RADIATION ONCOLOGY | Facility: HOSPITAL | Age: 75
Discharge: HOME | End: 2025-04-16
Attending: RADIOLOGY
Payer: MEDICARE

## 2025-04-16 VITALS
BODY MASS INDEX: 28 KG/M2 | HEART RATE: 59 BPM | RESPIRATION RATE: 16 BRPM | SYSTOLIC BLOOD PRESSURE: 145 MMHG | DIASTOLIC BLOOD PRESSURE: 82 MMHG | WEIGHT: 209 LBS | OXYGEN SATURATION: 100 % | TEMPERATURE: 97 F

## 2025-04-16 DIAGNOSIS — C49.A4 GIST (GASTROINTESTINAL STROMA TUMOR), MALIGNANT, COLON (HCC): Primary | ICD-10-CM

## 2025-04-16 PROCEDURE — 99213 OFFICE O/P EST LOW 20 MIN: CPT

## 2025-04-16 RX ORDER — ROSUVASTATIN CALCIUM 40 MG/1
40 TABLET, COATED ORAL DAILY
COMMUNITY
Start: 2025-04-11

## 2025-04-16 NOTE — PATIENT INSTRUCTIONS
- WE WILL CALL TO SCHEDULE YOUR FOLLOW-UP APPOINTMENT WITH DR. KWONG IN 6 months        - CALL DR KWONG'S NURSES AT [phone] IF YOU HAVE ANY QUESTIONS/CONCERNS REGARDING RADIATION THERAPY

## 2025-04-16 NOTE — PROGRESS NOTES
[facility]  RADIATION ONCOLOGY   FOLLOW UP     Luciano Hernández  10/23/1950    DIAGNOSIS: GIST    CANCER HISTORY   75 yo with hx of pelvic GIST s/p pelvic exenteration with colostomy and urostomy in 2017. Hx of emergent lysis of adhesions and parastomal hernia repair with left pelvic tumor resection in 2019. Now with no good systemic treatment options or surgical options. But with increasing pelvic and perineal pain from progressing R>L pelvic/perineal mass. Referred for trial of palliative RT.     Pelvic/R perineal tumor   3000 cGy in 10 fractions   12/31/2024 to 1/14/2025     INTERIM HISTORY   Doing pretty well. Pelvic pain is much better. Tolerating Qinlock. No change in bowel symptoms. No diarrhea.    EXAM     Vitals:    04/16/25 1111   BP: 145/82   Pulse: 59   Resp: 16   Temp: 97.2 °F (36.2 °C)     Abdomen soft and nontender    IMPRESSION/PLAN   Good palliative response to EBRT for pelvic GIST    F/u 6 mo  On systemic Rx    Roya Foley MD  Radiation Oncology    15 minutes were spent with the patient, more than 50 percent on counseling/coordination of care (discuss disease status, management of any side effects, future follow up plans)

## 2025-05-05 DIAGNOSIS — E11.9 DIET-CONTROLLED DIABETES MELLITUS (HCC): ICD-10-CM

## 2025-05-05 DIAGNOSIS — E11.9 TYPE 2 DIABETES MELLITUS WITHOUT COMPLICATION, WITHOUT LONG-TERM CURRENT USE OF INSULIN (HCC): ICD-10-CM

## 2025-05-05 RX ORDER — BLOOD SUGAR DIAGNOSTIC
STRIP MISCELLANEOUS
Qty: 100 STRIP | Refills: 0 | Status: SHIPPED | OUTPATIENT
Start: 2025-05-05

## 2025-05-05 RX ORDER — LANCETS 33 GAUGE
1 EACH MISCELLANEOUS DAILY
Qty: 100 EACH | Refills: 0 | Status: SHIPPED | OUTPATIENT
Start: 2025-05-05 | End: 2026-05-05

## 2025-05-05 NOTE — IMAGING NOTE
Spoke to patient and confirmed iodinated contrast allergy.  Discussed and reviewed 13 hour pre-med protocol. Patient states he already has his prescription for the premedication and is also aware of when to take the medications. Also aware he needs to have a . Verbalized understanding.

## 2025-05-05 NOTE — TELEPHONE ENCOUNTER
Glucose Strip   Last time medication was refilled 10/09/2024  Last office visit  01/10/2025  Next office visit due/scheduled   Future Appointments   Date Time Provider Department Center   5/12/2025  9:30 AM PF CT [facility]1 PF [facility]   5/14/2025  2:15 PM Zaire Tapia MD [facility]   5/19/2025 10:30 AM Gavin Bernal MD [facility] [address]     Passed protocol, Medication sent.              One Touch Lancets   Last time medication was refilled 09/30/2024  Last office visit  01/10/2025  Next office visit due/scheduled   Future Appointments   Date Time Provider Department Center   5/12/2025  9:30 AM PF CT [facility]1 PF [facility]   5/14/2025  2:15 PM Zaire Tapia MD [facility]   5/19/2025 10:30 AM Gavin Bernal MD [facility] [address]     Passed protocol, Medication sent.

## 2025-05-12 ENCOUNTER — HOSPITAL ENCOUNTER (OUTPATIENT)
Dept: CT IMAGING | Age: 75
Discharge: HOME | End: 2025-05-12
Attending: SPECIALIST
Payer: MEDICARE

## 2025-05-12 DIAGNOSIS — C78.7 SECONDARY LIVER CANCER (HCC): ICD-10-CM

## 2025-05-12 DIAGNOSIS — C49.A4 GIST (GASTROINTESTINAL STROMA TUMOR), MALIGNANT, COLON (HCC): ICD-10-CM

## 2025-05-12 DIAGNOSIS — E03.2 HYPOTHYROIDISM DUE TO DRUGS: ICD-10-CM

## 2025-05-12 DIAGNOSIS — C49.9 METASTATIC SARCOMA (HCC): ICD-10-CM

## 2025-05-12 DIAGNOSIS — D50.0 IRON DEFICIENCY ANEMIA SECONDARY TO BLOOD LOSS (CHRONIC): ICD-10-CM

## 2025-05-12 PROCEDURE — 74177 CT ABD & PELVIS W/CONTRAST: CPT | Performed by: SPECIALIST

## 2025-05-12 NOTE — PROGRESS NOTES
Patient is here for 3 month MD follow up for GIST. Patient had a CT scan on 5/12. Patient is on Qinlock 150 mg daily. Eating well. Patient reports feeling weak and tired. Denies pain. No Gi issues.       Education Record    Learner:  Patient    Disease / Diagnosis:  GIST     Barriers / Limitations:  None   Comments:    Method:  Discussion   Comments:    General Topics:  Plan of care reviewed   Comments:    Outcome:  Shows understanding   Comments:

## 2025-05-14 ENCOUNTER — OFFICE VISIT (OUTPATIENT)
Age: 75
End: 2025-05-14
Attending: SPECIALIST
Payer: MEDICARE

## 2025-05-14 VITALS
TEMPERATURE: 98 F | SYSTOLIC BLOOD PRESSURE: 143 MMHG | HEART RATE: 87 BPM | HEIGHT: 72.01 IN | DIASTOLIC BLOOD PRESSURE: 75 MMHG | OXYGEN SATURATION: 100 % | RESPIRATION RATE: 18 BRPM | BODY MASS INDEX: 28.28 KG/M2 | WEIGHT: 208.81 LBS

## 2025-05-14 DIAGNOSIS — C49.A4 GIST (GASTROINTESTINAL STROMA TUMOR), MALIGNANT, COLON (HCC): ICD-10-CM

## 2025-05-14 DIAGNOSIS — Z51.81 MEDICATION MONITORING ENCOUNTER: ICD-10-CM

## 2025-05-14 DIAGNOSIS — E03.9 HYPOTHYROIDISM (ACQUIRED): ICD-10-CM

## 2025-05-14 DIAGNOSIS — E03.2 HYPOTHYROIDISM DUE TO DRUGS: ICD-10-CM

## 2025-05-14 DIAGNOSIS — D50.0 IRON DEFICIENCY ANEMIA SECONDARY TO BLOOD LOSS (CHRONIC): ICD-10-CM

## 2025-05-14 DIAGNOSIS — C78.7 SECONDARY LIVER CANCER (HCC): ICD-10-CM

## 2025-05-14 DIAGNOSIS — E11.8 CONTROLLED TYPE 2 DIABETES MELLITUS WITH COMPLICATION, WITHOUT LONG-TERM CURRENT USE OF INSULIN (HCC): Primary | ICD-10-CM

## 2025-05-14 LAB
ALBUMIN SERPL-MCNC: 4.4 G/DL (ref 3.2–4.8)
ALBUMIN/GLOB SERPL: 1.7 {RATIO} (ref 1–2)
ALP LIVER SERPL-CCNC: 99 U/L (ref 45–117)
ALT SERPL-CCNC: 22 U/L (ref 10–49)
ANION GAP SERPL CALC-SCNC: 7 MMOL/L (ref 0–18)
AST SERPL-CCNC: 25 U/L (ref ?–34)
BASOPHILS # BLD AUTO: 0.07 X10(3) UL (ref 0–0.2)
BASOPHILS NFR BLD AUTO: 0.7 %
BILIRUB SERPL-MCNC: 0.5 MG/DL (ref 0.2–1.1)
BUN BLD-MCNC: 27 MG/DL (ref 9–23)
CALCIUM BLD-MCNC: 9.7 MG/DL (ref 8.7–10.6)
CHLORIDE SERPL-SCNC: 107 MMOL/L (ref 98–112)
CO2 SERPL-SCNC: 23 MMOL/L (ref 21–32)
CREAT BLD-MCNC: 1.18 MG/DL (ref 0.7–1.3)
EGFRCR SERPLBLD CKD-EPI 2021: 65 ML/MIN/1.73M2 (ref 60–?)
EOSINOPHIL # BLD AUTO: 0.1 X10(3) UL (ref 0–0.7)
EOSINOPHIL NFR BLD AUTO: 1 %
ERYTHROCYTE [DISTWIDTH] IN BLOOD BY AUTOMATED COUNT: 14.4 %
FASTING STATUS PATIENT QL REPORTED: NO
GLOBULIN PLAS-MCNC: 2.6 G/DL (ref 2–3.5)
GLUCOSE BLD-MCNC: 300 MG/DL (ref 70–99)
HCT VFR BLD AUTO: 43.6 % (ref 39–53)
HGB BLD-MCNC: 14.5 G/DL (ref 13–17.5)
IMM GRANULOCYTES # BLD AUTO: 0.05 X10(3) UL (ref 0–1)
IMM GRANULOCYTES NFR BLD: 0.5 %
LYMPHOCYTES # BLD AUTO: 1.22 X10(3) UL (ref 1–4)
LYMPHOCYTES NFR BLD AUTO: 12.3 %
MCH RBC QN AUTO: 29.7 PG (ref 26–34)
MCHC RBC AUTO-ENTMCNC: 33.3 G/DL (ref 31–37)
MCV RBC AUTO: 89.2 FL (ref 80–100)
MONOCYTES # BLD AUTO: 0.85 X10(3) UL (ref 0.1–1)
MONOCYTES NFR BLD AUTO: 8.6 %
NEUTROPHILS # BLD AUTO: 7.63 X10 (3) UL (ref 1.5–7.7)
NEUTROPHILS # BLD AUTO: 7.63 X10(3) UL (ref 1.5–7.7)
NEUTROPHILS NFR BLD AUTO: 76.9 %
OSMOLALITY SERPL CALC.SUM OF ELEC: 300 MOSM/KG (ref 275–295)
PLATELET # BLD AUTO: 237 10(3)UL (ref 150–450)
POTASSIUM SERPL-SCNC: 4 MMOL/L (ref 3.5–5.1)
PROT SERPL-MCNC: 7 G/DL (ref 5.7–8.2)
RBC # BLD AUTO: 4.89 X10(6)UL (ref 3.8–5.8)
SODIUM SERPL-SCNC: 137 MMOL/L (ref 136–145)
T4 FREE SERPL-MCNC: 1.3 NG/DL (ref 0.8–1.7)
TSI SER-ACNC: 9.13 UIU/ML (ref 0.55–4.78)
WBC # BLD AUTO: 9.9 X10(3) UL (ref 4–11)

## 2025-05-14 NOTE — PROGRESS NOTES
[facility]ology Oncology [facility] Progress Note       Patient Name: Chepe Hernández   YOB: 1950  Medical Record Number: [id number]    The 21st Century Cures Act makes medical notes like these available to patients in the interest of transparency. Please be advised this is a medical document. Medical documents are intended to carry relevant information, facts as evident, and the clinical opinion of the practitioner. The medical note is intended as peer to peer communication and may appear blunt or direct. It is written in medical language and may contain abbreviations or verbiage that are unfamiliar.     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     Oncologic History  Luciano Hernández is a 74 year old male who originally presented in 2012 with left lower quadrant abdominal pain and obstructive uropathy. He was found to have a 16 cm mass abutting the bladder and rectum. 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 He was started on imatinib 400 mg daily with decrease in size of the tumor. While he has no metastatic disease he has been seen by several surgical oncologists and told that complete surgical resection would require colostomy and urostomy.     Routine imaging studies on 03/15/2016 showed increase in size of established tumor without metastatic disease. Increase in tumor size coincided with increased urinary frequency and hesitancy. As a result on 03/25/2016 patient increased imatinib dose to 800 mg daily.      On 06/19/2017 he presented to the emergency room with rectal pain. He reports that he was constipated and pushed to have a bowel movement. After the bowel movement he developed severe pain. He states that he had noticed that for the one month prior, he was having increasing issues with constipation. 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     On 06/20/2017  patient began therapy with sunitinib 50 mg daily. At the end of 07/2016 he continued sunitinib at a dose of 37.5 mg daily. Follow up imaging studies on 08/31/2017 showed small progression of the tumor. Patient was also experiencing increased pain, difficulty moving his bowels, and symptoms of urinary obstruction.      On 10/03/2017 he underwent pelvic exenteration including en-bloc resection of pelvic GIST with abdominoperineal resection, total cystectomy, prostatectomy, end colostomy, and urostomy. Pathology showed a 10.2 cm GIST with 80% necrosis; necrotic tumor was present in prostatic tissue and wall of rectum; 10/10 lymph nodes were negative for metastasis; tumor showed 16 mitoses per 50 hpf; surgical margins were negative.     Patient presented to ED on 12/02/2019 with rectal pain. CT abdomen/pelvis with contrast on that day showed multiple new pelvic nodules that were not present in 07/2019. On 12/06/2019 patient underwent biopsy of a left lower quadrant soft tissue mass. Pathology confirmed gastrointestinal stromal tumor. 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     On 12/19/2019 patient started regorafenib. He states that within hours of taking the first dose, his pain resolved.     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 He was taken to the OR emergently for lysis of adhesions, repair of the parastomal hernia with mesh, and excision of the left lower quadrant tumor. Pathology from the tumor showed GIST. Regorafenib was discontinued to allow for post surgical healing.     On 01/13/2020 patient restarted regorafenib. With therapy his pain resolved but he repeatedly discontinued therapy. Once it was due to stomal herniation requiring surgery. A second time it was due to dehydration. A third time it was due to severe pelvic pain.     Since 01/31/2020 he has consistently remained on  regorafenib.     In 09/2022 he was diagnosed with myocardial infarction and had coronary artery stents placed.     On 10/02/2022 patient was admitted with atrial fibrillation which was managed with medications. He was found to a left atrial appendage thrombus and started on DOAC.    CT imaging on 11/28/2022 showed progressive disease.     In 12/2022 he began therapy with ripretinib. CT imaging on 02/17/2023 showed partial response.     On 01/30/2024 he underwent attempted resection of the growing perineal mass; however, the mass was not found at the time of resection. Because of a persistent open would, ripretinib could not be immediately restarted.     At the end of 03/2024, we decided to start imatinib with the hopes it would slow progression of his disease while we wait for his surgical would to close.     On 04/15/2024 patient presented with complaints of fatigue, tachycardia with exertion, lightheadedness, hypotension, and melena. Hemoglobin was 7.9 g/dl whereas it was normal on 03/20/2024. Patient was subsequently admitted to the hospital. On 04/16/2024 he underwent EGD which showed nodular esophagitis and a 0.4 cm gastric polyp but no evidence of bleeding. Colonoscopy and MRI enterography failed to show a source of bleeding. Upon discharge he restarted ripretinib.    Patient presented to the ED on 07/24/2024 with melena and drop in hemoglobin. On 07/25/2024 he underwent push enteroscopy which showed white plaques in the esophagus consistent with candida, grade 2 hiatal hernia, moderate gastritis, non-bleeding AVM in the duodenum. Upon discharge, aspirin was continued but clopidogrel was discontinued.     CT imaging on 09/232024 showed some small progression. It was decided to continue ripretinib until there was more substantial progression.     CT imaging on 11/26/2024 showed further progression of disease. Patient decided against any changes in systemic therapy.     From 12/31/2024 to 01/14/2025 he received  radiation therapy to the right pelvic disease.      History of Present Illness  Patient returns for follow up. He feels well and has no specific complaints. No new pain. No melena or bright red blood per rectum.     Past Medical History (historical data, reviewed by physician)  GIST (as above); benign prostatic hyperplasia; hypothyroidism; hypercholesterolemia; paroxysmal atrial fibrillation; peripheral vascular disease.      Past Surgical History (historical data, reviewed by physician)  Stomal hernia repair; inguinal hernia repair x 4; lumbar discectomy; resection of GIST (as above); angioplasty left lower extremity arteries; attempted resection of perineal mass (as above).     Family History (historical data, reviewed by physician)  Mother with breast cancer.     Social History (historical data, reviewed by physician)  Current cigar smoker; social alcohol use.      Current Medications   OneTouch Delica Lancets 33G Does not apply Misc 1 Lancet by Finger stick route daily. 100 each 0    Glucose Blood (ONETOUCH ULTRA) In Vitro Strip Use as directed once daily. 100 strip 0    rosuvastatin 40 MG Oral Tab Take 1 tablet (40 mg total) by mouth daily.      [DISCONTINUED] LEVOTHYROXINE 100 MCG Oral Tab TAKE 1 TABLET (100 MCG TOTAL) BY MOUTH BEFORE BREAKFAST. TAKE ON EMPTY STOMACH (Patient not taking: Reported on 5/19/2025) 90 tablet 1    [DISCONTINUED] ESCITALOPRAM 10 MG Oral Tab TAKE 1 TABLET BY MOUTH EVERY DAY 90 tablet 0    Ripretinib (QINLOCK) 50 MG Oral Tab Take 150 mg by mouth daily. 90 tablet 5    [DISCONTINUED] metFORMIN  MG Oral Tablet 24 Hr Take 1 tablet (500 mg total) by mouth daily with breakfast. 90 tablet 3    ALPRAZolam (XANAX) 0.5 MG Oral Tab Take 1 tablet (0.5 mg total) by mouth 2 (two) times daily as needed for Anxiety or Sleep. 60 tablet 1    Blood Glucose Monitoring Suppl (ONETOUCH ULTRA 2) w/Device Does not apply Kit 1 DEVICE BY OTHER ROUTE DAILY. USE AS DIRECTED. 1 kit 0    Glucose Blood  (ONETOUCH VERIO) In Vitro Strip Use as directed to check blood glucose daily 100 strip 1    Multiple Vitamins-Minerals (CENTRUM SILVER 50+MEN OR) Take 1 tablet by mouth in the morning.      ASPIRIN 81 OR Take by mouth.       Allergies   Mr. Hernández is allergic to radiology contrast iodinated dyes.     Vital Signs   /75 (BP Location: Left arm, Patient Position: Sitting, Cuff Size: large)   Pulse 87   Temp 97.6 °F (36.4 °C) (Temporal)   Resp 18   Ht 1.829 m (6' 0.01\")   Wt 94.7 kg (208 lb 12.8 oz)   SpO2 100%   BMI 28.31 kg/m²     Physical Examination  Constitutional      No apparent distress.   Head                   Normocephalic and atraumatic.  Eyes                   Conjunctiva clear; sclera anicteric.  ENMT                 External nose normal; external ears normal.  Respiratory          Normal effort; no respiratory distress; clear to auscultation bilaterally.   Cardiovascular  Regular rate and rhythm; normal S1S2.  Abdomen  Soft; not tender; no masses.   Extremities  No lower extremity edema.  Neurologic           Motor and sensory grossly intact.  Psychiatric          Mood and affect appropriate.    Laboratory  Recent Results (from the past 3 weeks)   CBC W/DIFF [E]    Collection Time: 05/14/25  2:15 PM   Result Value Ref Range    WBC 9.9 4.0 - 11.0 x10(3) uL    RBC 4.89 3.80 - 5.80 x10(6)uL    HGB 14.5 13.0 - 17.5 g/dL    HCT 43.6 39.0 - 53.0 %    .0 150.0 - 450.0 10(3)uL    MCV 89.2 80.0 - 100.0 fL    MCH 29.7 26.0 - 34.0 pg    MCHC 33.3 31.0 - 37.0 g/dL    RDW 14.4 %    Neutrophil Absolute Prelim 7.63 1.50 - 7.70 x10 (3) uL    Neutrophil Absolute 7.63 1.50 - 7.70 x10(3) uL    Lymphocyte Absolute 1.22 1.00 - 4.00 x10(3) uL    Monocyte Absolute 0.85 0.10 - 1.00 x10(3) uL    Eosinophil Absolute 0.10 0.00 - 0.70 x10(3) uL    Basophil Absolute 0.07 0.00 - 0.20 x10(3) uL    Immature Granulocyte Absolute 0.05 0.00 - 1.00 x10(3) uL    Neutrophil % 76.9 %    Lymphocyte % 12.3 %    Monocyte % 8.6  %    Eosinophil % 1.0 %    Basophil % 0.7 %    Immature Granulocyte % 0.5 %   COMP METABOLIC PANEL [E]    Collection Time: 05/14/25  2:15 PM   Result Value Ref Range    Glucose 300 (H) 70 - 99 mg/dL    Sodium 137 136 - 145 mmol/L    Potassium 4.0 3.5 - 5.1 mmol/L    Chloride 107 98 - 112 mmol/L    CO2 23.0 21.0 - 32.0 mmol/L    Anion Gap 7 0 - 18 mmol/L    BUN 27 (H) 9 - 23 mg/dL    Creatinine 1.18 0.70 - 1.30 mg/dL    Calcium, Total 9.7 8.7 - 10.6 mg/dL    Calculated Osmolality 300 (H) 275 - 295 mOsm/kg    eGFR-Cr 65 >=60 mL/min/1.73m2    AST 25 <34 U/L    ALT 22 10 - 49 U/L    Alkaline Phosphatase 99 45 - 117 U/L    Bilirubin, Total 0.5 0.2 - 1.1 mg/dL    Total Protein 7.0 5.7 - 8.2 g/dL    Albumin 4.4 3.2 - 4.8 g/dL    Globulin  2.6 2.0 - 3.5 g/dL    A/G Ratio 1.7 1.0 - 2.0    Patient Fasting for CMP? No    TSH + FREE T4 [E]    Collection Time: 05/14/25  2:15 PM   Result Value Ref Range    Free T4 1.3 0.8 - 1.7 ng/dL    TSH 9.132 (H) 0.550 - 4.780 uIU/mL   Hemoglobin A1C    Collection Time: 05/19/25 11:15 AM   Result Value Ref Range    HgbA1C 7.6 (H) <5.7 %    Estimated Average Glucose 171 (H) 68 - 126 mg/dL   Microalb/Creat Ratio, Random Urine    Collection Time: 05/19/25 11:15 AM   Result Value Ref Range    Microalbumin, Urine 12.70 mg/dL    Creatinine Ur Random 54.70 mg/dL    Malb/Cre Calc 232.2 (H) <=30.0 ug/mg   Lipid Panel    Collection Time: 05/19/25 11:15 AM   Result Value Ref Range    Cholesterol, Total 133 <200 mg/dL    HDL Cholesterol 36 (L) 40 - 59 mg/dL    Triglycerides 127 30 - 149 mg/dL    LDL Cholesterol 74 <100 mg/dL    VLDL 20 0 - 30 mg/dL    Non HDL Chol 97 <130 mg/dL    Patient Fasting for Lipid? Yes    Comp Metabolic Panel (14)    Collection Time: 05/19/25 11:15 AM   Result Value Ref Range    Glucose 170 (H) 70 - 99 mg/dL    Sodium 140 136 - 145 mmol/L    Potassium 4.6 3.5 - 5.1 mmol/L    Chloride 108 98 - 112 mmol/L    CO2 23.0 21.0 - 32.0 mmol/L    Anion Gap 9 0 - 18 mmol/L    BUN 21 9 - 23  mg/dL    Creatinine 1.22 0.70 - 1.30 mg/dL    Calcium, Total 10.1 8.7 - 10.6 mg/dL    Calculated Osmolality 297 (H) 275 - 295 mOsm/kg    eGFR-Cr 62 >=60 mL/min/1.73m2    AST 39 (H) <34 U/L    ALT 27 10 - 49 U/L    Alkaline Phosphatase 105 45 - 117 U/L    Bilirubin, Total 0.9 0.2 - 1.1 mg/dL    Total Protein 7.5 5.7 - 8.2 g/dL    Albumin 4.9 (H) 3.2 - 4.8 g/dL    Globulin  2.6 2.0 - 3.5 g/dL    A/G Ratio 1.9 1.0 - 2.0    Patient Fasting for CMP? Yes      Radiology  05/12/2025:  CT abdomen/pelvis w contrast - I independently visualized the radiologic images in addition to reviewing the written report: Progressive disease in all established disease sites including liver, mesentery, and pelvis.     Impression and Plan   1.   Metastatic GIST: CT imaging shows progressive disease. He denies any new or progressive disease related symptoms.           Patient previously decided against further change in therapy. We discussed the option of trying an alternative TKI. I explained that response rates are unclear and the risk of treatment related toxicities is not insignificant. Patient does not want to change therapy.           Continue ripretinib without modification.     2.   Hypothyroidism: TSH is elevated. Increase levothyroxine to 125 mcg daily.     3.   Hypertension: Patient is on antihypertensive therapy. Blood pressure today is >140/90. He is advised to follow up with his cardiologist.     Planned Follow Up  Patient will return for follow up in 3 months.    Electronically Signed by:     Zaire Tapia M.D.  System Medical Director, Oncology Services  [facility]

## 2025-05-15 ENCOUNTER — TELEPHONE (OUTPATIENT)
Age: 75
End: 2025-05-15

## 2025-05-15 DIAGNOSIS — E03.2 HYPOTHYROIDISM DUE TO DRUGS: Primary | ICD-10-CM

## 2025-05-15 RX ORDER — LEVOTHYROXINE SODIUM 125 UG/1
125 TABLET ORAL
Qty: 30 TABLET | Refills: 2 | Status: SHIPPED | OUTPATIENT
Start: 2025-05-15

## 2025-05-15 NOTE — TELEPHONE ENCOUNTER
Test(s) completed: Labs completed 5/14/25    Results: per Dr Tapia \"Please call patient. Tell him that his labs are good. His TSH is stable but mildly abnormal. If we increase his dose of levothyroxine he may feel more energy. Confirm the current dose of 100 and if correct, send 125.\"     Plan: Spoke with patient. Relayed above lab results. He is currently on Levothyroxine 100 mcg daily. Prescription for 125 mcg sent to patient's pharmacy.

## 2025-05-18 PROBLEM — F33.1 MODERATE RECURRENT MAJOR DEPRESSION (HCC): Status: ACTIVE | Noted: 2021-11-15

## 2025-05-18 PROBLEM — F33.9 MAJOR DEPRESSIVE DISORDER, RECURRENT, UNSPECIFIED: Status: RESOLVED | Noted: 2021-11-15 | Resolved: 2025-05-18

## 2025-05-19 ENCOUNTER — LAB ENCOUNTER (OUTPATIENT)
Dept: LAB | Age: 75
End: 2025-05-19
Attending: INTERNAL MEDICINE
Payer: MEDICARE

## 2025-05-19 ENCOUNTER — OFFICE VISIT (OUTPATIENT)
Dept: INTERNAL MEDICINE CLINIC | Facility: CLINIC | Age: 75
End: 2025-05-19
Payer: MEDICARE

## 2025-05-19 VITALS
BODY MASS INDEX: 27.77 KG/M2 | OXYGEN SATURATION: 100 % | HEIGHT: 72 IN | HEART RATE: 77 BPM | WEIGHT: 205 LBS | TEMPERATURE: 98 F | DIASTOLIC BLOOD PRESSURE: 74 MMHG | RESPIRATION RATE: 16 BRPM | SYSTOLIC BLOOD PRESSURE: 122 MMHG

## 2025-05-19 DIAGNOSIS — E11.59 HYPERTENSION ASSOCIATED WITH DIABETES (HCC): ICD-10-CM

## 2025-05-19 DIAGNOSIS — I15.2 HYPERTENSION ASSOCIATED WITH DIABETES (HCC): ICD-10-CM

## 2025-05-19 DIAGNOSIS — E11.8 DIABETES MELLITUS WITH COMPLICATION (HCC): Primary | ICD-10-CM

## 2025-05-19 DIAGNOSIS — I10 ESSENTIAL HYPERTENSION, MALIGNANT: ICD-10-CM

## 2025-05-19 DIAGNOSIS — E11.8 DIABETES MELLITUS WITH COMPLICATION (HCC): ICD-10-CM

## 2025-05-19 DIAGNOSIS — E78.2 MIXED HYPERLIPIDEMIA: Primary | ICD-10-CM

## 2025-05-19 PROBLEM — E11.9 DIET-CONTROLLED DIABETES MELLITUS (HCC): Status: RESOLVED | Noted: 2019-05-16 | Resolved: 2025-05-19

## 2025-05-19 LAB
ALBUMIN SERPL-MCNC: 4.9 G/DL (ref 3.2–4.8)
ALBUMIN/GLOB SERPL: 1.9 {RATIO} (ref 1–2)
ALP LIVER SERPL-CCNC: 105 U/L (ref 45–117)
ALT SERPL-CCNC: 27 U/L (ref 10–49)
ANION GAP SERPL CALC-SCNC: 9 MMOL/L (ref 0–18)
AST SERPL-CCNC: 39 U/L (ref ?–34)
BILIRUB SERPL-MCNC: 0.9 MG/DL (ref 0.2–1.1)
BUN BLD-MCNC: 21 MG/DL (ref 9–23)
CALCIUM BLD-MCNC: 10.1 MG/DL (ref 8.7–10.6)
CHLORIDE SERPL-SCNC: 108 MMOL/L (ref 98–112)
CHOLEST SERPL-MCNC: 133 MG/DL (ref ?–200)
CO2 SERPL-SCNC: 23 MMOL/L (ref 21–32)
CREAT BLD-MCNC: 1.22 MG/DL (ref 0.7–1.3)
CREAT UR-SCNC: 54.7 MG/DL
EGFRCR SERPLBLD CKD-EPI 2021: 62 ML/MIN/1.73M2 (ref 60–?)
EST. AVERAGE GLUCOSE BLD GHB EST-MCNC: 171 MG/DL (ref 68–126)
FASTING PATIENT LIPID ANSWER: YES
FASTING STATUS PATIENT QL REPORTED: YES
GLOBULIN PLAS-MCNC: 2.6 G/DL (ref 2–3.5)
GLUCOSE BLD-MCNC: 170 MG/DL (ref 70–99)
HBA1C MFR BLD: 7.6 % (ref ?–5.7)
HDLC SERPL-MCNC: 36 MG/DL (ref 40–59)
LDLC SERPL CALC-MCNC: 74 MG/DL (ref ?–100)
MICROALBUMIN UR-MCNC: 12.7 MG/DL
MICROALBUMIN/CREAT 24H UR-RTO: 232.2 UG/MG (ref ?–30)
NONHDLC SERPL-MCNC: 97 MG/DL (ref ?–130)
OSMOLALITY SERPL CALC.SUM OF ELEC: 297 MOSM/KG (ref 275–295)
POTASSIUM SERPL-SCNC: 4.6 MMOL/L (ref 3.5–5.1)
PROT SERPL-MCNC: 7.5 G/DL (ref 5.7–8.2)
SODIUM SERPL-SCNC: 140 MMOL/L (ref 136–145)
TRIGL SERPL-MCNC: 127 MG/DL (ref 30–149)
VLDLC SERPL CALC-MCNC: 20 MG/DL (ref 0–30)

## 2025-05-19 PROCEDURE — 99214 OFFICE O/P EST MOD 30 MIN: CPT | Performed by: INTERNAL MEDICINE

## 2025-05-19 PROCEDURE — 82043 UR ALBUMIN QUANTITATIVE: CPT

## 2025-05-19 PROCEDURE — 82570 ASSAY OF URINE CREATININE: CPT

## 2025-05-19 PROCEDURE — 36415 COLL VENOUS BLD VENIPUNCTURE: CPT

## 2025-05-19 PROCEDURE — 80061 LIPID PANEL: CPT

## 2025-05-19 PROCEDURE — 83036 HEMOGLOBIN GLYCOSYLATED A1C: CPT

## 2025-05-19 PROCEDURE — 80053 COMPREHEN METABOLIC PANEL: CPT

## 2025-05-19 RX ORDER — METFORMIN HYDROCHLORIDE 750 MG/1
750 TABLET, EXTENDED RELEASE ORAL
Qty: 90 TABLET | Refills: 1 | Status: SHIPPED | OUTPATIENT
Start: 2025-05-19

## 2025-05-19 NOTE — PROGRESS NOTES
Subjective:   Patient ID: Luciano Hernández is a 74 year old male.    HPI  HPI:   Luciano Hernández is a 74 year old male who presents for recheck of his diabetes. Patient’s FBS have been at goal..  Pt has been checking his feet on a regular basis. Pt reports stable depression sxs. Pt reports normal state of health  Denies sob, wt loss.    Wt Readings from Last 6 Encounters:   05/19/25 205 lb (93 kg)   05/14/25 208 lb 12.8 oz (94.7 kg)   04/16/25 209 lb (94.8 kg)   02/12/25 204 lb 11.2 oz (92.9 kg)   01/10/25 204 lb 12.8 oz (92.9 kg)   01/09/25 202 lb 9.6 oz (91.9 kg)     Body mass index is 27.8 kg/m².     Lab Results   Component Value Date    A1C 7.5 (H) 09/25/2024    A1C 6.3 (H) 04/15/2024    A1C 7.1 (H) 08/23/2023     Lab Results   Component Value Date    CHOLEST 102 01/20/2023    CHOLEST 170 10/02/2022    CHOLEST 135 09/02/2022     Lab Results   Component Value Date    HDL 39 (L) 01/20/2023    HDL 38 (L) 10/02/2022    HDL 32 (L) 09/02/2022     Lab Results   Component Value Date    LDL 47 01/20/2023    LDL 96 10/02/2022    LDL 79 09/02/2022     Lab Results   Component Value Date    TRIG 76 01/20/2023    TRIG 207 (H) 10/02/2022    TRIG 134 09/02/2022     Lab Results   Component Value Date    AST 25 05/14/2025    AST 18 02/12/2025    AST 21 12/04/2024     Lab Results   Component Value Date    ALT 22 05/14/2025    ALT 15 02/12/2025    ALT 18 12/04/2024     Malb/Cre Calc   Date Value Ref Range Status   02/15/2021 24.7 <=30.0 ug/mg Final     Comment:     <30 ug/mg creatinine       Normal     ug/mg creatinine   Microalbuminuria   >300 ug/mg creatinine      Albuminuria       11/20/2019 10.6 <=30.0 ug/mg Final     Comment:     <30 ug/mg creatinine       Normal     ug/mg creatinine   Microalbuminuria   >300 ug/mg creatinine      Albuminuria       05/08/2019 75.6 (H) <=30.0 ug/mg Final     Comment:       <30 ug/mg creatinine       Normal     ug/mg creatinine   Microalbuminuria   >300 ug/mg  creatinine      Albuminuria           Current Medications[1]   Past Medical History[2]   Past Surgical History[3]   Social History: Short Social Hx on File[4]  Exercise: minimal.  Diet: watches minimally     REVIEW OF SYSTEMS:   GENERAL HEALTH: feels well otherwise  SKIN: denies any unusual skin lesions or rashes  RESPIRATORY: denies shortness of breath with exertion  CARDIOVASCULAR: denies chest pain on exertion  GI: denies abdominal pain and denies heartburn  NEURO: denies headaches    EXAM:   /74   Pulse 77   Temp 98.1 °F (36.7 °C)   Resp 16   Ht 6' (1.829 m)   Wt 205 lb (93 kg)   SpO2 100%   BMI 27.80 kg/m²   GENERAL: well developed, well nourished,in no apparent distress  SKIN: no rashes,no suspicious lesions  NECK: supple,no adenopathy,no bruits  LUNGS: clear to auscultation  CARDIO: RRR without murmur  GI: good BS's,no masses, HSM or tenderness  EXTREMITIES: no cyanosis, clubbing or edema  NEURO: Bilateral barefoot skin diabetic exam is normal, visualized feet and the appearance is normal.  Bilateral monofilament/sensation of both feet is normal.  Pulsation pedal pulse exam of both lower legs/feet is normal as well.        ASSESSMENT AND PLAN:   Luciano Hernández is a 74 year old male who presents for a recheck of his Diabetes mellitus with complication (HCC) and Hypertension associated with diabetes (HCC)  which are controlled  Moderate recurrent major depression (HCC) -.controlled. Cont current med therapy pn    Recommendations are: increase metformin to 750 mg qam, check HgbA1C and UA, lose wgt with carbohydrate controlled diet and exercise, , check feet daily.  The patient indicates understanding of these issues and agrees to the plan.  The patient is asked to return in 3 m.    History/Other:   Review of Systems  Current Medications[5]  Allergies:Allergies[6]    Objective:   Physical Exam    Assessment & Plan:   1. Diabetes mellitus with complication (HCC)    2. Hypertension associated  with diabetes (HCC)        Orders Placed This Encounter   Procedures    Hemoglobin A1C    Microalb/Creat Ratio, Random Urine       Meds This Visit:  Requested Prescriptions     Signed Prescriptions Disp Refills    metFORMIN  MG Oral Tablet 24 Hr 90 tablet 1     Sig: Take 1 tablet (750 mg total) by mouth daily with breakfast.       Imaging & Referrals:  None         [1]   Current Outpatient Medications   Medication Sig Dispense Refill    metFORMIN  MG Oral Tablet 24 Hr Take 1 tablet (750 mg total) by mouth daily with breakfast. 90 tablet 1    levothyroxine 125 MCG Oral Tab Take 1 tablet (125 mcg total) by mouth before breakfast. 30 tablet 2    OneTouch Delica Lancets 33G Does not apply Misc 1 Lancet by Finger stick route daily. 100 each 0    Glucose Blood (ONETOUCH ULTRA) In Vitro Strip Use as directed once daily. 100 strip 0    rosuvastatin 40 MG Oral Tab Take 1 tablet (40 mg total) by mouth daily.      ESCITALOPRAM 10 MG Oral Tab TAKE 1 TABLET BY MOUTH EVERY DAY 90 tablet 0    Ripretinib (QINLOCK) 50 MG Oral Tab Take 150 mg by mouth daily. 90 tablet 5    ALPRAZolam (XANAX) 0.5 MG Oral Tab Take 1 tablet (0.5 mg total) by mouth 2 (two) times daily as needed for Anxiety or Sleep. 60 tablet 1    Blood Glucose Monitoring Suppl (ONETOUCH ULTRA 2) w/Device Does not apply Kit 1 DEVICE BY OTHER ROUTE DAILY. USE AS DIRECTED. 1 kit 0    Multiple Vitamins-Minerals (CENTRUM SILVER 50+MEN OR) Take 1 tablet by mouth in the morning.      ASPIRIN 81 OR Take by mouth.      Glucose Blood (ONETOUCH VERIO) In Vitro Strip Use as directed to check blood glucose daily 100 strip 1   [2]   Past Medical History:   Abdominal hernia    Anxiety state    Arrhythmia    atrial fibrillation intermittently    Back problem    back surgery 20 years ago    Black stools    No longer a concern    BPH (benign prostatic hyperplasia)    Cancer (HCC)    GIST    Carcinoma of gastrointestinal tract (HCC)    Coronary atherosclerosis    Depression     Diabetes (HCC)    per pcp not 11/28/22 diet controlled dm- pt denies any hx of dm    Disorder of thyroid    Esophageal reflux    Hearing impairment    DOESN'T WEAR HEARING AIDS    Hearing loss    Heart attack (HCC)    Heart palpitations    AF    High blood pressure    High cholesterol    Hx of motion sickness    > 30 yrs ago while on a boat    Hypothyroidism    Other and unspecified hyperlipidemia    Peripheral vascular disease    Pulmonary embolism (HCC)    Stented coronary artery    3 stents total    Thyroid disease    Visual impairment    GLASSES    Wears glasses   [3]   Past Surgical History:  Procedure Laterality Date    Angioplasty (coronary)  9-1-22    Back surgery  1998    Bowel resection  10-3-2017    Cath drug eluting stent  09/09/2022    LAD    Colonoscopy  2006    Colonoscopy N/A 04/18/2024    Procedure: COLONOSCOPY;  Surgeon: Quentin Llamas MD;  Location:  ENDOSCOPY    Colostomy  10/03/2017    Cystoscopy,insert ureteral stent      Hernia surgery Left 2006    Hernia surgery Left 1975    Hernia surgery Left 12/26/2019    Laparoscopy, surgical prostatectomy, retropubic radical, w/nerve sparing      Other surgical history  12/03/2015    fracture radiius and ulna  Right arm -     Other surgical history  10/03/2017    Pelvic exenteration to include 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 Resection subcutaneous fat with wire localization.    Part removal colon w end colostomy     [4]   Social History  Socioeconomic History    Marital status:     Number of children: 3   Occupational History    Occupation: RETIRED TEACHER   Tobacco Use    Smoking status: Former     Current packs/day: 0.00     Average packs/day: 1 pack/day for 45.0 years  (45.0 ttl pk-yrs)     Types: Cigarettes     Start date: 1971     Quit date: 10/1/2017     Years since quittin.6    Smokeless tobacco: Never   Vaping Use    Vaping status: Never Used   Substance and Sexual Activity    Alcohol use: Yes     Alcohol/week: 3.0 standard drinks of alcohol     Types: 3 Standard drinks or equivalent per week     Comment: RARE    Drug use: No    Sexual activity: Not Currently   Other Topics Concern    Caffeine Concern Yes     Comment: coffee three times a day     Exercise No    Seat Belt No    Special Diet No    Stress Concern No    Weight Concern Yes   Social History Narrative    , lives alone    Has 2 daughters and 1 son - living close by     Social Drivers of Health     Food Insecurity: No Food Insecurity (2024)    Food Insecurity     Food Insecurity: Never true   Transportation Needs: No Transportation Needs (2024)    Transportation Needs     Lack of Transportation: No   Housing Stability: Low Risk  (4/15/2024)    Housing Stability     Housing Instability: No   [5]   Current Outpatient Medications   Medication Sig Dispense Refill    metFORMIN  MG Oral Tablet 24 Hr Take 1 tablet (750 mg total) by mouth daily with breakfast. 90 tablet 1    levothyroxine 125 MCG Oral Tab Take 1 tablet (125 mcg total) by mouth before breakfast. 30 tablet 2    OneTouch Delica Lancets 33G Does not apply Misc 1 Lancet by Finger stick route daily. 100 each 0    Glucose Blood (ONETOUCH ULTRA) In Vitro Strip Use as directed once daily. 100 strip 0    rosuvastatin 40 MG Oral Tab Take 1 tablet (40 mg total) by mouth daily.      ESCITALOPRAM 10 MG Oral Tab TAKE 1 TABLET BY MOUTH EVERY DAY 90 tablet 0    Ripretinib (QINLOCK) 50 MG Oral Tab Take 150 mg by mouth daily. 90 tablet 5    ALPRAZolam (XANAX) 0.5 MG Oral Tab Take 1 tablet (0.5 mg total) by mouth 2 (two) times daily as needed for Anxiety or Sleep. 60 tablet 1    Blood Glucose Monitoring Suppl (ONETOUCH ULTRA 2) w/Device Does not  apply Kit 1 DEVICE BY OTHER ROUTE DAILY. USE AS DIRECTED. 1 kit 0    Multiple Vitamins-Minerals (CENTRUM SILVER 50+MEN OR) Take 1 tablet by mouth in the morning.      ASPIRIN 81 OR Take by mouth.      Glucose Blood (ONETOUCH VERIO) In Vitro Strip Use as directed to check blood glucose daily 100 strip 1   [6]   Allergies  Allergen Reactions    Radiology Contrast Iodinated Dyes HIVES, RESPIRATORY FAILURE and OTHER (SEE COMMENTS)     Originally with \"Barium enema for lower GI\" 50 years ago. He states he developed hives and went into respiratory arrest. Pt has since had CT iodinated contrast with 13 hr pre-meds and no break-through reactions, HUMBERTO Mera, Radiology RN.

## 2025-05-29 DIAGNOSIS — F33.1 MODERATE EPISODE OF RECURRENT MAJOR DEPRESSIVE DISORDER (HCC): ICD-10-CM

## 2025-05-29 RX ORDER — ESCITALOPRAM OXALATE 10 MG/1
10 TABLET ORAL DAILY
Qty: 90 TABLET | Refills: 0 | Status: SHIPPED | OUTPATIENT
Start: 2025-05-29

## 2025-05-29 NOTE — TELEPHONE ENCOUNTER
Last time medication was refilled 02/28/2025  Last office visit  05/19/2025  Next office visit due/scheduled   Future Appointments   Date Time Provider Department Center   8/13/2025  1:30 PM PF OOT PF [facility]   8/13/2025  2:00 PM Zaire Tapia MD PF [facility]   8/19/2025 11:30 AM Gavin Bernal MD [facility] [address]         Medication not on protocol..

## 2025-07-17 ENCOUNTER — OFFICE VISIT (OUTPATIENT)
Dept: FAMILY MEDICINE CLINIC | Facility: CLINIC | Age: 75
End: 2025-07-17
Payer: MEDICARE

## 2025-07-17 VITALS
RESPIRATION RATE: 16 BRPM | TEMPERATURE: 97 F | SYSTOLIC BLOOD PRESSURE: 140 MMHG | DIASTOLIC BLOOD PRESSURE: 81 MMHG | HEART RATE: 66 BPM | OXYGEN SATURATION: 99 %

## 2025-07-17 DIAGNOSIS — H61.23 BILATERAL IMPACTED CERUMEN: Primary | ICD-10-CM

## 2025-07-17 PROCEDURE — 99213 OFFICE O/P EST LOW 20 MIN: CPT | Performed by: PHYSICIAN ASSISTANT

## 2025-07-17 NOTE — PROGRESS NOTES
CHIEF COMPLAINT:     Chief Complaint   Patient presents with    Ear Wax       HPI:     Luciano Hernández is a 74 year old male who presents to clinic today with request for ear wax flush and removal.  Was seen by audiologist who told he to get if cleaned out before getting hearing aids fitted.     Denies new hearing loss or new tinnitus over his baseline.       Associated symptoms:    deniesfever  denies hearing loss over baseline loss  denies tinnitus over baseline   denies dizziness or imbalance  denies otorrhea    denies nasal congestion.  denies tooth pain  denies pain with chewing at the TMJ.       Current Medications[1]   Past Medical History[2]   Social History:  Short Social Hx on File[3]     REVIEW OF SYSTEMS:     Positive for stated complaint: ear wax.   Pertinent positives and negatives noted in the the HPI.      EXAM:   /81   Pulse 66   Temp 97 °F (36.1 °C)   Resp 16   SpO2 99%   GENERAL: WNWD NAD  SKIN: no rashes,no suspicious lesions  HEAD: atraumatic, normocephalic  EYES: conjunctiva clear, sclera non icteric  EARS: non tender with manipulation.  External auditory canals are occluded with cerumen.   Right TM: non erythematous post lavage  Left TM: non erythematous post lavage.  NOSE: nares patent, nasal mucosa without gross congestion  THROAT: mucosa moist, Posterior pharynx is not erythematous or injected. No exudates.  NECK: supple, non-tender  LUNGS: CTA without R/R/W  CARDIO: S1/S2 RRR  EXTREMITIES: no cyanosis, clubbing or edema  LYMPH: no pre or post auricular lymphadenopathy.        No results found for this or any previous visit (from the past 24 hours).      ASSESSMENT AND PLAN:   Luciano Hernández is a 74 year old male who presents with:    ASSESSMENT:  Encounter Diagnosis   Name Primary?    Bilateral impacted cerumen Yes       Cerumen  Removal:    Procedure risks reviewed including TM perforation. Verbal consent obtained. The patient agrees to proceed.  The ear canal(s)  were visualized revealing wax impaction bilaterally.    The ear canal(s) was carefully irrigated by the MA removing the wax.   The procedure was well tolerated without complications.  No significant change to his hearing post lavage.  Follow up audiology.     PLAN: Meds as listed below.  Comfort measures as described in Patient Instructions    Meds & Refills for this Visit:  Requested Prescriptions      No prescriptions requested or ordered in this encounter       Patient voiced understand and is in agreement with treatment plan.         [1]   Current Outpatient Medications   Medication Sig Dispense Refill    ESCITALOPRAM 10 MG Oral Tab TAKE 1 TABLET BY MOUTH EVERY DAY 90 tablet 0    metFORMIN  MG Oral Tablet 24 Hr Take 1 tablet (750 mg total) by mouth daily with breakfast. 90 tablet 1    levothyroxine 125 MCG Oral Tab Take 1 tablet (125 mcg total) by mouth before breakfast. 30 tablet 2    OneTouch Delica Lancets 33G Does not apply Misc 1 Lancet by Finger stick route daily. 100 each 0    Glucose Blood (ONETOUCH ULTRA) In Vitro Strip Use as directed once daily. 100 strip 0    rosuvastatin 40 MG Oral Tab Take 1 tablet (40 mg total) by mouth daily.      Ripretinib (QINLOCK) 50 MG Oral Tab Take 150 mg by mouth daily. 90 tablet 5    ALPRAZolam (XANAX) 0.5 MG Oral Tab Take 1 tablet (0.5 mg total) by mouth 2 (two) times daily as needed for Anxiety or Sleep. 60 tablet 1    Blood Glucose Monitoring Suppl (ONETOUCH ULTRA 2) w/Device Does not apply Kit 1 DEVICE BY OTHER ROUTE DAILY. USE AS DIRECTED. 1 kit 0    Glucose Blood (ONETOUCH VERIO) In Vitro Strip Use as directed to check blood glucose daily 100 strip 1    Multiple Vitamins-Minerals (CENTRUM SILVER 50+MEN OR) Take 1 tablet by mouth in the morning.      ASPIRIN 81 OR Take by mouth.     [2]   Past Medical History:   Abdominal hernia    Anxiety state    Arrhythmia    atrial fibrillation intermittently    Back problem    back surgery 20 years ago    Black stools     No longer a concern    BPH (benign prostatic hyperplasia)    Cancer (HCC)    GIST    Carcinoma of gastrointestinal tract (HCC)    Coronary atherosclerosis    Depression    Diabetes (HCC)    per pcp not 22 diet controlled dm- pt denies any hx of dm    Disorder of thyroid    Esophageal reflux    Hearing impairment    DOESN'T WEAR HEARING AIDS    Hearing loss    Heart attack (HCC)    Heart palpitations    AF    High blood pressure    High cholesterol    Hx of motion sickness    > 30 yrs ago while on a boat    Hypothyroidism    Other and unspecified hyperlipidemia    Peripheral vascular disease    Pulmonary embolism (HCC)    Stented coronary artery    3 stents total    Thyroid disease    Visual impairment    GLASSES    Wears glasses   [3]   Social History  Socioeconomic History    Marital status:     Number of children: 3   Occupational History    Occupation: RETIRED TEACHER   Tobacco Use    Smoking status: Former     Current packs/day: 0.00     Average packs/day: 1 pack/day for 45.0 years (45.0 ttl pk-yrs)     Types: Cigarettes     Start date: 1971     Quit date: 10/1/2017     Years since quittin.7    Smokeless tobacco: Never   Vaping Use    Vaping status: Never Used   Substance and Sexual Activity    Alcohol use: Yes     Alcohol/week: 3.0 standard drinks of alcohol     Types: 3 Standard drinks or equivalent per week     Comment: RARE    Drug use: No    Sexual activity: Not Currently   Other Topics Concern    Caffeine Concern Yes     Comment: coffee three times a day     Exercise No    Seat Belt No    Special Diet No    Stress Concern No    Weight Concern Yes   Social History Narrative    , lives alone    Has 2 daughters and 1 son - living close by     Social Drivers of Health     Food Insecurity: No Food Insecurity (2024)    Food Insecurity     Food Insecurity: Never true   Transportation Needs: No Transportation Needs (2024)    Transportation Needs     Lack of Transportation: No    Housing Stability: Low Risk  (4/15/2024)    Housing Stability     Housing Instability: No

## 2025-08-09 DIAGNOSIS — E03.2 HYPOTHYROIDISM DUE TO DRUGS: ICD-10-CM

## 2025-08-11 RX ORDER — LEVOTHYROXINE SODIUM 125 UG/1
125 TABLET ORAL
Qty: 90 TABLET | Refills: 0 | OUTPATIENT
Start: 2025-08-11

## 2025-08-13 ENCOUNTER — OFFICE VISIT (OUTPATIENT)
Facility: LOCATION | Age: 75
End: 2025-08-13
Attending: SPECIALIST

## 2025-08-13 ENCOUNTER — NURSE ONLY (OUTPATIENT)
Facility: LOCATION | Age: 75
End: 2025-08-13
Attending: SPECIALIST

## 2025-08-13 VITALS
RESPIRATION RATE: 18 BRPM | BODY MASS INDEX: 27.95 KG/M2 | DIASTOLIC BLOOD PRESSURE: 74 MMHG | SYSTOLIC BLOOD PRESSURE: 143 MMHG | HEART RATE: 62 BPM | OXYGEN SATURATION: 99 % | TEMPERATURE: 97 F | WEIGHT: 206.38 LBS | HEIGHT: 72.01 IN

## 2025-08-13 DIAGNOSIS — C78.7 SECONDARY LIVER CANCER (HCC): ICD-10-CM

## 2025-08-13 DIAGNOSIS — E03.9 HYPOTHYROIDISM (ACQUIRED): ICD-10-CM

## 2025-08-13 DIAGNOSIS — C49.A4 GIST (GASTROINTESTINAL STROMA TUMOR), MALIGNANT, COLON (HCC): ICD-10-CM

## 2025-08-13 DIAGNOSIS — E03.2 HYPOTHYROIDISM DUE TO DRUGS: Primary | ICD-10-CM

## 2025-08-13 LAB
ALBUMIN SERPL-MCNC: 4.6 G/DL (ref 3.2–4.8)
ALBUMIN/GLOB SERPL: 1.8 (ref 1–2)
ALP LIVER SERPL-CCNC: 98 U/L (ref 45–117)
ALT SERPL-CCNC: 14 U/L (ref 10–49)
ANION GAP SERPL CALC-SCNC: 9 MMOL/L (ref 0–18)
AST SERPL-CCNC: 21 U/L (ref ?–34)
BASOPHILS # BLD AUTO: 0.06 X10(3) UL (ref 0–0.2)
BASOPHILS NFR BLD AUTO: 0.9 %
BILIRUB SERPL-MCNC: 0.6 MG/DL (ref 0.2–1.1)
BUN BLD-MCNC: 21 MG/DL (ref 9–23)
CALCIUM BLD-MCNC: 9.9 MG/DL (ref 8.7–10.6)
CHLORIDE SERPL-SCNC: 106 MMOL/L (ref 98–112)
CO2 SERPL-SCNC: 26 MMOL/L (ref 21–32)
CREAT BLD-MCNC: 1.06 MG/DL (ref 0.7–1.3)
EGFRCR SERPLBLD CKD-EPI 2021: 74 ML/MIN/1.73M2 (ref 60–?)
EOSINOPHIL # BLD AUTO: 0.3 X10(3) UL (ref 0–0.7)
EOSINOPHIL NFR BLD AUTO: 4.5 %
ERYTHROCYTE [DISTWIDTH] IN BLOOD BY AUTOMATED COUNT: 14.7 %
FASTING STATUS PATIENT QL REPORTED: NO
GLOBULIN PLAS-MCNC: 2.6 G/DL (ref 2–3.5)
GLUCOSE BLD-MCNC: 130 MG/DL (ref 70–99)
HCT VFR BLD AUTO: 41.2 % (ref 39–53)
HGB BLD-MCNC: 13.6 G/DL (ref 13–17.5)
IMM GRANULOCYTES # BLD AUTO: 0.04 X10(3) UL (ref 0–1)
IMM GRANULOCYTES NFR BLD: 0.6 %
LYMPHOCYTES # BLD AUTO: 1.18 X10(3) UL (ref 1–4)
LYMPHOCYTES NFR BLD AUTO: 17.7 %
MCH RBC QN AUTO: 29.1 PG (ref 26–34)
MCHC RBC AUTO-ENTMCNC: 33 G/DL (ref 31–37)
MCV RBC AUTO: 88.2 FL (ref 80–100)
MONOCYTES # BLD AUTO: 0.63 X10(3) UL (ref 0.1–1)
MONOCYTES NFR BLD AUTO: 9.5 %
NEUTROPHILS # BLD AUTO: 4.44 X10 (3) UL (ref 1.5–7.7)
NEUTROPHILS # BLD AUTO: 4.44 X10(3) UL (ref 1.5–7.7)
NEUTROPHILS NFR BLD AUTO: 66.8 %
OSMOLALITY SERPL CALC.SUM OF ELEC: 297 MOSM/KG (ref 275–295)
PLATELET # BLD AUTO: 259 10(3)UL (ref 150–450)
POTASSIUM SERPL-SCNC: 4.2 MMOL/L (ref 3.5–5.1)
PROT SERPL-MCNC: 7.2 G/DL (ref 5.7–8.2)
RBC # BLD AUTO: 4.67 X10(6)UL (ref 3.8–5.8)
SODIUM SERPL-SCNC: 141 MMOL/L (ref 136–145)
T4 FREE SERPL-MCNC: 1.6 NG/DL (ref 0.8–1.7)
TSI SER-ACNC: 6.02 UIU/ML (ref 0.55–4.78)
WBC # BLD AUTO: 6.7 X10(3) UL (ref 4–11)

## 2025-08-14 DIAGNOSIS — E03.2 HYPOTHYROIDISM DUE TO DRUGS: ICD-10-CM

## 2025-08-14 RX ORDER — LEVOTHYROXINE SODIUM 125 UG/1
125 TABLET ORAL
Qty: 30 TABLET | Refills: 2 | Status: SHIPPED | OUTPATIENT
Start: 2025-08-14

## 2025-08-19 ENCOUNTER — OFFICE VISIT (OUTPATIENT)
Dept: INTERNAL MEDICINE CLINIC | Facility: CLINIC | Age: 75
End: 2025-08-19

## 2025-08-19 VITALS
OXYGEN SATURATION: 99 % | TEMPERATURE: 97 F | BODY MASS INDEX: 28.04 KG/M2 | DIASTOLIC BLOOD PRESSURE: 68 MMHG | SYSTOLIC BLOOD PRESSURE: 116 MMHG | WEIGHT: 207 LBS | HEART RATE: 82 BPM | RESPIRATION RATE: 16 BRPM | HEIGHT: 72.01 IN

## 2025-08-19 DIAGNOSIS — I10 ESSENTIAL HYPERTENSION: Primary | ICD-10-CM

## 2025-08-19 PROCEDURE — 99213 OFFICE O/P EST LOW 20 MIN: CPT | Performed by: INTERNAL MEDICINE

## 2025-08-19 PROCEDURE — G2211 COMPLEX E/M VISIT ADD ON: HCPCS | Performed by: INTERNAL MEDICINE

## 2025-08-19 RX ORDER — ESCITALOPRAM OXALATE 10 MG/1
15 TABLET ORAL DAILY
COMMUNITY
Start: 2025-08-19 | End: 2025-08-23

## 2025-08-23 DIAGNOSIS — F33.1 MAJOR DEPRESSIVE DISORDER, RECURRENT, MODERATE (HCC): ICD-10-CM

## 2025-08-23 RX ORDER — ESCITALOPRAM OXALATE 10 MG/1
10 TABLET ORAL DAILY
Qty: 90 TABLET | Refills: 0 | Status: SHIPPED | OUTPATIENT
Start: 2025-08-23

## (undated) DEVICE — 1200CC GUARDIAN II: Brand: GUARDIAN

## (undated) DEVICE — KIT VLV 5 PC AIR H2O SUCT BX ENDOGATOR CONN

## (undated) DEVICE — SHEET DRAPE 40X58 STER

## (undated) DEVICE — COVER PRB W5XL96IN W/ GEL ULTRACOVER

## (undated) DEVICE — PENCIL TELESCOPE MEGADYNE SE

## (undated) DEVICE — ADHESIVE SKIN TOP FOR WND CLSR DERMBND ADV

## (undated) DEVICE — SURGICAL GLV PROTEXIS PI 6.5.

## (undated) DEVICE — PACK CDS RECTAL

## (undated) DEVICE — SUTURE VCRL SZ 3-0 L27IN ABSRB UD L26MM SH

## (undated) DEVICE — LIGHT HANDLE

## (undated) DEVICE — TUBING MEGADYNE SPECULUM

## (undated) DEVICE — REM POLYHESIVE ADULT PATIENT RETURN ELECTRODE: Brand: VALLEYLAB

## (undated) DEVICE — PAD SACRAL SPAN AID

## (undated) DEVICE — GIJAW SINGLE-USE BIOPSY FORCEPS WITH NEEDLE: Brand: GIJAW

## (undated) DEVICE — Device

## (undated) DEVICE — BITEBLOCK ENDOSCP 60FR MAXI STRP

## (undated) DEVICE — 10FT COMBINED O2 DELIVERY/CO2 MONITORING. FILTER WITH MICROSTREAM TYPE LUER: Brand: DUAL ADULT NASAL CANNULA

## (undated) DEVICE — KIT CUSTOM ENDOPROCEDURE STERIS

## (undated) DEVICE — FIAPC® PROBE W/ FILTER 2200 A OD 2.3MM/6.9FR; L 2.2M/7.2FT: Brand: ERBE

## (undated) DEVICE — SLEEVE COMPR M KNEE LEN SGL USE KENDALL SCD

## (undated) DEVICE — SUTURE VCRL SZ 3-0 L27IN ABSRB VLT L26MM SH

## (undated) DEVICE — V2 SPECIMEN COLLECTION MANIFOLD KIT: Brand: NEPTUNE

## (undated) DEVICE — SURGICAL GLOVE PI ORTHO 7.5

## (undated) DEVICE — KIT MFLD FOR SPEC COLL

## (undated) DEVICE — MEGADYNE E-Z CLEAN NDLE 2.5IN

## (undated) DEVICE — 3M™ RED DOT™ MONITORING ELECTRODE WITH FOAM TAPE AND STICKY GEL 2570-3, 3/BAG, 200/CASE, 54/PLT: Brand: RED DOT™

## (undated) DEVICE — STAPLER SKIN ETHICON GUN PXW35

## (undated) DEVICE — APPLIER CLP M L11IN TI MULT RNG HNDL 30 CLP

## (undated) DEVICE — BLADE 24 SS SRG STRL

## (undated) DEVICE — SUTURE VCRL SZ 4-0 L18IN ABSRB UD L19MM PS-2

## (undated) DEVICE — SUTURE VCRL SZ 2-0 L27IN ABSRB UD L26MM SH

## (undated) DEVICE — 3M™ RED DOT™ MONITORING ELECTRODE WITH FOAM TAPE AND STICKY GEL, 50/BAG, 20/CASE, 72/PLT 2570: Brand: RED DOT™

## (undated) DEVICE — SUTURE PROL SZ 2-0 L30IN NONABSORB BLU

## (undated) NOTE — LETTER
Your patient was recently seen at the [facility] for a hospital follow-up visit. The visit note is attached. Please contact the clinic with any questions at [phone].     Thank you,  HUMPHREY Golden

## (undated) NOTE — ED AVS SNAPSHOT
[facility] Emergency Department    [facility]  [address] [address]    Phone:  [phone]    Fax:  Sue Gaona   MRN: [id number]    Department:  [facility] Emergency Department   Date of Visit:  6/19/20 constipation.             Where to Get Your Medications      You can get these medications from any pharmacy     Bring a paper prescription for each of these medications    - docusate sodium 100 MG Caps            Discharge References/Attachments     CONSTI IF THERE IS ANY CHANGE OR WORSENING OF YOUR CONDITION, CALL YOUR PRIMARY CARE PHYSICIAN AT ONCE OR RETURN IMMEDIATELY TO THE EMERGENCY DEPARTMENT.     If you have been prescribed any medication(s), please fill your prescription right away and begin taking [address] to help you get signed up for insurance coverage. [address] is a Federal Navigator program that can help with your Affordable Care Act coverage, as well as all types of Medicaid plans.   To get signed up and covere

## (undated) NOTE — LETTER
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     I understand that the treatme questions have been answered to my satisfaction. I understand that I can contact my oncologist,  or my Cancer Care Team at any time if I have questions, by calling [phone].    Additional written information will be given to me prior to start of therap

## (undated) NOTE — LETTER
[address] [facility] FOR BLOOD OR BLOOD COMPONENTS      In the course of your treatment, it may become necessary to administer a transfusion of blood or blood components.  This form provides basic information concerning this proc If loss of blood poses serious threats in the course of your treatment, THERE IS NO EFFECTIVE ALTERNATIVE TO BLOOD TRANSFUSION.  However, if you have any further questions on this matter, your physician will fully explain the alternatives to you if it has n

## (undated) NOTE — IP AVS SNAPSHOT
Patient Demographics     Address  [address] [address] Phone  [phone] [facility])  [phone] [facility] E-mail Address  [email or web address]      Emergency Contact(s)     Name Relation Home Work Mobile    Rik Lopez Daughter 032 TAKE these medications       Instructions Authorizing Provider Morning Afternoon Evening As Needed   aspirin 81 MG Tabs  Next dose due:  10/13 am      Take 81 mg by mouth daily.           fluconazole 200 MG Tabs  Commonly known as:  DIFLUCAN  Start taking o [id number] HYDROcodone-acetaminophen (NORCO) 5-325 MG per tab 1 tablet 10/12/17 1313 Given      [id number] Levothyroxine Sodium (SYNTHROID) tab 100 mcg 10/12/17 0532 Given      [id number] famoTIDine (PEPCID) tab 20 mg (Or Linked Group #1) 10/11/17 2122 Given GFR 94 >=60 — [facility]   Comment:           Estimated GFR units: mL/min/1.73 square meters   eGFR calculated by the CKD-EPI equation.        Calcium, Total 8.1 8.3 - 10.3 mg/dL [facility]   Comment:           Total Calciums are not corrected for effects Blood Culture Once [[id number]] Collected:  10/07/17 0930    Order Status:  Completed Lab Status:  Final result Updated:  10/12/17 1000    Specimen:  Blood from Blood,peripheral      Blood Culture Result No Growth 5 Days    Urine Culture, Routine Once [190 smoking use included Cigars. He has a 45.00 pack-year smoking history. He has never used smokeless tobacco. He reports that he drinks alcohol. He reports that he does not use drugs.     Family History:   Family History   Problem Relation Age of Onset   • Al General: No acute distress. Alert and oriented  Respiratory: Clear to auscultation bilaterally. No wheezes. No rhonchi. Cardiovascular: S1, S2. Regular rate and rhythm. No murmurs, rubs or gallops. Equal pulses.    Abdomen: lap incision dressing c/d/i; uro Consults signed by Riya Joel MD at 10/11/2017  3:12 PM     Author:  Riya Joel MD Service:  Infectious Disease Author Type:  Physician    Filed:  10/11/2017  3:12 PM Date of Service:  10/11/2017  2:48 PM Status:  Signed    :  Riya Joel Comment: fracture radiius and ulna  Right arm -   Family History   Problem Relation Age of Onset   • Alcohol and Other Disorders Associated Father    • Cancer Mother      Breast      reports that he quit smoking about 12 months ago.  His smoking use inc Completed. See pertinent positives and negatives in the the HPI. Physical Exam:    General: No acute distress. Alert and oriented x 3. Vital signs: Blood pressure 132/78, pulse 61, temperature 97.6 °F (36.4 °C), temperature source Oral, resp.  rate 19, BILIRUBIN Latest Ref Range: Negative  Negative   KETONES (URINE DIPSTICK) Latest Ref Range: Negative mg/dL Negative   OCCULT BLOOD Latest Ref Range: Negative  Large (A)   PH, URINE Latest Ref Range: 4.5 - 8.0  8.0   PROTEIN (URINE DIPSTICK) Latest Ref Joaquin Oliver Electronically signed by Victor Manuel Mei MD on 10/11/2017  3:12 PM   Attribution [place]    IC. 1 - Victor Manuel Mei MD on 10/11/2017  2:48 PM  IC.2 - Victor Manuel Mei MD on 10/11/2017  3:03 PM                     D/C Summary     No notes of this type exist for this

## (undated) NOTE — Clinical Note
Spoke with patient for TCM.  Patient is doing well since getting home.  Patient does not have an appt scheduled at this time but reports he will call the office to schedule.  Thank you!

## (undated) NOTE — LETTER
Deann Lopez [address]  [place], [address] [facility]    Consent for Operation  Date: __________________                                Time: _______________    1.  I authorize the performance upon Kelsie Shaikh the following operation:  Procedure(s) procedure has been videotaped, the surgeon will obtain the original videotape. The hospital will not be responsible for storage or maintenance of this tape.   7. For the purpose of advancing medical education, I consent to the admittance of observers to the STATEMENTS REQUIRING INSERTION OR COMPLETION WERE FILLED IN.     Signature of Patient:   ___________________________    When the patient is a minor or mentally incompetent to give consent:  Signature of person authorized to consent for patient: ____________ supplements, and pills I can buy without a prescription (including street drugs/illegal medications). Failure to inform my anesthesiologist about these medicines may increase my risk of anesthetic complications. iv.  If I am allergic to anything or have ha Anesthesiologist Signature     Date   Time  I have discussed the procedure and information above with the patient (or patient’s representative) and answered their questions. The patient or their representative has agreed to have anesthesia services.     ___

## (undated) NOTE — LETTER
20    Patient: Delilah Ryan  : 10/23/1950 Visit date: 2020    Dear  Dr. Elio Chavez MD,    Thank you for referring Delilah Ryan to my practice. Please find my assessment and plan below.           Assessment   Parastomal hernia with obstruction and w

## (undated) NOTE — LETTER
Franki Escudero M.D., F.A.C.S. Rigoberto Donato M.D., F.A.C.S. Landen Rob M.D., Heriberto Martinez M.D., F.A.C.S. Mike Chacon. Mike Neal M.D., F.A.C.S. Esperanza Hoover M.D. ACOSTA Culp M.D., F. To that end, on the following page we will ask you some questions to make certain that you understand everything which has been explained to you.  Included in this understanding is that there are both surgical and nonsurgical treatments available for you, t [facility] ([facility] surgeon has met with me and explained the matter of my illness, and what treatments might be available to improve my condition.  As a result of that conversation, I understand the following:    A [facility] surgeon met with The nature and options for treatment for my condition have been explained to me, in detail, by a [facility] surgeon and all questions have been answered to my satisfaction.  I understand that I am not required to undergo surgery, and further, that if I so desire,

## (undated) NOTE — LETTER
[facility]  [facility], [address] [place] [id number]  [facility]: [phone]  FAX: [phone] Homa Dsouza MD   [facility]  [address] [address]  Via In Basket    The patient

## (undated) NOTE — ED AVS SNAPSHOT
[facility] Emergency Department    [place]  [address] [place] [address]    Phone:  [phone]    Fax:  Sue Contreras   MRN: [id number]    Department:  [facility] Emergency Department   Date of Visit:  6/19/20 IF THERE IS ANY CHANGE OR WORSENING OF YOUR CONDITION, CALL YOUR PRIMARY CARE PHYSICIAN AT ONCE OR RETURN IMMEDIATELY TO THE EMERGENCY DEPARTMENT.     If you have been prescribed any medication(s), please fill your prescription right away and begin taking t

## (undated) NOTE — ED AVS SNAPSHOT
[facility] Emergency Department    [place]  [address] [place] [address]    Phone:  [phone]    Fax:  Sue Morales   MRN: [id number]    Department:  [facility] Emergency Department   Date of Visit:  6/18/20 06/19/2017  01:23 HYDROmorphone HCl PF (DILAUDID) 1 MG/ML injection 1 mg 1 mg                Admin Date Administration Dose                   06/19/2017  03:05 Acetaminophen-Codeine #3 (TYLENOL #3) 300-30 MG tab 1 tablet 1 tablet                Admin Date ABDOMINAL PAIN, UNKNOWN CAUSE, (MALE) (ENGLISH)      Disclosure     Insurance plans vary and the physician(s) referred by the ER may not be covered by your plan. Please contact your insurance company to determine coverage for follow-up care and referrals. prescription right away and begin taking the medication(s) as directed    If the emergency physician has read X-rays, these will be re-interpreted by a radiologist.  If there is a significant change in your reading, you will be contacted.  Please make sure Medicaid plans. To get signed up and covered, please call [phone] and ask to get set up for an insurance coverage that is in-network with [facility].         Imaging Results         CT ABDOMEN+PELVIS(CPT=74176) (In process)       Jaiden

## (undated) NOTE — LETTER
Ronny Agudelo. FEROZ Sánchez, F.A.C.S. Surgical Clearance Needed    Date: 2/22/2023                                                                       From: Mikhail Hyde    Attn:  [address] [place] [address]                                                                                Fax:     RE: Surgical Clearance               # of Pages: 1        Patient Name: Garry Barillas    Patient YOB: 1950    Consult For: ANTI COAGULATION MANAGEMENT    Surgery: LAPAROSCOPIC CHOLECYSTECTOMY WITH CHOLANGIOGRAM    Date of Surgery: 3/1/23 AT [facility]        This facsimile transmission is provided for your internal use only. If the reader of this message is not the intended recipient, you are hereby notified that you have received this document in error, and that any review, dissemination, distribution, or copying of this message is strictly prohibited. If you have received this communication in error, please notify us immediately by telephone and return the original message to us by mail.

## (undated) NOTE — LETTER
Consent to Procedure/Sedation    Date: __________________    Time: _______________    1. I authorize the performance upon Osvaldo Eubanks the followin.  I authorize Dr. _________________________ (and whomever is designated as the doctor’s assistant) ___________________________    ___________________    Witness: ____________________     Date: ______________    Printed: 2019   6:15 AM    Patient Name: Osvaldo Eubanks        : 10/23/1950       Medical Record #: [id number]

## (undated) NOTE — Clinical Note
KENIAI, TCM call made, see notes. NCM confirmed patient has a HFU On 12/2/2020 with [facility] clinic at 9:30 am, NCM changed visit type to TCM HFU.

## (undated) NOTE — LETTER
Patient Name: Luciano Hernández DOB-Age / Sex: 10/23/1950-A: 73 y  male   Medical Records: [id number] CSN: [id number]    DNAR NOTIFICATION    Your patient listed above has a procedure scheduled at [facility] and has indicated that he/she currently has a DNAR (Do Not Attempt Resuscitattion) with their Advanced Directives.    Please note that you will be asked to address this matter with your patient pre-operatively.  Thank you.      Procedure Date 10/21/2024  Procedure PUSH ENTEROSCOPY, N/A    Surgeon(s):  Quentin Llamas MD

## (undated) NOTE — LETTER
[address]  [address]  Authorization for Surgical Operation and Procedure     Date:__7/25/2024_________                                                                                                         Time:_0800__    1.   I hereby authorize  Ford Llamas DO, my physician(s) and the assistant to perform the following surgical operation/ procedure and administer such anesthesia as may be determined necessary by my physician(s):     Operation/Procedure name (s) Push enteroscopy with possible biopsy, possible control of bleeding under monitored anesthesia care on Luciano Hernández    2.   I recognize that during the surgical operation/procedure, unforeseen conditions may necessitate additional or different procedures than those listed above.  I, therefore, further authorize and request that the above-named surgeon, assistants, or designees perform such procedures as are, in their judgment, necessary and desirable.    3.   My surgeon/physician has discussed prior to my surgery the potential benefits, risks and side effects of this procedure; the likelihood of achieving goals; and potential problems that might occur during recuperation.  They also discussed reasonable alternatives to the procedure, including risks, benefits, and side effects related to the alternatives and risks related to not receiving this procedure.  I have had all my questions answered and I acknowledge that no guarantee has been made as to the result that may be obtained.    4.   Should the need arise during my operation or immediate post-operative period, I also consent to the administration of blood and/or blood products.  Further, I understand that despite careful testing and screening of blood or blood products by collecting agencies, I may still be subject to ill effects as a result of receiving a blood transfusion and/or blood products.  The following are some, but not all, of the  potential risks that can occur: fever and allergic reactions, hemolytic reactions, transmission of diseases such as Hepatitis, AIDS and Cytomegalovirus (CMV) and fluid overload.  In the event that I wish to have an autologous transfusion of my own blood, or a directed donor transfusion.  I will discuss this with my physician.   5.   I authorize the use of any specimen, organs, tissues, body parts or foreign objects that may be removed from my body during the operation/procedure for diagnosis, research or teaching purposes and their subsequent disposal by hospital authorities.  I also authorize the release of specimen test results and/or written reports to my treating physician on the hospital medical staff or other referring or consulting physicians involved in my care, at the discretion of the Pathologist or my treating physician.    6.   I consent to the photographing or videotaping of the operations or procedures to be performed, including appropriate portions of my body for medical, scientific, or educational purposes, provided my identity is not revealed by the pictures or by descriptive texts accompanying them.  If the procedure has been photographed/videotaped, the surgeon will obtain the original picture, image, videotape or CD.  The hospital will not be responsible for storage, release or maintenance of the picture, image, tape or CD.    7.   I consent to the presence of a  or observers in the operating room as deemed necessary by my physician or their designees.    8.   I recognize that in the event my procedure results in extended X-Ray/fluoroscopy time, I may develop a skin reaction.    9. If I have a Do Not Attempt Resuscitation (DNAR) order in place, that status will be suspended while in the operating room, procedural suite, and during the recovery period unless otherwise explicitly stated by me (or a person authorized to consent on my behalf). The surgeon or my attending physician  will determine when the applicable recovery period ends for purposes of reinstating the DNAR order.  10. Patients having a sterilization procedure: I understand that if the procedure is successful the results will be permanent and it will therefore be impossible for me to inseminate, conceive, or bear children.  I also understand that the procedure is intended to result in sterility, although the result has not been guaranteed.   11. I acknowledge that my physician has explained sedation/analgesia administration to me including the risk and benefits I consent to the administration of sedation/analgesia as may be necessary or desirable in the judgment of my physician.      I CERTIFY THAT I HAVE READ AND FULLY UNDERSTAND THE ABOVE CONSENT TO OPERATION and/or OTHER PROCEDURE.      _________________________________________  __________________________________  Signature of Patient     Signature of Responsible Person           ___________________________________         Printed Name of Responsible Person             _________________________________                 Relationship to Patient    _________________________________________  ______________________________  Signature of Witness          Date  Time                                                  Page 1 of 2        [address]  [address]    Consent for Anesthesia  1. I, Luciano Hernández agree to be cared for by an anesthesiologist, who is specially trained to monitor me and give me medicine to put me to sleep or keep me comfortable during my procedure.    I understand that my anesthesiologist is not an employee or agent of [facility] or [employer]. He or she works for [employer]    2. As the patient asking for anesthesia services, I agree to:  a. Allow the anesthesiologist (anesthesia doctor) to give me medicine and do additional procedures as necessary. Some examples are: Starting or  using an “IV” to give memedicine, fluids or blood during my procedure, and having a breathing tube placed to help me breathe when I’m asleep (intubation). In the event that my heart stops working properly, I understand that my anesthesiologist will make every effort to sustain my life, unless otherwise directed by [facility] Do Not Resuscitate documents.  b. Tell my anesthesia doctor before my procedure:  If I am pregnant.  The last time that I ate or drank.  All of the medicines I take (including prescriptions, herbal supplements, and pills I can buy without a prescription (including street drugs/illegal medications). Failure to inform my anesthesiologist about these medicines may increase my risk of anesthetic complications.  If I am allergic to anything or have had a reaction to anesthesia before.    3. I understand how the anesthesia medicine will help me (benefits).    4. I understand that with any type of anesthesia medicine there are risks:  a. The most common risks are: nausea, vomiting, sore throat, muscle soreness, damage to my eyes, mouth, or teeth (from breathing tube placement).  b. Rare risks include: remembering what happened during my procedure, allergic reactions to medications, injury to my airway, heart, lungs, vision, nerves, or muscles and in extremely rare instances death.    5. My doctor has explained to me other choices available to me for my care (alternatives).    6. Pregnant Patients (“epidural”):  I understand that the risks of having an epidural (medicine given into my back to help control pain during labor), include itching, low blood pressure, difficulty urinating, headache or slowing of the baby’s heart. Very rare risks include infection, bleeding, seizure, irregular heart rhythms and nerve injury.    7. Regional Anesthesia (“spinal”, “epidural”, & “nerve blocks”):  I understand that rare but potential complications include headache, bleeding, infection, seizure, irregular heart  rhythms, and nerve injury.    I can change my mind about having anesthesia services at any time before I get the medicine.    _____________________________________________________________________________  Patient (or Representative) Signature/Relationship to Patient  Date   Time    _____________________________________________________________________________   Name (if used)    Language/Organization   Time    _____________________________________________________________________________  Anesthesiologist Signature     Date   Time  I have discussed the procedure and information above with the patient (or patient’s representative) and answered their questions. The patient or their representative has agreed to have anesthesia services.    _____________________________________________________________________________  Witness        Date   Time  I have verified that the signature is that of the patient or patient’s representative, and that it was signed before the procedure                       Page 2 of 2

## (undated) NOTE — MR AVS SNAPSHOT
After Visit Summary   6/22/2017    Jossy Jay    MRN: [id number]           Diagnoses this Visit     Generalized abdominal pain    -  Primary     Nausea         Malignant gastrointestinal stromal tumor, unspecified site [facility])         Essential hype To Do List     Wednesday June 21, 2017     Imaging:  XR ABDOMEN, OBSTRUCTIVE SERIES (CPT=74020)        Thursday June 22, 2017     Cardiology:  CARD ECHO 2D W/O DOPPLER (EOC=24086)        Monday November 13, 2017 11:00 AM     Appointment with Rober Wahl

## (undated) NOTE — MR AVS SNAPSHOT
[address]  [address] [address] [phone]               Thank you for choosing us for your health care visit with Homa Dsouza MD.  We are glad to serve you and happy to provide you with this guthrie heart attack (acute myocardial infarction) or stroke. Your body makes all the cholesterol it needs. But you also get cholesterol from many of the foods you eat. You should limit how much cholesterol you get in your diet.  You should also limit how much fat · Talk with your health care provider before starting a low-cholesterol diet or weight-loss program.  · In general, a low-cholesterol diet means that you eat less saturated fat (red meat and regular dairy) and less cholesterol each day.  You may eat foods w © 8367-1060 [address], [address]. All rights reserved. This information is not intended as a substitute for professional medical care. Always follow your healthcare professional's instructions.              Al discharge instructions in [employer] by going to Visits < Admission Summaries. If you've been to the Emergency Department or your doctor's office, you can view your past visit information in [employer] by going to Visits < Visit Summaries. [place] questions?

## (undated) NOTE — LETTER
[facility] [address], [address] [place] [address]  Consent for Procedure/Sedation    Date: ***    Time: ***      {edw ivs consent:1223}

## (undated) NOTE — Clinical Note
KENIAI, TCM call made, see notes. NCM attempted to schedule a TCM HFU for 11/3/17 and then had to cancel because patient remembered he has an appointment for a dressing change at that time with Dr. Krystal Soto. Message sent to MD's office.

## (undated) NOTE — LETTER
Andre Mauricio M.D., F.A.C.S. Blane Galicia M.D., F.A.C.S. Lucita Mejia M.D., Tanner Mchugh M.D., F.A.C.SJulia Randolph. Tanmay Lyles M.D., F.A.C.S. Sherri Ogden M.D. ACOSTA Olivares M.D., GERMAN To that end, on the following page we will ask you some questions to make certain that you understand everything which has been explained to you.  Included in this understanding is that there are both surgical and nonsurgical treatments available for you, t [facility] ([facility] surgeon has met with me and explained the matter of my illness, and what treatments might be available to improve my condition.  As a result of that conversation, I understand the following:    A [facility] surgeon met with The nature and options for treatment for my condition have been explained to me, in detail, by a [facility] surgeon and all questions have been answered to my satisfaction.  I understand that I am not required to undergo surgery, and further, that if I so desire,

## (undated) NOTE — LETTER
[address]  [address]  Authorization for Surgical Operation and Procedure     Date:___________                                                                                                         Time:__________  I hereby authorize Surgeon(s):  Quentin Llamas MD, my physician and his/her assistants (if applicable), which may include medical students, residents, and/or fellows, to perform the following surgical operation/ procedure and administer such anesthesia as may be determined necessary by my physician:  Operation/Procedure name (s) Procedure(s):  FLEXIBLE SIGMOIDOSCOPY on Luciano Hernández   2.   I recognize that during the surgical operation/procedure, unforeseen conditions may necessitate additional or different procedures than those listed above.  I, therefore, further authorize and request that the above-named surgeon, assistants, or designees perform such procedures as are, in their judgment, necessary and desirable.    3.   My surgeon/physician has discussed prior to my surgery the potential benefits, risks and side effects of this procedure; the likelihood of achieving goals; and potential problems that might occur during recuperation.  They also discussed reasonable alternatives to the procedure, including risks, benefits, and side effects related to the alternatives and risks related to not receiving this procedure.  I have had all my questions answered and I acknowledge that no guarantee has been made as to the result that may be obtained.    4.   Should the need arise during my operation/procedure, which includes change of level of care prior to discharge, I also consent to the administration of blood and/or blood products.  Further, I understand that despite careful testing and screening of blood or blood products by collecting agencies, I may still be subject to ill effects as a result of receiving a blood transfusion and/or blood products.  The  following are some, but not all, of the potential risks that can occur: fever and allergic reactions, hemolytic reactions, transmission of diseases such as Hepatitis, AIDS and Cytomegalovirus (CMV) and fluid overload.  In the event that I wish to have an autologous transfusion of my own blood, or a directed donor transfusion, I will discuss this with my physician.  Check only if Refusing Blood or Blood Products  I understand refusal of blood or blood products as deemed necessary by my physician may have serious consequences to my condition to include possible death. I hereby assume responsibility for my refusal and release the hospital, its personnel, and my physicians from any responsibility for the consequences of my refusal.          o  Refuse      5.   I authorize the use of any specimen, organs, tissues, body parts or foreign objects that may be removed from my body during the operation/procedure for diagnosis, research or teaching purposes and their subsequent disposal by hospital authorities.  I also authorize the release of specimen test results and/or written reports to my treating physician on the hospital medical staff or other referring or consulting physicians involved in my care, at the discretion of the Pathologist or my treating physician.    6.   I consent to the photographing or videotaping of the operations or procedures to be performed, including appropriate portions of my body for medical, scientific, or educational purposes, provided my identity is not revealed by the pictures or by descriptive texts accompanying them.  If the procedure has been photographed/videotaped, the surgeon will obtain the original picture, image, videotape or CD.  The hospital will not be responsible for storage, release or maintenance of the picture, image, tape or CD.    7.   I consent to the presence of a  or observers in the operating room as deemed necessary by my physician or their designees.     8.   I recognize that in the event my procedure results in extended X-Ray/fluoroscopy time, I may develop a skin reaction.    9. If I have a Do Not Attempt Resuscitation (DNAR) order in place, that status will be suspended while in the operating room, procedural suite, and during the recovery period unless otherwise explicitly stated by me (or a person authorized to consent on my behalf). The surgeon or my attending physician will determine when the applicable recovery period ends for purposes of reinstating the DNAR order.  10. Patients having a sterilization procedure: I understand that if the procedure is successful the results will be permanent and it will therefore be impossible for me to inseminate, conceive, or bear children.  I also understand that the procedure is intended to result in sterility, although the result has not been guaranteed.   11. I acknowledge that my physician has explained sedation/analgesia administration to me including the risk and benefits I consent to the administration of sedation/analgesia as may be necessary or desirable in the judgment of my physician.    I CERTIFY THAT I HAVE READ AND FULLY UNDERSTAND THE ABOVE CONSENT TO OPERATION and/or OTHER PROCEDURE.    _________________________________________  __________________________________  Signature of Patient     Signature of Responsible Person         ___________________________________         Printed Name of Responsible Person           _________________________________                 Relationship to Patient  _________________________________________  ______________________________  Signature of Witness          Date  Time      Patient Name: Luciano Calderón Edgar     : 10/23/1950                 Printed: 2024     Medical Record #: [id number]                     Page 1 of [address] [place] [address] [place]  [address]    Consent for Anesthesia    I, Luciano Calderón  Edgar agree to be cared for by an anesthesiologist, who is specially trained to monitor me and give me medicine to put me to sleep or keep me comfortable during my procedure    I understand that my anesthesiologist is not an employee or agent of [facility] or [employer]. He or she works for [employer].    As the patient asking for anesthesia services, I agree to:  Allow the anesthesiologist (anesthesia doctor) to give me medicine and do additional procedures as necessary. Some examples are: Starting or using an “IV” to give me medicine, fluids or blood during my procedure, and having a breathing tube placed to help me breathe when I’m asleep (intubation). In the event that my heart stops working properly, I understand that my anesthesiologist will make every effort to sustain my life, unless otherwise directed by [facility] Do Not Resuscitate documents.  Tell my anesthesia doctor before my procedure:  If I am pregnant.  The last time that I ate or drank.  All of the medicines I take (including prescriptions, herbal supplements, and pills I can buy without a prescription (including street drugs/illegal medications). Failure to inform my anesthesiologist about these medicines may increase my risk of anesthetic complications.  If I am allergic to anything or have had a reaction to anesthesia before.  I understand how the anesthesia medicine will help me (benefits).  I understand that with any type of anesthesia medicine there are risks:  The most common risks are: nausea, vomiting, sore throat, muscle soreness, damage to my eyes, mouth, or teeth (from breathing tube placement).  Rare risks include: remembering what happened during my procedure, allergic reactions to medications, injury to my airway, heart, lungs, vision, nerves, or muscles and in extremely rare instances death.  My doctor has explained to me other choices available to me for my care (alternatives).  Pregnant  Patients (“epidural”):  I understand that the risks of having an epidural (medicine given into my back to help control pain during labor), include itching, low blood pressure, difficulty urinating, headache or slowing of the baby’s heart. Very rare risks include infection, bleeding, seizure, irregular heart rhythms and nerve injury.  Regional Anesthesia (“spinal”, “epidural”, & “nerve blocks”):  I understand that rare but potential complications include headache, bleeding, infection, seizure, irregular heart rhythms, and nerve injury.    I can change my mind about having anesthesia services at any time before I get the medicine.    _____________________________________________________________________________  Patient (or Representative) Signature/Relationship to Patient  Date   Time    _____________________________________________________________________________   Name (if used)    Language/Organization   Time    _____________________________________________________________________________  Anesthesiologist Signature     Date   Time  I have discussed the procedure and information above with the patient (or patient’s representative) and answered their questions. The patient or their representative has agreed to have anesthesia services.    _____________________________________________________________________________  Witness        Date   Time  I have verified that the signature is that of the patient or patient’s representative, and that it was signed before the procedure  Patient Name: Luciano Hernández     : 10/23/1950                 Printed: 2024     Medical Record #: [id number]                     Page 2 of 2

## (undated) NOTE — ED AVS SNAPSHOT
[facility] Emergency Department    [place]  [address] [address]    Phone:  [phone]    Fax:  [address] [place] [address]   MRN: [id number]    Department:  [facility] Emergency Department   Date of Visit:  6/18/20 IF THERE IS ANY CHANGE OR WORSENING OF YOUR CONDITION, CALL YOUR PRIMARY CARE PHYSICIAN AT ONCE OR RETURN IMMEDIATELY TO THE EMERGENCY DEPARTMENT.     If you have been prescribed any medication(s), please fill your prescription right away and begin taking t

## (undated) NOTE — LETTER
[address]  [address]  Authorization for Surgical Operation and Procedure     Date:___________                                                                                                         Time:__________  I hereby authorize Surgeon(s):  Quentin Llamas MD, my physician and his/her assistants (if applicable), which may include medical students, residents, and/or fellows, to perform the following surgical operation/ procedure and administer such anesthesia as may be determined necessary by my physician:  Operation/Procedure name (s) Colonoscopy with possible biopsy, possible control of bleeding under monitored anesthesia on Luciano Hernández   2.   I recognize that during the surgical operation/procedure, unforeseen conditions may necessitate additional or different procedures than those listed above.  I, therefore, further authorize and request that the above-named surgeon, assistants, or designees perform such procedures as are, in their judgment, necessary and desirable.    3.   My surgeon/physician has discussed prior to my surgery the potential benefits, risks and side effects of this procedure; the likelihood of achieving goals; and potential problems that might occur during recuperation.  They also discussed reasonable alternatives to the procedure, including risks, benefits, and side effects related to the alternatives and risks related to not receiving this procedure.  I have had all my questions answered and I acknowledge that no guarantee has been made as to the result that may be obtained.    4.   Should the need arise during my operation/procedure, which includes change of level of care prior to discharge, I also consent to the administration of blood and/or blood products.  Further, I understand that despite careful testing and screening of blood or blood products by collecting agencies, I may still be subject to ill effects as a result of  receiving a blood transfusion and/or blood products.  The following are some, but not all, of the potential risks that can occur: fever and allergic reactions, hemolytic reactions, transmission of diseases such as Hepatitis, AIDS and Cytomegalovirus (CMV) and fluid overload.  In the event that I wish to have an autologous transfusion of my own blood, or a directed donor transfusion, I will discuss this with my physician.  Check only if Refusing Blood or Blood Products  I understand refusal of blood or blood products as deemed necessary by my physician may have serious consequences to my condition to include possible death. I hereby assume responsibility for my refusal and release the hospital, its personnel, and my physicians from any responsibility for the consequences of my refusal.          o  Refuse      5.   I authorize the use of any specimen, organs, tissues, body parts or foreign objects that may be removed from my body during the operation/procedure for diagnosis, research or teaching purposes and their subsequent disposal by hospital authorities.  I also authorize the release of specimen test results and/or written reports to my treating physician on the hospital medical staff or other referring or consulting physicians involved in my care, at the discretion of the Pathologist or my treating physician.    6.   I consent to the photographing or videotaping of the operations or procedures to be performed, including appropriate portions of my body for medical, scientific, or educational purposes, provided my identity is not revealed by the pictures or by descriptive texts accompanying them.  If the procedure has been photographed/videotaped, the surgeon will obtain the original picture, image, videotape or CD.  The hospital will not be responsible for storage, release or maintenance of the picture, image, tape or CD.    7.   I consent to the presence of a  or observers in the operating room  as deemed necessary by my physician or their designees.    8.   I recognize that in the event my procedure results in extended X-Ray/fluoroscopy time, I may develop a skin reaction.    9. If I have a Do Not Attempt Resuscitation (DNAR) order in place, that status will be suspended while in the operating room, procedural suite, and during the recovery period unless otherwise explicitly stated by me (or a person authorized to consent on my behalf). The surgeon or my attending physician will determine when the applicable recovery period ends for purposes of reinstating the DNAR order.  10. Patients having a sterilization procedure: I understand that if the procedure is successful the results will be permanent and it will therefore be impossible for me to inseminate, conceive, or bear children.  I also understand that the procedure is intended to result in sterility, although the result has not been guaranteed.   11. I acknowledge that my physician has explained sedation/analgesia administration to me including the risk and benefits I consent to the administration of sedation/analgesia as may be necessary or desirable in the judgment of my physician.    I CERTIFY THAT I HAVE READ AND FULLY UNDERSTAND THE ABOVE CONSENT TO OPERATION and/or OTHER PROCEDURE.    _________________________________________  __________________________________  Signature of Patient     Signature of Responsible Person         ___________________________________         Printed Name of Responsible Person           _________________________________                 Relationship to Patient  _________________________________________  ______________________________  Signature of Witness          Date  Time      Patient Name: Luciano Stephane Hernández     : 10/23/1950                 Printed: 2024     Medical Record #: [id number]                     Page 1 of [address] [place] [address],  [place]  [address]    Consent for Anesthesia    I, Luciano Hernández agree to be cared for by an anesthesiologist, who is specially trained to monitor me and give me medicine to put me to sleep or keep me comfortable during my procedure    I understand that my anesthesiologist is not an employee or agent of [facility] or [employer]. He or she works for [employer].    As the patient asking for anesthesia services, I agree to:  Allow the anesthesiologist (anesthesia doctor) to give me medicine and do additional procedures as necessary. Some examples are: Starting or using an “IV” to give me medicine, fluids or blood during my procedure, and having a breathing tube placed to help me breathe when I’m asleep (intubation). In the event that my heart stops working properly, I understand that my anesthesiologist will make every effort to sustain my life, unless otherwise directed by [facility] Do Not Resuscitate documents.  Tell my anesthesia doctor before my procedure:  If I am pregnant.  The last time that I ate or drank.  All of the medicines I take (including prescriptions, herbal supplements, and pills I can buy without a prescription (including street drugs/illegal medications). Failure to inform my anesthesiologist about these medicines may increase my risk of anesthetic complications.  If I am allergic to anything or have had a reaction to anesthesia before.  I understand how the anesthesia medicine will help me (benefits).  I understand that with any type of anesthesia medicine there are risks:  The most common risks are: nausea, vomiting, sore throat, muscle soreness, damage to my eyes, mouth, or teeth (from breathing tube placement).  Rare risks include: remembering what happened during my procedure, allergic reactions to medications, injury to my airway, heart, lungs, vision, nerves, or muscles and in extremely rare instances death.  My doctor has explained to me other choices  available to me for my care (alternatives).  Pregnant Patients (“epidural”):  I understand that the risks of having an epidural (medicine given into my back to help control pain during labor), include itching, low blood pressure, difficulty urinating, headache or slowing of the baby’s heart. Very rare risks include infection, bleeding, seizure, irregular heart rhythms and nerve injury.  Regional Anesthesia (“spinal”, “epidural”, & “nerve blocks”):  I understand that rare but potential complications include headache, bleeding, infection, seizure, irregular heart rhythms, and nerve injury.    I can change my mind about having anesthesia services at any time before I get the medicine.    _____________________________________________________________________________  Patient (or Representative) Signature/Relationship to Patient  Date   Time    _____________________________________________________________________________   Name (if used)    Language/Organization   Time    _____________________________________________________________________________  Anesthesiologist Signature     Date   Time  I have discussed the procedure and information above with the patient (or patient’s representative) and answered their questions. The patient or their representative has agreed to have anesthesia services.    _____________________________________________________________________________  Witness        Date   Time  I have verified that the signature is that of the patient or patient’s representative, and that it was signed before the procedure  Patient Name: Luciano Hernández     : 10/23/1950                 Printed: 2024     Medical Record #: [id number]                     Page 2 of 2

## (undated) NOTE — LETTER
[facility], [address] [place] [address] [phone]               04/09/19    Dear Ivy Jarvis,     I am writing to you to remind you to schedule your annual diabetic eye exam. Diabetes remains one of t

## (undated) NOTE — LETTER
[facility], [address]  [facility] [address]  PH: [phone]  FAX: [phone]    Medical Clearance Request    Sheree Flores MD  [address] [place] [id number]  Via Fax: [phone]    The patient listed below is scheduled for surgery and needs the following pre-op labs and/or clearance prior to surgery.  The patient has been instructed to schedule an appointment with you for a pre-op physical.      Patient: Chepe Hernández  : 10/23/1950    Surgeon:  Dr. David Daniels    Procedure: Resection of perineal soft tissue tumor with wire localization    Surgery Date:  24  Location:  [facility]    outpatient    [] Hematocrit/Hemogram [x] EKG was completed on 23    [] CBC    [] Chest X-Ray    [] CMP    [] PT/PTT    [] Urinalysis   [] MRSA Nasal Culture    [] Urinalysis with reflex  [] History and Physical    [] Urine pregnancy  [] Medical Clearance    [] Qualitative HCG (blood) [x] Cardiac Clearance and anticoagulation hold      Please fax to fax number indicated above when completed.  Call [phone] with any questions.     Thank you for your prompt attention to these requirements.    [facility]

## (undated) NOTE — ED AVS SNAPSHOT
[facility] Emergency Department  [facility]  [address] [address]  Phone:  [phone]  Fax:  [address] [facility] [address] Esther Sin   MRN: [id number]    Department:  [facility] Emergency Department   Date of Visit:  6/25/2017 IF THERE IS ANY CHANGE OR WORSENING OF YOUR CONDITION, CALL YOUR PRIMARY CARE PHYSICIAN AT ONCE OR RETURN IMMEDIATELY TO THE EMERGENCY DEPARTMENT.     If you have been prescribed any medication(s), please fill your prescription right away and begin taking t

## (undated) NOTE — MR AVS SNAPSHOT
After Visit Summary   2/3/2017    Loly Dates    MRN: [id number]           Diagnoses this Visit     GIST (gastrointestinal stroma tumor), malignant, colon    -  Primary       Allergies     Radiology Contrast Iodinated Dyes Hives, Respiratory failur [email or web address]. If you've recently had a stay at the Hospital you can access your discharge instructions in [facility] by going to Visits < Admission Summaries.  If you've been to the Emergency Department or your doctor's office, you can view yo

## (undated) NOTE — LETTER
[facility] [address], [address]  Consent for Procedure/Sedation    Date: ***    Time: ***      1. I hereby authorize ***, my physician and his/her assistants (if applicable), which may include medical students, residents, and/or fellows, to perform the following surgical operation/ procedure and administer such anesthesia as may be determined necessary by my physician:  Operation/Procedure name (s) *** on Gordo Evans  2. I recognize that during the surgical operation/procedure, unforeseen conditions may necessitate additional or different procedures than those listed above. I, therefore, further authorize and request that the above-named surgeon, assistants, or designees perform such procedures as are, in their judgment, necessary and desirable. 3.   My surgeon/physician has discussed prior to my surgery the potential benefits, risks and side effects of this procedure; the likelihood of achieving goals; and potential problems that might occur during recuperation. They also discussed reasonable alternatives to the procedure, including risks, benefits, and side effects related to the alternatives and risks related to not receiving this procedure. I have had all my questions answered and I acknowledge that no guarantee has been made as to the result that may be obtained. 4.   Should the need arise during my operation or immediate post-operative period, I also consent to the administration of blood and/or blood products. Further, I understand that despite careful testing and screening of blood or blood products by collecting agencies, I may still be subject to ill effects as a result of receiving a blood transfusion and/or blood products. The following are some, but not all, of the potential risks that can occur: fever and allergic reactions, hemolytic reactions, transmission of diseases such as Hepatitis, AIDS and Cytomegalovirus (CMV) and fluid overload.   In the event that I wish to have an autologous transfusion of my own blood, or a directed donor transfusion. I will discuss this with my physician. 5.   I authorize the use of any specimen, organs, tissues, body parts or foreign objects that may be removed from my body during the operation/procedure for diagnosis, research or teaching purposes and their subsequent disposal by hospital authorities. I also authorize the release of specimen test results and/or written reports to my treating physician on the hospital medical staff or other referring or consulting physicians involved in my care, at the discretion of the Pathologist or my treating physician. 6.   I consent to the photographing or videotaping of the operations or procedures to be performed, including appropriate portions of my body for medical, scientific, or educational purposes, provided my identity is not revealed by the pictures or by descriptive texts accompanying them. If the procedure has been photographed/videotaped, the surgeon will obtain the original picture, image, videotape or CD. The hospital will not be responsible for storage, release or maintenance of the picture, image, tape or CD.    7.   I consent to the presence of a  or observers in the operating room as deemed necessary by my physician or their designees. 8.   I recognize that in the event my procedure results in extended X-Ray/fluoroscopy time, I may develop a skin reaction. 9. If I have a Do Not Attempt Resuscitation (DNAR) order in place, that status will be suspended while in the operating room, procedural suite, and during the recovery period unless otherwise explicitly stated by me (or a person authorized to consent on my behalf). The surgeon or my attending physician will determine when the applicable recovery period ends for purposes of reinstating the DNAR order.   10. Patients having a sterilization procedure: I understand that if the procedure is successful the results will be permanent and it will therefore be impossible for me to inseminate, conceive, or bear children. I also understand that the procedure is intended to result in sterility, although the result has not been guaranteed. 11. I acknowledge that my physician has explained sedation/analgesia administration to me including the risk and benefits I consent to the administration of sedation/analgesia as may be necessary or desirable in the judgment of my physician.     I CERTIFY THAT I HAVE READ AND FULLY UNDERSTAND THE ABOVE CONSENT TO OPERATION and/or OTHER PROCEDURE.      _________________________________________  __________________________________  Signature of Patient     Signature of Responsible Person         ___________________________________         Printed Name of Responsible Person           _________________________________                 Relationship to Patient  _________________________________________  ______________________________  Signature of Witness          Date  Time      Patient Name: Shani Mar     : 10/23/1950                 Printed: October 3, 2022     Medical Record #: [id number]                     Page 1 of 1

## (undated) NOTE — LETTER
February 6, 2018    Domingo Zuniga  [address] [facility]    Dear Arely Desai: It was a pleasure speaking with you over the phone recently.  To follow up, I wanted to send you some contact information to utilize when you have a ques

## (undated) NOTE — Clinical Note
KENIAI, TCM call made, see notes. NCM confirmed with patient that he has a HFU on 7/16/19, NCM changed visit type to TCM HFU.

## (undated) NOTE — MR AVS SNAPSHOT
After Visit Summary   4/10/2017    Marla Faye    MRN: [id number]           Diagnoses this Visit     GIST (gastrointestinal stroma tumor), malignant, [facility] ([facility]    -  Primary     Periorbital swelling         Urinary frequency         BPH (benign p Appointment with Cam Vizcaino at [address] [phone])   [address] [place] [phone]       Thursday Adele 15, 2017 10:45 AM     Appointment with Frida Pacheco at [facility]  ([phone] Component Value Flag Ref Range Units Status    WBC 6.5  4.0-13.0  x10(3) uL Final    RBC 3.85  3.80-5.80  x10(6)uL Final    HGB 12.8 (L) 13.0-17.0  g/dL Final    HCT 36.8 (L) 37.0-53.0  % Final    .0  150.0-450.0  10(3)uL Final    MCV 95.6  80.0-99

## (undated) NOTE — LETTER
19    Patient: Linda Johnson  : 10/23/1950 Visit date: 3/25/2019    Dear  Dr. Rosana Selby MD,    Thank you for referring Linda Johnson to my practice. Please find my assessment and plan below.                 Assessment   Peristomal hernia  (primary enco Currently the patient is controlling his symptoms with Tylenol as needed. One consideration may be for gabapentin or pregabalin to treat the symptoms.     Patient should notify the office if he does begin having increasing symptoms at the colostomy, we ma

## (undated) NOTE — ED AVS SNAPSHOT
Jenifer Rodriguez   MRN: [id number]    Department:  [facility] Emergency Department   Date of Visit:  12/2/2019           Disclosure     Insurance plans vary and the physician(s) referred by the ER may not be covered by your plan.  Please contact your i tell this physician (or your personal doctor if your instructions are to return to your personal doctor) about any new or lasting problems. The primary care or specialist physician will see patients referred from the [facility] Emergency Department.  Shelton Lombard

## (undated) NOTE — LETTER
December 1, 2017    Osvaldo Raimundo  [address] [facility] [address]    Dear Susannah Lr: It was a pleasure speaking with you over the phone recently.  To follow up, I wanted to send you some contact information to utilize when you have a ques

## (undated) NOTE — LETTER
Deann Lopez [address] [facility] [address], [address] [facility]     PICC LINE INSERTION CONSENT     I agree to have a Peripherally Inserted Central Catheter (PICC) placed in my arm.    1. The PICC insertion procedure, care, maintenance, risks, benefits, and c also discussed reasonable alternatives to the PICC, including risks, benefits, and side effects related to the alternatives and risks related to not receiving this procedure      _____________________ ___________ ____________________________________   Date

## (undated) NOTE — ED AVS SNAPSHOT
Barnhart Adilene   MRN: [id number]    Department:  [facility] Emergency Department   Date of Visit:  7/22/2018           Disclosure     Insurance plans vary and the physician(s) referred by the ER may not be covered by your plan.  Please contact your i tell this physician (or your personal doctor if your instructions are to return to your personal doctor) about any new or lasting problems. The primary care or specialist physician will see patients referred from the [facility] Emergency Department.  Helio Murry

## (undated) NOTE — LETTER
[facility] Testing Department  Phone: [phone]  Right Fax: [phone]  Cesar [facility] [employer] Date: 17    Patient Name: Lucina Saldaña  Surgery Date: 10/3/2017    CSN: [id number]  Medical Record: [id number]   : 8

## (undated) NOTE — LETTER
[address]  [address]  Authorization for Surgical Operation and Procedure     Date:___________                                                                                                         Time:__________  I hereby authorize Surgeon(s):  Quentin Llamas MD, my physician and his/her assistants (if applicable), which may include medical students, residents, and/or fellows, to perform the following surgical operation/ procedure and administer such anesthesia as may be determined necessary by my physician:  Operation/Procedure name (s) Procedure(s):  ESOPHAGOGASTRODUODENOSCOPY (EGD), FLEXIBLE SIGMOIDOSCOPY  FLEXIBLE SIGMOIDOSCOPY on Luciano Hernández   2.   I recognize that during the surgical operation/procedure, unforeseen conditions may necessitate additional or different procedures than those listed above.  I, therefore, further authorize and request that the above-named surgeon, assistants, or designees perform such procedures as are, in their judgment, necessary and desirable.    3.   My surgeon/physician has discussed prior to my surgery the potential benefits, risks and side effects of this procedure; the likelihood of achieving goals; and potential problems that might occur during recuperation.  They also discussed reasonable alternatives to the procedure, including risks, benefits, and side effects related to the alternatives and risks related to not receiving this procedure.  I have had all my questions answered and I acknowledge that no guarantee has been made as to the result that may be obtained.    4.   Should the need arise during my operation/procedure, which includes change of level of care prior to discharge, I also consent to the administration of blood and/or blood products.  Further, I understand that despite careful testing and screening of blood or blood products by collecting agencies, I may still be subject to ill effects as a result of  receiving a blood transfusion and/or blood products.  The following are some, but not all, of the potential risks that can occur: fever and allergic reactions, hemolytic reactions, transmission of diseases such as Hepatitis, AIDS and Cytomegalovirus (CMV) and fluid overload.  In the event that I wish to have an autologous transfusion of my own blood, or a directed donor transfusion, I will discuss this with my physician.  Check only if Refusing Blood or Blood Products  I understand refusal of blood or blood products as deemed necessary by my physician may have serious consequences to my condition to include possible death. I hereby assume responsibility for my refusal and release the hospital, its personnel, and my physicians from any responsibility for the consequences of my refusal.          o  Refuse      5.   I authorize the use of any specimen, organs, tissues, body parts or foreign objects that may be removed from my body during the operation/procedure for diagnosis, research or teaching purposes and their subsequent disposal by hospital authorities.  I also authorize the release of specimen test results and/or written reports to my treating physician on the hospital medical staff or other referring or consulting physicians involved in my care, at the discretion of the Pathologist or my treating physician.    6.   I consent to the photographing or videotaping of the operations or procedures to be performed, including appropriate portions of my body for medical, scientific, or educational purposes, provided my identity is not revealed by the pictures or by descriptive texts accompanying them.  If the procedure has been photographed/videotaped, the surgeon will obtain the original picture, image, videotape or CD.  The hospital will not be responsible for storage, release or maintenance of the picture, image, tape or CD.    7.   I consent to the presence of a  or observers in the operating room  as deemed necessary by my physician or their designees.    8.   I recognize that in the event my procedure results in extended X-Ray/fluoroscopy time, I may develop a skin reaction.    9. If I have a Do Not Attempt Resuscitation (DNAR) order in place, that status will be suspended while in the operating room, procedural suite, and during the recovery period unless otherwise explicitly stated by me (or a person authorized to consent on my behalf). The surgeon or my attending physician will determine when the applicable recovery period ends for purposes of reinstating the DNAR order.  10. Patients having a sterilization procedure: I understand that if the procedure is successful the results will be permanent and it will therefore be impossible for me to inseminate, conceive, or bear children.  I also understand that the procedure is intended to result in sterility, although the result has not been guaranteed.   11. I acknowledge that my physician has explained sedation/analgesia administration to me including the risk and benefits I consent to the administration of sedation/analgesia as may be necessary or desirable in the judgment of my physician.    I CERTIFY THAT I HAVE READ AND FULLY UNDERSTAND THE ABOVE CONSENT TO OPERATION and/or OTHER PROCEDURE.    _________________________________________  __________________________________  Signature of Patient     Signature of Responsible Person         ___________________________________         Printed Name of Responsible Person           _________________________________                 Relationship to Patient  _________________________________________  ______________________________  Signature of Witness          Date  Time      Patient Name: Luciano Stephane Hernández     : 10/23/1950                 Printed: April 15, 2024     Medical Record #: [id number]                     Page [address] of [address] [place] [address],  [place]  [address]    Consent for Anesthesia    I, Luciano Hernández agree to be cared for by an anesthesiologist, who is specially trained to monitor me and give me medicine to put me to sleep or keep me comfortable during my procedure    I understand that my anesthesiologist is not an employee or agent of [facility] or [employer]. He or she works for [employer].    As the patient asking for anesthesia services, I agree to:  Allow the anesthesiologist (anesthesia doctor) to give me medicine and do additional procedures as necessary. Some examples are: Starting or using an “IV” to give me medicine, fluids or blood during my procedure, and having a breathing tube placed to help me breathe when I’m asleep (intubation). In the event that my heart stops working properly, I understand that my anesthesiologist will make every effort to sustain my life, unless otherwise directed by [facility] Do Not Resuscitate documents.  Tell my anesthesia doctor before my procedure:  If I am pregnant.  The last time that I ate or drank.  All of the medicines I take (including prescriptions, herbal supplements, and pills I can buy without a prescription (including street drugs/illegal medications). Failure to inform my anesthesiologist about these medicines may increase my risk of anesthetic complications.  If I am allergic to anything or have had a reaction to anesthesia before.  I understand how the anesthesia medicine will help me (benefits).  I understand that with any type of anesthesia medicine there are risks:  The most common risks are: nausea, vomiting, sore throat, muscle soreness, damage to my eyes, mouth, or teeth (from breathing tube placement).  Rare risks include: remembering what happened during my procedure, allergic reactions to medications, injury to my airway, heart, lungs, vision, nerves, or muscles and in extremely rare instances death.  My doctor has explained to me other choices  available to me for my care (alternatives).  Pregnant Patients (“epidural”):  I understand that the risks of having an epidural (medicine given into my back to help control pain during labor), include itching, low blood pressure, difficulty urinating, headache or slowing of the baby’s heart. Very rare risks include infection, bleeding, seizure, irregular heart rhythms and nerve injury.  Regional Anesthesia (“spinal”, “epidural”, & “nerve blocks”):  I understand that rare but potential complications include headache, bleeding, infection, seizure, irregular heart rhythms, and nerve injury.    I can change my mind about having anesthesia services at any time before I get the medicine.    _____________________________________________________________________________  Patient (or Representative) Signature/Relationship to Patient  Date   Time    _____________________________________________________________________________   Name (if used)    Language/Organization   Time    _____________________________________________________________________________  Anesthesiologist Signature     Date   Time  I have discussed the procedure and information above with the patient (or patient’s representative) and answered their questions. The patient or their representative has agreed to have anesthesia services.    _____________________________________________________________________________  Witness        Date   Time  I have verified that the signature is that of the patient or patient’s representative, and that it was signed before the procedure  Patient Name: Luciano Hernández     : 10/23/1950                 Printed: April 15, 2024     Medical Record #: [id number]                     Page 2 of 2